# Patient Record
Sex: MALE | Race: WHITE | Employment: OTHER | URBAN - METROPOLITAN AREA
[De-identification: names, ages, dates, MRNs, and addresses within clinical notes are randomized per-mention and may not be internally consistent; named-entity substitution may affect disease eponyms.]

---

## 2017-09-08 ENCOUNTER — HOSPITAL ENCOUNTER (OUTPATIENT)
Dept: LAB | Age: 77
Discharge: HOME | End: 2017-09-08
Payer: MEDICARE

## 2017-09-08 DIAGNOSIS — R06.09 DOE (DYSPNEA ON EXERTION): ICD-10-CM

## 2017-09-08 DIAGNOSIS — I25.119 ATHEROSCLEROSIS OF NATIVE CORONARY ARTERY OF NATIVE HEART WITH ANGINA PECTORIS (HCC): ICD-10-CM

## 2017-09-08 PROBLEM — I50.32 DIASTOLIC CHF, CHRONIC (HCC): Status: ACTIVE | Noted: 2017-09-08

## 2017-09-08 LAB
ANION GAP SERPL CALC-SCNC: 9 MMOL/L
BASOPHILS # BLD: 0.1 K/UL (ref 0–0.2)
BASOPHILS NFR BLD: 1 % (ref 0–2)
BNP SERPL-MCNC: 16 PG/ML
BUN SERPL-MCNC: 58 MG/DL (ref 8–23)
CALCIUM SERPL-MCNC: 9 MG/DL (ref 8.3–10.4)
CHLORIDE SERPL-SCNC: 105 MMOL/L (ref 98–107)
CO2 SERPL-SCNC: 22 MMOL/L (ref 21–32)
CREAT SERPL-MCNC: 2.8 MG/DL (ref 0.8–1.5)
DIFFERENTIAL METHOD BLD: ABNORMAL
EOSINOPHIL # BLD: 0.4 K/UL (ref 0–0.8)
EOSINOPHIL NFR BLD: 5 % (ref 0.5–7.8)
ERYTHROCYTE [DISTWIDTH] IN BLOOD BY AUTOMATED COUNT: 13.4 % (ref 11.9–14.6)
GLUCOSE SERPL-MCNC: 152 MG/DL (ref 65–100)
HCT VFR BLD AUTO: 34.9 % (ref 41.1–50.3)
HGB BLD-MCNC: 11.7 G/DL (ref 13.6–17.2)
INR PPP: 1.1 (ref 0.9–1.2)
LYMPHOCYTES # BLD: 2.4 K/UL (ref 0.5–4.6)
LYMPHOCYTES NFR BLD: 29 % (ref 13–44)
MAGNESIUM SERPL-MCNC: 2.3 MG/DL (ref 1.8–2.4)
MCH RBC QN AUTO: 31.3 PG (ref 26.1–32.9)
MCHC RBC AUTO-ENTMCNC: 33.5 G/DL (ref 31.4–35)
MCV RBC AUTO: 93.3 FL (ref 79.6–97.8)
MONOCYTES # BLD: 0.6 K/UL (ref 0.1–1.3)
MONOCYTES NFR BLD: 7 % (ref 4–12)
NEUTS SEG # BLD: 4.8 K/UL (ref 1.7–8.2)
NEUTS SEG NFR BLD: 58 % (ref 43–78)
PLATELET # BLD AUTO: 245 K/UL (ref 150–450)
PMV BLD AUTO: 11.9 FL (ref 10.8–14.1)
POTASSIUM SERPL-SCNC: 4.3 MMOL/L (ref 3.5–5.1)
PROTHROMBIN TIME: 11.6 SEC (ref 9.6–12)
RBC # BLD AUTO: 3.74 M/UL (ref 4.23–5.67)
SODIUM SERPL-SCNC: 136 MMOL/L (ref 136–145)
WBC # BLD AUTO: 8.2 K/UL (ref 4.3–11.1)

## 2017-09-08 PROCEDURE — 85025 COMPLETE CBC W/AUTO DIFF WBC: CPT | Performed by: INTERNAL MEDICINE

## 2017-09-08 PROCEDURE — 83880 ASSAY OF NATRIURETIC PEPTIDE: CPT | Performed by: INTERNAL MEDICINE

## 2017-09-08 PROCEDURE — 83735 ASSAY OF MAGNESIUM: CPT | Performed by: INTERNAL MEDICINE

## 2017-09-08 PROCEDURE — 80048 BASIC METABOLIC PNL TOTAL CA: CPT | Performed by: INTERNAL MEDICINE

## 2017-09-08 PROCEDURE — 85610 PROTHROMBIN TIME: CPT | Performed by: INTERNAL MEDICINE

## 2017-09-08 PROCEDURE — 36415 COLL VENOUS BLD VENIPUNCTURE: CPT | Performed by: INTERNAL MEDICINE

## 2017-09-10 ENCOUNTER — HOSPITAL ENCOUNTER (OUTPATIENT)
Age: 77
Setting detail: OBSERVATION
Discharge: HOME | DRG: 303 | End: 2017-09-11
Attending: INTERNAL MEDICINE | Admitting: INTERNAL MEDICINE
Payer: MEDICARE

## 2017-09-10 PROBLEM — I20.0 UNSTABLE ANGINA (HCC): Status: ACTIVE | Noted: 2017-09-10

## 2017-09-10 LAB
ANION GAP SERPL CALC-SCNC: 11 MMOL/L (ref 7–16)
BUN SERPL-MCNC: 52 MG/DL (ref 8–23)
CALCIUM SERPL-MCNC: 8.9 MG/DL (ref 8.3–10.4)
CHLORIDE SERPL-SCNC: 106 MMOL/L (ref 98–107)
CO2 SERPL-SCNC: 22 MMOL/L (ref 21–32)
CREAT SERPL-MCNC: 2.62 MG/DL (ref 0.8–1.5)
ERYTHROCYTE [DISTWIDTH] IN BLOOD BY AUTOMATED COUNT: 14.1 % (ref 11.9–14.6)
GLUCOSE BLD STRIP.AUTO-MCNC: 149 MG/DL (ref 65–100)
GLUCOSE BLD STRIP.AUTO-MCNC: 151 MG/DL (ref 65–100)
GLUCOSE SERPL-MCNC: 145 MG/DL (ref 65–100)
HCT VFR BLD AUTO: 33.7 % (ref 41.1–50.3)
HGB BLD-MCNC: 11.7 G/DL (ref 13.6–17.2)
INR PPP: 1.1 (ref 0.9–1.2)
MAGNESIUM SERPL-MCNC: 2.1 MG/DL (ref 1.8–2.4)
MCH RBC QN AUTO: 31.4 PG (ref 26.1–32.9)
MCHC RBC AUTO-ENTMCNC: 34.7 G/DL (ref 31.4–35)
MCV RBC AUTO: 90.3 FL (ref 79.6–97.8)
PLATELET # BLD AUTO: 312 K/UL (ref 150–450)
PMV BLD AUTO: 11.1 FL (ref 10.8–14.1)
POTASSIUM SERPL-SCNC: 4.2 MMOL/L (ref 3.5–5.1)
PROTHROMBIN TIME: 11.8 SEC (ref 9.6–12)
RBC # BLD AUTO: 3.73 M/UL (ref 4.23–5.67)
SODIUM SERPL-SCNC: 139 MMOL/L (ref 136–145)
WBC # BLD AUTO: 8.9 K/UL (ref 4.3–11.1)

## 2017-09-10 PROCEDURE — 85027 COMPLETE CBC AUTOMATED: CPT | Performed by: INTERNAL MEDICINE

## 2017-09-10 PROCEDURE — 94760 N-INVAS EAR/PLS OXIMETRY 1: CPT

## 2017-09-10 PROCEDURE — 77030018846 HC SOL IRR STRL H20 ICUM -A

## 2017-09-10 PROCEDURE — 74011250637 HC RX REV CODE- 250/637: Performed by: NURSE PRACTITIONER

## 2017-09-10 PROCEDURE — 80048 BASIC METABOLIC PNL TOTAL CA: CPT | Performed by: INTERNAL MEDICINE

## 2017-09-10 PROCEDURE — 83735 ASSAY OF MAGNESIUM: CPT | Performed by: INTERNAL MEDICINE

## 2017-09-10 PROCEDURE — 74011250636 HC RX REV CODE- 250/636: Performed by: NURSE PRACTITIONER

## 2017-09-10 PROCEDURE — 65660000000 HC RM CCU STEPDOWN

## 2017-09-10 PROCEDURE — 74011636637 HC RX REV CODE- 636/637: Performed by: INTERNAL MEDICINE

## 2017-09-10 PROCEDURE — 65390000012 HC CONDITION CODE 44 OBSERVATION

## 2017-09-10 PROCEDURE — 74011636637 HC RX REV CODE- 636/637: Performed by: NURSE PRACTITIONER

## 2017-09-10 PROCEDURE — 85610 PROTHROMBIN TIME: CPT | Performed by: INTERNAL MEDICINE

## 2017-09-10 PROCEDURE — 82962 GLUCOSE BLOOD TEST: CPT

## 2017-09-10 RX ORDER — ISOSORBIDE MONONITRATE 60 MG/1
60 TABLET, EXTENDED RELEASE ORAL DAILY
Status: DISCONTINUED | OUTPATIENT
Start: 2017-09-11 | End: 2017-09-11 | Stop reason: HOSPADM

## 2017-09-10 RX ORDER — DEXTROSE 40 %
15 GEL (GRAM) ORAL AS NEEDED
Status: DISCONTINUED | OUTPATIENT
Start: 2017-09-10 | End: 2017-09-11 | Stop reason: HOSPADM

## 2017-09-10 RX ORDER — INSULIN LISPRO 100 [IU]/ML
14 INJECTION, SOLUTION INTRAVENOUS; SUBCUTANEOUS
Status: DISCONTINUED | OUTPATIENT
Start: 2017-09-10 | End: 2017-09-11 | Stop reason: HOSPADM

## 2017-09-10 RX ORDER — HYDRALAZINE HYDROCHLORIDE 50 MG/1
100 TABLET, FILM COATED ORAL 2 TIMES DAILY
Status: DISCONTINUED | OUTPATIENT
Start: 2017-09-10 | End: 2017-09-11 | Stop reason: HOSPADM

## 2017-09-10 RX ORDER — CLOPIDOGREL BISULFATE 75 MG/1
75 TABLET ORAL DAILY
Status: DISCONTINUED | OUTPATIENT
Start: 2017-09-11 | End: 2017-09-11 | Stop reason: HOSPADM

## 2017-09-10 RX ORDER — INSULIN LISPRO 100 [IU]/ML
INJECTION, SOLUTION INTRAVENOUS; SUBCUTANEOUS
Status: DISCONTINUED | OUTPATIENT
Start: 2017-09-10 | End: 2017-09-10

## 2017-09-10 RX ORDER — INSULIN LISPRO 100 [IU]/ML
INJECTION, SOLUTION INTRAVENOUS; SUBCUTANEOUS
Status: DISCONTINUED | OUTPATIENT
Start: 2017-09-10 | End: 2017-09-11 | Stop reason: HOSPADM

## 2017-09-10 RX ORDER — INSULIN GLARGINE 100 [IU]/ML
32 INJECTION, SOLUTION SUBCUTANEOUS 2 TIMES DAILY
Status: DISCONTINUED | OUTPATIENT
Start: 2017-09-10 | End: 2017-09-10

## 2017-09-10 RX ORDER — BUDESONIDE 0.5 MG/2ML
500 INHALANT ORAL
Status: DISCONTINUED | OUTPATIENT
Start: 2017-09-10 | End: 2017-09-11 | Stop reason: HOSPADM

## 2017-09-10 RX ORDER — SODIUM CHLORIDE 9 MG/ML
75 INJECTION, SOLUTION INTRAVENOUS CONTINUOUS
Status: DISCONTINUED | OUTPATIENT
Start: 2017-09-10 | End: 2017-09-11 | Stop reason: HOSPADM

## 2017-09-10 RX ORDER — SODIUM CHLORIDE 0.9 % (FLUSH) 0.9 %
5-10 SYRINGE (ML) INJECTION EVERY 8 HOURS
Status: DISCONTINUED | OUTPATIENT
Start: 2017-09-10 | End: 2017-09-11 | Stop reason: HOSPADM

## 2017-09-10 RX ORDER — DEXTROSE 50 % IN WATER (D50W) INTRAVENOUS SYRINGE
25-50 AS NEEDED
Status: DISCONTINUED | OUTPATIENT
Start: 2017-09-10 | End: 2017-09-11 | Stop reason: HOSPADM

## 2017-09-10 RX ORDER — ALLOPURINOL 300 MG/1
300 TABLET ORAL DAILY
Status: DISCONTINUED | OUTPATIENT
Start: 2017-09-11 | End: 2017-09-11 | Stop reason: HOSPADM

## 2017-09-10 RX ORDER — INSULIN GLARGINE 100 [IU]/ML
32 INJECTION, SOLUTION SUBCUTANEOUS 2 TIMES DAILY
Status: DISCONTINUED | OUTPATIENT
Start: 2017-09-11 | End: 2017-09-11 | Stop reason: HOSPADM

## 2017-09-10 RX ORDER — ALBUTEROL SULFATE 2.5 MG/.5ML
2.5 SOLUTION RESPIRATORY (INHALATION)
Status: DISCONTINUED | OUTPATIENT
Start: 2017-09-10 | End: 2017-09-11 | Stop reason: HOSPADM

## 2017-09-10 RX ORDER — INSULIN LISPRO 100 [IU]/ML
20 INJECTION, SOLUTION INTRAVENOUS; SUBCUTANEOUS
Status: DISCONTINUED | OUTPATIENT
Start: 2017-09-11 | End: 2017-09-11 | Stop reason: HOSPADM

## 2017-09-10 RX ORDER — SODIUM CHLORIDE 0.9 % (FLUSH) 0.9 %
5-10 SYRINGE (ML) INJECTION AS NEEDED
Status: DISCONTINUED | OUTPATIENT
Start: 2017-09-10 | End: 2017-09-11 | Stop reason: HOSPADM

## 2017-09-10 RX ORDER — INSULIN GLARGINE 100 [IU]/ML
16 INJECTION, SOLUTION SUBCUTANEOUS ONCE
Status: COMPLETED | OUTPATIENT
Start: 2017-09-10 | End: 2017-09-10

## 2017-09-10 RX ORDER — ALBUTEROL SULFATE 90 UG/1
1 AEROSOL, METERED RESPIRATORY (INHALATION)
Status: DISCONTINUED | OUTPATIENT
Start: 2017-09-10 | End: 2017-09-11 | Stop reason: HOSPADM

## 2017-09-10 RX ORDER — LEVOTHYROXINE SODIUM 50 UG/1
50 TABLET ORAL
Status: DISCONTINUED | OUTPATIENT
Start: 2017-09-11 | End: 2017-09-11 | Stop reason: HOSPADM

## 2017-09-10 RX ORDER — ASPIRIN 81 MG/1
81 TABLET ORAL DAILY
Status: DISCONTINUED | OUTPATIENT
Start: 2017-09-11 | End: 2017-09-11 | Stop reason: HOSPADM

## 2017-09-10 RX ADMIN — HYDRALAZINE HYDROCHLORIDE 100 MG: 50 TABLET, FILM COATED ORAL at 17:10

## 2017-09-10 RX ADMIN — INSULIN GLARGINE 16 UNITS: 100 INJECTION, SOLUTION SUBCUTANEOUS at 17:09

## 2017-09-10 RX ADMIN — INSULIN LISPRO 14 UNITS: 100 INJECTION, SOLUTION INTRAVENOUS; SUBCUTANEOUS at 17:09

## 2017-09-10 RX ADMIN — SODIUM CHLORIDE 75 ML/HR: 900 INJECTION, SOLUTION INTRAVENOUS at 15:44

## 2017-09-10 NOTE — PROGRESS NOTES
Patient received to room 310 as direct admit. Patient oriented to room, call light and plan of care. Admission assessment completed. Admission skin assessment completed with second RN and reveals the following: scattered scabs, otherwise skin intact.

## 2017-09-10 NOTE — IP AVS SNAPSHOT
[address] 
[phone] Patient: Gladsi Lomas MRN: [id number] [facility]:6/43[birth year]39 You are allergic to the following Allergen Reactions Amlodipine Shortness of Breath Iodine Shortness of Breath Metformin Shortness of Breath Ranolazine Unknown (comments) Lips and tongue numbness Spironolactone Unknown (comments) Other (comments) Potassium level went really high Increased potassium Beta Blocker (Beta-Blockers (Beta-Adrenergic Blocking Agts)) Other (comments) Mental fogginess Lortab (Hydrocodone-Acetaminophen) Unknown (comments) Pt states that if he takes this  The medication makes him feel like he is going crazy Shellfish Containing Products Shortness of Breath Swelling Recent Documentation Height Weight BMI Smoking Status 1.854 m 149.3 kg 43.43 kg/m2 Former Smoker Emergency Contacts Name Discharge Info Relation Home Work Mobile Aly Livingston  Spouse [3] [phone] About your hospitalization You were admitted on:  September 10, 2017 You last received care in the:  [facility] 3 TELEMETRY You were discharged on:  September 11, 2017 Unit phone number:  [phone] Why you were hospitalized Your primary diagnosis was:  Unstable Angina (Hcc) Your diagnoses also included:  Hypertension, Edema, Gallo (Obstructive Sleep Apnea), Ckd (Chronic Kidney Disease), Diabetes Mellitus Type 2 In Obese (Hcc), Spaulding (Dyspnea On Exertion), Diastolic Chf, Chronic (Hcc) Providers Seen During Your Hospitalizations Provider Role Specialty Primary office phone Reva Crocker DO Attending Provider Cardiology [phone] Your Primary Care Physician (PCP) Primary Care Physician Office Phone Office Fax Debbie Coelho[address] [phone] Follow-up Information Follow up With Details Comments Contact Info Mariela Jose DO In 4 weeks Sofia Fontenot and then follow up in the office with Dr. Freya Wood. Office will call Velvet  [address] [facility] [place] [facility] [address] 
[phone] Matt Plascencia MD In 1 week call in AM for follow up appointment in one week Jaspreet Wallis [address] [place] [id number] 
[phone] Your Appointments Thursday September 28, 2017 10:00 AM EDT Office Visit with Mariela Jose DO  
[facility] CARDIOLOGY [place] OFFICE ([address] [place] [address]) [address] [place] [address]  
[phone] Current Discharge Medication List  
  
START taking these medications Dose & Instructions Dispensing Information Comments Morning Noon Evening Bedtime  
 famotidine 40 mg tablet Commonly known as:  PEPCID Your last dose was: Your next dose is:    
   
   
 Dose:  40 mg Take 1 Tab by mouth two (2) times a day. Quantity:  60 Tab Refills:  3 CONTINUE these medications which have CHANGED Dose & Instructions Dispensing Information Comments Morning Noon Evening Bedtime  
 hydrALAZINE 100 mg tablet Commonly known as:  APRESOLINE What changed:  how much to take Your last dose was: Your next dose is:    
   
   
 Dose:  100 mg Take 1 Tab by mouth two (2) times a day. Quantity:  180 Tab Refills:  3 CONTINUE these medications which have NOT CHANGED Dose & Instructions Dispensing Information Comments Morning Noon Evening Bedtime  
 albuterol 90 mcg/actuation inhaler Commonly known as:  PROAIR HFA Your last dose was: Your next dose is:    
   
   
 2 puffs qid prn Quantity:  1 Inhaler Refills:  11  
     
   
   
   
  
 allopurinol 300 mg tablet Commonly known as:  Nakia Nip Your last dose was: Your next dose is: Dose:  300 mg Take 300 mg by mouth daily. Refills:  0  
     
   
   
   
  
 aspirin delayed-release 81 mg tablet Your last dose was: Your next dose is:    
   
   
 Dose:  81 mg Take 81 mg by mouth. Refills:  0 Cholecalciferol (Vitamin D3) 2,000 unit Cap capsule Commonly known as:  VITAMIN D3 Your last dose was: Your next dose is:    
   
   
 Dose:  1 Cap Take 1 Cap by mouth. Refills:  0  
     
   
   
   
  
 clopidogrel 75 mg Tab Commonly known as:  PLAVIX Your last dose was: Your next dose is:    
   
   
 Dose:  75 mg Take 1 Tab by mouth daily. Quantity:  90 Tab Refills:  3  
     
   
   
   
  
 cpap machine kit Your last dose was: Your next dose is:    
   
   
 8/12 cm qhs Refills:  0  
     
   
   
   
  
 fluticasone-vilanterol 100-25 mcg/dose inhaler Commonly known as:  BREO ELLIPTA Your last dose was: Your next dose is:    
   
   
 1 inhalation daily, rinse mouth after use Quantity:  1 Inhaler Refills:  11  
     
   
   
   
  
 glucose blood VI test strips strip Commonly known as:  ASCENSIA AUTODISC VI, ONE TOUCH ULTRA TEST VI Your last dose was: Your next dose is:    
   
   
 by Does Not Apply route. Refills:  0 HumaLOG 100 unit/mL injection Generic drug:  insulin lispro Your last dose was: Your next dose is:    
   
   
 Sliding scale Refills:  0 Insulin Needles (Disposable) 32 gauge x 5/32\" Ndle Your last dose was: Your next dose is:    
   
   
 by Does Not Apply route. Refills:  0  
     
   
   
   
  
 isosorbide mononitrate ER 60 mg CR tablet Commonly known as:  IMDUR Your last dose was: Your next dose is:    
   
   
 Dose:  60 mg Take 1 Tab by mouth every morning. Quantity:  90 Tab Refills:  3 Lancets Misc Your last dose was: Your next dose is:    
   
   
 by Does Not Apply route. Refills:  0  
     
   
   
   
  
 LANTUS 100 unit/mL injection Generic drug:  insulin glargine Your last dose was: Your next dose is:    
   
   
 Dose:  32 Units 32 Units by SubCUTAneous route two (2) times a day. Refills:  0  
     
   
   
   
  
 levothyroxine 50 mcg tablet Commonly known as:  SYNTHROID Your last dose was: Your next dose is: Take  by mouth Daily (before breakfast). Refills:  0  
     
   
   
   
  
 torsemide 10 mg tablet Commonly known as:  DEMADEX Your last dose was: Your next dose is:    
   
   
 Dose:  10 mg Take 10 mg by mouth two (2) times a day. Refills:  0  
     
   
   
   
  
 TRULICITY 1.5 HN/3.2 mL sub-q pen Generic drug:  dulaglutide Your last dose was: Your next dose is:    
   
   
  Refills:  0 STOP taking these medications   
 losartan-hydroCHLOROthiazide 100-25 mg per tablet Commonly known as:  HYZAAR Where to Get Your Medications These medications were sent to [address] [place] [address]  [address] [place] [address] Phone:  [phone]  
  famotidine 40 mg tablet Discharge Instructions Cardiac Catheterization/Angiography Discharge Instructions *Check the puncture site frequently for swelling or bleeding. If you see any bleeding, lie down and apply pressure over the area with a clean town or washcloth. Notify your doctor for any redness, swelling, drainage or oozing from the puncture site. Notify your doctor for any fever or chills. *If the leg or arm with the puncture becomes cold, numb or painful, call Dr Mohini Schaffer at  [phone]. *Activity should be limited for the next 48 hours.  Climb stairs as little as possible and avoid any stooping, bending or strenuous activity for 48 hours. No heavy lifting (anything over 10 pounds) for three days. DISCHARGE SUMMARY from Nurse The following personal items are in your possession at time of discharge: 
 
Dental Appliances: None Home Medications: Sent home Jewelry: None Clothing: At bedside Other Valuables: None PATIENT INSTRUCTIONS: 
 
After general anesthesia or intravenous sedation, for 24 hours or while taking prescription Narcotics: · Limit your activities · Do not drive and operate hazardous machinery · Do not make important personal or business decisions · Do  not drink alcoholic beverages · If you have not urinated within 8 hours after discharge, please contact your surgeon on call. Report the following to your surgeon: 
· Excessive pain, swelling, redness or odor of or around the surgical area · Temperature over 100.5 · Nausea and vomiting lasting longer than 4 hours or if unable to take medications · Any signs of decreased circulation or nerve impairment to extremity: change in color, persistent  numbness, tingling, coldness or increase pain · Any questions What to do at Home: *  Please give a list of your current medications to your Primary Care Provider. *  Please update this list whenever your medications are discontinued, doses are 
    changed, or new medications (including over-the-counter products) are added. *  Please carry medication information at all times in case of emergency situations. These are general instructions for a healthy lifestyle: No smoking/ No tobacco products/ Avoid exposure to second hand smoke Surgeon General's Warning:  Quitting smoking now greatly reduces serious risk to your health. Obesity, smoking, and sedentary lifestyle greatly increases your risk for illness A healthy diet, regular physical exercise & weight monitoring are important for maintaining a healthy lifestyle You may be retaining fluid if you have a history of heart failure or if you experience any of the following symptoms:  Weight gain of 3 pounds or more overnight or 5 pounds in a week, increased swelling in our hands or feet or shortness of breath while lying flat in bed. Please call your doctor as soon as you notice any of these symptoms; do not wait until your next office visit. Recognize signs and symptoms of STROKE: 
 
F-face looks uneven A-arms unable to move or move unevenly S-speech slurred or non-existent T-time-call 911 as soon as signs and symptoms begin-DO NOT go Back to bed or wait to see if you get better-TIME IS BRAIN. Warning Signs of HEART ATTACK Call 911 if you have these symptoms: 
? Chest discomfort. Most heart attacks involve discomfort in the center of the chest that lasts more than a few minutes, or that goes away and comes back. It can feel like uncomfortable pressure, squeezing, fullness, or pain. ? Discomfort in other areas of the upper body. Symptoms can include pain or discomfort in one or both arms, the back, neck, jaw, or stomach. ? Shortness of breath with or without chest discomfort. ? Other signs may include breaking out in a cold sweat, nausea, or lightheadedness. Don't wait more than five minutes to call [address]! Fast action can save your life. Calling 911 is almost always the fastest way to get lifesaving treatment. Emergency Medical Services staff can begin treatment when they arrive  up to an hour sooner than if someone gets to the hospital by car. The discharge information has been reviewed with the patient. The patient verbalized understanding. Discharge medications reviewed with the patient and appropriate educational materials and side effects teaching were provided. *Do not drive for 48 hours. *You may resume your usual diet.  Drink more fluids than usual. 
 
 *Have a responsible person drive you home and stay with you for at least 24 hours after your heart catheterization/angiography. *You may remove the bandage from your Right and Arm in 24 hours. You may shower in 24 hours. No tub baths, hot tubs or swimming for one week. Do not place any lotions, creams, powders, ointments over the puncture site for one week. You may place a clean band-aid over the puncture site each day for 5 days. Change this daily. Discharge Orders None ACO Transitions of Care Introducing [employer] [address] offers a voluntary care coordination program to provide high quality service and care to [facility] fee-for-service beneficiaries. John Monahan was designed to help you enhance your health and well-being through the following services: ? Transitions of Care  support for individuals who are transitioning from one care setting to another (example: Hospital to home). ? Chronic and Complex Care Coordination  support for individuals and caregivers of those with serious or chronic illnesses or with more than one chronic (ongoing) condition and those who take a number of different medications. If you meet specific medical criteria, a [address] [place] [address] Rd may call you directly to coordinate your care with your primary care physician and your other care providers. For questions about the [facility] programs, please, contact your physicians office. For general questions or additional information about Accountable Care Organizations: 
Please visit www.medicare.gov/acos. html or call 1-800-MEDICARE (4-135.109.5822) TTY users should call 3-601.129.1278. [employer] Announcement We are excited to announce that we are making your provider's discharge notes available to you in [employer]   You will see these notes when they are completed and signed by the physician that discharged you from your recent hospital stay. If you have any questions or concerns about any information you see in [employer], please call the Health Information Department where you were seen or reach out to your Primary Care Provider for more information about your plan of care. Introducing [facility]! Dear Margot Bolton: Thank you for requesting a [employer] account. Our records indicate that you already have an active [employer] account. You can access your account anytime at [email or web address] Did you know that you can access your hospital and ER discharge instructions at any time in [employer]? You can also review all of your test results from your hospital stay or ER visit. Additional Information If you have questions, please visit the Frequently Asked Questions section of the [employer] website at [email or web address] Remember, [employer] is NOT to be used for urgent needs. For medical emergencies, dial 911. Now available from your iPhone and Android! General Information Please provide this summary of care documentation to your next provider. Patient Signature:  ____________________________________________________________ Date:  ____________________________________________________________  
  
Daksha Pope Provider Signature:  ____________________________________________________________ Date:  ____________________________________________________________

## 2017-09-10 NOTE — IP AVS SNAPSHOT
303 [facility] 
 
 
 [address] [place] [address] 
[phone] Patient: Nicolette March MRN: [id number] XNP:[birth year] Current Discharge Medication List  
  
START taking these medications Dose & Instructions Dispensing Information Comments Morning Noon Evening Bedtime  
 famotidine 40 mg tablet Commonly known as:  PEPCID Your last dose was: Your next dose is:    
   
   
 Dose:  40 mg Take 1 Tab by mouth two (2) times a day. Quantity:  60 Tab Refills:  3 CONTINUE these medications which have CHANGED Dose & Instructions Dispensing Information Comments Morning Noon Evening Bedtime  
 hydrALAZINE 100 mg tablet Commonly known as:  APRESOLINE What changed:  how much to take Your last dose was: Your next dose is:    
   
   
 Dose:  100 mg Take 1 Tab by mouth two (2) times a day. Quantity:  180 Tab Refills:  3 CONTINUE these medications which have NOT CHANGED Dose & Instructions Dispensing Information Comments Morning Noon Evening Bedtime  
 albuterol 90 mcg/actuation inhaler Commonly known as:  PROAIR HFA Your last dose was: Your next dose is:    
   
   
 2 puffs qid prn Quantity:  1 Inhaler Refills:  11  
     
   
   
   
  
 allopurinol 300 mg tablet Commonly known as:  Darek Colin Your last dose was: Your next dose is:    
   
   
 Dose:  300 mg Take 300 mg by mouth daily. Refills:  0  
     
   
   
   
  
 aspirin delayed-release 81 mg tablet Your last dose was: Your next dose is:    
   
   
 Dose:  81 mg Take 81 mg by mouth. Refills:  0 Cholecalciferol (Vitamin D3) 2,000 unit Cap capsule Commonly known as:  VITAMIN D3 Your last dose was: Your next dose is:    
   
   
 Dose:  1 Cap Take 1 Cap by mouth. Refills:  0 clopidogrel 75 mg Tab Commonly known as:  PLAVIX Your last dose was: Your next dose is:    
   
   
 Dose:  75 mg Take 1 Tab by mouth daily. Quantity:  90 Tab Refills:  3  
     
   
   
   
  
 cpap machine kit Your last dose was: Your next dose is:    
   
   
 8/12 cm qhs Refills:  0  
     
   
   
   
  
 fluticasone-vilanterol 100-25 mcg/dose inhaler Commonly known as:  BREO ELLIPTA Your last dose was: Your next dose is:    
   
   
 1 inhalation daily, rinse mouth after use Quantity:  1 Inhaler Refills:  11  
     
   
   
   
  
 glucose blood VI test strips strip Commonly known as:  ASCENSIA AUTODISC VI, ONE TOUCH ULTRA TEST VI Your last dose was: Your next dose is:    
   
   
 by Does Not Apply route. Refills:  0 HumaLOG 100 unit/mL injection Generic drug:  insulin lispro Your last dose was: Your next dose is:    
   
   
 Sliding scale Refills:  0 Insulin Needles (Disposable) 32 gauge x 5/32\" Ndle Your last dose was: Your next dose is:    
   
   
 by Does Not Apply route. Refills:  0  
     
   
   
   
  
 isosorbide mononitrate ER 60 mg CR tablet Commonly known as:  IMDUR Your last dose was: Your next dose is:    
   
   
 Dose:  60 mg Take 1 Tab by mouth every morning. Quantity:  90 Tab Refills:  3 Lancets Misc Your last dose was: Your next dose is:    
   
   
 by Does Not Apply route. Refills:  0  
     
   
   
   
  
 LANTUS 100 unit/mL injection Generic drug:  insulin glargine Your last dose was: Your next dose is:    
   
   
 Dose:  32 Units 32 Units by SubCUTAneous route two (2) times a day. Refills:  0  
     
   
   
   
  
 levothyroxine 50 mcg tablet Commonly known as:  SYNTHROID Your last dose was: Your next dose is: Take  by mouth Daily (before breakfast). Refills:  0  
     
   
   
   
  
 torsemide 10 mg tablet Commonly known as:  DEMADEX Your last dose was: Your next dose is:    
   
   
 Dose:  10 mg Take 10 mg by mouth two (2) times a day. Refills:  0  
     
   
   
   
  
 TRULICITY 1.5 NZ/6.6 mL sub-q pen Generic drug:  dulaglutide Your last dose was: Your next dose is:    
   
   
  Refills:  0 STOP taking these medications   
 losartan-hydroCHLOROthiazide 100-25 mg per tablet Commonly known as:  HYZAAR Where to Get Your Medications These medications were sent to [address] [place] [address]  [address] [place], [place] [id number] Phone:  [phone]  
  famotidine 40 mg tablet

## 2017-09-10 NOTE — H&P
Please see office note from 9/8 --admitted for prehydration prior to decided upon [facility] and possible PCI for angina.

## 2017-09-11 VITALS
RESPIRATION RATE: 16 BRPM | BODY MASS INDEX: 41.75 KG/M2 | DIASTOLIC BLOOD PRESSURE: 87 MMHG | TEMPERATURE: 97.7 F | WEIGHT: 315 LBS | HEIGHT: 73 IN | OXYGEN SATURATION: 97 % | SYSTOLIC BLOOD PRESSURE: 145 MMHG | HEART RATE: 82 BPM

## 2017-09-11 LAB
ACT BLD: 439 SECS (ref 70–128)
ALBUMIN SERPL-MCNC: 3.2 G/DL (ref 3.2–4.6)
ALBUMIN/GLOB SERPL: 0.9 {RATIO} (ref 1.2–3.5)
ALP SERPL-CCNC: 55 U/L (ref 50–136)
ALT SERPL-CCNC: 26 U/L (ref 12–65)
ANION GAP SERPL CALC-SCNC: 10 MMOL/L (ref 7–16)
AST SERPL-CCNC: 20 U/L (ref 15–37)
BILIRUB SERPL-MCNC: 0.4 MG/DL (ref 0.2–1.1)
BNP SERPL-MCNC: 30 PG/ML
BUN SERPL-MCNC: 48 MG/DL (ref 8–23)
CALCIUM SERPL-MCNC: 8.7 MG/DL (ref 8.3–10.4)
CHLORIDE SERPL-SCNC: 106 MMOL/L (ref 98–107)
CHOLEST SERPL-MCNC: 154 MG/DL
CO2 SERPL-SCNC: 23 MMOL/L (ref 21–32)
CREAT SERPL-MCNC: 2.49 MG/DL (ref 0.8–1.5)
EST. AVERAGE GLUCOSE BLD GHB EST-MCNC: 197 MG/DL
GLOBULIN SER CALC-MCNC: 3.6 G/DL (ref 2.3–3.5)
GLUCOSE BLD STRIP.AUTO-MCNC: 197 MG/DL (ref 65–100)
GLUCOSE BLD STRIP.AUTO-MCNC: 231 MG/DL (ref 65–100)
GLUCOSE BLD STRIP.AUTO-MCNC: 234 MG/DL (ref 65–100)
GLUCOSE BLD STRIP.AUTO-MCNC: 241 MG/DL (ref 65–100)
GLUCOSE BLD STRIP.AUTO-MCNC: 291 MG/DL (ref 65–100)
GLUCOSE SERPL-MCNC: 182 MG/DL (ref 65–100)
HBA1C MFR BLD: 8.5 % (ref 4.8–6)
HDLC SERPL-MCNC: 36 MG/DL (ref 40–60)
HDLC SERPL: 4.3 {RATIO}
LDLC SERPL CALC-MCNC: 83.2 MG/DL
LIPID PROFILE,FLP: ABNORMAL
MAGNESIUM SERPL-MCNC: 2 MG/DL (ref 1.8–2.4)
POTASSIUM SERPL-SCNC: 4.3 MMOL/L (ref 3.5–5.1)
PROT SERPL-MCNC: 6.8 G/DL (ref 6.3–8.2)
SODIUM SERPL-SCNC: 139 MMOL/L (ref 136–145)
TRIGL SERPL-MCNC: 174 MG/DL (ref 35–150)
TSH SERPL DL<=0.005 MIU/L-ACNC: 2.01 UIU/ML (ref 0.36–3.74)
VLDLC SERPL CALC-MCNC: 34.8 MG/DL (ref 6–23)

## 2017-09-11 PROCEDURE — 80061 LIPID PANEL: CPT | Performed by: INTERNAL MEDICINE

## 2017-09-11 PROCEDURE — 74011250637 HC RX REV CODE- 250/637: Performed by: PHYSICIAN ASSISTANT

## 2017-09-11 PROCEDURE — 74011250636 HC RX REV CODE- 250/636: Performed by: INTERNAL MEDICINE

## 2017-09-11 PROCEDURE — 36415 COLL VENOUS BLD VENIPUNCTURE: CPT | Performed by: INTERNAL MEDICINE

## 2017-09-11 PROCEDURE — 74011250636 HC RX REV CODE- 250/636

## 2017-09-11 PROCEDURE — C1894 INTRO/SHEATH, NON-LASER: HCPCS

## 2017-09-11 PROCEDURE — 77030004559 HC CATH ANGI DX SUPT CARD -B

## 2017-09-11 PROCEDURE — 77030015766

## 2017-09-11 PROCEDURE — 74011636320 HC RX REV CODE- 636/320: Performed by: INTERNAL MEDICINE

## 2017-09-11 PROCEDURE — 83036 HEMOGLOBIN GLYCOSYLATED A1C: CPT | Performed by: INTERNAL MEDICINE

## 2017-09-11 PROCEDURE — 83880 ASSAY OF NATRIURETIC PEPTIDE: CPT | Performed by: INTERNAL MEDICINE

## 2017-09-11 PROCEDURE — 80048 BASIC METABOLIC PNL TOTAL CA: CPT | Performed by: INTERNAL MEDICINE

## 2017-09-11 PROCEDURE — 74011250637 HC RX REV CODE- 250/637: Performed by: NURSE PRACTITIONER

## 2017-09-11 PROCEDURE — 74011250637 HC RX REV CODE- 250/637: Performed by: INTERNAL MEDICINE

## 2017-09-11 PROCEDURE — 93571 IV DOP VEL&/PRESS C FLO 1ST: CPT

## 2017-09-11 PROCEDURE — 99218 HC RM OBSERVATION: CPT

## 2017-09-11 PROCEDURE — 74011000250 HC RX REV CODE- 250: Performed by: INTERNAL MEDICINE

## 2017-09-11 PROCEDURE — 74011000258 HC RX REV CODE- 258: Performed by: INTERNAL MEDICINE

## 2017-09-11 PROCEDURE — 80053 COMPREHEN METABOLIC PANEL: CPT | Performed by: INTERNAL MEDICINE

## 2017-09-11 PROCEDURE — C1769 GUIDE WIRE: HCPCS

## 2017-09-11 PROCEDURE — 77030004534 HC CATH ANGI DX INFN CARD -A

## 2017-09-11 PROCEDURE — 74011636637 HC RX REV CODE- 636/637: Performed by: INTERNAL MEDICINE

## 2017-09-11 PROCEDURE — 85347 COAGULATION TIME ACTIVATED: CPT

## 2017-09-11 PROCEDURE — C1887 CATHETER, GUIDING: HCPCS

## 2017-09-11 PROCEDURE — 77030012468 HC VLV BLEEDBK CNTRL ABBT -B

## 2017-09-11 PROCEDURE — 93458 L HRT ARTERY/VENTRICLE ANGIO: CPT

## 2017-09-11 PROCEDURE — 77030029997 HC DEV COM RDL R BND TELE -B

## 2017-09-11 PROCEDURE — 84443 ASSAY THYROID STIM HORMONE: CPT | Performed by: INTERNAL MEDICINE

## 2017-09-11 PROCEDURE — 82962 GLUCOSE BLOOD TEST: CPT

## 2017-09-11 PROCEDURE — 83735 ASSAY OF MAGNESIUM: CPT | Performed by: INTERNAL MEDICINE

## 2017-09-11 PROCEDURE — 74011636637 HC RX REV CODE- 636/637: Performed by: NURSE PRACTITIONER

## 2017-09-11 RX ORDER — DIPHENHYDRAMINE HCL 25 MG
25 CAPSULE ORAL EVERY 6 HOURS
Status: DISCONTINUED | OUTPATIENT
Start: 2017-09-11 | End: 2017-09-11 | Stop reason: HOSPADM

## 2017-09-11 RX ORDER — HYDROCORTISONE SODIUM SUCCINATE 100 MG/2ML
100 INJECTION, POWDER, FOR SOLUTION INTRAMUSCULAR; INTRAVENOUS ONCE
Status: COMPLETED | OUTPATIENT
Start: 2017-09-11 | End: 2017-09-11

## 2017-09-11 RX ORDER — SODIUM CHLORIDE 0.9 % (FLUSH) 0.9 %
5-10 SYRINGE (ML) INJECTION EVERY 8 HOURS
Status: DISCONTINUED | OUTPATIENT
Start: 2017-09-11 | End: 2017-09-11 | Stop reason: HOSPADM

## 2017-09-11 RX ORDER — HEPARIN SODIUM 200 [USP'U]/100ML
3 INJECTION, SOLUTION INTRAVENOUS CONTINUOUS
Status: DISCONTINUED | OUTPATIENT
Start: 2017-09-11 | End: 2017-09-11 | Stop reason: HOSPADM

## 2017-09-11 RX ORDER — FAMOTIDINE 10 MG/ML
20 INJECTION INTRAVENOUS AS NEEDED
Status: DISCONTINUED | OUTPATIENT
Start: 2017-09-11 | End: 2017-09-11 | Stop reason: HOSPADM

## 2017-09-11 RX ORDER — METOPROLOL TARTRATE 5 MG/5ML
5 INJECTION INTRAVENOUS ONCE
Status: COMPLETED | OUTPATIENT
Start: 2017-09-11 | End: 2017-09-11

## 2017-09-11 RX ORDER — FAMOTIDINE 40 MG/1
40 TABLET, FILM COATED ORAL 2 TIMES DAILY
Qty: 60 TAB | Refills: 3 | Status: SHIPPED | OUTPATIENT
Start: 2017-09-11 | End: 2017-09-28

## 2017-09-11 RX ORDER — PREDNISONE 20 MG/1
20 TABLET ORAL
Status: COMPLETED | OUTPATIENT
Start: 2017-09-11 | End: 2017-09-11

## 2017-09-11 RX ORDER — ADENOSINE 3 MG/ML
140 INJECTION, SOLUTION INTRAVENOUS
Status: DISCONTINUED | OUTPATIENT
Start: 2017-09-11 | End: 2017-09-11 | Stop reason: HOSPADM

## 2017-09-11 RX ORDER — SODIUM CHLORIDE 0.9 % (FLUSH) 0.9 %
5-10 SYRINGE (ML) INJECTION AS NEEDED
Status: DISCONTINUED | OUTPATIENT
Start: 2017-09-11 | End: 2017-09-11 | Stop reason: HOSPADM

## 2017-09-11 RX ORDER — DIPHENHYDRAMINE HYDROCHLORIDE 50 MG/ML
25 INJECTION, SOLUTION INTRAMUSCULAR; INTRAVENOUS ONCE
Status: COMPLETED | OUTPATIENT
Start: 2017-09-11 | End: 2017-09-11

## 2017-09-11 RX ORDER — SODIUM CHLORIDE 9 MG/ML
100 INJECTION, SOLUTION INTRAVENOUS CONTINUOUS
Status: DISCONTINUED | OUTPATIENT
Start: 2017-09-11 | End: 2017-09-11 | Stop reason: HOSPADM

## 2017-09-11 RX ORDER — LIDOCAINE HYDROCHLORIDE 20 MG/ML
1-20 INJECTION, SOLUTION INFILTRATION; PERINEURAL
Status: DISCONTINUED | OUTPATIENT
Start: 2017-09-11 | End: 2017-09-11 | Stop reason: HOSPADM

## 2017-09-11 RX ORDER — GUAIFENESIN 100 MG/5ML
243 LIQUID (ML) ORAL
Status: COMPLETED | OUTPATIENT
Start: 2017-09-11 | End: 2017-09-11

## 2017-09-11 RX ORDER — FAMOTIDINE 20 MG/1
40 TABLET, FILM COATED ORAL 2 TIMES DAILY
Status: DISCONTINUED | OUTPATIENT
Start: 2017-09-11 | End: 2017-09-11 | Stop reason: HOSPADM

## 2017-09-11 RX ADMIN — ADENOSINE 20.9 MG/MIN: 3 INJECTION, SOLUTION INTRAVENOUS at 14:05

## 2017-09-11 RX ADMIN — Medication 10 ML: at 16:00

## 2017-09-11 RX ADMIN — IOPAMIDOL 130 ML: 755 INJECTION, SOLUTION INTRAVENOUS at 14:08

## 2017-09-11 RX ADMIN — DIPHENHYDRAMINE HYDROCHLORIDE 25 MG: 25 CAPSULE ORAL at 09:27

## 2017-09-11 RX ADMIN — BIVALIRUDIN 1.75 MG/KG/HR: 250 INJECTION, POWDER, LYOPHILIZED, FOR SOLUTION INTRAVENOUS at 13:45

## 2017-09-11 RX ADMIN — CLOPIDOGREL BISULFATE 75 MG: 75 TABLET ORAL at 09:27

## 2017-09-11 RX ADMIN — METOPROLOL TARTRATE 5 MG: 5 INJECTION INTRAVENOUS at 13:32

## 2017-09-11 RX ADMIN — HYDROCORTISONE SODIUM SUCCINATE 100 MG: 100 INJECTION, POWDER, FOR SOLUTION INTRAMUSCULAR; INTRAVENOUS at 13:18

## 2017-09-11 RX ADMIN — INSULIN LISPRO 14 UNITS: 100 INJECTION, SOLUTION INTRAVENOUS; SUBCUTANEOUS at 16:01

## 2017-09-11 RX ADMIN — FAMOTIDINE 40 MG: 20 TABLET ORAL at 09:28

## 2017-09-11 RX ADMIN — LIDOCAINE HYDROCHLORIDE 60 MG: 20 INJECTION, SOLUTION INFILTRATION; PERINEURAL at 13:22

## 2017-09-11 RX ADMIN — HEPARIN SODIUM 2 ML: 10000 INJECTION, SOLUTION INTRAVENOUS; SUBCUTANEOUS at 13:26

## 2017-09-11 RX ADMIN — ASPIRIN 81 MG: 81 TABLET, COATED ORAL at 09:28

## 2017-09-11 RX ADMIN — DIPHENHYDRAMINE HYDROCHLORIDE 25 MG: 50 INJECTION, SOLUTION INTRAMUSCULAR; INTRAVENOUS at 13:18

## 2017-09-11 RX ADMIN — INSULIN GLARGINE 32 UNITS: 100 INJECTION, SOLUTION SUBCUTANEOUS at 15:59

## 2017-09-11 RX ADMIN — INSULIN LISPRO 4 UNITS: 100 INJECTION, SOLUTION INTRAVENOUS; SUBCUTANEOUS at 16:02

## 2017-09-11 RX ADMIN — FAMOTIDINE 20 MG: 10 INJECTION, SOLUTION INTRAVENOUS at 13:19

## 2017-09-11 RX ADMIN — PREDNISONE 20 MG: 20 TABLET ORAL at 09:27

## 2017-09-11 RX ADMIN — HYDRALAZINE HYDROCHLORIDE 100 MG: 50 TABLET, FILM COATED ORAL at 09:29

## 2017-09-11 RX ADMIN — ALLOPURINOL 300 MG: 300 TABLET ORAL at 09:27

## 2017-09-11 RX ADMIN — ASPIRIN 81 MG 243 MG: 81 TABLET ORAL at 10:38

## 2017-09-11 RX ADMIN — ISOSORBIDE MONONITRATE 60 MG: 60 TABLET, EXTENDED RELEASE ORAL at 09:30

## 2017-09-11 RX ADMIN — LEVOTHYROXINE SODIUM 50 MCG: 50 TABLET ORAL at 09:28

## 2017-09-11 RX ADMIN — NITROGLYCERIN 0.4 MG: 200 INJECTION, SOLUTION INTRAVENOUS at 14:09

## 2017-09-11 NOTE — DISCHARGE SUMMARY
[address] [place] [address] Cardiology Discharge Summary     Patient ID:  Alena Bolaños  [id number]  3 y.o.  1940    Admit date: 9/10/2017    Discharge date:  09/11/2017    Admitting Physician: Isadora Cline DO     Discharge Physician: Fabi Song NP/Dr. Ashley Landry    Admission Diagnoses: PRE CARDIAC CATH HYDRATION  Unstable angina ([facility] [address] [facility]  Unstable angina [facility])    Discharge Diagnoses:    Diagnosis    [facility]    Diastolic CHF, chronic ([facility]    Acquired hypothyroidism    Anemia in chronic kidney disease    Cough variant asthma    Coronary atherosclerosis of native coronary vessel    Dyspnea    CHRISTY (dyspnea on exertion)    Abnormal nuclear stress test    Elevated liver function tests    Hyponatremia    Anemia    Hypertension    Edema    CKD (chronic kidney disease)    PHILLIP (obstructive sleep apnea)    Diabetes mellitus type 2 in obese [facility])       Cardiology Procedures this admission:  Diagnostic left heart catheterization  Consults: [facility] Course: Patient was seen at the office of [address] [place] [address] Cardiology by Dr. Robe Tapia for complaints of shortness of breath and fatigue and was subsequently directly admitted for hydration over weekend given renal function with [facility] planned for following Monday. Patient underwent cardiac catheterization by Dr. Ashley Landry. Patient was found to have patent LAD stents and FFR of 0.93 of mid circumflex, medical management was continued. Patient tolerated the procedure well and was taken to the telemetry floor for recovery. The evening of discharge, patient was up feeling well without any complaints of chest pain or shortness of breath. Patient's right radial cath site was clean, dry and intact without hematoma or bruit. Patient's labs were WNL. Patient was seen and examined by Dr. Ashley Landry and determined stable and ready for discharge. Losartan/HCTZ stopped due to worsening renal function.   The patient will follow up with [address] [place] [address] Cardiology -- Dr. Mandy Melendez in 2 weeks with echo prior to follow up. DISPOSITION: The patient is being discharged home in stable condition on a low saturated fat, low cholesterol and low salt diet. The patient is instructed to advance activities as tolerated to the limit of fatigue or shortness of breath. The patient is instructed to avoid all heavy lifting, straining, stooping or squatting for 3-5 days. The patient is instructed to watch the cath site for bleeding/oozing; if seen, the patient is instructed to apply firm pressure with a clean cloth and call [facility] at [phone]. The patient is instructed to watch for signs of infection which include: increasing area of redness, fever/hot to touch or purulent drainage at the catheterization site. The patient is instructed not to soak in a bathtub for 7-10 days, but is cleared to shower. The patient is instructed to call the office or return to the ER for immediate evaluation for any shortness of breath or chest pain not relieved by NTG.         Discharge Exam:   Visit Vitals    /87    Pulse 82    Temp 97.7 °F (36.5 °C)    Resp 16    Ht 6' 1\" (1.854 m)    Wt 149.3 kg (329 lb 3.2 oz)    SpO2 97%    BMI 43.43 kg/m2       Recent Results (from the past 24 hour(s))   GLUCOSE, POC    Collection Time: 09/10/17  4:39 PM   Result Value Ref Range    Glucose (POC) 149 (H) 65 - 100 mg/dL   GLUCOSE, POC    Collection Time: 09/10/17 10:16 PM   Result Value Ref Range    Glucose (POC) 151 (H) 65 - 100 mg/dL   GLUCOSE, POC    Collection Time: 09/11/17  6:41 AM   Result Value Ref Range    Glucose (POC) 197 (H) 65 - 117 mg/dL   METABOLIC PANEL, COMPREHENSIVE    Collection Time: 09/11/17  6:50 AM   Result Value Ref Range    Sodium 139 136 - 145 mmol/L    Potassium 4.3 3.5 - 5.1 mmol/L    Chloride 106 98 - 107 mmol/L    CO2 23 21 - 32 mmol/L    Anion gap 10 7 - 16 mmol/L    Glucose 182 (H) 65 - 100 mg/dL    BUN 48 (H) 8 - 23 MG/DL    Creatinine 2.49 (H) 0.8 - 1.5 MG/DL    GFR est AA 32 (L) >60 ml/min/1.73m2    GFR est non-AA 27 (L) >60 ml/min/1.73m2    Calcium 8.7 8.3 - 10.4 MG/DL    Bilirubin, total 0.4 0.2 - 1.1 MG/DL    ALT (SGPT) 26 12 - 65 U/L    AST (SGOT) 20 15 - 37 U/L    Alk. phosphatase 55 50 - 136 U/L    Protein, total 6.8 6.3 - 8.2 g/dL    Albumin 3.2 3.2 - 4.6 g/dL    Globulin 3.6 (H) 2.3 - 3.5 g/dL    A-G Ratio 0.9 (L) 1.2 - 3.5     MAGNESIUM    Collection Time: 09/11/17  6:50 AM   Result Value Ref Range    Magnesium 2.0 1.8 - 2.4 mg/dL   BNP    Collection Time: 09/11/17  6:50 AM   Result Value Ref Range    BNP 30 pg/mL   LIPID PANEL    Collection Time: 09/11/17  6:50 AM   Result Value Ref Range    LIPID PROFILE          Cholesterol, total 154 <200 MG/DL    Triglyceride 174 (H) 35 - 150 MG/DL    HDL Cholesterol 36 (L) 40 - 60 MG/DL    LDL, calculated 83.2 <100 MG/DL    VLDL, calculated 34.8 (H) 6.0 - 23.0 MG/DL    CHOL/HDL Ratio 4.3     HEMOGLOBIN A1C WITH EAG    Collection Time: 09/11/17  6:50 AM   Result Value Ref Range    Hemoglobin A1c 8.5 (H) 4.8 - 6.0 %    Est. average glucose 197 mg/dL   TSH 3RD GENERATION    Collection Time: 09/11/17  6:50 AM   Result Value Ref Range    TSH 2.010 0.358 - 3.740 uIU/mL   GLUCOSE, POC    Collection Time: 09/11/17 11:16 AM   Result Value Ref Range    Glucose (POC) 231 (H) 65 - 100 mg/dL   POC ACTIVATED CLOTTING TIME    Collection Time: 09/11/17  1:48 PM   Result Value Ref Range    Activated Clotting Time (POC) 439 (H) 70 - 128 SECS   GLUCOSE, POC    Collection Time: 09/11/17  2:34 PM   Result Value Ref Range    Glucose (POC) 241 (H) 65 - 100 mg/dL   GLUCOSE, POC    Collection Time: 09/11/17  3:39 PM   Result Value Ref Range    Glucose (POC) 234 (H) 65 - 100 mg/dL         Patient Instructions:     Current Discharge Medication List      START taking these medications    Details   famotidine (PEPCID) 40 mg tablet Take 1 Tab by mouth two (2) times a day.   Qty: 60 Tab, Refills: 3         CONTINUE these medications which have NOT CHANGED    Details   torsemide (DEMADEX) 10 mg tablet Take 10 mg by mouth two (2) times a day. fluticasone-vilanterol (BREO ELLIPTA) 100-25 mcg/dose inhaler 1 inhalation daily, rinse mouth after use  Qty: 1 Inhaler, Refills: 11      levothyroxine (SYNTHROID) 50 mcg tablet Take  by mouth Daily (before breakfast). clopidogrel (PLAVIX) 75 mg tab Take 1 Tab by mouth daily. Qty: 90 Tab, Refills: 3      isosorbide mononitrate ER (IMDUR) 60 mg CR tablet Take 1 Tab by mouth every morning. Qty: 90 Tab, Refills: 3      TRULICITY 1.5 MH/4.3 mL sub-q pen       hydrALAZINE (APRESOLINE) 100 mg tablet Take 1 Tab by mouth two (2) times a day. Qty: 180 Tab, Refills: 3    Associated Diagnoses: Essential hypertension with goal blood pressure less than 140/90      aspirin delayed-release 81 mg tablet Take 81 mg by mouth. Cholecalciferol, Vitamin D3, (VITAMIN D3) 2,000 unit cap capsule Take 1 Cap by mouth. allopurinol (ZYLOPRIM) 300 mg tablet Take 300 mg by mouth daily. insulin lispro (HUMALOG) 100 unit/mL injection Sliding scale      insulin glargine (LANTUS) 100 unit/mL injection 32 Units by SubCUTAneous route two (2) times a day. albuterol (PROAIR HFA) 90 mcg/actuation inhaler 2 puffs qid prn  Qty: 1 Inhaler, Refills: 11      cpap machine kit 8/12 cm qhs      glucose blood VI test strips (ASCENSIA AUTODISC VI, ONE TOUCH ULTRA TEST VI) strip by Does Not Apply route. Insulin Needles, Disposable, 32 gauge x 5/32\" ndle by Does Not Apply route. Lancets misc by Does Not Apply route.          STOP taking these medications       losartan-hydrochlorothiazide (HYZAAR) 100-25 mg per tablet Comments:   Reason for Stopping:               Signed:  Pearl Keith NP  9/11/2017  4:20 PM

## 2017-09-11 NOTE — CONSULTS
HOSPITALIST H&P/CONSULT  NAME:  Shabnam Lopez   Age:  65 y.o.  :   1940   MRN:   [id number]  PCP: May Mena MD  Treatment Team: Attending Provider: Jeri Cabrera DO; Consulting Provider: Sarah aSndoval MD    Full Code     CC: Reason for consultation is: DM2    HPI:   Patient case was reviewed with the RN prior to seeing the patient. Patient is a 65 y.o. male who is in the hospital for a cardiac cath in the morning. We were consulted for DM mgmt. Pt has been diabetic for many years and has his insulin mgmt the way he likes it, including a sliding scale. He only took half his dose tonight since he will be NPO after midnight. He has no concerns. ROS:  All systems have been reviewed and are negative except as stated in HPI or elsewhere.       Past Medical History:   Diagnosis Date    Acquired hypothyroidism 2016    Anemia 2015    Anemia in chronic kidney disease 2016    CKD (chronic kidney disease) 10/11/2012    Coronary atherosclerosis of native coronary vessel 2/10/2016    Diabetes mellitus type 2 in obese ([facility] 10/11/2012    Dyspnea 2/10/2016    Edema 10/11/2012    Elevated liver function tests 2015    Hypertension 10/11/2012    Hyponatremia 2015    PHILLIP (obstructive sleep apnea) 10/11/2012    Seborrheic dermatitis 2013      Past Surgical History:   Procedure Laterality Date    HX HERNIA REPAIR        Social History   Substance Use Topics    Smoking status: Former Smoker     Packs/day: 0.50     Years: 2.00     Types: Cigarettes     Quit date: 1962    Smokeless tobacco: Never Used    Alcohol use No      Family History   Problem Relation Age of Onset    Hypertension Mother     Hypertension Father     Cancer Father      lung       FH Reviewed and non-contributory to admitting diagnosis    Allergies   Allergen Reactions    Amlodipine Shortness of Breath    Iodine Shortness of Breath    Metformin Shortness of Breath    Ranolazine Unknown (comments)     Lips and tongue numbness    Spironolactone Unknown (comments) and Other (comments)     Potassium level went really high  Increased potassium    Beta Blocker [Beta-Blockers (Beta-Adrenergic Blocking Agts)] Other (comments)     Mental fogginess    Lortab [Hydrocodone-Acetaminophen] Unknown (comments)     Pt states that if he takes this  The medication makes him feel like he is going crazy    Shellfish Containing Products Shortness of Breath and Swelling      Prior to Admission Medications   Prescriptions Last Dose Informant Patient Reported? Taking? Cholecalciferol, Vitamin D3, (VITAMIN D3) 2,000 unit cap capsule 9/10/2017 at Unknown time  Yes Yes   Sig: Take 1 Cap by mouth. Insulin Needles, Disposable, 32 gauge x \" ndle   Yes No   Sig: by Does Not Apply route. Lancets misc   Yes No   Sig: by Does Not Apply route. TRULICITY 1.5 PB/7.8 mL sub-q pen 9/10/2017 at Unknown time  Yes Yes   albuterol (PROAIR HFA) 90 mcg/actuation inhaler Unknown at Unknown time  No No   Si puffs qid prn   allopurinol (ZYLOPRIM) 300 mg tablet 9/10/2017 at Unknown time  Yes Yes   Sig: Take 300 mg by mouth daily. aspirin delayed-release 81 mg tablet 9/10/2017 at Unknown time  Yes Yes   Sig: Take 81 mg by mouth. clopidogrel (PLAVIX) 75 mg tab 9/10/2017 at Unknown time  No Yes   Sig: Take 1 Tab by mouth daily. cpap machine kit   Yes No   Si/12 cm qhs   fluticasone-vilanterol (BREO ELLIPTA) 100-25 mcg/dose inhaler 9/10/2017 at Unknown time  No Yes   Si inhalation daily, rinse mouth after use   glucose blood VI test strips (ASCENSIA AUTODISC VI, ONE TOUCH ULTRA TEST VI) strip   Yes No   Sig: by Does Not Apply route. hydrALAZINE (APRESOLINE) 100 mg tablet 9/10/2017 at Unknown time  No Yes   Sig: Take 1 Tab by mouth two (2) times a day. Patient taking differently: Take 50 mg by mouth two (2) times a day.    insulin glargine (LANTUS) 100 unit/mL injection 9/10/2017 at Unknown time Yes Yes   Si Units by SubCUTAneous route two (2) times a day. insulin lispro (HUMALOG) 100 unit/mL injection 9/10/2017 at Unknown time  Yes Yes   Sig: Sliding scale   isosorbide mononitrate ER (IMDUR) 60 mg CR tablet 9/10/2017 at Unknown time  No Yes   Sig: Take 1 Tab by mouth every morning. levothyroxine (SYNTHROID) 50 mcg tablet 9/10/2017 at Unknown time  Yes Yes   Sig: Take  by mouth Daily (before breakfast). losartan-hydrochlorothiazide (HYZAAR) 100-25 mg per tablet 9/10/2017 at Unknown time  No Yes   Sig: Take 1 Tab by mouth daily. torsemide (DEMADEX) 10 mg tablet 9/10/2017 at Unknown time  Yes Yes   Sig: Take 10 mg by mouth two (2) times a day. Facility-Administered Medications: None         Objective:     Visit Vitals    /64 (BP 1 Location: Right arm, BP Patient Position: At rest)    Pulse 70    Temp 97.2 °F (36.2 °C)    Resp 16    Ht 6' 1\" (1.854 m)    Wt 150.5 kg (331 lb 14.4 oz)    SpO2 94%    BMI 43.79 kg/m2      Temp (24hrs), Av °F (36.1 °C), Min:96.7 °F (35.9 °C), Max:97.2 °F (36.2 °C)    Oxygen Therapy  O2 Sat (%): 94 % (17 0052)  O2 Device: Room air (17 0405)   Body mass index is 43.79 kg/(m^2). Physical Exam:    General:    WD and WN , No apparent distress. Head:   Normocephalic, without obvious abnormality, atraumatic. Eyes:  PERRL; EOMI  ENT:  Hearing is normal.  Oropharynx is clear with tacky mucous membranes   Resp:    Clear to auscultation bilaterally. No Wheezing or Rhonchi. Resp are even and unlabored  Heart[de-identified]  Regular rate and rhythm,  no murmur, rub or gallop. No LE edema  Abdomen:   Soft, non-tender. Not distended. Bowel sounds normal.  hepato-splenomegaly cannot be assess due to obesity   Musc/SK: Muscle strength and tone normal and appropriate for age and condition. No cyanosis. No clubbing  Skin:     Texture, turgor normal. No significant rashes or lesions. Neurologic: CN II - XII are tested and intact;   Reflexes unobtainable  Psych: Alert and oriented x 4;  Judgement and insight are normal     Data Review:   Recent Results (from the past 24 hour(s))   METABOLIC PANEL, BASIC    Collection Time: 09/10/17  3:30 PM   Result Value Ref Range    Sodium 139 136 - 145 mmol/L    Potassium 4.2 3.5 - 5.1 mmol/L    Chloride 106 98 - 107 mmol/L    CO2 22 21 - 32 mmol/L    Anion gap 11 7 - 16 mmol/L    Glucose 145 (H) 65 - 100 mg/dL    BUN 52 (H) 8 - 23 MG/DL    Creatinine 2.62 (H) 0.8 - 1.5 MG/DL    GFR est AA 31 (L) >60 ml/min/1.73m2    GFR est non-AA 25 (L) >60 ml/min/1.73m2    Calcium 8.9 8.3 - 10.4 MG/DL   PROTHROMBIN TIME + INR    Collection Time: 09/10/17  3:30 PM   Result Value Ref Range    Prothrombin time 11.8 9.6 - 12.0 sec    INR 1.1 0.9 - 1.2     MAGNESIUM    Collection Time: 09/10/17  3:30 PM   Result Value Ref Range    Magnesium 2.1 1.8 - 2.4 mg/dL   CBC W/O DIFF    Collection Time: 09/10/17  3:30 PM   Result Value Ref Range    WBC 8.9 4.3 - 11.1 K/uL    RBC 3.73 (L) 4.23 - 5.67 M/uL    HGB 11.7 (L) 13.6 - 17.2 g/dL    HCT 33.7 (L) 41.1 - 50.3 %    MCV 90.3 79.6 - 97.8 FL    MCH 31.4 26.1 - 32.9 PG    MCHC 34.7 31.4 - 35.0 g/dL    RDW 14.1 11.9 - 14.6 %    PLATELET 969 398 - 081 K/uL    MPV 11.1 10.8 - 14.1 FL   GLUCOSE, POC    Collection Time: 09/10/17  4:39 PM   Result Value Ref Range    Glucose (POC) 149 (H) 65 - 100 mg/dL   GLUCOSE, POC    Collection Time: 09/10/17 10:16 PM   Result Value Ref Range    Glucose (POC) 151 (H) 65 - 100 mg/dL     CXR Results  (Last 48 hours)    None        CT Results  (Last 48 hours)    None          Assessment and Plan:      Active Hospital Problems    Diagnosis Date Noted    [email or web address] 88/06/9316    Diastolic CHF, chronic ([facility] 09/08/2017    CHRISTY (dyspnea on exertion) 12/22/2015    Hypertension 10/11/2012    Edema 10/11/2012     Edema on norvasc      PHILLIP (obstructive sleep apnea) 10/11/2012    CKD (chronic kidney disease) 10/11/2012    [facility] 10/11/2012         · PLAN   · Pt to continue home doses of insulin; will hold morning insulin until after cath when he can eat again  · Plans discussed with patient and/or caregiver; questions answered.     NO further need for hospitalist intervention; will sign off    Signed By: Eloy Coulter MD     September 11, 2017

## 2017-09-11 NOTE — PROGRESS NOTES
Care Management Interventions  PCP Verified by CM: Yes  Mode of Transport at Discharge:  (Patient and wife both drive)  Transition of Care Consult (CM Consult): Discharge Planning  Current Support Network: Lives with Spouse  Confirm Follow Up Transport: Family    Patient is 64 yo male admitted for hydration prior to cardiac catherization. Patient lives with wife and independent of ADL's prior to admission. Still drives and has CPAP at home. Discharge plan is home with spouse. Voices no needs at this time. Patient was changed from observation to inpatient. Wife request he takes own medication with change in status and I notified patient's nurse Nito Mccoy of this information and admission status change. Notified patient relations to request guest tray for wife. Code 40 letter given and content explained. Pt verbalized understanding of same. Copy given to pt. Copy placed in chart. Medicare Outpatient Observation Notice provided to the patient. Oral explanation was provided and all questions answered. Signed document placed in the medical record under media tab.  Copy to patient

## 2017-09-11 NOTE — DISCHARGE INSTRUCTIONS
Cardiac Catheterization/Angiography Discharge Instructions    *Check the puncture site frequently for swelling or bleeding. If you see any bleeding, lie down and apply pressure over the area with a clean town or washcloth. Notify your doctor for any redness, swelling, drainage or oozing from the puncture site. Notify your doctor for any fever or chills. *If the leg or arm with the puncture becomes cold, numb or painful, call Dr Rosanne Cui at  [phone]. *Activity should be limited for the next 48 hours. Climb stairs as little as possible and avoid any stooping, bending or strenuous activity for 48 hours. No heavy lifting (anything over 10 pounds) for three days. DISCHARGE SUMMARY from Nurse    The following personal items are in your possession at time of discharge:    Dental Appliances: None        Home Medications: Sent home  Jewelry: None  Clothing: At bedside  Other Valuables: None             PATIENT INSTRUCTIONS:    After general anesthesia or intravenous sedation, for 24 hours or while taking prescription Narcotics:  · Limit your activities  · Do not drive and operate hazardous machinery  · Do not make important personal or business decisions  · Do  not drink alcoholic beverages  · If you have not urinated within 8 hours after discharge, please contact your surgeon on call. Report the following to your surgeon:  · Excessive pain, swelling, redness or odor of or around the surgical area  · Temperature over 100.5  · Nausea and vomiting lasting longer than 4 hours or if unable to take medications  · Any signs of decreased circulation or nerve impairment to extremity: change in color, persistent  numbness, tingling, coldness or increase pain  · Any questions        What to do at Home:      *  Please give a list of your current medications to your Primary Care Provider.     *  Please update this list whenever your medications are discontinued, doses are      changed, or new medications (including over-the-counter products) are added. *  Please carry medication information at all times in case of emergency situations. These are general instructions for a healthy lifestyle:    No smoking/ No tobacco products/ Avoid exposure to second hand smoke    Surgeon General's Warning:  Quitting smoking now greatly reduces serious risk to your health. Obesity, smoking, and sedentary lifestyle greatly increases your risk for illness    A healthy diet, regular physical exercise & weight monitoring are important for maintaining a healthy lifestyle    You may be retaining fluid if you have a history of heart failure or if you experience any of the following symptoms:  Weight gain of 3 pounds or more overnight or 5 pounds in a week, increased swelling in our hands or feet or shortness of breath while lying flat in bed. Please call your doctor as soon as you notice any of these symptoms; do not wait until your next office visit. Recognize signs and symptoms of STROKE:    F-face looks uneven    A-arms unable to move or move unevenly    S-speech slurred or non-existent    T-time-call 911 as soon as signs and symptoms begin-DO NOT go       Back to bed or wait to see if you get better-TIME IS BRAIN. Warning Signs of HEART ATTACK     Call 911 if you have these symptoms:   Chest discomfort. Most heart attacks involve discomfort in the center of the chest that lasts more than a few minutes, or that goes away and comes back. It can feel like uncomfortable pressure, squeezing, fullness, or pain.  Discomfort in other areas of the upper body. Symptoms can include pain or discomfort in one or both arms, the back, neck, jaw, or stomach.  Shortness of breath with or without chest discomfort.  Other signs may include breaking out in a cold sweat, nausea, or lightheadedness. Don't wait more than five minutes to call 911 - MINUTES MATTER! Fast action can save your life.  Calling 911 is almost always the fastest way to get lifesaving treatment. Emergency Medical Services staff can begin treatment when they arrive -- up to an hour sooner than if someone gets to the hospital by car. The discharge information has been reviewed with the patient. The patient verbalized understanding. Discharge medications reviewed with the patient and appropriate educational materials and side effects teaching were provided. *Do not drive for 48 hours. *You may resume your usual diet. Drink more fluids than usual.    *Have a responsible person drive you home and stay with you for at least 24 hours after your heart catheterization/angiography. *You may remove the bandage from your Right and Arm in 24 hours. You may shower in 24 hours. No tub baths, hot tubs or swimming for one week. Do not place any lotions, creams, powders, ointments over the puncture site for one week. You may place a clean band-aid over the puncture site each day for 5 days. Change this daily.

## 2017-09-11 NOTE — PROCEDURES
Brief Cardiac Procedure Note    Patient: Jessica Key MRN: [id number]  SSN: [id number]    YOB: 1940  Age: 65 y.o. Sex: male      Date of Procedure: 9/11/2017     Pre-procedure Diagnosis: Shortness of Breath    Post-procedure Diagnosis: Coronary Artery Disease    Procedure: Left Heart Catheterization    Brief Description of Procedure: PRESSURE WIRE LCX    Performed By: Eyal Jeffries MD     Assistants: NONE    Anesthesia: Moderate Sedation    Estimated Blood Loss: Less than 10 mL      Specimens: None    Implants: None    Findings:   LV NORMAL  10mmAV GRADIENT AT MOST  LAD STENTS WIDELY PATENT  FOCAL [place] RING 50% MID LCX ---FFR 0.93  RCA MILD IRREG, PDA DIFFUSE MID 50% BUT 2mm VESSEL, TREAT MEDICALLY  RIGHT RADIAL    Complications: None    Recommendations: Continue medical therapy.     Signed By: Eyal Jeffries MD     September 11, 2017

## 2017-09-11 NOTE — PROGRESS NOTES
TRANSFER - IN REPORT:    Verbal report received from [place] on Nancy Leather  being received from Cath Lab for routine progression of care      Report consisted of patients Situation, Background, Assessment and   Recommendations(SBAR). Information from the following report(s) SBAR, Procedure Summary and MAR was reviewed with the receiving nurse. Opportunity for questions and clarification was provided. Assessment completed upon patients arrival to unit and care assumed. Detail Level: Detailed

## 2017-09-11 NOTE — PROGRESS NOTES
Report received from Ned Lam RN, care assumed. Pt resting in chair, family at side. No needs voiced at this time.

## 2017-09-11 NOTE — PROCEDURES
Galina Reddy 43       Name:  Bryce Fowler   MR#:  [id number]   :  1940   Account #:  [de-identified]   Date of Adm:  09/10/2017       DATE OF PROCEDURE: 2017     REFERRING: Idalia Riggs MD and Shantell Zamudio MD.    REASON FOR PROCEDURE: Recurrent exertional dyspnea in this   morbidly obese 65-year-old white male with a history of LAD   stents in the mid and distal vessel about 9-10 months ago by Dr. Elmore Simmonds. PROCEDURE PERFORMED: Left heart catheterization with coronary   angiography and left ventriculogram, pressure wire interrogation   to the mid left circumflex. TOTAL CONTRAST: 130 mL of Isovue. CONSCIOUS SEDATION: None, but the patient was monitored by Evette Urena RN without complication. He was pretreated for possible   IODINE ALLERGY with intravenous prednisone, Benadryl and Pepcid. PROCEDURE TECHNIQUE: After informed consent was obtained, the   patient was brought to the cath lab and prepped and draped in   the usual fashion. A 6-Malawian glide sheath was inserted in the   right radial artery using a micropuncture modified Seldinger   technique, and left heart catheterization performed using a 6-  Western Katharina JL4 catheter, 5-Malawian Tiger catheter and an angled   pigtail. Manual pressure will be applied to the patient's access   site in the recovery area. Pressure wire interrogation was   performed to the mid circumflex through a 6-Malawian XB 3.5   guiding catheter. PRESSURE RESULTS: Aorta 140/70. Left ventricle 140 over 10-14. LEFT VENTRICULOGRAM: There is normal left ventricular regional   wall motion with ejection fraction greater than 60%. There is no   mitral regurgitation and there is no aortic valve gradient on   catheter pullback of significance.  Left ventricular end-  diastolic pressure is high normal.    CORONARY ANATOMY: The left main has mild irregularities dividing   into an LAD and circumflex in the usual fashion. The LAD wraps around the apex of the left ventricle and has   stents that are present in the mid and distal vessel. Both   stents are widely patent with minimal irregularities. The   segment between the stents has mild irregular disease to 20% to   30%. The apical LAD has mild to moderate irregularity, also up   to about 40% to 50%, but this is a truly apical in a vessel but   is 2 mm in diameter or less and best left to medical therapy. There are a couple of small diagonal branches off the LAD and   one large diagonal branch with a high takeoff, all of which have   mild luminal irregularities. The circumflex is a moderate caliber, anatomically nondominant   system, which has a focal napkin ring 40% to 50% mid vessel   stenosis and mild disease elsewhere. The right coronary is a dominant vessel supplying a small   posterior descending branch and a moderate caliber   posterolateral branch. The right coronary proper has mild   disease up to 30% proximally, with extraluminal calcification. The remainder of the right coronary and the posterolateral   branch have mild irregularity. The posterior descending branch   has a diffuse irregular 40% to 50% proximal and mid vessel   stenosis, but the vessel is 2 mm in diameter or less and best   left to medical therapy given its small caliber. PRESSURE WIRE INTERROGATION: Given the patient's symptoms of   exertional dyspnea and moderate focal stenosis in a large   circumflex branch, pressure wire interrogation was performed. There was an appropriate hemodynamic and clinical response to   140 mcg/kg per minute of adenosine and the lowest FFR recorded   was 0.93, indicating hemodynamic insignificance. This will be   left to medical therapy. CONCLUSIONS:   1. Negative pressure wire interrogation of the mid circumflex. 2. Widely patent left anterior descending stents.    3. High normal left ventricular end-diastolic pressure with   normal ejection fraction. The patient is encouraged to begin an aggressive lifestyle   modification plan with diet, exercise and weight loss which will   almost certainly help with his exertional dyspnea in the future.         Darcy Giordano MD      ATS / Juanpablo Fung   D:  09/11/2017   14:22   T:  09/11/2017   14:54   Job #:  [id number]

## 2017-09-11 NOTE — PHYSICIAN ADVISORY
Letter of Determination: Outpatient Status Appropriate    This patient was originally hospitalized as Inpatient on 9/10/2017 for intravenous fluids prior to cardiac catheterization. This patient is appropriate for Outpatient Admission in accordance with CMS regulation Section 43 . 3. There does not appear to be sufficient medical necessity to warrant inpatient care. It is our recommendation that this patient's hospitalization status should be OUTPATIENT status.      The final decision regarding the patient's hospitalization status depends on the attending physician's judgement.       Modesto Roa MD, ELEAZAR,   Physician Guille Norwood.

## 2017-09-11 NOTE — CDMP QUERY
Please clarify if this patient is being treated/managed for:    MORBID OBESITY      BMI   43.43 HT 6'1\" #      =>Other Explanation of clinical findings  =>Unable to Determine (no explanation of clinical findings)        Please clarify and document your clinical opinion in the progress notes and discharge summary including the definitive and/or presumptive diagnosis, (suspected or probable), related to the above clinical findings. Please include clinical findings supporting your diagnosis.     Thank you,  Sravanthi Hylton RN  [phone]

## 2017-09-11 NOTE — PROGRESS NOTES
Discharge instructions reviewed with patient. Prescriptions given for famotidine and med info sheets provided for all new medications. Opportunity for questions provided. Patient voiced understanding of all discharge instructions.

## 2017-09-11 NOTE — PROGRESS NOTES
Report received from Merlyn Edwards Cath Lab RN. Procedural findings communicated. Intra procedural  medication administration reviewed. Progression of care discussed.      Patient received into CPRU [place] [facility] post sheath removal.     right Radial access site without bleeding or swelling     TR band dry and intact     Patient instructed to limit movement to right upper extremity    Routine post procedural vital signs and site assessment initiated

## 2017-09-11 NOTE — PROGRESS NOTES
TRANSFER - OUT REPORT:    Verbal report given to mercedes rn(name) on Kt Balderas  being transferred to cpru(unit) for routine progression of care       Report consisted of patients Situation, Background, Assessment and   Recommendations(SBAR). Information from the following report(s) SBAR was reviewed with the receiving nurse. Lines:   Peripheral IV 09/10/17 Left Antecubital (Active)   Site Assessment Clean, dry, & intact 9/11/2017 12:05 PM   Phlebitis Assessment 0 9/11/2017 12:05 PM   Infiltration Assessment 0 9/11/2017 12:05 PM   Dressing Status Clean, dry, & intact 9/11/2017 12:05 PM   Dressing Type Tape;Transparent 9/11/2017 12:05 PM   Hub Color/Line Status Capped;Flushed 9/11/2017 12:05 PM       Peripheral IV 09/11/17 Left Hand (Active)   Site Assessment Clean, dry, & intact 9/11/2017 12:05 PM   Phlebitis Assessment 0 9/11/2017 12:05 PM   Infiltration Assessment 0 9/11/2017 12:05 PM   Dressing Status Clean, dry, & intact 9/11/2017 12:05 PM   Dressing Type Tape;Transparent 9/11/2017 12:05 PM   Hub Color/Line Status Infusing;Patent 9/11/2017 12:05 PM        Opportunity for questions and clarification was provided.       Patient transported with:   Jonas Gleason  Negative pressure wire study  angiomax dc 1410  Right wrist r band 14ml  No bleeding or hematoma

## 2017-09-11 NOTE — PROGRESS NOTES
[facility] CARDIOLOGY PROGRESS NOTE    9/11/2017 8:07 AM    Admit Date: 9/10/2017    Admit Diagnosis: PRE CARDIAC CATH HYDRATION;Unstable angina (HCC)      Subjective:   Stable overnight without angina, CHF, or palpitations. Vitals stable and controlled. No other complaints overnight. Tolerating meds well. Objective:      Vitals:    09/10/17 2001 09/10/17 2217 09/11/17 0052 09/11/17 0449   BP:  145/59 136/64 169/70   Pulse:  63 70 69   Resp:  16 16 16   Temp:  97.1 °F (36.2 °C) 97.2 °F (36.2 °C) 97.3 °F (36.3 °C)   SpO2: 96% 97% 94% 97%   Weight:    149.3 kg (329 lb 3.2 oz)   Height:           Physical Exam:  Neck- supple, no JVD  CV- regular rate and rhythm no MRG  Lung- clear bilaterally, mildly decreased bibasilar but no crackles/wheezing  Abd- soft, nontender, nondistended  Ext- trace edema  Skin- warm and dry    Data Review:   Recent Labs      09/10/17   1530  09/08/17   1329   NA  139  136   K  4.2  4.3   MG  2.1  2.3   BUN  52*  58*   CREA  2.62*  2.80*   GLU  145*  152*   WBC  8.9  8.2   HGB  11.7*  11.7*   HCT  33.7*  34.9*   PLT  312  245   INR  1.1  1.1       Assessment and Plan:     Principal Problem:    Unstable angina (HCC) (9/10/2017)- anginal equivalent CHRISTY/SOB without harris CP, but similar to prior LAD PCI symptoms. Hydrating, check labs and plan [facility] later today if creatinine stable to improved    Active Problems:    Hypertension - stable, continue meds      Edema (10/11/2012)      Overview: Edema on norvasc      CKD (chronic kidney disease)- see above, hydrating      PHILLIP (obstructive sleep apnea)- CPAP as tolerated      Diabetes mellitus type 2 in obese (HCC) - stable, slide scale      CHRISTY (dyspnea on exertion) (60/62/6791)      Diastolic CHF, chronic ([address] (9/8/2017)- minimal edema today, continue meds    PRETREAT FOR CONTRAST ALLERGY WITH ORAL NOW, IV PRECATH THIS AFTERNOON      LUCA Mendez MD  [facility] Cardiology  Pager [phone]

## 2017-09-28 PROBLEM — I20.0 UNSTABLE ANGINA (HCC): Status: RESOLVED | Noted: 2017-09-10 | Resolved: 2017-09-28

## 2018-04-18 PROBLEM — E11.21 TYPE 2 DIABETES WITH NEPHROPATHY (HCC): Status: ACTIVE | Noted: 2018-04-18

## 2018-04-18 PROBLEM — E66.01 OBESITY, MORBID (HCC): Status: ACTIVE | Noted: 2018-04-18

## 2019-02-10 ENCOUNTER — ANESTHESIA EVENT (OUTPATIENT)
Dept: ENDOSCOPY | Age: 79
End: 2019-02-10
Payer: MEDICARE

## 2019-02-10 RX ORDER — SODIUM CHLORIDE 0.9 % (FLUSH) 0.9 %
5-40 SYRINGE (ML) INJECTION EVERY 8 HOURS
Status: CANCELLED | OUTPATIENT
Start: 2019-02-10

## 2019-02-10 RX ORDER — SODIUM CHLORIDE 0.9 % (FLUSH) 0.9 %
5-40 SYRINGE (ML) INJECTION AS NEEDED
Status: CANCELLED | OUTPATIENT
Start: 2019-02-10

## 2019-02-10 RX ORDER — SODIUM CHLORIDE, SODIUM LACTATE, POTASSIUM CHLORIDE, CALCIUM CHLORIDE 600; 310; 30; 20 MG/100ML; MG/100ML; MG/100ML; MG/100ML
100 INJECTION, SOLUTION INTRAVENOUS CONTINUOUS
Status: CANCELLED | OUTPATIENT
Start: 2019-02-10

## 2019-02-11 ENCOUNTER — HOSPITAL ENCOUNTER (OUTPATIENT)
Age: 79
Setting detail: OUTPATIENT SURGERY
Discharge: HOME | End: 2019-02-11
Attending: INTERNAL MEDICINE | Admitting: INTERNAL MEDICINE
Payer: MEDICARE

## 2019-02-11 ENCOUNTER — ANESTHESIA (OUTPATIENT)
Dept: ENDOSCOPY | Age: 79
End: 2019-02-11
Payer: MEDICARE

## 2019-02-11 VITALS
DIASTOLIC BLOOD PRESSURE: 88 MMHG | WEIGHT: 315 LBS | SYSTOLIC BLOOD PRESSURE: 160 MMHG | HEART RATE: 57 BPM | BODY MASS INDEX: 41.75 KG/M2 | OXYGEN SATURATION: 97 % | TEMPERATURE: 97.7 F | HEIGHT: 73 IN | RESPIRATION RATE: 16 BRPM

## 2019-02-11 LAB — GLUCOSE BLD STRIP.AUTO-MCNC: 126 MG/DL (ref 65–100)

## 2019-02-11 PROCEDURE — 77030009426 HC FCPS BIOP ENDOSC BSC -B: Performed by: INTERNAL MEDICINE

## 2019-02-11 PROCEDURE — 74011250636 HC RX REV CODE- 250/636

## 2019-02-11 PROCEDURE — 74011250636 HC RX REV CODE- 250/636: Performed by: ANESTHESIOLOGY

## 2019-02-11 PROCEDURE — 82962 GLUCOSE BLOOD TEST: CPT

## 2019-02-11 PROCEDURE — 88312 SPECIAL STAINS GROUP 1: CPT

## 2019-02-11 PROCEDURE — 76040000025: Performed by: INTERNAL MEDICINE

## 2019-02-11 PROCEDURE — 76060000031 HC ANESTHESIA FIRST 0.5 HR: Performed by: INTERNAL MEDICINE

## 2019-02-11 PROCEDURE — 88305 TISSUE EXAM BY PATHOLOGIST: CPT

## 2019-02-11 RX ORDER — LIDOCAINE HYDROCHLORIDE 20 MG/ML
INJECTION, SOLUTION EPIDURAL; INFILTRATION; INTRACAUDAL; PERINEURAL AS NEEDED
Status: DISCONTINUED | OUTPATIENT
Start: 2019-02-11 | End: 2019-02-11 | Stop reason: HOSPADM

## 2019-02-11 RX ORDER — PROPOFOL 10 MG/ML
INJECTION, EMULSION INTRAVENOUS
Status: DISCONTINUED | OUTPATIENT
Start: 2019-02-11 | End: 2019-02-11 | Stop reason: HOSPADM

## 2019-02-11 RX ORDER — PROPOFOL 10 MG/ML
INJECTION, EMULSION INTRAVENOUS AS NEEDED
Status: DISCONTINUED | OUTPATIENT
Start: 2019-02-11 | End: 2019-02-11 | Stop reason: HOSPADM

## 2019-02-11 RX ORDER — SODIUM CHLORIDE, SODIUM LACTATE, POTASSIUM CHLORIDE, CALCIUM CHLORIDE 600; 310; 30; 20 MG/100ML; MG/100ML; MG/100ML; MG/100ML
100 INJECTION, SOLUTION INTRAVENOUS CONTINUOUS
Status: DISCONTINUED | OUTPATIENT
Start: 2019-02-11 | End: 2019-02-11 | Stop reason: HOSPADM

## 2019-02-11 RX ADMIN — PROPOFOL 50 MG: 10 INJECTION, EMULSION INTRAVENOUS at 11:50

## 2019-02-11 RX ADMIN — LIDOCAINE HYDROCHLORIDE 60 MG: 20 INJECTION, SOLUTION EPIDURAL; INFILTRATION; INTRACAUDAL; PERINEURAL at 11:48

## 2019-02-11 RX ADMIN — SODIUM CHLORIDE, SODIUM LACTATE, POTASSIUM CHLORIDE, AND CALCIUM CHLORIDE: 600; 310; 30; 20 INJECTION, SOLUTION INTRAVENOUS at 11:44

## 2019-02-11 RX ADMIN — PROPOFOL 160 MCG/KG/MIN: 10 INJECTION, EMULSION INTRAVENOUS at 11:50

## 2019-02-11 RX ADMIN — PROPOFOL 20 MG: 10 INJECTION, EMULSION INTRAVENOUS at 11:52

## 2019-02-11 NOTE — ROUTINE PROCESS
Patient discharged. Passing flatus. Tolerating po fluids. Belongings returned. No acute distress noted. Escorted to car, with family by Jack Moreno RN.

## 2019-02-11 NOTE — ANESTHESIA POSTPROCEDURE EVALUATION
Procedure(s): ESOPHAGOGASTRODUODENOSCOPY (EGD)/ 43 ESOPHAGOGASTRODUODENAL (EGD) BIOPSY ENDOSCOPIC POLYPECTOMY. Anesthesia Post Evaluation Multimodal analgesia: multimodal analgesia used between 6 hours prior to anesthesia start to PACU discharge Patient location during evaluation: PACU Patient participation: complete - patient participated Level of consciousness: awake Pain management: adequate Airway patency: patent Anesthetic complications: no 
Cardiovascular status: acceptable and hemodynamically stable Respiratory status: acceptable Hydration status: acceptable Comments: Acceptable for discharge from PACU. Post anesthesia nausea and vomiting:  none Visit Vitals /66 Pulse 63 Temp 36.5 °C (97.7 °F) Resp 16 Ht 6' 1\" (1.854 m) Wt 149.7 kg (330 lb) SpO2 94% BMI 43.54 kg/m²

## 2019-02-11 NOTE — DISCHARGE INSTRUCTIONS
Gastrointestinal Esophagogastroduodenoscopy (EGD)/ Endoscopic Ultrasound(EUS)- Upper Exam Discharge Instructions    1. Call Dr. Micah Blanchard at [phone]  for any problems or questions. 2. Contact the doctor's office for follow up appointment as directed. 3. Medication may cause drowsiness for several hours, therefore, do not drive or operate machinery for remainder of the day. 4. No alcohol today. 5. Ordinarily, you may resume regular diet and activity after exam unless otherwise specified by your physician. 6. For mild soreness in your throat you may use Cepacol throat lozenges or warm  salt-water gargles as needed. Any additional instructions:   1. Avoid NSAIDs  2. Follow up with office for biopsy results  3. Increase Prilosec to 20 mg 2 x a day before breakfast and before evening meal every day  4. Take Gaviscon up to three times a day  5. Consider a GES or just a trial of Reglan  6.  Office visit follow up in one to two months    Instructions given to Jessica Key and other family members

## 2019-02-11 NOTE — PROCEDURES
ESOPHAGOGASTRODUODENOSCOPY    ESOPHAGOGASTRODUODENOSCOPY    DATE of PROCEDURE: 2/11/2019    PT NAME: Reva Darden     [id number]  PCP:  Aleks Killian MD  MEDICATION:   TIVA    INSTRUMENT: GIF  H190    SPECIAL PROCEDURE: None  BLOOD LOSS-  min. SPEC- 1) duodenal 2) pre pyloric antral polyp 3)  random gastric and EG junction    PROCEDURE:  Standard EGD      ASSESSMENT:  1. Small amount of retained bile, bile gastritis  2. Antral gastric polyp  3. Probable short segment Graves's without complication    PLAN:   1. Avoid NSAIDs  2. F/U Biopsies  3. Increase Prilosec to 20 mg 2 x a day before breakfast and before eving meal every day  4. Take Gaviscon up to three times a day  5. Consider a GES or just a trial of Reglan  6.  OV F/U one to two months    Brandon Ulloa MD

## 2019-02-11 NOTE — ANESTHESIA PREPROCEDURE EVALUATION
Anesthetic History No history of anesthetic complications Review of Systems / Medical History Patient summary reviewed and pertinent labs reviewed Pulmonary Sleep apnea: CPAP Asthma : well controlled Neuro/Psych Within defined limits Cardiovascular Hypertension: well controlled CAD, cardiac stents (BRANDY 2016 - Plavix heldx 7d, but remains on bASA) and hyperlipidemia Exercise tolerance: >4 METS 
  
GI/Hepatic/Renal 
  
GERD Renal disease: CRI Comments: Chronic nausea Endo/Other Diabetes: well controlled, type 2 Hypothyroidism Morbid obesity Other Findings Physical Exam 
 
Airway Mallampati: II 
TM Distance: > 6 cm Neck ROM: normal range of motion Mouth opening: Normal 
 
 Cardiovascular Rhythm: regular Rate: normal 
 
 
 
 Dental 
No notable dental hx Pulmonary Breath sounds clear to auscultation Abdominal 
 
 
 
 Other Findings Anesthetic Plan ASA: 3 Anesthesia type: total IV anesthesia Anesthetic plan and risks discussed with: Patient

## 2019-02-11 NOTE — H&P
Gastroenterology Outpatient History and Physical 
 
Patient: Lieutenant Alegre Physician: Ramses Cr MD 
 
Vital Signs: Blood pressure 177/65, pulse 60, temperature 97.8 °F (36.6 °C), resp. rate 16, height 6' 1\" (1.854 m), weight 149.7 kg (330 lb), SpO2 99 %. Allergies: Allergies Allergen Reactions  Amlodipine Shortness of Breath  Iodine Shortness of Breath  Metformin Shortness of Breath  Ranolazine Unknown (comments) Lips and tongue numbness  Shellfish Containing Products Anaphylaxis  Spironolactone Unknown (comments) and Other (comments) Potassium level went really high Increased potassium  Beta Blocker [Beta-Blockers (Beta-Adrenergic Blocking Agts)] Other (comments) Mental fogginess  Lortab [Hydrocodone-Acetaminophen] Unknown (comments) Pt states that if he takes this  The medication makes him feel like he is going crazy  Prilosec [Omeprazole] Other (comments) Caused drastic drop in blood sugar Chief Complaint: Nausea and emesis History of Present Illness: As Above Justification for Procedure: As Above History: 
Past Medical History:  
Diagnosis Date  Acquired hypothyroidism 4/8/2016  Anemia 1/21/2015  Anemia in chronic kidney disease 4/8/2016  Asthma  Chronic kidney disease   
 stage 4 kidney disease- Dr. Laurel Bailey  CKD (chronic kidney disease) 10/11/2012  Coronary atherosclerosis of native coronary vessel 2/10/2016  Diabetes mellitus type 2 in obese ([address] 10/11/2012  
 avg 150, symptoms of hypoglycemia 90  
 Diastolic heart failure ([address]  Dyspnea 2/10/2016  Dyspnea  Edema 10/11/2012  Elevated liver function tests 8/20/2015  Hypertension 10/11/2012  Hyponatremia 1/21/2015  Morbid obesity ([address] BMI 43.54  
 PHILLIP (obstructive sleep apnea) 10/11/2012  
 cpap  Seborrheic dermatitis 1/16/2013  Seborrheic dermatitis Past Surgical History:  
Procedure Laterality Date  HX CATARACT REMOVAL Bilateral 2017  HX HEART CATHETERIZATION  2016  
 3 stent 64 Shaniqua Restrepo Social History Socioeconomic History  Marital status:  Spouse name: Not on file  Number of children: Not on file  Years of education: Not on file  Highest education level: Not on file Tobacco Use  Smoking status: Former Smoker Packs/day: 0.50 Years: 2.00 Pack years: 1.00 Types: Cigarettes Last attempt to quit: 1962 Years since quittin.4  Smokeless tobacco: Never Used Substance and Sexual Activity  Alcohol use: No  
 Drug use: No  
  
Family History Problem Relation Age of Onset  Hypertension Mother  Hypertension Father  Cancer Father   
     lung  No Known Problems Sister  No Known Problems Brother  No Known Problems Sister Medications:  
Prior to Admission medications Medication Sig Start Date End Date Taking? Authorizing Provider  
carvedilol (COREG) 3.125 mg tablet Take 1 Tab by mouth two (2) times daily (with meals). 19  Yes Gt Powell DO  
losartan-hydroCHLOROthiazide (HYZAAR) 100-25 mg per tablet Take 1 Tab by mouth daily. 19  Yes Gt Powell DO  
isosorbide mononitrate ER (IMDUR) 60 mg CR tablet Take 1 Tab by mouth every morning. Patient taking differently: Take 60 mg by mouth nightly. 19  Yes Gt Powell DO  
clopidogrel (PLAVIX) 75 mg tab Take 1 Tab by mouth daily. 19  Yes Tyrese ROME,   
hydrALAZINE (APRESOLINE) 50 mg tablet Take 1 Tab by mouth two (2) times a day. 10/25/18  Yes Gt oPwell DO  
torsemide (DEMADEX) 10 mg tablet Take 1 Tab by mouth two (2) times a day.  10/25/18  Yes Gt Powell DO  
albuterol [facility] HFA) 90 mcg/actuation inhaler 2 puffs qid prn 18  Yes Viktoriya Cabrera, ESTEFANI  
fluticasone-vilanterol (BREO ELLIPTA) 100-25 mcg/dose inhaler 1 inhalation daily, rinse mouth after use 5/25/18  Yes Munir Cabrera, NP  
levothyroxine (SYNTHROID) 50 mcg tablet Take 88 mcg by mouth Daily (before breakfast). Yes Provider, Historical  
aspirin delayed-release 81 mg tablet Take 81 mg by mouth. Yes Provider, Historical  
Cholecalciferol, Vitamin D3, (VITAMIN D3) 2,000 unit cap capsule Take 1 Cap by mouth. Yes Provider, Historical  
allopurinol (ZYLOPRIM) 300 mg tablet Take 300 mg by mouth daily. Yes Provider, Historical  
insulin lispro (HUMALOG) 100 unit/mL injection Sliding scale 3/7/14  Yes Neita Galeazzi, MD  
insulin glargine (LANTUS) 100 unit/mL injection 46 Units by SubCUTAneous route two (2) times a day. Yes Provider, Historical  
TRULICITY 1.5 ZM/3.6 mL sub-q pen 1.5 mg by SubCUTAneous route every seven (7) days. 9/13/16   Provider, Historical  
 
 
Physical Exam:  
  
  
Heart: regular rate and rhythm, S1, S2 normal, no murmur, click, rub or gallop Lungs: chest clear, no wheezing, rales, normal symmetric air entry, Heart exam - S1, S2 normal, no murmur, no gallop, rate regular Abdominal: Bowel sounds are normal, liver is not enlarged, spleen is not enlarged Findings/Diagnosis: Nausea and emesis Signed: 
George Dickerson MD 2/11/2019

## 2019-05-24 ENCOUNTER — HOSPITAL ENCOUNTER (OUTPATIENT)
Dept: LAB | Age: 79
Discharge: HOME | End: 2019-05-24
Payer: MEDICARE

## 2019-05-24 DIAGNOSIS — I25.119 ATHEROSCLEROSIS OF NATIVE CORONARY ARTERY OF NATIVE HEART WITH ANGINA PECTORIS (HCC): ICD-10-CM

## 2019-05-24 LAB
ANION GAP SERPL CALC-SCNC: 9 MMOL/L (ref 7–16)
BASOPHILS # BLD: 0.1 K/UL (ref 0–0.2)
BASOPHILS NFR BLD: 1 % (ref 0–2)
BNP SERPL-MCNC: 13 PG/ML
BUN SERPL-MCNC: 67 MG/DL (ref 8–23)
CALCIUM SERPL-MCNC: 9.2 MG/DL (ref 8.3–10.4)
CHLORIDE SERPL-SCNC: 104 MMOL/L (ref 98–107)
CO2 SERPL-SCNC: 25 MMOL/L (ref 21–32)
CREAT SERPL-MCNC: 2.9 MG/DL (ref 0.8–1.5)
DIFFERENTIAL METHOD BLD: ABNORMAL
EOSINOPHIL # BLD: 0.4 K/UL (ref 0–0.8)
EOSINOPHIL NFR BLD: 5 % (ref 0.5–7.8)
ERYTHROCYTE [DISTWIDTH] IN BLOOD BY AUTOMATED COUNT: 14.1 % (ref 11.9–14.6)
GLUCOSE SERPL-MCNC: 185 MG/DL (ref 65–100)
HCT VFR BLD AUTO: 36.2 % (ref 41.1–50.3)
HGB BLD-MCNC: 12 G/DL (ref 13.6–17.2)
IMM GRANULOCYTES # BLD AUTO: 0 K/UL (ref 0–0.5)
IMM GRANULOCYTES NFR BLD AUTO: 0 % (ref 0–5)
INR PPP: 1.2
LYMPHOCYTES # BLD: 2.1 K/UL (ref 0.5–4.6)
LYMPHOCYTES NFR BLD: 26 % (ref 13–44)
MAGNESIUM SERPL-MCNC: 2.7 MG/DL (ref 1.8–2.4)
MCH RBC QN AUTO: 30.8 PG (ref 26.1–32.9)
MCHC RBC AUTO-ENTMCNC: 33.1 G/DL (ref 31.4–35)
MCV RBC AUTO: 93.1 FL (ref 79.6–97.8)
MONOCYTES # BLD: 0.7 K/UL (ref 0.1–1.3)
MONOCYTES NFR BLD: 9 % (ref 4–12)
NEUTS SEG # BLD: 4.7 K/UL (ref 1.7–8.2)
NEUTS SEG NFR BLD: 59 % (ref 43–78)
NRBC # BLD: 0 K/UL (ref 0–0.2)
PLATELET # BLD AUTO: 265 K/UL (ref 150–450)
PMV BLD AUTO: 10.6 FL (ref 9.4–12.3)
POTASSIUM SERPL-SCNC: 4.3 MMOL/L (ref 3.5–5.1)
PROTHROMBIN TIME: 14.7 SEC (ref 11.7–14.5)
RBC # BLD AUTO: 3.89 M/UL (ref 4.23–5.6)
SODIUM SERPL-SCNC: 138 MMOL/L (ref 136–145)
TSH SERPL DL<=0.005 MIU/L-ACNC: 0.88 UIU/ML (ref 0.36–3.74)
WBC # BLD AUTO: 8 K/UL (ref 4.3–11.1)

## 2019-05-24 PROCEDURE — 83735 ASSAY OF MAGNESIUM: CPT

## 2019-05-24 PROCEDURE — 80048 BASIC METABOLIC PNL TOTAL CA: CPT

## 2019-05-24 PROCEDURE — 36415 COLL VENOUS BLD VENIPUNCTURE: CPT

## 2019-05-24 PROCEDURE — 84443 ASSAY THYROID STIM HORMONE: CPT

## 2019-05-24 PROCEDURE — 85025 COMPLETE CBC W/AUTO DIFF WBC: CPT

## 2019-05-24 PROCEDURE — 83880 ASSAY OF NATRIURETIC PEPTIDE: CPT

## 2019-05-24 PROCEDURE — 85610 PROTHROMBIN TIME: CPT

## 2019-05-24 NOTE — PROGRESS NOTES
He is getting LHC on 5/29, please call him. Cr worsening, need to admit him the afternoon before to Observation tele floor bed for CKD and hydration for LHC. Needs overnight admission on the 28th for IV hydration before the LHC on Wed. Please arrange for early admission the day before and let patient know.  Thanks!!

## 2019-05-28 ENCOUNTER — HOSPITAL ENCOUNTER (OUTPATIENT)
Age: 79
LOS: 1 days | Discharge: HOME | End: 2019-05-30
Attending: INTERNAL MEDICINE | Admitting: INTERNAL MEDICINE
Payer: MEDICARE

## 2019-05-28 PROBLEM — R07.9 CHEST PAIN: Status: ACTIVE | Noted: 2019-05-28

## 2019-05-28 LAB
ATRIAL RATE: 85 BPM
CALCULATED P AXIS, ECG09: 69 DEGREES
CALCULATED R AXIS, ECG10: -12 DEGREES
CALCULATED T AXIS, ECG11: 54 DEGREES
DIAGNOSIS, 93000: NORMAL
GLUCOSE BLD STRIP.AUTO-MCNC: 202 MG/DL (ref 65–100)
GLUCOSE BLD STRIP.AUTO-MCNC: 251 MG/DL (ref 65–100)
GLUCOSE BLD STRIP.AUTO-MCNC: 401 MG/DL (ref 65–100)
P-R INTERVAL, ECG05: 256 MS
Q-T INTERVAL, ECG07: 388 MS
QRS DURATION, ECG06: 102 MS
QTC CALCULATION (BEZET), ECG08: 461 MS
VENTRICULAR RATE, ECG03: 85 BPM

## 2019-05-28 PROCEDURE — 74011000250 HC RX REV CODE- 250: Performed by: INTERNAL MEDICINE

## 2019-05-28 PROCEDURE — 74011250636 HC RX REV CODE- 250/636: Performed by: NURSE PRACTITIONER

## 2019-05-28 PROCEDURE — 74011636637 HC RX REV CODE- 636/637: Performed by: INTERNAL MEDICINE

## 2019-05-28 PROCEDURE — 94760 N-INVAS EAR/PLS OXIMETRY 1: CPT

## 2019-05-28 PROCEDURE — 82962 GLUCOSE BLOOD TEST: CPT

## 2019-05-28 PROCEDURE — 77030020263 HC SOL INJ SOD CL0.9% LFCR 1000ML

## 2019-05-28 PROCEDURE — 93005 ELECTROCARDIOGRAM TRACING: CPT | Performed by: NURSE PRACTITIONER

## 2019-05-28 RX ORDER — ALBUTEROL SULFATE 0.83 MG/ML
2.5 SOLUTION RESPIRATORY (INHALATION)
Status: DISCONTINUED | OUTPATIENT
Start: 2019-05-28 | End: 2019-05-30 | Stop reason: HOSPADM

## 2019-05-28 RX ORDER — SODIUM CHLORIDE 0.9 % (FLUSH) 0.9 %
5-40 SYRINGE (ML) INJECTION AS NEEDED
Status: DISCONTINUED | OUTPATIENT
Start: 2019-05-28 | End: 2019-05-30 | Stop reason: HOSPADM

## 2019-05-28 RX ORDER — INSULIN LISPRO 100 [IU]/ML
INJECTION, SOLUTION INTRAVENOUS; SUBCUTANEOUS
Status: DISCONTINUED | OUTPATIENT
Start: 2019-05-28 | End: 2019-05-28

## 2019-05-28 RX ORDER — ASPIRIN 81 MG/1
81 TABLET ORAL DAILY
Status: DISCONTINUED | OUTPATIENT
Start: 2019-05-29 | End: 2019-05-30 | Stop reason: HOSPADM

## 2019-05-28 RX ORDER — INSULIN GLARGINE 100 [IU]/ML
46 INJECTION, SOLUTION SUBCUTANEOUS 2 TIMES DAILY
Status: DISCONTINUED | OUTPATIENT
Start: 2019-05-28 | End: 2019-05-30 | Stop reason: HOSPADM

## 2019-05-28 RX ORDER — ALLOPURINOL 300 MG/1
300 TABLET ORAL DAILY
Status: DISCONTINUED | OUTPATIENT
Start: 2019-05-29 | End: 2019-05-30 | Stop reason: HOSPADM

## 2019-05-28 RX ORDER — PREDNISONE 10 MG/1
10 TABLET ORAL 2 TIMES DAILY
Status: DISCONTINUED | OUTPATIENT
Start: 2019-05-28 | End: 2019-05-29

## 2019-05-28 RX ORDER — NITROGLYCERIN 0.4 MG/1
0.4 TABLET SUBLINGUAL
Status: DISCONTINUED | OUTPATIENT
Start: 2019-05-28 | End: 2019-05-30 | Stop reason: HOSPADM

## 2019-05-28 RX ORDER — SODIUM CHLORIDE 0.9 % (FLUSH) 0.9 %
5-40 SYRINGE (ML) INJECTION EVERY 8 HOURS
Status: DISCONTINUED | OUTPATIENT
Start: 2019-05-28 | End: 2019-05-30 | Stop reason: HOSPADM

## 2019-05-28 RX ORDER — MORPHINE SULFATE 2 MG/ML
2 INJECTION, SOLUTION INTRAMUSCULAR; INTRAVENOUS
Status: DISCONTINUED | OUTPATIENT
Start: 2019-05-28 | End: 2019-05-30 | Stop reason: HOSPADM

## 2019-05-28 RX ORDER — CARVEDILOL 3.12 MG/1
3.12 TABLET ORAL 2 TIMES DAILY WITH MEALS
Status: DISCONTINUED | OUTPATIENT
Start: 2019-05-28 | End: 2019-05-30 | Stop reason: HOSPADM

## 2019-05-28 RX ORDER — DIPHENHYDRAMINE HCL 25 MG
25 CAPSULE ORAL 2 TIMES DAILY
Status: DISCONTINUED | OUTPATIENT
Start: 2019-05-28 | End: 2019-05-30 | Stop reason: HOSPADM

## 2019-05-28 RX ORDER — BUDESONIDE 0.5 MG/2ML
500 INHALANT ORAL
Status: DISCONTINUED | OUTPATIENT
Start: 2019-05-28 | End: 2019-05-30 | Stop reason: HOSPADM

## 2019-05-28 RX ORDER — ISOSORBIDE MONONITRATE 60 MG/1
60 TABLET, EXTENDED RELEASE ORAL DAILY
Status: DISCONTINUED | OUTPATIENT
Start: 2019-05-29 | End: 2019-05-30 | Stop reason: HOSPADM

## 2019-05-28 RX ORDER — LEVOTHYROXINE SODIUM 88 UG/1
88 TABLET ORAL
Status: DISCONTINUED | OUTPATIENT
Start: 2019-05-29 | End: 2019-05-30 | Stop reason: HOSPADM

## 2019-05-28 RX ORDER — INSULIN LISPRO 100 [IU]/ML
INJECTION, SOLUTION INTRAVENOUS; SUBCUTANEOUS
Status: DISCONTINUED | OUTPATIENT
Start: 2019-05-28 | End: 2019-05-30 | Stop reason: HOSPADM

## 2019-05-28 RX ORDER — INSULIN LISPRO 100 [IU]/ML
20 INJECTION, SOLUTION INTRAVENOUS; SUBCUTANEOUS
Status: DISCONTINUED | OUTPATIENT
Start: 2019-05-28 | End: 2019-05-30 | Stop reason: HOSPADM

## 2019-05-28 RX ORDER — INSULIN LISPRO 100 [IU]/ML
28 INJECTION, SOLUTION INTRAVENOUS; SUBCUTANEOUS
Status: DISCONTINUED | OUTPATIENT
Start: 2019-05-28 | End: 2019-05-30 | Stop reason: HOSPADM

## 2019-05-28 RX ORDER — HYDRALAZINE HYDROCHLORIDE 50 MG/1
50 TABLET, FILM COATED ORAL 2 TIMES DAILY
Status: DISCONTINUED | OUTPATIENT
Start: 2019-05-28 | End: 2019-05-30 | Stop reason: HOSPADM

## 2019-05-28 RX ORDER — INSULIN LISPRO 100 [IU]/ML
INJECTION, SOLUTION INTRAVENOUS; SUBCUTANEOUS
COMMUNITY
End: 2021-07-01 | Stop reason: ALTCHOICE

## 2019-05-28 RX ORDER — INSULIN LISPRO 100 [IU]/ML
20 INJECTION, SOLUTION INTRAVENOUS; SUBCUTANEOUS
Status: DISCONTINUED | OUTPATIENT
Start: 2019-05-29 | End: 2019-05-30 | Stop reason: HOSPADM

## 2019-05-28 RX ORDER — ONDANSETRON 4 MG/1
4 TABLET, ORALLY DISINTEGRATING ORAL
Status: DISCONTINUED | OUTPATIENT
Start: 2019-05-28 | End: 2019-05-30 | Stop reason: HOSPADM

## 2019-05-28 RX ORDER — CLOPIDOGREL BISULFATE 75 MG/1
75 TABLET ORAL DAILY
Status: DISCONTINUED | OUTPATIENT
Start: 2019-05-29 | End: 2019-05-30 | Stop reason: HOSPADM

## 2019-05-28 RX ORDER — LOSARTAN POTASSIUM AND HYDROCHLOROTHIAZIDE 25; 100 MG/1; MG/1
1 TABLET ORAL DAILY
Status: DISCONTINUED | OUTPATIENT
Start: 2019-05-29 | End: 2019-05-30 | Stop reason: HOSPADM

## 2019-05-28 RX ORDER — SODIUM CHLORIDE 9 MG/ML
150 INJECTION, SOLUTION INTRAVENOUS CONTINUOUS
Status: DISCONTINUED | OUTPATIENT
Start: 2019-05-28 | End: 2019-05-29

## 2019-05-28 RX ADMIN — Medication 5 ML: at 22:13

## 2019-05-28 RX ADMIN — INSULIN LISPRO 28 UNITS: 100 INJECTION, SOLUTION INTRAVENOUS; SUBCUTANEOUS at 13:12

## 2019-05-28 RX ADMIN — Medication 10 ML: at 14:04

## 2019-05-28 RX ADMIN — INSULIN LISPRO 6 UNITS: 100 INJECTION, SOLUTION INTRAVENOUS; SUBCUTANEOUS at 22:09

## 2019-05-28 RX ADMIN — INSULIN LISPRO 15 UNITS: 100 INJECTION, SOLUTION INTRAVENOUS; SUBCUTANEOUS at 13:15

## 2019-05-28 RX ADMIN — SODIUM CHLORIDE 75 ML/HR: 900 INJECTION, SOLUTION INTRAVENOUS at 20:28

## 2019-05-28 NOTE — PROGRESS NOTES
Verbal bedside report received from [facility], [address] [facility]. Assumed care of patient.

## 2019-05-28 NOTE — PROGRESS NOTES
Initial visit to assess pt's spiritual needs. Feeling today?  good    Receiving good care?  yes    Needs from Spiritual Care:  Nothing at this time    Ministry of presence & prayer to demonstrate caring & concern, convey emotional & spiritual support.     Chaplain Norman Real, MDiv,[facility],PhD

## 2019-05-28 NOTE — PROGRESS NOTES
05/28/19 1230   Dual Skin Pressure Injury Assessment   Dual Skin Pressure Injury Assessment WDL   Second Care Provider (Based on [address] [place] [address]) Paty Higginbotham RN   Skin Integumentary   Skin Integumentary (WDL) WDL    Pressure  Injury Documentation No Pressure Injury Noted-Pressure Ulcer Prevention Initiated   Skin Color Appropriate for ethnicity   Skin Condition/Temp Warm;Dry   Skin Integrity Intact   Turgor Non-tenting   Hair Growth Present   Varicosities Absent     Dual RN skin assessment completed. Patient's skin noted to be warm, dry and intact. Patient with intact skin over all pressure points. No noted skin breakdown or skin issues noted.

## 2019-05-28 NOTE — PROGRESS NOTES
Pt refused tx, stating he will take his [place] that he brought from home. Home CPAP at bedside, pt declined help with setup/water.

## 2019-05-28 NOTE — PROGRESS NOTES
The patient is being admitted from home for overnight hydration due to CKD. Please see H and P from 5/24/2019 office visit for Plan.

## 2019-05-28 NOTE — PROGRESS NOTES
Patient noted to be outpatient, so discussed with patient his option to take home medications and educated patient on the protocol for taking home medications at the hospital. Patient stated he did not want to do it that way and brought out his personal medicine box with assorted pills in it stating he wanted to take \"his\" pills. Patient then took 4 unmarked pills from his bottle against RN's recommendation and stated the pills he took was losartan, imdur, hydralazine and coreg. Patient also proceeded to draw up 29 units humalog from his personal bag at this time and 46 units of lantus. RN discussed in length the policy and protocols for taking home medications after patient self administered all aforementioned medications. Patient finally agreeable to taking home medications per hospital policy and stated he would not take unmarked pills from his home supply and only take medications labeled in bags and verified by pharmacy. Patient also questioned why provider had ordered benadryl as a part of his medications during this admission. RN educated that benadryl is given alongside a sterioid when patient has allergies to contrast dye and shellfish as a preventative combination prior to heart caths. Clarified these orders with Kristy aBrrett NP. Per Risa Day, these orders are correct and to document if patient refuses to take this medication. Medications sent to pharmacy for verification. Patient would not give RN his unlabeled bottle of pills to lock in silver box, but stated he would not take anymore medications from that bottle.

## 2019-05-28 NOTE — PROGRESS NOTES
Patient arrived to unit as direct admit per Dr. Jeremie Us. Patient stated he had not eaten lunch yet, but is diabetic. Checked patient's blood sugars and noted to be 401. Spoke with Serjio Barragan NP. Orders received to order sliding scaled humalog and the prandial insulin dose that patient takes at home and give as ordered per sliding scale and prandial. Provider to see patient soon.

## 2019-05-28 NOTE — PROGRESS NOTES
Verbal bedside report given to [facility] carlos RODRÍGUEZ RN. Patient's situation, background, assessment and recommendations provided. Opportunity for questions provided. Oncoming RN assumed care of patient.

## 2019-05-28 NOTE — PROGRESS NOTES
Care Management Interventions  PCP Verified by CM: Yes  Palliative Care Criteria Met (RRAT>21 & CHF Dx)?: No(RRAT 36 Dx Chest pain)  Transition of Care Consult (CM Consult): Discharge Planning  Discharge Durable Medical Equipment: No(CPAP)  Physical Therapy Consult: No  Occupational Therapy Consult: No  Speech Therapy Consult: No  Current Support Network: Lives with Spouse  Confirm Follow Up Transport: Self  Plan discussed with Pt/Family/Caregiver: Yes  Freedom of Choice Offered: Yes  Discharge Location  Discharge Placement: Home  Met with patient alert and oriented x 3, independent of ADL's and lives with his wife of 61 years. DME includes CPAP and he is able to drive. He has Medicare and AARP and is able to obtain medications at [facility] in [facility] [phone]. Current d/c plan is home with wife. He is admitted observation for IVF prior to cardiac cath.

## 2019-05-29 LAB
ANION GAP SERPL CALC-SCNC: 11 MMOL/L (ref 7–16)
BUN SERPL-MCNC: 63 MG/DL (ref 8–23)
CALCIUM SERPL-MCNC: 9 MG/DL (ref 8.3–10.4)
CHLORIDE SERPL-SCNC: 104 MMOL/L (ref 98–107)
CO2 SERPL-SCNC: 21 MMOL/L (ref 21–32)
CREAT SERPL-MCNC: 2.73 MG/DL (ref 0.8–1.5)
GLUCOSE BLD STRIP.AUTO-MCNC: 172 MG/DL (ref 65–100)
GLUCOSE BLD STRIP.AUTO-MCNC: 176 MG/DL (ref 65–100)
GLUCOSE BLD STRIP.AUTO-MCNC: 184 MG/DL (ref 65–100)
GLUCOSE BLD STRIP.AUTO-MCNC: 215 MG/DL (ref 65–100)
GLUCOSE BLD STRIP.AUTO-MCNC: 216 MG/DL (ref 65–100)
GLUCOSE SERPL-MCNC: 199 MG/DL (ref 65–100)
POTASSIUM SERPL-SCNC: 4.8 MMOL/L (ref 3.5–5.1)
SODIUM SERPL-SCNC: 136 MMOL/L (ref 136–145)

## 2019-05-29 PROCEDURE — 74011250636 HC RX REV CODE- 250/636

## 2019-05-29 PROCEDURE — 82962 GLUCOSE BLOOD TEST: CPT

## 2019-05-29 PROCEDURE — 74011250636 HC RX REV CODE- 250/636: Performed by: INTERNAL MEDICINE

## 2019-05-29 PROCEDURE — 74011636320 HC RX REV CODE- 636/320: Performed by: INTERNAL MEDICINE

## 2019-05-29 PROCEDURE — C1769 GUIDE WIRE: HCPCS

## 2019-05-29 PROCEDURE — 77030020263 HC SOL INJ SOD CL0.9% LFCR 1000ML

## 2019-05-29 PROCEDURE — 80048 BASIC METABOLIC PNL TOTAL CA: CPT

## 2019-05-29 PROCEDURE — C1894 INTRO/SHEATH, NON-LASER: HCPCS

## 2019-05-29 PROCEDURE — 74011250637 HC RX REV CODE- 250/637: Performed by: NURSE PRACTITIONER

## 2019-05-29 PROCEDURE — 93458 L HRT ARTERY/VENTRICLE ANGIO: CPT

## 2019-05-29 PROCEDURE — 77030015766

## 2019-05-29 PROCEDURE — 77030029997 HC DEV COM RDL R BND TELE -B

## 2019-05-29 PROCEDURE — 74011250636 HC RX REV CODE- 250/636: Performed by: NURSE PRACTITIONER

## 2019-05-29 PROCEDURE — 74011000250 HC RX REV CODE- 250: Performed by: INTERNAL MEDICINE

## 2019-05-29 PROCEDURE — 77030004534 HC CATH ANGI DX INFN CARD -A

## 2019-05-29 RX ORDER — HYDROCORTISONE SODIUM SUCCINATE 100 MG/2ML
100 INJECTION, POWDER, FOR SOLUTION INTRAMUSCULAR; INTRAVENOUS ONCE
Status: COMPLETED | OUTPATIENT
Start: 2019-05-29 | End: 2019-05-29

## 2019-05-29 RX ORDER — LIDOCAINE HYDROCHLORIDE 10 MG/ML
3-10 INJECTION INFILTRATION; PERINEURAL
Status: DISCONTINUED | OUTPATIENT
Start: 2019-05-29 | End: 2019-05-29

## 2019-05-29 RX ORDER — MIDAZOLAM HYDROCHLORIDE 1 MG/ML
.5-2 INJECTION, SOLUTION INTRAMUSCULAR; INTRAVENOUS
Status: DISCONTINUED | OUTPATIENT
Start: 2019-05-29 | End: 2019-05-29

## 2019-05-29 RX ORDER — FAMOTIDINE 10 MG/ML
20 INJECTION INTRAVENOUS ONCE
Status: COMPLETED | OUTPATIENT
Start: 2019-05-29 | End: 2019-05-29

## 2019-05-29 RX ORDER — HEPARIN SODIUM 200 [USP'U]/100ML
2 INJECTION, SOLUTION INTRAVENOUS CONTINUOUS
Status: DISCONTINUED | OUTPATIENT
Start: 2019-05-29 | End: 2019-05-29

## 2019-05-29 RX ORDER — SODIUM CHLORIDE 9 MG/ML
75 INJECTION, SOLUTION INTRAVENOUS CONTINUOUS
Status: DISCONTINUED | OUTPATIENT
Start: 2019-05-29 | End: 2019-05-30 | Stop reason: HOSPADM

## 2019-05-29 RX ADMIN — LOSARTAN POTASSIUM AND HYDROCHLOROTHIAZIDE 1 TABLET: 25; 100 TABLET ORAL at 16:51

## 2019-05-29 RX ADMIN — SODIUM CHLORIDE 150 ML/HR: 900 INJECTION, SOLUTION INTRAVENOUS at 09:00

## 2019-05-29 RX ADMIN — SODIUM CHLORIDE 75 ML/HR: 900 INJECTION, SOLUTION INTRAVENOUS at 21:36

## 2019-05-29 RX ADMIN — INSULIN LISPRO 3 UNITS: 100 INJECTION, SOLUTION INTRAVENOUS; SUBCUTANEOUS at 21:40

## 2019-05-29 RX ADMIN — INSULIN LISPRO 3 UNITS: 100 INJECTION, SOLUTION INTRAVENOUS; SUBCUTANEOUS at 13:01

## 2019-05-29 RX ADMIN — FAMOTIDINE 20 MG: 10 INJECTION, SOLUTION INTRAVENOUS at 10:55

## 2019-05-29 RX ADMIN — CARVEDILOL 3.12 MG: 3.12 TABLET ORAL at 08:13

## 2019-05-29 RX ADMIN — HYDROCORTISONE SODIUM SUCCINATE 100 MG: 100 INJECTION, POWDER, FOR SOLUTION INTRAMUSCULAR; INTRAVENOUS at 10:55

## 2019-05-29 RX ADMIN — HYDRALAZINE HYDROCHLORIDE 50 MG: 50 TABLET, FILM COATED ORAL at 08:17

## 2019-05-29 RX ADMIN — INSULIN LISPRO 20 UNITS: 100 INJECTION, SOLUTION INTRAVENOUS; SUBCUTANEOUS at 16:45

## 2019-05-29 RX ADMIN — LEVOTHYROXINE SODIUM 88 MCG: 88 TABLET ORAL at 08:14

## 2019-05-29 RX ADMIN — HEPARIN SODIUM 2 UNITS/HR: 200 INJECTION, SOLUTION INTRAVENOUS at 11:38

## 2019-05-29 RX ADMIN — CLOPIDOGREL BISULFATE 75 MG: 75 TABLET ORAL at 08:14

## 2019-05-29 RX ADMIN — INSULIN GLARGINE 23 UNITS: 100 INJECTION, SOLUTION SUBCUTANEOUS at 08:57

## 2019-05-29 RX ADMIN — HYDRALAZINE HYDROCHLORIDE 50 MG: 50 TABLET, FILM COATED ORAL at 16:49

## 2019-05-29 RX ADMIN — INSULIN GLARGINE 46 UNITS: 100 INJECTION, SOLUTION SUBCUTANEOUS at 16:44

## 2019-05-29 RX ADMIN — INSULIN LISPRO 28 UNITS: 100 INJECTION, SOLUTION INTRAVENOUS; SUBCUTANEOUS at 13:01

## 2019-05-29 RX ADMIN — CARVEDILOL 3.12 MG: 3.12 TABLET ORAL at 16:51

## 2019-05-29 RX ADMIN — ALLOPURINOL 150 MG: 300 TABLET ORAL at 08:12

## 2019-05-29 RX ADMIN — HEPARIN SODIUM 2 ML: 10000 INJECTION, SOLUTION INTRAVENOUS; SUBCUTANEOUS at 12:07

## 2019-05-29 RX ADMIN — LIDOCAINE HYDROCHLORIDE 3 ML: 10 INJECTION, SOLUTION INFILTRATION; PERINEURAL at 12:05

## 2019-05-29 RX ADMIN — Medication 5 ML: at 05:14

## 2019-05-29 RX ADMIN — INSULIN LISPRO 6 UNITS: 100 INJECTION, SOLUTION INTRAVENOUS; SUBCUTANEOUS at 16:45

## 2019-05-29 RX ADMIN — DIPHENHYDRAMINE HYDROCHLORIDE 25 MG: 25 CAPSULE ORAL at 08:53

## 2019-05-29 RX ADMIN — ASPIRIN 81 MG: 81 TABLET ORAL at 08:11

## 2019-05-29 RX ADMIN — IOPAMIDOL 110 ML: 755 INJECTION, SOLUTION INTRAVENOUS at 12:24

## 2019-05-29 RX ADMIN — ISOSORBIDE MONONITRATE 60 MG: 60 TABLET, EXTENDED RELEASE ORAL at 16:52

## 2019-05-29 RX ADMIN — Medication 5 ML: at 13:04

## 2019-05-29 NOTE — PROGRESS NOTES
Pt asked to take half of AM insulin lantus 46 unit prior to procedure today. Per Dr. Josh Lomax, it is okay for pt to take half AM insulin lantus. Hold AM insulin humalog SS and prandial per Dr. Josh Lomax.

## 2019-05-29 NOTE — PROGRESS NOTES
Verbal bedside report received from [facility], [address] [place] [address]. Assumed care of patient.

## 2019-05-29 NOTE — PROGRESS NOTES
Problem: Falls - Risk of  Goal: *Absence of Falls  Description  Document Indira Dhaliwal Fall Risk and appropriate interventions in the flowsheet.   Outcome: Progressing Towards Goal     Problem: Cath Lab Procedures: Pre-Procedure  Goal: Off Pathway (Use only if patient is Off Pathway)  Outcome: Progressing Towards Goal  Goal: Activity/Safety  Outcome: Progressing Towards Goal  Goal: Consults, if ordered  Outcome: Progressing Towards Goal  Goal: Diagnostic Test/Procedures  Outcome: Progressing Towards Goal  Goal: Nutrition/Diet  Outcome: Progressing Towards Goal  Goal: Discharge Planning  Outcome: Progressing Towards Goal  Goal: Medications  Outcome: Progressing Towards Goal  Goal: Respiratory  Outcome: Progressing Towards Goal  Goal: Treatments/Interventions/Procedures  Outcome: Progressing Towards Goal  Goal: Psychosocial  Outcome: Progressing Towards Goal  Goal: *Verbalize description of procedure  Outcome: Progressing Towards Goal  Goal: *Consent signed  Outcome: Progressing Towards Goal

## 2019-05-29 NOTE — PROGRESS NOTES
TRANSFER - OUT REPORT:    [facility] Dr Cindy Badillo  RRA  Diagnostic no interventions  TR band 12 ml  NO bleeding/hematoma  Pt is a/o no complaints    Verbal report given to Pauly(name) on Trinidad Valenzuela  being transferred to [facility](unit) for routine progression of care       Report consisted of patients Situation, Background, Assessment and   Recommendations(SBAR). Information from the following report(s) SBAR and Procedure Summary was reviewed with the receiving nurse. Lines:   Peripheral IV 05/28/19 Left;Posterior Forearm (Active)   Site Assessment Clean, dry, & intact 5/29/2019  8:00 AM   Phlebitis Assessment 0 5/29/2019  8:00 AM   Infiltration Assessment 0 5/29/2019  8:00 AM   Dressing Status Clean, dry, & intact 5/29/2019  8:00 AM   Dressing Type Transparent 5/29/2019  8:00 AM   Hub Color/Line Status Patent 5/29/2019  8:00 AM   Alcohol Cap Used No 5/29/2019  4:30 AM       Peripheral IV 05/29/19 Anterior;Right Wrist (Active)   Site Assessment Clean, dry, & intact 5/29/2019  8:00 AM   Phlebitis Assessment 0 5/29/2019  8:00 AM   Infiltration Assessment 0 5/29/2019  8:00 AM   Dressing Status Clean, dry, & intact 5/29/2019  8:00 AM   Dressing Type Transparent 5/29/2019  8:00 AM   Hub Color/Line Status Patent 5/29/2019  8:00 AM   Alcohol Cap Used No 5/29/2019  4:30 AM        Opportunity for questions and clarification was provided.

## 2019-05-29 NOTE — PROGRESS NOTES
pato  5/29/2019 6:18 AM    Admit Date: 5/28/2019    Admit Diagnosis: Chest pain [R07.9]      Subjective:    Patient is a [de-identified] y/o male sitting in recliner. Patient was direct admit for hydration pre heart cath. Patient has many questions regarding medications. He reports an iodine/shellfish allergy. Being premedicated with benadryl and prednisone.      Objective:      Visit Vitals  /80 (BP 1 Location: Right arm)   Pulse 72   Temp 97.7 °F (36.5 °C)   Resp 19   Ht 6' 1\" (1.854 m)   Wt 325 lb 4.8 oz (147.6 kg)   SpO2 97%   BMI 42.92 kg/m²       ROS:  General ROS: negative for - chills  Hematological and Lymphatic ROS: negative for - blood clots or jaundice  Respiratory ROS: no cough, shortness of breath, or wheezing  Cardiovascular ROS: no chest pain or dyspnea on exertion  Gastrointestinal ROS: no abdominal pain, change in bowel habits, or black or bloody stools  Neurological ROS: no TIA or stroke symptoms    Physical Exam:    Physical Examination: General appearance - alert, well appearing, and in no distress  Mental status - alert, oriented to person, place, and time  Eyes - pupils equal and reactive, extraocular eye movements intact  Neck/lymph - supple, no significant adenopathy  Chest/CV - clear to auscultation, no wheezes, rales or rhonchi, symmetric air entry  Heart - normal rate, regular rhythm, normal S1, S2, no murmurs, rubs, clicks or gallops  Abdomen/GI - soft, nontender, nondistended, no masses or organomegaly  Musculoskeletal - no joint tenderness, deformity or swelling  Extremities - intact peripheral pulses, slight pedal edema bilaterally  Skin - normal coloration and turgor, no rashes, no suspicious skin lesions noted    Current Facility-Administered Medications   Medication Dose Route Frequency    insulin lispro (HUMALOG) injection 20 Units (Patient Supplied)  20 Units SubCUTAneous ACB    insulin lispro (HUMALOG) injection 28 Units (Patient Supplied)  28 Units SubCUTAneous ACL    insulin lispro (HUMALOG) injection 20 Units (Patient Supplied)  20 Units SubCUTAneous ACD    insulin lispro (HUMALOG) injection (Patient Supplied)   SubCUTAneous AC&HS    carvedilol (COREG) tablet 3.125 mg (Patient Supplied)  3.125 mg Oral BID WITH MEALS    ondansetron (ZOFRAN ODT) tablet 4 mg  4 mg Oral Q8H PRN    [START ON 6/2/2019] dulaglutide (TRULICITY) 1.5 NA/4.0 mL sub-q pen 1.5 mg (Patient Supplied)  1.5 mg SubCUTAneous every Sunday    albuterol (PROVENTIL VENTOLIN) nebulizer solution 2.5 mg  2.5 mg Nebulization Q4H PRN    hydrALAZINE (APRESOLINE) tablet 50 mg (Patient Supplied)  50 mg Oral BID    predniSONE (DELTASONE) tablet 10 mg  10 mg Oral BID    allopurinol (ZYLOPRIM) tablet 300 mg (Patient Supplied)  300 mg Oral DAILY    insulin glargine (LANTUS) injection 46 Units (Patient Supplied)  46 Units SubCUTAneous BID    aspirin delayed-release tablet 81 mg (Patient Supplied)  81 mg Oral DAILY    clopidogrel (PLAVIX) tablet 75 mg (Patient Supplied)  75 mg Oral DAILY    levothyroxine (SYNTHROID) tablet 88 mcg (Patient Supplied)  88 mcg Oral ACB    isosorbide mononitrate ER (IMDUR) tablet 60 mg (Patient Supplied)  60 mg Oral DAILY    0.9% sodium chloride infusion  75 mL/hr IntraVENous CONTINUOUS    sodium chloride (NS) flush 5-40 mL  5-40 mL IntraVENous Q8H    sodium chloride (NS) flush 5-40 mL  5-40 mL IntraVENous PRN    nitroglycerin (NITROSTAT) tablet 0.4 mg  0.4 mg SubLINGual Q5MIN PRN    morphine injection 2 mg  2 mg IntraVENous Q4H PRN    diphenhydrAMINE (BENADRYL) capsule 25 mg  25 mg Oral BID    budesonide (PULMICORT) 500 mcg/2 ml nebulizer suspension  500 mcg Nebulization BID RT    And    albuterol (PROVENTIL VENTOLIN) nebulizer solution 2.5 mg  2.5 mg Nebulization Q6HWA RT    losartan-hydroCHLOROthiazide (HYZAAR) 100-25 mg per tablet 1 Tab (Patient Supplied)  1 Tab Oral DAILY       Data Review:   @LABRCNT(Na,K,BUN,CREA,WBC,HGB,HCT,PLT,INR,TRP,TCHOL*,Triglyceride*,LDL*,LDLCPOC HDL*,HDL])@    TELEMETRY: SR    Assessment/Plan:     Active Problems:    Chest pain (5/28/2019)    Plan for LHC and possible PCI. Increased hydration rate. Premedicate for iodine allergy. Patient has CKD III and a 25-50% chance of acquiring contrast induced nephropathy and 10% chance of requiring dialysis.      Lakisha Fletcher RN

## 2019-05-29 NOTE — PROGRESS NOTES
TRANSFER - IN REPORT:    Verbal report received from Chuck Briones RN on Estefania Rubalcava being received from Cath lab for routine post - op      Report consisted of patients Situation, Background, Assessment and Recommendations(SBAR). Information from the following report(s) SBAR and MAR was reviewed with the receiving nurse. Opportunity for questions and clarification was provided. Patient to be transferred to room 309.

## 2019-05-29 NOTE — H&P
Miguelina Davies DO   Physician   Cardiology   Progress Notes      Signed   Encounter Date:  5/24/2019               Expand All Collapse All            []Hide copied text    []Roxanne for details           [address], [address]  PHONE: [phone]                                                            Stephanie Nowak   1940        SUBJECTIVE:   Stephanie Nowak is a [de-identified] y.o. male seen for a follow up visit regarding the following:           Chief Complaint   Patient presents with    Chest Pain       for the past few weeks worsening     Shortness of Breath       extertional          HPI:    Here for CAD (LAD PCI 12/15, does have small vessel dz, LHC 9/11/17 with patent LAD stents, no PCI), PHILLIP on CPAP. He has CKD Stage III - IV.    9/2017:  LHC showed mild to mod CAD. patent LAD stents. Echo 9/2017: EF normal.      He is struggling with worsening angina, more CHRISTY. Poor exercise tolerance. Feeling tired, no energy. Getting worse. More chest pressure as well. NO LE edema. No edema now. This has been stable.       Still on DAPT. Doing well on DAPT treatment as reviewed today, no bleeding issues or excessive bruising noted. Blood sugars have been better. He has lost 10 pds with better diet now.      Seeing Dr. Alexis Travis as well. On meds for asthma. Compliant with CPAP.     Stopped statin, does not trust them.         Edema on norvasc  High K on aldactone  Intolerant to ranexa           Past Medical History, Past Surgical History, Family history, Social History, and Medications were all reviewed with the patient today and updated as necessary.             Outpatient Medications Marked as Taking for the 5/24/19 encounter (Office Visit) with Miguelina Davies DO   Medication Sig Dispense Refill    levothyroxine (SYNTHROID) 88 mcg tablet Take 88 mcg by mouth.        predniSONE (DELTASONE) 20 mg tablet Take 20 mg by mouth two (2) times a day.  6 Tab 0    cpap machine kit by Does Not Apply route. 8 cm        fluticasone furoate-vilanterol (BREO ELLIPTA) 100-25 mcg/dose inhaler 1 inhalation daily, rinse mouth after use 3 Inhaler 3    ondansetron hcl (ZOFRAN) 4 mg tablet Take 4 mg by mouth every eight (8) hours as needed for Nausea.        albuterol (PROAIR HFA) 90 mcg/actuation inhaler Take 2 Puffs by inhalation every four (4) hours as needed for Wheezing. 1 Inhaler 11    carvedilol (COREG) 3.125 mg tablet Take 1 Tab by mouth two (2) times daily (with meals). 180 Tab 3    losartan-hydroCHLOROthiazide (HYZAAR) 100-25 mg per tablet Take 1 Tab by mouth daily. 90 Tab 3    isosorbide mononitrate ER (IMDUR) 60 mg CR tablet Take 1 Tab by mouth every morning. (Patient taking differently: Take 60 mg by mouth nightly.) 90 Tab 3    clopidogrel (PLAVIX) 75 mg tab Take 1 Tab by mouth daily. 90 Tab 3    hydrALAZINE (APRESOLINE) 50 mg tablet Take 1 Tab by mouth two (2) times a day. 180 Tab 3    torsemide (DEMADEX) 10 mg tablet Take 1 Tab by mouth two (2) times a day.  196 Tab 3    TRULICITY 1.5 CH/2.0 mL sub-q pen 1.5 mg by SubCUTAneous route every seven (7) days.        aspirin delayed-release 81 mg tablet Take 81 mg by mouth.        Cholecalciferol, Vitamin D3, (VITAMIN D3) 2,000 unit cap capsule Take 1 Cap by mouth.        allopurinol (ZYLOPRIM) 300 mg tablet Take 300 mg by mouth daily.        insulin lispro (HUMALOG) 100 unit/mL injection Sliding scale        insulin glargine (LANTUS) 100 unit/mL injection 46 Units by SubCUTAneous route two (2) times a day.                Allergies   Allergen Reactions    Amlodipine Shortness of Breath    Iodine Shortness of Breath    Metformin Shortness of Breath    Ranolazine Unknown (comments)       Lips and tongue numbness    Shellfish Containing Products Anaphylaxis    Spironolactone Unknown (comments) and Other (comments)       Potassium level went really high  Increased potassium    Beta Blocker [Beta-Blockers (Beta-Adrenergic Blocking Agts)] Other (comments)       Mental fogginess    Lortab [Hydrocodone-Acetaminophen] Unknown (comments)       Pt states that if he takes this  The medication makes him feel like he is going crazy    Prilosec [Omeprazole] Other (comments)       Caused drastic drop in blood sugar           Patient Active Problem List     Diagnosis    Obesity, morbid ([address]    Type 2 diabetes with nephropathy ([address]    Diastolic CHF, chronic ([address]    Acquired hypothyroidism    Anemia in chronic kidney disease    Cough variant asthma    Coronary atherosclerosis of native coronary vessel       12/15: LAD PCI, small vessel dz       Dyspnea    CHRISTY (dyspnea on exertion)    Abnormal nuclear stress test    Elevated liver function tests    Hyponatremia    Anemia    PPD positive    Seborrheic dermatitis    Hypertension    Edema       Edema on norvasc       CKD (chronic kidney disease)    PHILLIP (obstructive sleep apnea)    Diabetes mellitus type 2 in obese [facility])            Past Surgical History:   Procedure Laterality Date    HX CATARACT REMOVAL Bilateral 2017    HX HEART CATHETERIZATION   2016     3 stent    HX HERNIA REPAIR               Family History   Problem Relation Age of Onset    Hypertension Mother      Hypertension Father      Cancer Father           lung    No Known Problems Sister      No Known Problems Brother      No Known Problems Sister        Social History            Tobacco Use    Smoking status: Former Smoker       Packs/day: 0.50       Years: 2.00       Pack years: 1.00       Types: Cigarettes       Last attempt to quit: 1962       Years since quittin.4    Smokeless tobacco: Never Used   Substance Use Topics    Alcohol use: No            ROS:  Constitutional:   Negative for fevers and unexplained weight loss. Eyes:   Negative for visual disturbance. ENT:   Negative for significant hearing loss and tinnitus. Respiratory:   Negative for hemoptysis.   Cardiovascular: Negative except as noted in HPI. Gastrointestinal:   Negative for melena and abdominal pain. Genitourinary:   Negative for hematuria, renal stones. Integumentary:   Negative for rash or non-healing wounds  Hematologic/Lymphatic:   Negative for excessive bleeding hx or clotting disorder. Musculoskeletal:  Negative for active, unexplained/severe joint pain. Neurological:   Negative for stroke. Behavioral/Psych:   Negative for suicidal ideations. Endocrine:   Negative for uncontrolled diabetic symptoms including polyuria, polydipsia and poor wound healing.         PHYSICAL EXAM:        Visit Vitals       /64   Pulse 65   Ht 6' 1\" (1.854 m)   Wt 325 lb (147.4 kg) Comment: with shoes   BMI 42.88 kg/m²      General/Constitutional:   Alert and oriented x 3, no acute distress  HEENT:   normocephalic, atraumatic, moist mucous membranes  Neck:   No JVD or carotid bruits bilaterally  Cardiovascular:   regular rate and rhythm, no murmur/rub/gallop appreciated  Pulmonary:   clear to auscultation bilaterally, no respiratory distress  Abdomen:   soft, non-tender, non-distended  Ext:   Mild non-pitting LE edema bilaterally  Skin:  warm and dry, no obvious rashes seen  Neuro:   no obvious sensory or motor deficits  Psychiatric:   normal mood and affect     EKG Today: sinus rhythm, normal intervals and non-specific ST/T wave changes.           Medical problems, medical history and test results were reviewed with the patient today.           Recent Results   No results found for this or any previous visit (from the past 168 hour(s)).            Lab Results   Component Value Date/Time     Cholesterol, total 154 09/11/2017 06:50 AM     HDL Cholesterol 36 (L) 09/11/2017 06:50 AM     LDL, calculated 83.2 09/11/2017 06:50 AM     VLDL, calculated 34.8 (H) 09/11/2017 06:50 AM     Triglyceride 174 (H) 09/11/2017 06:50 AM     CHOL/HDL Ratio 4.3 09/11/2017 06:50 AM            ASSESSMENT:     Diagnoses and all orders for this visit:     1. Irregular heart beats  -     AMB POC EKG ROUTINE W/ 12 LEADS, INTER & REP     2. Atherosclerosis of native coronary artery of native heart with angina pectoris ([address]  -     CBC WITH AUTOMATED DIFF; Future  -     METABOLIC PANEL, BASIC; Future  -     PROTHROMBIN TIME + INR; Future  -     BNP; Future  -     MAGNESIUM; Future  -     TSH 3RD GENERATION; Future  -     LEFT HEART CATH; Future     3. Diastolic CHF, chronic (HCC)     4. Generalized edema     5. Essential hypertension     6. Stage 4 chronic kidney disease (HCC)     Other orders  -     predniSONE (DELTASONE) 20 mg tablet; Take 20 mg by mouth two (2) times a day.              IMPRESSION:      1. CAD:  Angina worsening. Lost 10 pds, diet better now. No edema, no obvious HF. He has known CKD.       Needs LHC given worsening angina, risk of ARF reviewed.      May need overnight admission for CKD to monitor Cr after the LHC. He agrees to proceed with risk of ARF known.      Will pre-treat for contrast allergy as well with prednisone and in the lab before per protocol.     Plan for Left Heart Catheterization and possible Percutaneous Coronary Intervention (PCI) at [facility] due to worsening angina within the last 2 mos as described in HPI. Discussed risk of cardiac catheterization and potential PCI with the patient in detail. These risks include, but are not limited to, bleeding, stroke, heart attack, cardiac arrhythmias, allergic reactions, atheroemboli, acute kidney injury and cardiac arrest/death. Local complications at the site of catheter insertion were also reviewed and discussed. The patient voiced complete understanding about these risks. The patient agrees to proceed with the aforementioned associated risks.     Check labs before.       2. HTN:  Follow, remain on meds.      3. CHRISTY:  Likely multi-factorial from weight, DHF, asthma, PHILLIP, worsening CKD and physical deconditioning. Needs weight loss. Remain on CPAP. Follow after the [facility]. Check BNP as well. But, he has lost weight, eating better now.      4. HPL:  Stopped statin on his own, does not trust them.         5. CRI: stage IV, seeing renal.     As above, IV hydration before the [facility], may need overnight admission.      6. cDHF:  Follow BP, has been well controlled. Remain on current diuretics, seeing renal.  Follow Cr and K. BID diuretic working well, extra one as needed.         The patient has been instructed to call with any angina or equivalent as reviewed today. All questions were answered with the patient voicing complete understanding.       Patient has been instructed and agrees to call our office with any issues or other concerns related to their cardiac condition(s) and/or complaint(s).       Follow-up and Dispositions  ·   Return for Return after test(s).              Cirilo Rai DO  5/24/2019         Pt set up for procedure. Risks benefits and alternatives discussed. Pt agrees to proceed. Risks of bleeding infection emergent surgical procedure loss of life or limb renal failure and other known risks discussed.  Pt agrees to proceed and agrees to sign consent form.                           Electronically signed by Jet House DO at 05/24/19 2191   Note Details     Author Jet House DO File Time 05/24/19 1148   Author Type Physician Status Signed   Last  Jet House DO Specialty Cardiology       Office Visit on 5/24/2019            Detailed Report           Note shared with patient

## 2019-05-29 NOTE — PROGRESS NOTES
Arrived in patient's room to administer home medications. Patient had an unmarked bottle out and stated he already had his pills pull out and ready to take- showed RN several assorted pills in bottle. Re-educated patient on hospital home medication policy and that I would need to scan his home medications that were verified by pharmacy last night. Patient agreed to take home medications per protocol and allow RN to scan pharmacy verified home medications. Noted that pharmacy had not verified patient's home prednisone bottle. Took bottle to pharmacy and pharmacist Harleen Merida unable to verify patient's prednisone because the computer program did not have an option for 10mg prednisone label. Patient refusing to take hospital supplied prednisone because he does not want to take anything his insurance won't cover while under outpatient status. Instructed by Harleen Merida, Pharmacist to change the order to a non-formulary, so pharmacy could make a label allowing patient to take a half tablet of his 20mg prednisone tablets equalling the 10mg ordered dose by MD.  All home medications given by RN, then placed in silver lock box in room. Patient refused to let RN lock up his unmarked bottle of assorted pills and refused to send it home. Patient also informed RN that he expected a lunch tray to be in his room waiting for his after his procedure today because he does not like the turkey sandwich trays kept on the floor. Told patient I would speak to dietary and try to arrange this if possible, but there was no guarantee he would be back on the unit for lunch depending on time of procedure. Patient making passive, rude comments about nurses and the nursing field at this time. Charge RN's Jose Dumont and Jorje Solorzano informed of situation. Patient to be prepped for heart cath today by tech, other wise, ready for heart cath.

## 2019-05-29 NOTE — PROGRESS NOTES
Brought pt's home meds into the room. Pt refused to have them locked in silver box in the room, insisting the meds to be placed in lower drawer of bedside table.

## 2019-05-29 NOTE — PROGRESS NOTES
Cardiac Catheterization Procedure Note    Patient ID:     Name: Stephanie Nowak   Medical Record Number: [id number]   YOB: 1940    Date of Procedure: 5/29/2019     Pre-procedure Diagnosis:  Atypical Angina    Post-procedure Diagnosis: Coronary Artery Disease    Reason for Procedure: Suspected CAD    Blood loss less than 5 ml    Sedation. Pt received 0 mg versed and 0 mcg fentanyl for monitored conscious sedation from 1200to 1230. Nurse margo    Specimen: None    No complications    No assistants    Time out, Mallampati, and ASA performed    Procedure:  After informed consent, patient was prepped and draped in the usual sterile fashion. radial approach was used. 120cc Visipaque contrast were utilized for the entire procedure. no closure device used        FINDINGS    Left Ventricle: 60  LVEDP: 24    Left Main:ok    Left Anterior descending coronary artery: [id number]% disease       Left Circumflex coronary artery: diffuse mild disease        Right coronary artery: diffuse mod disease            Graft anatomy: na    Intervention if done: na    Conclusions: moderate disease    Recommentations: med therapy.  weight loss    No complications      Signed By: Obinna Fowler MD

## 2019-05-29 NOTE — PROGRESS NOTES
Bedside and Verbal shift change report given to self (oncoming nurse) by Delores Gallego RN (offgoing nurse). Report included the following information SBAR, Kardex, MAR and Recent Results.

## 2019-05-29 NOTE — PROGRESS NOTES
Verbal bedside report given to [facility], oncoming RN. Patient's situation, background, assessment and recommendations provided. Opportunity for questions provided. Oncoming RN assumed care of patient.

## 2019-05-29 NOTE — PROGRESS NOTES
Verbal bedside report given to Ann Martins, oncoming RN. Patient's situation, background, assessment and recommendations provided. Opportunity for questions provided. Oncoming RN assumed care of patient. R. radial site visualized.

## 2019-05-30 VITALS
HEIGHT: 73 IN | OXYGEN SATURATION: 97 % | HEART RATE: 54 BPM | RESPIRATION RATE: 18 BRPM | BODY MASS INDEX: 41.75 KG/M2 | WEIGHT: 315 LBS | DIASTOLIC BLOOD PRESSURE: 81 MMHG | TEMPERATURE: 98.1 F | SYSTOLIC BLOOD PRESSURE: 155 MMHG

## 2019-05-30 LAB
ANION GAP SERPL CALC-SCNC: 11 MMOL/L (ref 7–16)
BUN SERPL-MCNC: 65 MG/DL (ref 8–23)
CALCIUM SERPL-MCNC: 9.1 MG/DL (ref 8.3–10.4)
CHLORIDE SERPL-SCNC: 106 MMOL/L (ref 98–107)
CO2 SERPL-SCNC: 21 MMOL/L (ref 21–32)
CREAT SERPL-MCNC: 2.63 MG/DL (ref 0.8–1.5)
GLUCOSE BLD STRIP.AUTO-MCNC: 155 MG/DL (ref 65–100)
GLUCOSE SERPL-MCNC: 142 MG/DL (ref 65–100)
POTASSIUM SERPL-SCNC: 4.6 MMOL/L (ref 3.5–5.1)
SODIUM SERPL-SCNC: 138 MMOL/L (ref 136–145)

## 2019-05-30 PROCEDURE — 80048 BASIC METABOLIC PNL TOTAL CA: CPT

## 2019-05-30 PROCEDURE — 36415 COLL VENOUS BLD VENIPUNCTURE: CPT

## 2019-05-30 PROCEDURE — 82962 GLUCOSE BLOOD TEST: CPT

## 2019-05-30 RX ADMIN — ALLOPURINOL 300 MG: 300 TABLET ORAL at 07:43

## 2019-05-30 RX ADMIN — HYDRALAZINE HYDROCHLORIDE 50 MG: 50 TABLET, FILM COATED ORAL at 07:47

## 2019-05-30 RX ADMIN — ASPIRIN 81 MG: 81 TABLET ORAL at 07:44

## 2019-05-30 RX ADMIN — CARVEDILOL 3.12 MG: 3.12 TABLET ORAL at 07:45

## 2019-05-30 RX ADMIN — Medication 10 ML: at 05:02

## 2019-05-30 RX ADMIN — INSULIN LISPRO 20 UNITS: 100 INJECTION, SOLUTION INTRAVENOUS; SUBCUTANEOUS at 07:38

## 2019-05-30 RX ADMIN — INSULIN LISPRO 3 UNITS: 100 INJECTION, SOLUTION INTRAVENOUS; SUBCUTANEOUS at 07:37

## 2019-05-30 RX ADMIN — CLOPIDOGREL BISULFATE 75 MG: 75 TABLET ORAL at 07:46

## 2019-05-30 RX ADMIN — LEVOTHYROXINE SODIUM 88 MCG: 88 TABLET ORAL at 07:30

## 2019-05-30 RX ADMIN — INSULIN GLARGINE 40 UNITS: 100 INJECTION, SOLUTION SUBCUTANEOUS at 07:40

## 2019-05-30 NOTE — DISCHARGE INSTRUCTIONS
Patient Education        Left Heart Catheterization: About This Test  What is it? Cardiac catheterization is a test to check the left side of your heart. Your doctor might look at the shape of your heart, the motion of your heart, or the blood pressure inside the chambers. Why is this test done? This test gives information about how your heart is working. It can:  · Check blood flow and blood pressure in the chambers of the heart. · Check the pumping action of the heart. · Find out if a heart defect is present and how severe it is. · Find out how well the heart valves work. What happens during the test?  · You will get medicine to help you relax. · A thin tube called a catheter is put into a blood vessel in the groin or the arm. The doctor moves the catheter through the blood vessel into your heart. · You will get a shot to numb the skin where the catheter goes in. You may feel pressure when the doctor moves the catheter through your blood vessel into your heart. · Dye may be injected into your heart. Your doctor can watch on special monitors as the dye moves in your heart. The dye helps your doctor see blood flow in your heart. · You may feel hot or flushed for several seconds when the dye is put in.  · If a heart defect is found, cardiac catheterization sometimes is used to correct it during the test.  How long does it take? · The test will take about 30 minutes. If a problem is found and the doctor treats it, it can take a few hours longer. What happens after the test?  · You will stay in a room for at least a few hours to make sure the catheter site starts to heal. You may have a bandage or a compression device on your groin or arm to prevent bleeding. · If the catheter was placed in your groin, you may lie in bed for a few hours. If the catheter was put in your arm, you will need to keep your arm still for at least 1 hour. · You may or may not need to stay in the hospital overnight.  You will get more instructions for what to do at home. · Drink plenty of fluids for several hours after the test.  Follow-up care is a key part of your treatment and safety. Be sure to make and go to all appointments, and call your doctor if you are having problems. It's also a good idea to know your test results and keep a list of the medicines you take. Where can you learn more? Go to http://curtis-kaitlynn.info/. Enter W306 in the search box to learn more about \"Left Heart Catheterization: About This Test.\"  Current as of: July 22, 2018  Content Version: 11.9  © 0048-2285 [employer]. Care instructions adapted under license by [employer] (which disclaims liability or warranty for this information). If you have questions about a medical condition or this instruction, always ask your healthcare professional. [address] any warranty or liability for your use of this information. Cardiac Catheterization/Angiography Discharge Instructions    *Check the puncture site frequently for swelling or bleeding. If you see any bleeding, lie down and apply pressure over the area with a clean town or washcloth. Notify your doctor for any redness, swelling, drainage or oozing from the puncture site. Notify your doctor for any fever or chills. *If the leg or arm with the puncture becomes cold, numb or painful, call Dr Jeanne Lundberg at  [phone]    *Activity should be limited for the next 48 hours. Climb stairs as little as possible and avoid any stooping, bending or strenuous activity for 48 hours. No heavy lifting (anything over 10 pounds) for three days. *Do not drive for 48 hours. *You may resume your usual diet. Drink more fluids than usual.    *Have a responsible person drive you home and stay with you for at least 24 hours after your heart catheterization/angiography. *You may remove the bandage from your Right and Arm in 24 hours.  You may shower in 24 hours. No tub baths, hot tubs or swimming for one week. Do not place any lotions, creams, powders, ointments over the puncture site for one week. You may place a clean band-aid over the puncture site each day for 5 days. Change this daily.

## 2019-05-30 NOTE — PROGRESS NOTES
Bedside and Verbal shift change report given to Robert Ramachandran RN (oncoming nurse) by self Fantasma Damon nurse). Report included the following information SBAR, Kardex, MAR and Recent Results.

## 2019-05-30 NOTE — DISCHARGE SUMMARY
[facility] Discharge Summary     Patient ID:  Ricardo Garcia  [id number]  61 y.o.  1940    Admit date: 5/28/2019    Discharge date:  5/29/2019    Admitting Physician: Santiago Powell DO     Discharge Physician: Dr. Twyla Sotomayor    Admission Diagnoses: Chest pain [R07.9]    Discharge Diagnoses:    Diagnosis    Chest pain    Obesity, morbid ([address]    Type 2 diabetes with nephropathy ([address]    Diastolic CHF, chronic ([address]    Coronary atherosclerosis of native coronary vessel    CKD (chronic kidney disease)       Cardiology Procedures this admission:  Left heart catheterization with PCI  Consults: None    Hospital Course: Patient was seen at the office of [facility] by Dr. Javad Rogers for complaints of chest pain and was subsequently scheduled for an AM Admission LHC at [facility] on 5/28/19 for overnight hydration. Patient underwent cardiac catheterization by Dr. Karen Carney. Patient was found to have stable moderate CAD that will continue to be treated medically. Patient tolerated the procedure well and was taken to the telemetry floor for recovery. The morning of discharge, patient was up feeling well without any complaints of chest pain or shortness of breath. Patient's right radial cath site was clean, dry and intact without hematoma or bruit. Patient's labs were WNL. Patient was seen and examined by Dr. Twyla Sotomayor and determined stable and ready for discharge. Patient was instructed on the importance of medication compliance including taking aspirin and Plavix everyday without missing a dose. For maximized medical therapy for CAD, patient will continue BB, ARB, and Imdur/hydralazine. No statin therapy due to intolerance to all statins in the past.  The patient will follow up with [facility] -- Dr. Javad Rogers in 2-4 weeks. Patient has been referred to cardiac rehab. Patient will have BMP prior to follow up.         DISPOSITION: The patient is being discharged home in stable condition on a low saturated fat, low cholesterol and low salt diet. The patient is instructed to advance activities as tolerated to the limit of fatigue or shortness of breath. The patient is instructed to avoid all heavy lifting, straining, stooping or squatting for 3-5 days. The patient is instructed to watch the cath site for bleeding/oozing; if seen, the patient is instructed to apply firm pressure with a clean cloth and call [address] Cardiology at [phone]. The patient is instructed to watch for signs of infection which include: increasing area of redness, fever/hot to touch or purulent drainage at the catheterization site. The patient is instructed not to soak in a bathtub for 7-10 days, but is cleared to shower. The patient is instructed to call the office or return to the ER for immediate evaluation for any shortness of breath or chest pain not relieved by NTG. Discharge Exam: Patient has been seen by Dr. Latosha Hector: see his progress note for exam details.     Visit Vitals  /71   Pulse 64   Temp 97.6 °F (36.4 °C)   Resp 18   Ht 6' 1\" (1.854 m)   Wt 147.6 kg (325 lb 4.8 oz)   SpO2 97%   BMI 42.92 kg/m²       Recent Results (from the past 24 hour(s))   METABOLIC PANEL, BASIC    Collection Time: 05/29/19  4:36 AM   Result Value Ref Range    Sodium 136 136 - 145 mmol/L    Potassium 4.8 3.5 - 5.1 mmol/L    Chloride 104 98 - 107 mmol/L    CO2 21 21 - 32 mmol/L    Anion gap 11 7 - 16 mmol/L    Glucose 199 (H) 65 - 100 mg/dL    BUN 63 (H) 8 - 23 MG/DL    Creatinine 2.73 (H) 0.8 - 1.5 MG/DL    GFR est AA 29 (L) >60 ml/min/1.73m2    GFR est non-AA 24 (L) >60 ml/min/1.73m2    Calcium 9.0 8.3 - 10.4 MG/DL   GLUCOSE, POC    Collection Time: 05/29/19  6:19 AM   Result Value Ref Range    Glucose (POC) 215 (H) 65 - 100 mg/dL   GLUCOSE, POC    Collection Time: 05/29/19 10:47 AM   Result Value Ref Range    Glucose (POC) 172 (H) 65 - 100 mg/dL   GLUCOSE, POC    Collection Time: 05/29/19 12:53 PM   Result Value Ref Range    Glucose (POC) 184 (H) 65 - 100 mg/dL   GLUCOSE, POC    Collection Time: 19  3:47 PM   Result Value Ref Range    Glucose (POC) 216 (H) 65 - 100 mg/dL   GLUCOSE, POC    Collection Time: 19  9:36 PM   Result Value Ref Range    Glucose (POC) 176 (H) 65 - 100 mg/dL         Patient Instructions:     Current Discharge Medication List      CONTINUE these medications which have NOT CHANGED    Details   !! insulin lispro (HUMALOG U-100 INSULIN) 100 unit/mL injection by SubCUTAneous route Before breakfast, lunch, and dinner. Patient takes pre-meal humalo units with breakfast plus sliding scale, 28 units plus sliding scale and 20 units plus sliding scale      levothyroxine (SYNTHROID) 88 mcg tablet Take 88 mcg by mouth. cpap machine kit by Does Not Apply route. 8 cm      carvedilol (COREG) 3.125 mg tablet Take 1 Tab by mouth two (2) times daily (with meals). Qty: 180 Tab, Refills: 3      losartan-hydroCHLOROthiazide (HYZAAR) 100-25 mg per tablet Take 1 Tab by mouth daily. Qty: 90 Tab, Refills: 3      isosorbide mononitrate ER (IMDUR) 60 mg CR tablet Take 1 Tab by mouth every morning. Qty: 90 Tab, Refills: 3      clopidogrel (PLAVIX) 75 mg tab Take 1 Tab by mouth daily. Qty: 90 Tab, Refills: 3      torsemide (DEMADEX) 10 mg tablet Take 1 Tab by mouth two (2) times a day. Qty: 180 Tab, Refills: 3      aspirin delayed-release 81 mg tablet Take 81 mg by mouth. Cholecalciferol, Vitamin D3, (VITAMIN D3) 2,000 unit cap capsule Take 1 Cap by mouth. allopurinol (ZYLOPRIM) 300 mg tablet Take 300 mg by mouth daily. predniSONE (DELTASONE) 20 mg tablet Take 20 mg by mouth two (2) times a day. Qty: 6 Tab, Refills: 0      fluticasone furoate-vilanterol (BREO ELLIPTA) 100-25 mcg/dose inhaler 1 inhalation daily, rinse mouth after use  Qty: 3 Inhaler, Refills: 3      ondansetron hcl (ZOFRAN) 4 mg tablet Take 4 mg by mouth every eight (8) hours as needed for Nausea.       albuterol (PROAIR HFA) 90 mcg/actuation inhaler Take 2 Puffs by inhalation every four (4) hours as needed for Wheezing. Qty: 1 Inhaler, Refills: 11      hydrALAZINE (APRESOLINE) 50 mg tablet Take 1 Tab by mouth two (2) times a day. Qty: 180 Tab, Refills: 3      TRULICITY 1.5 BH/7.4 mL sub-q pen 1.5 mg by SubCUTAneous route every seven (7) days. !! insulin lispro (HUMALOG) 100 unit/mL injection Sliding scale      insulin glargine (LANTUS) 100 unit/mL injection 46 Units by SubCUTAneous route two (2) times a day. !! - Potential duplicate medications found. Please discuss with provider.

## 2019-05-30 NOTE — PROCEDURES
[facility]  CARDIAC CATH    Name:  Kathleen Schlatter  MR#:  [id number]  :  1940  ACCOUNT #:  [de-identified]  DATE OF SERVICE:  2019      PROCEDURES PERFORMED:  Left heart cath, selective coronary angiography, and left ventriculogram.    PREOPERATIVE DIAGNOSES:  Atypical angina. POSTOPERATIVE DIAGNOSES:  Stable coronary artery disease. SURGEON:  Severo Rama, MD    ASSISTANT:  None. ESTIMATED BLOOD LOSS:  Less than 5 mL. SPECIMENS REMOVED:  None. COMPLICATIONS:  None. IMPLANTS:  None. ANESTHESIA:  Sedation, none. INDICATION:  Atypical angina. TOTAL CONTRAST:  120 mL. Radial access was used with a Martha left 4, Martha right 4, and pigtail. FINDINGS:  As follows; left ventriculogram done in [place] projection shows an EF of 60% with a dilated ventricle. LVEDP is measured at 24. Left main arises normally, bifurcates into LAD and circumflex. Left main has moderate calcification but no stenosis. LAD courses the apex, has previously been stented. The LAD gives off a large first diagonal.  The LAD throughout its midportion has diffuse moderate 40-60% plaquing but no high-grade obstruction. The diagonal has diffuse moderate 30-40% plaquing with no high-grade obstruction. Circumflex artery in the AV groove supplies two OMs and a PDA. The circumflex has diffuse moderate 30-40% disease throughout the OMs have diffuse moderate 30-40% disease. Right coronary artery arises in a normal fashion, courses in the AV groove, supplies the posterior descending and posterolateral.  The right system has diffuse moderate nonobstructive disease. CONCLUSION:  Diffuse moderate nonobstructive coronary artery disease. The patient is best served with lifestyle modification, medical therapy and weight loss.       Ryanne Gomez MD      [id number]  D:  2019 12:49  T:  2019 12:58  JOB #:  [id number]

## 2019-05-30 NOTE — PROGRESS NOTES
Care Management Interventions  PCP Verified by CM: Yes  Palliative Care Criteria Met (RRAT>21 & CHF Dx)?: No(RRAT 36 Dx Chest pain)  Mode of Transport at Discharge: Other (see comment)(wife)  Transition of Care Consult (CM Consult): Discharge Planning  Discharge Durable Medical Equipment: No(CPAP)  Physical Therapy Consult: No  Occupational Therapy Consult: No  Speech Therapy Consult: No  Current Support Network: Lives with Spouse  Confirm Follow Up Transport: Self  Plan discussed with Pt/Family/Caregiver: Yes  Freedom of Choice Offered: Yes  Discharge Location  Discharge Placement: Home  Patient ready for d/c home today. He voices no concerns or needs for d/c. Patient to d/c home with wife.

## 2021-07-02 ENCOUNTER — HOSPITAL ENCOUNTER (OUTPATIENT)
Dept: LAB | Age: 81
Discharge: HOME | End: 2021-07-02
Payer: MEDICARE

## 2021-07-02 DIAGNOSIS — I50.32 DIASTOLIC CHF, CHRONIC (HCC): ICD-10-CM

## 2021-07-02 LAB
ALBUMIN SERPL-MCNC: 3.3 G/DL (ref 3.2–4.6)
ALBUMIN/GLOB SERPL: 1 {RATIO} (ref 1.2–3.5)
ALP SERPL-CCNC: 75 U/L (ref 50–136)
ALT SERPL-CCNC: 29 U/L (ref 12–65)
ANION GAP SERPL CALC-SCNC: 6 MMOL/L (ref 7–16)
AST SERPL-CCNC: 29 U/L (ref 15–37)
BILIRUB SERPL-MCNC: 0.4 MG/DL (ref 0.2–1.1)
BNP SERPL-MCNC: 272 PG/ML
BUN SERPL-MCNC: 62 MG/DL (ref 8–23)
CALCIUM SERPL-MCNC: 9.2 MG/DL (ref 8.3–10.4)
CHLORIDE SERPL-SCNC: 108 MMOL/L (ref 98–107)
CO2 SERPL-SCNC: 25 MMOL/L (ref 21–32)
CREAT SERPL-MCNC: 3.02 MG/DL (ref 0.8–1.5)
ERYTHROCYTE [DISTWIDTH] IN BLOOD BY AUTOMATED COUNT: 13.6 % (ref 11.9–14.6)
EST. AVERAGE GLUCOSE BLD GHB EST-MCNC: 163 MG/DL
GLOBULIN SER CALC-MCNC: 3.4 G/DL (ref 2.3–3.5)
GLUCOSE SERPL-MCNC: 242 MG/DL (ref 65–100)
HBA1C MFR BLD: 7.3 % (ref 4.2–6.3)
HCT VFR BLD AUTO: 32.1 % (ref 41.1–50.3)
HGB BLD-MCNC: 10.5 G/DL (ref 13.6–17.2)
MAGNESIUM SERPL-MCNC: 2.6 MG/DL (ref 1.8–2.4)
MCH RBC QN AUTO: 30.9 PG (ref 26.1–32.9)
MCHC RBC AUTO-ENTMCNC: 32.7 G/DL (ref 31.4–35)
MCV RBC AUTO: 94.4 FL (ref 79.6–97.8)
NRBC # BLD: 0 K/UL (ref 0–0.2)
PLATELET # BLD AUTO: 229 K/UL (ref 150–450)
PMV BLD AUTO: 10.7 FL (ref 9.4–12.3)
POTASSIUM SERPL-SCNC: 4.3 MMOL/L (ref 3.5–5.1)
PROT SERPL-MCNC: 6.7 G/DL (ref 6.3–8.2)
RBC # BLD AUTO: 3.4 M/UL (ref 4.23–5.6)
SODIUM SERPL-SCNC: 139 MMOL/L (ref 138–145)
TSH SERPL DL<=0.005 MIU/L-ACNC: 0.92 UIU/ML (ref 0.36–3.74)
WBC # BLD AUTO: 5.9 K/UL (ref 4.3–11.1)

## 2021-07-02 PROCEDURE — 80053 COMPREHEN METABOLIC PANEL: CPT

## 2021-07-02 PROCEDURE — 36415 COLL VENOUS BLD VENIPUNCTURE: CPT

## 2021-07-02 PROCEDURE — 83036 HEMOGLOBIN GLYCOSYLATED A1C: CPT

## 2021-07-02 PROCEDURE — 85027 COMPLETE CBC AUTOMATED: CPT

## 2021-07-02 PROCEDURE — 83880 ASSAY OF NATRIURETIC PEPTIDE: CPT

## 2021-07-02 PROCEDURE — 84443 ASSAY THYROID STIM HORMONE: CPT

## 2021-07-02 PROCEDURE — 83735 ASSAY OF MAGNESIUM: CPT

## 2021-07-02 NOTE — PROGRESS NOTES
Please call him, labs are good. Cr up some, but good. Maybe take off HCTZ a few days,  only take 4-5 days per week. HbA1c 7.3, see PCP when he can about this. Call for issues, let's plan on BMP in 3 mos, see me.    Thanks

## 2021-07-07 NOTE — PROGRESS NOTES
Spoke with pt and relayed lab results per Dr Thuy Mcdonnell. Also went over HCTZ. Verbal understanding was given.

## 2022-03-19 PROBLEM — I50.32 DIASTOLIC CHF, CHRONIC (HCC): Status: ACTIVE | Noted: 2017-09-08

## 2022-03-19 PROBLEM — E11.21 TYPE 2 DIABETES WITH NEPHROPATHY (HCC): Status: ACTIVE | Noted: 2018-04-18

## 2022-03-19 PROBLEM — R07.9 CHEST PAIN: Status: ACTIVE | Noted: 2019-05-28

## 2022-03-19 PROBLEM — E66.01 OBESITY, MORBID (HCC): Status: ACTIVE | Noted: 2018-04-18

## 2022-05-31 ENCOUNTER — TELEPHONE (OUTPATIENT)
Dept: PULMONOLOGY | Age: 82
End: 2022-05-31

## 2022-05-31 DIAGNOSIS — R06.02 SOB (SHORTNESS OF BREATH): Primary | ICD-10-CM

## 2022-05-31 NOTE — TELEPHONE ENCOUNTER
Patient has appointment on Friday 6/3.   Having pain in right lung when coughing and asking if he can get chest xray before his appointment on 6/3

## 2022-06-01 ENCOUNTER — HOSPITAL ENCOUNTER (OUTPATIENT)
Dept: GENERAL RADIOLOGY | Age: 82
Discharge: HOME | End: 2022-06-04
Payer: MEDICARE

## 2022-06-01 ENCOUNTER — TELEPHONE (OUTPATIENT)
Dept: PULMONOLOGY | Age: 82
End: 2022-06-01

## 2022-06-01 DIAGNOSIS — R06.02 SOB (SHORTNESS OF BREATH): ICD-10-CM

## 2022-06-01 PROCEDURE — 71046 X-RAY EXAM CHEST 2 VIEWS: CPT

## 2022-06-01 NOTE — TELEPHONE ENCOUNTER
Last seen: 12/3/21  Hx: asthma, JORJE w/ CPAP, SEARS, DM2    Contacted patient in regard to reported pain w/ cough; started a couple of weeks ago, described as a tenderness only on the R side when coughing; also having some sob; wants to rule out any other potential issues; no fevers; not worse w/ exertion only w/ cough or sneezing; pain is in the front but more to the side than the middle; doesn't feel like normal asthma exacerbation & is requesting cxr order to ensure no further interventions needed. Order entered and patient will have done prior to appt 6/3.

## 2022-06-01 NOTE — TELEPHONE ENCOUNTER
Patient wants an cxr before his appt on Friday he has been hurting in his right lung please have him one ordered

## 2022-06-02 NOTE — PROGRESS NOTES
He is an 79-year-old male seen today for follow-up of cough variant asthma, sleep apnea, obesity, exertional dyspnea, JORJE.  At prior visit, family member had mentioned family history of cirrhosis and wondered if he needed to have alpha-1 level.  This was obtained and was normal.      DIAGNOSTICS:       CPFt's 3/2016 - Spirometry is consistent with mild obstructive defect with decreased forced vital capacity, lung volume and atelectasis is consistent with mild gas trapping. The diffusion capacity is normal. Overall this is consistent with GOLD stage II COPD. CXR 3/2016unremarkable. Echocardiogram 12/2015 EF 6570%. Diastolic dysfunction. Myocardial stress test - CONCLUSION:   1. Stress EKG: Normal.  2. SPECT Perfusion Imaging: Fixed defect likely related to diaphragmatic attenuation - probably normal.  3. LV Systolic Function is  normal.   ECHO - 9/22/2017EF 70-75%, hyperdynamic, mild LVH.  Diastolic function indeterminant.  RVSP estimated at 22 mmHg. CXR 10/2016no acute findings. Ambulatory oximetry 10/5/2016 - baseline saturation 97%, dropped to 95% with ambulation. Baseline heart rate 82, increased to 124 with ambulation. Elevated d-dimer 10/2016, subsequent negative ventilation/perfusion study. Spirometry 6/20/2017moderate combined obstructive and restrictive defect, interval improvement since prior study. Spirometry 5/25/2018 - patient declined, (back pain).   Spirometry 3/29/2019moderate restrictive defect, interval decline in FVC, no significant change in FEV1.  Previous complete pulmonary function test have demonstrated obstructive defect with air trapping. Echo 8/3/2020EF 49-68%, + diastolic dysfunction, mild aortic valve sclerosis without significant stenosis, minimal aortic regurgitation. Mild MR. Trace TR.  RVSP could not be estimated due to no tricuspid regurgitation.   Spirometry 12/3/2020mild restrictive defect, slight improvement in FVC and FEV1 compared to last study, significant improvement compared to baseline study.   Normal alpha-1 level 6/2021.

## 2022-06-03 ENCOUNTER — OFFICE VISIT (OUTPATIENT)
Dept: PULMONOLOGY | Age: 82
End: 2022-06-03
Payer: MEDICARE

## 2022-06-03 VITALS
WEIGHT: 315 LBS | DIASTOLIC BLOOD PRESSURE: 74 MMHG | SYSTOLIC BLOOD PRESSURE: 130 MMHG | HEART RATE: 75 BPM | BODY MASS INDEX: 41.75 KG/M2 | HEIGHT: 73 IN | OXYGEN SATURATION: 97 % | TEMPERATURE: 97.2 F

## 2022-06-03 DIAGNOSIS — J45.991 COUGH VARIANT ASTHMA: Primary | ICD-10-CM

## 2022-06-03 DIAGNOSIS — I50.32 DIASTOLIC CHF, CHRONIC (HCC): ICD-10-CM

## 2022-06-03 DIAGNOSIS — E66.01 OBESITY, MORBID (HCC): ICD-10-CM

## 2022-06-03 DIAGNOSIS — R06.09 DYSPNEA ON EXERTION: ICD-10-CM

## 2022-06-03 DIAGNOSIS — G47.33 OSA (OBSTRUCTIVE SLEEP APNEA): ICD-10-CM

## 2022-06-03 DIAGNOSIS — J98.4 RESTRICTIVE LUNG DISEASE: ICD-10-CM

## 2022-06-03 PROCEDURE — G8427 DOCREV CUR MEDS BY ELIG CLIN: HCPCS | Performed by: NURSE PRACTITIONER

## 2022-06-03 PROCEDURE — 1123F ACP DISCUSS/DSCN MKR DOCD: CPT | Performed by: NURSE PRACTITIONER

## 2022-06-03 PROCEDURE — 99214 OFFICE O/P EST MOD 30 MIN: CPT | Performed by: NURSE PRACTITIONER

## 2022-06-03 PROCEDURE — 1036F TOBACCO NON-USER: CPT | Performed by: NURSE PRACTITIONER

## 2022-06-03 PROCEDURE — G8417 CALC BMI ABV UP PARAM F/U: HCPCS | Performed by: NURSE PRACTITIONER

## 2022-06-03 RX ORDER — FLUTICASONE FUROATE, UMECLIDINIUM BROMIDE AND VILANTEROL TRIFENATATE 100; 62.5; 25 UG/1; UG/1; UG/1
POWDER RESPIRATORY (INHALATION)
Qty: 1 EACH | Refills: 11 | Status: ON HOLD | OUTPATIENT
Start: 2022-06-03 | End: 2022-08-02

## 2022-06-03 ASSESSMENT — ENCOUNTER SYMPTOMS
SHORTNESS OF BREATH: 1
ABDOMINAL PAIN: 0
DIARRHEA: 0
NAUSEA: 0
CHEST TIGHTNESS: 0
WHEEZING: 0
COUGH: 0
CONSTIPATION: 0
VOMITING: 0

## 2022-06-03 NOTE — PROGRESS NOTES
[address], [address] [facility]  [phone]    Patient Name:  Arlene Parrish    YOB: 1940    Office Visit 6/3/2022      CHIEF COMPLAINT:      Chief Complaint   Patient presents with    Asthma    Follow-up     HISTORY OF PRESENT ILLNESS:     He is a very pleasant 28-year-old male seen today for follow-up of cough variant asthma, sleep apnea, obesity, exertional dyspnea, JORJE.  At prior visit, family member had mentioned family history of cirrhosis and wondered if he needed to have alpha-1 level.  This was obtained and was normal.    Today, he reports that he has had interval increase in exertional dyspnea over the past several weeks. He states that [employer] not seem to get around in his lungs as deep as before. \"  States that he is \"scared to do anything\" due to this shortness of breath and therefore has become very inactive. He does note some improvement in dyspnea later in the day. He has some tenderness in the right rib area with coughing and sneezing and therefore requested to have chest x-ray prior to his appointment which demonstrates clear lung fields and no acute cardiopulmonary abnormality. He is using albuterol inhaler twice daily and notes good response. Has gained 24 pounds over the past 8 months. Has chronic lower extremity edema, diuretics prescribed by nephrology. Has 10 mg furosemide advised that he can take up to 4 tablets daily if needed for worsening swelling. Also, his wife indicates that edema is some better when he is compliant using compression stockings.      DIAGNOSTICS:       CPFt's 3/2016 - Spirometry is consistent with mild obstructive defect with decreased forced vital capacity, lung volume and atelectasis is consistent with mild gas trapping. The diffusion capacity is normal. Overall this is consistent with GOLD stage II COPD. CXR 3/2016-unremarkable. Echocardiogram 12/2015- EF 65-70%.  Diastolic dysfunction. Myocardial stress test - CONCLUSION:   1. Stress EKG: Normal.  2. SPECT Perfusion Imaging: Fixed defect likely related to diaphragmatic attenuation - probably normal.  3. LV Systolic Function is  normal.   ECHO - 9/22/2017-EF 70-75%, hyperdynamic, mild LVH.  Diastolic function indeterminant.  RVSP estimated at 22 mmHg. CXR 10/2016-no acute findings. Ambulatory oximetry 10/5/2016 - baseline saturation 97%, dropped to 95% with ambulation. Baseline heart rate 82, increased to 124 with ambulation. Elevated d-dimer 10/2016, subsequent negative ventilation/perfusion study. Spirometry 6/20/2017-moderate combined obstructive and restrictive defect, interval improvement since prior study. Spirometry 5/25/2018 - patient declined, (back pain).   Spirometry 3/29/2019-moderate restrictive defect, interval decline in FVC, no significant change in FEV1.  Previous complete pulmonary function test have demonstrated obstructive defect with air trapping. Echo 8/3/2020-EF 11-68%, + diastolic dysfunction, mild aortic valve sclerosis without significant stenosis, minimal aortic regurgitation.  Mild MR.  Trace TR.  RVSP could not be estimated due to no tricuspid regurgitation. Spirometry 12/3/2020-mild restrictive defect, slight improvement in FVC and FEV1 compared to last study, significant improvement compared to baseline study.   Normal alpha-1 level 6/2021. CXR 6/1/2022- lung parenchyma is clear, no acute cardiopulmonary abnormality. Ambulatory oximetry 6/3/2022-baseline saturation 97% room air at rest, 97% room air with ambulation. Baseline heart rate 58, maximum 73 with ambulation.     Past Medical History:   Diagnosis Date    Acquired hypothyroidism 4/8/2016    Anemia 1/21/2015    Anemia in chronic kidney disease 4/8/2016    Asthma     Chronic kidney disease     stage 4 kidney disease- Dr. Rodo Coates CKD (chronic kidney disease) 10/11/2012    Coronary atherosclerosis of native coronary vessel 2/10/2016  Diabetes mellitus type 2 in obese ([address] 10/11/2012    avg 150, symptoms of hypoglycemia 90    Diastolic heart failure ([address]     Dyspnea     Dyspnea 2/10/2016    Edema 10/11/2012    Elevated liver function tests 2015    Hypertension 10/11/2012    Hyponatremia 2015    Morbid obesity (HCC)     BMI 43.54    JORJE (obstructive sleep apnea) 10/11/2012    cpap    Seborrheic dermatitis 2013    Seborrheic dermatitis        Patient Active Problem List   Diagnosis    Edema    CKD (chronic kidney disease)    JORJE (obstructive sleep apnea)    Dyspnea    Cough variant asthma    PPD positive    Hyponatremia    Chest pain    Anemia in chronic kidney disease    Obesity, morbid ([address]    SEARS (dyspnea on exertion)    Abnormal nuclear stress test    Type 2 diabetes with nephropathy (HCC)    Hypertension    Seborrheic dermatitis    Elevated liver function tests    Anemia    Diastolic CHF, chronic ([address]    Diabetes mellitus type 2 in obese ([address]    Coronary atherosclerosis of native coronary vessel    Acquired hypothyroidism         Past Surgical History:   Procedure Laterality Date    CARDIAC CATHETERIZATION      3 stent    CATARACT REMOVAL Bilateral 2017    HERNIA REPAIR           Social History     Socioeconomic History    Marital status:      Spouse name: None    Number of children: None    Years of education: None    Highest education level: None   Occupational History    None   Tobacco Use    Smoking status: Former Smoker     Packs/day: 0.50     Years: 2.00     Pack years: 1.00     Quit date: 1962     Years since quittin.4    Smokeless tobacco: Never Used   Substance and Sexual Activity    Alcohol use: No    Drug use: No    Sexual activity: None   Other Topics Concern    None   Social History Narrative    None     Social Determinants of Health     Financial Resource Strain:     Difficulty of Paying Living Expenses: Not on file   Food Insecurity:  Worried About Running Out of Food in the Last Year: Not on file    John of Food in the Last Year: Not on file   Transportation Needs:     Lack of Transportation (Medical): Not on file    Lack of Transportation (Non-Medical):  Not on file   Physical Activity:     Days of Exercise per Week: Not on file    Minutes of Exercise per Session: Not on file   Stress:     Feeling of Stress : Not on file   Social Connections:     Frequency of Communication with Friends and Family: Not on file    Frequency of Social Gatherings with Friends and Family: Not on file    Attends Ca[employer] Services: Not on file    Active Member of [address] [place] [address] [employer] or Organizations: Not on file    Attends Club or Organization Meetings: Not on file    Marital Status: Not on file   Intimate Partner Violence:     Fear of Current or Ex-Partner: Not on file    Emotionally Abused: Not on file    Physically Abused: Not on file    Sexually Abused: Not on file   Housing Stability:     Unable to Pay for Housing in the Last Year: Not on file    Number of [place] in the Last Year: Not on file    Unstable Housing in the Last Year: Not on file       Family History   Problem Relation Age of Onset    Cancer Father         lung    No Known Problems Sister     No Known Problems Brother     Hypertension Father     No Known Problems Sister     Hypertension Mother        Allergies   Allergen Reactions    Amlodipine Shortness Of Breath    Iodine Shortness Of Breath    Metformin Shortness Of Breath    Ranolazine Other (See Comments)     Lips and tongue numbness    Spironolactone Other (See Comments)     Potassium level went really high  Increased potassium    Hydrocodone-Acetaminophen Other (See Comments)     Pt states that if he takes this  The medication makes him feel like he is going crazy    Omeprazole Other (See Comments)     Caused drastic drop in blood sugar       Current Outpatient Medications   Medication Sig    albuterol sulfate  (90 Base) MCG/ACT inhaler 2 puffs qid prn    allopurinol (ZYLOPRIM) 300 MG tablet Take 150 mg by mouth daily    aspirin 81 MG EC tablet Take 81 mg by mouth    calcitRIOL (ROCALTROL) 0.25 MCG capsule Take 0.25 mcg by mouth daily    cloNIDine (CATAPRES) 0.1 MG tablet Take 0.1 mg by mouth 2 times daily    Dulaglutide (TRULICITY) 1.5 ZG/4.2TI SOPN Inject 1.5 mg into the skin every 7 days    fluticasone-vilanterol (BREO ELLIPTA) 100-25 MCG/INH AEPB inhaler 1 inhalation daily, rinse mouth after use    hydrALAZINE (APRESOLINE) 100 MG tablet Take 100 mg by mouth 2 times daily    hydrALAZINE (APRESOLINE) 50 MG tablet Take 50 mg by mouth 4 times daily    hydroCHLOROthiazide (HYDRODIURIL) 25 MG tablet Take 25 mg by mouth daily    insulin glargine (LANTUS) 100 UNIT/ML injection vial Inject 60 Units into the skin 2 times daily    levothyroxine (SYNTHROID) 88 MCG tablet Take 88 mcg by mouth    losartan-hydroCHLOROthiazide (HYZAAR) 100-25 MG per tablet Take 1 tablet by mouth daily    nebivolol (BYSTOLIC) 10 MG tablet Take 10 mg by mouth daily    sodium bicarbonate 650 MG tablet Take 650 mg by mouth 2 times daily    torsemide (DEMADEX) 10 MG tablet Take 10 mg by mouth 3 times daily    losartan (COZAAR) 100 MG tablet Take 100 mg by mouth daily (Patient not taking: Reported on 6/3/2022)     No current facility-administered medications for this visit. REVIEW OF SYSTEMS:    Review of Systems   Constitutional: Positive for fatigue and unexpected weight change. Negative for chills and fever. Has gained 24 lbs since 10/21. Respiratory: Positive for shortness of breath. Negative for cough, chest tightness and wheezing. Cardiovascular: Positive for leg swelling. Negative for chest pain and palpitations. Gastrointestinal: Negative for abdominal pain, constipation, diarrhea, nausea and vomiting. Neurological: Negative for dizziness, tremors, seizures, weakness and headaches.             PHYSICAL EXAM:    Vitals:    06/03/22 1005   BP: 130/74   Pulse: 75   Temp: 97.2 °F (36.2 °C)   TempSrc: Skin   SpO2: 97%   Weight: (!) 348 lb (157.9 kg)   Height: 6' 1\" (1.854 m)        Body mass index is 45.91 kg/m². GENERAL APPEARANCE:  The patient is morbidly obese and in no respiratory distress. HEENT:  PERRL. Conjunctivae unremarkable. Posterior oropharynx- no thrush, oral mucosa is moist and pink. NECK/LYMPHATIC:   Symmetrical with no elevation of jugular venous pulsation. Trachea midline. No thyroid enlargement. No cervical adenopathy. LUNGS:   Normal respiratory effort with symmetrical lung expansion. Breath sounds-diminished at lung bases, no wheezing or rhonchi. Very faint crackles at lung bases. HEART:   There is a normal rate and regular rhythm. No murmur, rub, or gallop. There is 2+ edema in the lower extremities. NEURO:  The patient is alert and oriented to person, place, and time. Memory appears intact and mood is normal.  No gross sensorimotor deficits are present. DIAGNOSTIC TESTS: Studies were personally reviewed by me and discussed with the patient. CXR:    XR CHEST (2 VW) 06/01/2022      Findings: 2 views of the chest submitted demonstrate the cardiac silhouette and  mediastinum to be unremarkable. There is no pleural effusion or pneumothorax. The lung parenchyma is clear. Degenerative spondylosis is noted in the thoracic  spine. Impression  No acute cardiopulmonary abnormality. Ambulatory oximetry 6/3/2022-baseline saturation 97% room air at rest, 97% room air with ambulation. Baseline heart rate 58, maximum 73 with ambulation. ASSESSMENT:   (Medical Decision Making)                                                                                                                                          Encounter Diagnoses   Name Primary?     Cough variant asthma Yes    Comment: moderate obstruction    Dyspnea on exertion     JORJE (obstructive sleep apnea)     Obesity, morbid (HCC)     Diastolic CHF, chronic (HCC)     Restrictive lung disease      Patient with increased exertional dyspnea, see HPI for detailed discussion. I reviewed with him that dyspnea is multifactorial in his case related to above conditions. I have encouraged him to increase activity as tolerated, and that while dyspnea is an uncomfortable sensation for him, it is not harmful. We also discussed increasing use of albuterol inhaler to 2 puffs 4 times daily if needed, since he notices good response to twice daily. He is compliant with maintenance inhaler. BMI is 46, 24 pound weight gain over the past 8 months, substantial weight loss is warranted. Discussed dietary modification. Diastolic CHF appears to be fairly well compensated at this time, he has significant chronic lower extremity edema. This improves with use of compression stockings. He is compliant with diuretics which are managed by nephrology. PLAN:     Discussed nature of his shortness of breath which is multifactorial    Hold breo    Begin trelegy 100, (sample and Rx provided) 1 inhalation every morning, rinse mouth after use. Prescription for trelegy is provided - if he perceives significant response to it. If so, and not covered by insurance, advised to contact us with alternate preferred Rx as recommended by insurance. If he does not perceive significant response to Trelegy, advised to resume Breo 1 inhalation every morning. Albuterol 2 puffs 4 times daily as needed for shortness of breath or wheezing. Recommendations for fluid intake and diuretic dose as per nephrology and cardiology. I have encouraged him to increase activity as tolerated. We also discussed importance of weight loss. Continue CPAP as prescribed, follow up in the sleep center. Advised to use CPAP during the day if he naps, may benefit from short, structured nap with CPAP on daily basis.     Follow-up and Dispositions    · Return for MD or Dat, 40  min appt, chronic obstructive asthma, DD, JORJE, PFT's. Orders Placed This Encounter    fluticasone-umeclidin-vilant ([employer]) 100-62.5-25 MCG/INH AEPB     Si inhalation every morning, rinse mouth after use     Dispense:  1 each     Refill:  11           CLINT CARCAMO, APRN - CNP    Total  time spent with patient - 54 min. Over 50% of today's office visit was spent in face to face time reviewing test results, prognosis, importance of compliance, education about disease process, benefits of medications, instructions for management of acute symptoms, and follow up plans. Collaborating MD: Dr. Chris Buitrago    Electronically signed. Dictated using voice recognition software.   Proof read but unrecognized errors may exist.

## 2022-06-03 NOTE — PATIENT INSTRUCTIONS
Discussed nature of his shortness of breath which is multifactorial    Hold breo    Begin trelegy 100, (sample and Rx provided) 1 inhalation every morning, rinse mouth after use. Prescription for trelegy is provided if he perceives significant response to it. If so, and not covered by insurance, advised to contact us with alternate preferred Rx as recommended by insurance. If he does not perceive significant response to Trelegy, advised to resume Breo 1 inhalation every morning. Albuterol 2 puffs 4 times daily as needed for shortness of breath or wheezing. Recommendations for fluid intake and diuretic dose as per nephrology and cardiology. I have encouraged him to increase activity as tolerated. We also discussed importance of weight loss. Continue CPAP as prescribed, follow up in the sleep center. Advised to use CPAP during the day if he naps, may benefit from short, structured nap with CPAP on daily basis. [address] [place] [address] for Beginners:     Most people are sick of trying new diets for one reason - they do not work! What makes the [address] [place] [address] different? What is the [address] [place] [address]? [address] [place] [address] teaches a way of life where you rely on the right carbohydrates and fats. This new way of eating allows you to live contently without eating the bad carbohydrates and fats. In contrast, when a person eats bad carbohydrates and fats they feel hungrier, causing them to eat more, which causes weight gain. In exchange for eating right, you become healthier and can enjoy an 8 to 13 pound weight loss in just two weeks! The Diet was created by Dr. Kaitlynn Gilliland, a highly respected cardiologist, to work with your body safely and effectively. This diet works in phases, the first two for a specific timeframe and the third phase for life.  With this new approach, you can stop counting calories, stop weighing food portions, and stop feeling as though you are deprived from eating good-tasting and satisfying food! Actually, you will be eating three, normal-size meals but wait, that not all! You will also get two snacks each day and with meal plans that are designed to be flexible, you can enjoy a variety, based on what sounds good to you on any particular day. Best of all, you will see amazing results in a short amount of time. Your hips, thighs, and stomach will be thinner, the number on the scales will go down, and all those overwhelming food cravings will be gone! Just imagine losing weight while still enjoying many of your favorite foods. With the diet, you can dine on mouth-watering foods like Chicken en Papillote, Shrimp Figueroa, and even Chocolate Sponge Cake and still lose the weight! [facility] Diet Phase     There are basically three phases in [address] [place] [address]. You eat normal portion sizes In Phase 1, but all carbohydrate are restricted. This is the strictest phase in the diet and will last for two weeks. It emphasizes lean meats, such as chicken, turkey, fish, and shellfish. Low-Glycemic-index vegetables are allowed as well as low-fat cheese, nuts, eggs. Dieters should expect to lose somewhere between 8 to 12 pounds. In Phase 2, some of the banned food are slowly introduced while weight loss continue to around 1-2 pounds per week. You should remain on it until you lost your desired amount of weight. Phase 3 is for maintenance and should be followed for life. Is all about maintaining your desired weight with a healthy balanced diet. Should your weight begin to climb, simply return to Phase 1. To learn what the phases consist of, how [address] works, and what you can eat, keep reading! Caution:    As with any new diet, if you have any illness, are pregnant, or have questions or concerns, consult your physician prior to starting.  Specific to the diet, if you have kidney problems, or have diabetes that might have impaired your kidney function, talk to your physician before starting this diet. How does the [address] [place] [address] Work? As mentioned, the [address] [place] [address] is unique, successful, easy, and works in a three-phase process. Unlike many other so-called diets, with the [address] [place] [address], simply substitutes your bad carbohydrates and fats for good ones. After trying this, you will be amazed by how well and quickly it works. [facility] Diet Phase 1 lasts for two weeks. During this first phase, you will eat normal meals of chicken, beef, turkey, fish, and shellfish, lots of vegetables, eggs, cheese, nuts, and garden salads using 100% olive oil for your salad dressing. Each day for 14 days, you will eat three, well-balanced meals. While eating until your hunger is satisfied may go against most diets, with the [address] [place] [address] Avenue, it is part of the plan. Trying to lose weight and become healthy by depriving the body of food makes no sense. In addition to the three meals each day, you will also eat a snack between breakfast and lunch, and then again between lunch and dinner. Even if you do not feel like eating these snacks, for the [address] [place] [address] to work, you need to, and after dinner, you will even have dessert. Additionally, during this phase, you can drink all the coffee and tea you want and be sure to drink lots of water. You may be thinking that this is a lot of food - it is! With most diets, you deprive your body, eating only small portions of foods that are unappealing. The change you will make during this phase is that you will cut out all bread, rice, potatoes, pasta, baked goods, fruit, candy, cake, cookies, ice cream, or sugar. Keep in mind that these eliminated foods will be added back into your diet, starting in Phase 2. In addition to taking these foods out of your diet temporarily, you will also need to avoid beer, or any kind of alcohol. Once you start Phase 2, reasonable amounts of wine can be added back in.     Instead of feeling overwhelmed about the foods that will be taken out of your diet during the first two weeks, stop and think about this for a minute. To achieve a life of health and lose unwanted weight, two weeks is a small investment to make. After all, you are worth it! The first two or three days will be somewhat challenging, but breaking any bad habit starts out a little bumpy. Once you pass this small mili, the rest of the time will go by quicker than you think. When you see the results that these changes bring, you will be glad you did not give up! [address] [facility] Diet Phase 2 is different from the first in that it will last as long as it takes you to lose your desired weight. This phase will last different lengths of time depending on each individual person, how well they follow the diet, and how their body reacts. Do not forget that by the time you start this phase, you will already be 8 to 13 pounds lighter! Other changes you will notice when starting Phase 2 is that your outlook on eating will be changed. During the past two weeks, the way in which your body was reacting to foods making you overweight was corrected. Those old, nagging cravings have been squelched and the bad eating habits of the past are gone. The secret is not that you are eating less food, but eating fewer foods that start cravings and store excessive fat. Once the first phase ends, the weight will continue to come off by staying on the plan. Some of the indulgences you took out of your diet during Phase 1 will be added back in, but less often. When you want a piece of chocolate cake, you can have one, just not every night. If hot garlic bread sounds good, you can have this too, but not as often and with less enthusiasm. As you go through phase 2, your weight loss will not be as dramatic, but it will be consistent. On average, you can expect to lose from one to two pounds.  While these numbers are lower than what you achieved in the first phase, losing slowly is healthier and you will keep the weight off. [address] [facility] Diet Phase 3 begins when you have hit your target weight. To help you maintain your new weight, you will be able to enjoy options that are more liberal. Once you get to this phase, you will stay there for the rest of your life. After completing Phase 1 and 2, this phase will feel like normal eating to you. Now you are eating differently - for life! If your weight starts creeping back up, modify the foods you are eating and the amounts. Along with losing extra weight and changing the way your body responds to food, your blood chemistry is also changing, improving your cardiovascular system. With heart disease being a leading cause of death where the only symptom of heart attack is often death, improving your cardiovascular system will add quality years to your life. Although you may have started the [address] as a way to lose weight, when finished, you will see that you achieved so much more! [address]    During Phase 1, the foods eaten will be in controlled portions to include chicken, beef, turkey, fish, and shellfish. These type of food can also be eaten along with vegetables, eggs, cheese, nuts, and salads, but all will be in controlled portions too. Refer to the following list to see the type of food that are acceptable in phase 1, 2, and 3. Remember that bread, rice, potatoes, pasta, baked goods, fruit, candy, cake, cookies, ice cream, or sugar will be added back slowly into your diet in Phase 2.     Foods Allowed in Phase 1    BEEF Lean cuts, such as:  Eye of Round  Ground beef:  Extra Lean (96/4)  Lean (92/8)  Sirloin (90/10)  Tenderloin  Top Loin  Top Round  LAMB (Remove all visible fat)    Center Cut  Chop  Loin  PORK    Boiled ham  [place] nava  Loin  Tenderloin       POULTRY (SKINLESS)    [place] hen  [place]  [place] ([facility]) nava (2 slices per day)  [place] ([facility]) and chicken breast  SEAFOOD    All types of fish and shellfish  TOFU    Use soft, low-fat or lite varieties  VEAL    Chop  Cutlet, leg  Top round  EGGS    The use of whole eggs is not limited unless otherwise directed by your doctor. Use egg whites and egg substitute if desired.   LUNCHMEAT    Fat-free or low-fat only  MEAT SUBSTITUTES (SOY BASED)    Wang - Limit to 2 slices per day  Burger - < 3 gms fat per 2-3 oz portion  Chicken Patties & Nuggets - < 3 gms fat per 2-3 oz portion  Hot Dogs - < 3 gms fat per 2-3 oz portion  Natural Peanut Butter - 2 Tbsp (may use as protein choice or limited nut choice)  Sausage Oma - Limit 1 sarahi per day  Seiten  Soy Crumbles  Soy Nuts - 1/4 cup for a protein snack is suggested serving  [employer]  DAIRY    Low-fat (1 percent) or fat-free milk or soy milk  Plain or sugar-free low-fat or fat-free yogurt  Fat-free half & half  CHEESE (FAT-FREE OR LOW-FAT)  American  Cheddar  Cottage cheese, 1-2% or fat-free  Cream cheese substitute, dairy-free  Feta  Mozzarella  Parmesan  Provolone  Ricotta  String      NUTS (Limit to one serving per day as specified)    Almonds - 15 (Dry roasted recommended)  [place] Nuts - 4  Cashews - 15 (Dry roasted recommended)  Pecans - 15 (Dry roasted recommended)  Macadamia - 8 (Dry roasted recommended)  Peanut Butter - 1 tsp  Peanut Butter, Natural = 2 TBS  Peanuts, 20 small (May use dry roasted or boiled)  Pine Nuts ([place]) - 1 ounce  Pistachios - 30 (Dry roasted recommended)  Walnuts - 15 (Dry roasted recommended)  In place of nuts, may use: Flax Seed - 3 TBS    VEGETABLE CHOICES (includes legumes) (May use fresh, frozen or canned without added sugar)  Artichokes  Asparagus  Beans, Green  Beans, [place]  Beans, Wax  Beans or Legumes:  Black Beans  Butter Beans  Chickpeas or Garbanzo  [place]ais Peas  Soy Beans  Split Peas  Broccoli  Bok [place]  Cabbage  Cauliflower  Celery  [place] Greens  Cucumbers  Eggplant  Lettuce (All varieties)  Juice (Limit to 6 ounces per day)  Tomato  V-8  Mushrooms  Mustard Greens  Okra  Onion - Limit to 1/2 per day  Peppers (All varieties)  Cmclure Blood or those sweetened with [employer] (All varieties)  Rhubarb  Sauerkraut  Snow peas  Spinach  Sprouts, Alfalfa  Squash, Spaghetti  Squash, Summer  Yellow  Zucchini  Tomato - Limit to 1 whole or 10 cherry per serving  FAT CHOICES (with some suggested serving sizes) The following monounsaturated oils are recommended to be consumed daily:    Olive Oil  Canola Oil  Other Oil Choices that may be chosen (Polyunsaturated or a blend of Monounsaturated):    Corn  Enova  Grape seed  Safflower  Soybean  OTHER FAT CHOICES:    Avocado - 1/3 whole = 1 TBS oil  Guacamole - ½ cup = 1 TBS oil  Margarine - Chose those that do not contain Trans Fatty Acids such as Fleishmann's Premium Olive Oil or Smart Balance  Mayonnaise - Regular or Low Fat  Olives (Green or Ripe) 15 = 1/2 TBS  Salad Dressing - Use those < 3 gms sugar per serving  TOPPINGS & SAUCES use sparingly (check labels for added sugar)    Hot Sauce  Salsa - Limit to 2 TBS during phase 1  Soy Sauce - 1/2 TBS  Steak Sauce - 1/2 TBS  Worcestershire Sauce - 1 TBS  Whipped Topping (Light) - 2 TBS  SPICES AND SEASONINGS    All spices that contain no added sugar  Broth  Extracts (almond, vanilla, or others)  Horseradish sauce  I Can't Believe It's Not Butter! Spray  Lemon Juice  Lime Juice Pepper (black, cayenne, red, white)  SWEET TREATS (Limit to 75 calories per day)    Candies, hard, sugar-free  Chocolate powder, no-added-sugar  Cocoa powder, baking type  Fudgsicles, sugar-free  Gelatin, sugar-free  Gum, sugar-free  Popsicles, sugar-free  Sugar substitute Some Sugar Free  Products may be made with sugar alcohols (isomalt, lactitol, mannitol, sorbitol or xylitol) and are permitted on the SBD.  They may have associated side effects of GI distress (abdominal pain, diarrhea & gas) if consumed in excessive amounts. SUGAR SUBSTITUTES    Acesulfame K  Fructose (needs to be counted as Sweet Treats, Caloric Limit)  Nutrasweet (Equal)  Saccharin (Sweet & Low)  Sucralose (Splenda)  Stevia (Not approved by FDA)     Foods NOT Allowed and to be Avoided in Phase 1    VEGETABLES  Beets  Carrots  Corn  Potatoes, white  Potatoes, sweet  Yams  BEEF  Brisket  Liver  Other fatty cuts  Rib steaks  POULTRY    Chicken, wings and legs  Duck  [employer] products, processed  PORK    Honey-baked ham  VEAL    Breast  FRUIT   Avoid ALL fruits and fruit juices in Phase 1, including:    Apples  Apricots  Berries  Cantaloupe  Grapefruit  Peaches  Pears  DAIRY  1/2 cup of plain fat-free yogurt (once per day max.)  Fat Free 1/2 & 1/2, Nonfat milk, 1% milk,  Soy milk allowed with coffee. Otherwise avoid all other dairy products (unless listed under protein choices or sweet treats). Limit to < 2 TBS per cup of coffee. Otherwise, avoid all milk products in Phase 1, including:   Yogurt, cup-style and frozen  Ice cream  Milk, low-fat, fat-free, whole  Milk, soy  STARCHES AND CARBS    Avoid ALL starchy food in Phase 1, including:    Bread, all types  Cereal  Croutons, all types  Matzo  Oatmeal  Rice, all types  Pasta, all types  Pastry and baked goods, all types  CHEESE    Brie  [place]  Non-reduced fat  MISCELLANEOUS    Alcohol of any kind, including beer and wine  No regular ketchup or cocktail sauce  No pork rinds - too high in saturated fat  No jerky - too high in sugar content  Limit Caffeine-Containing Beverages to 1-2 servings per day    Foods Allowed in Phase 2 (Foods you can reintroduce to your diet)    FRUITS  Apples   Apricots-dried fresh   Blueberries   Cantaloupe   Cherries   Grapefruit   Grapes   Kiwi   Mangoes   Oranges   Peaches   Pears   Plums   Strawberries   DAIRY     Milk-light soy, fat-free or 1%   Yogurt-light, fruit-flavored, plain, low-fat or fat-free        STARCHES (use sparingly)   Bagels, small, whole grain Bread-multigrain, oat and bran, rye, whole wheat   Cereal-Fiber One, Davy's Extra-Fiber All Bran, oatmeal (not instant), other high-fiber, Uncle Fernie   Muffins, bran-sugar-free (no raisins)   Pasta, whole wheat   Peas, green   Ruth-stone-ground, whole wheat   Popcorn   Potato, small, sweet   Rice-brown, wild   VEGETABLES AND LEGUMES    Barley   Beans, wilkes   Black-eyed peas  MISCELLANEOUS    Chocolate (sparingly)-bittersweet, semisweet   Pudding, fat-free/sugar-free   Wine, red     Foods NOT Allowed and to be Avoided in Phase 2    STARCHES AND BREADS   Bagel, refined wheat   Bread-refined wheat white   Cookies   Cornflakes   Matzo   Pasta, white flour   Potatoes-baked, white instant   Rice cakes   Rice, white   Rolls, dinner   VEGETABLES     Beets   Carrots   Corn   Potatoes   FRUIT   Bananas   Canned fruit, juice packed   Fruit juice   Pineapple   Raisins   Watermelon    MISCELLANEOUS     Honey   Ice cream   Jam

## 2022-08-01 ENCOUNTER — TELEPHONE (OUTPATIENT)
Dept: CARDIOLOGY CLINIC | Age: 82
End: 2022-08-01

## 2022-08-01 DIAGNOSIS — I10 HYPERTENSION: ICD-10-CM

## 2022-08-01 DIAGNOSIS — I50.32 DIASTOLIC CHF, CHRONIC (HCC): Primary | ICD-10-CM

## 2022-08-01 DIAGNOSIS — R06.00 DYSPNEA: ICD-10-CM

## 2022-08-01 NOTE — TELEPHONE ENCOUNTER
Patient called and stated he has been on nebivolol. Patient states he is having shortness of breath just when he gets up and walks. Patient is also having trouble with feet sweelling and fluid. Paitent has gained a few pounds and has some wheezing. Patient was just wondering what to do with the prescription. Please call and advise.

## 2022-08-01 NOTE — TELEPHONE ENCOUNTER
Pt.calls reporting since he has been on Bystolic his bigeminy is better but he is retaining fluid,extremely SOB on exertion,walking to bathroom feels like he has run a 100 yard dash,lungs were good at Pulmonary. He is wheezing and has gained 10-15 pounds in the past few weeks. He increased his Torsemide to 4 tabs a day=40mg  once daily and it is not pulling off fluid. He is on Losartan HCTZ 100/25mg qday. BP is averaging 140-150/70's HR in 60'S. He does not want to go to the ER. Please advise. [facility]

## 2022-08-02 ENCOUNTER — HOSPITAL ENCOUNTER (INPATIENT)
Age: 82
LOS: 6 days | Discharge: HOME | DRG: 291 | End: 2022-08-08
Attending: INTERNAL MEDICINE | Admitting: INTERNAL MEDICINE
Payer: MEDICARE

## 2022-08-02 ENCOUNTER — OFFICE VISIT (OUTPATIENT)
Dept: CARDIOLOGY CLINIC | Age: 82
End: 2022-08-02
Payer: MEDICARE

## 2022-08-02 ENCOUNTER — APPOINTMENT (OUTPATIENT)
Dept: GENERAL RADIOLOGY | Age: 82
DRG: 291 | End: 2022-08-02
Attending: INTERNAL MEDICINE
Payer: MEDICARE

## 2022-08-02 VITALS
HEART RATE: 78 BPM | SYSTOLIC BLOOD PRESSURE: 154 MMHG | HEIGHT: 73 IN | DIASTOLIC BLOOD PRESSURE: 78 MMHG | BODY MASS INDEX: 41.75 KG/M2 | OXYGEN SATURATION: 95 % | WEIGHT: 315 LBS

## 2022-08-02 DIAGNOSIS — N18.4 STAGE 4 CHRONIC KIDNEY DISEASE (HCC): ICD-10-CM

## 2022-08-02 DIAGNOSIS — I50.33 ACUTE ON CHRONIC HEART FAILURE WITH PRESERVED EJECTION FRACTION (HCC): Primary | ICD-10-CM

## 2022-08-02 DIAGNOSIS — I10 PRIMARY HYPERTENSION: ICD-10-CM

## 2022-08-02 DIAGNOSIS — R06.09 DYSPNEA ON EXERTION: ICD-10-CM

## 2022-08-02 DIAGNOSIS — I50.32 DIASTOLIC CHF, CHRONIC (HCC): ICD-10-CM

## 2022-08-02 DIAGNOSIS — R06.00 DYSPNEA: ICD-10-CM

## 2022-08-02 DIAGNOSIS — I10 HYPERTENSION: ICD-10-CM

## 2022-08-02 DIAGNOSIS — I50.32 DIASTOLIC CHF, CHRONIC (HCC): Primary | ICD-10-CM

## 2022-08-02 PROBLEM — E87.70 VOLUME OVERLOAD: Status: ACTIVE | Noted: 2022-08-02

## 2022-08-02 LAB
ALBUMIN SERPL-MCNC: 3 G/DL (ref 3.2–4.6)
ALBUMIN/GLOB SERPL: 0.9 {RATIO} (ref 1.2–3.5)
ALP SERPL-CCNC: 65 U/L (ref 50–136)
ALT SERPL-CCNC: 25 U/L (ref 12–65)
ANION GAP SERPL CALC-SCNC: 7 MMOL/L (ref 7–16)
AST SERPL-CCNC: 20 U/L (ref 15–37)
BILIRUB SERPL-MCNC: 0.4 MG/DL (ref 0.2–1.1)
BUN SERPL-MCNC: 72 MG/DL (ref 8–23)
CALCIUM SERPL-MCNC: 8.9 MG/DL (ref 8.3–10.4)
CHLORIDE SERPL-SCNC: 113 MMOL/L (ref 98–107)
CO2 SERPL-SCNC: 21 MMOL/L (ref 21–32)
CREAT SERPL-MCNC: 3.4 MG/DL (ref 0.8–1.5)
EKG ATRIAL RATE: 75 BPM
EKG DIAGNOSIS: NORMAL
EKG P AXIS: 66 DEGREES
EKG P-R INTERVAL: 260 MS
EKG Q-T INTERVAL: 430 MS
EKG QRS DURATION: 108 MS
EKG QTC CALCULATION (BAZETT): 480 MS
EKG R AXIS: -2 DEGREES
EKG T AXIS: 103 DEGREES
EKG VENTRICULAR RATE: 75 BPM
ERYTHROCYTE [DISTWIDTH] IN BLOOD BY AUTOMATED COUNT: 14.6 % (ref 11.9–14.6)
GLOBULIN SER CALC-MCNC: 3.3 G/DL (ref 2.3–3.5)
GLUCOSE BLD STRIP.AUTO-MCNC: 143 MG/DL (ref 65–100)
GLUCOSE SERPL-MCNC: 146 MG/DL (ref 65–100)
HCT VFR BLD AUTO: 30.2 % (ref 41.1–50.3)
HGB BLD-MCNC: 9.4 G/DL (ref 13.6–17.2)
MCH RBC QN AUTO: 30.5 PG (ref 26.1–32.9)
MCHC RBC AUTO-ENTMCNC: 31.1 G/DL (ref 31.4–35)
MCV RBC AUTO: 98.1 FL (ref 79.6–97.8)
NRBC # BLD: 0 K/UL (ref 0–0.2)
NT PRO BNP: 9416 PG/ML
PLATELET # BLD AUTO: 236 K/UL (ref 150–450)
PMV BLD AUTO: 11.5 FL (ref 9.4–12.3)
POTASSIUM SERPL-SCNC: 4.9 MMOL/L (ref 3.5–5.1)
PROT SERPL-MCNC: 6.3 G/DL (ref 6.3–8.2)
RBC # BLD AUTO: 3.08 M/UL (ref 4.23–5.6)
SERVICE CMNT-IMP: ABNORMAL
SODIUM SERPL-SCNC: 141 MMOL/L (ref 136–145)
WBC # BLD AUTO: 6 K/UL (ref 4.3–11.1)

## 2022-08-02 PROCEDURE — 2580000003 HC RX 258: Performed by: NURSE PRACTITIONER

## 2022-08-02 PROCEDURE — 1100000003 HC PRIVATE W/ TELEMETRY

## 2022-08-02 PROCEDURE — 6370000000 HC RX 637 (ALT 250 FOR IP): Performed by: NURSE PRACTITIONER

## 2022-08-02 PROCEDURE — G8417 CALC BMI ABV UP PARAM F/U: HCPCS | Performed by: INTERNAL MEDICINE

## 2022-08-02 PROCEDURE — 6370000000 HC RX 637 (ALT 250 FOR IP): Performed by: INTERNAL MEDICINE

## 2022-08-02 PROCEDURE — 71045 X-RAY EXAM CHEST 1 VIEW: CPT

## 2022-08-02 PROCEDURE — G8427 DOCREV CUR MEDS BY ELIG CLIN: HCPCS | Performed by: INTERNAL MEDICINE

## 2022-08-02 PROCEDURE — 1123F ACP DISCUSS/DSCN MKR DOCD: CPT | Performed by: INTERNAL MEDICINE

## 2022-08-02 PROCEDURE — 1036F TOBACCO NON-USER: CPT | Performed by: INTERNAL MEDICINE

## 2022-08-02 PROCEDURE — 99215 OFFICE O/P EST HI 40 MIN: CPT | Performed by: INTERNAL MEDICINE

## 2022-08-02 PROCEDURE — 82962 GLUCOSE BLOOD TEST: CPT

## 2022-08-02 PROCEDURE — 6360000002 HC RX W HCPCS: Performed by: NURSE PRACTITIONER

## 2022-08-02 PROCEDURE — 93005 ELECTROCARDIOGRAM TRACING: CPT | Performed by: NURSE PRACTITIONER

## 2022-08-02 RX ORDER — ONDANSETRON 4 MG/1
4 TABLET, ORALLY DISINTEGRATING ORAL EVERY 8 HOURS PRN
Status: DISCONTINUED | OUTPATIENT
Start: 2022-08-02 | End: 2022-08-08 | Stop reason: HOSPADM

## 2022-08-02 RX ORDER — FLUTICASONE FUROATE AND VILANTEROL 100; 25 UG/1; UG/1
POWDER RESPIRATORY (INHALATION) DAILY
COMMUNITY

## 2022-08-02 RX ORDER — CLONIDINE HYDROCHLORIDE 0.2 MG/1
0.1 TABLET ORAL 2 TIMES DAILY PRN
Status: DISCONTINUED | OUTPATIENT
Start: 2022-08-02 | End: 2022-08-08 | Stop reason: HOSPADM

## 2022-08-02 RX ORDER — METOPROLOL SUCCINATE 100 MG/1
100 TABLET, EXTENDED RELEASE ORAL DAILY
Status: DISCONTINUED | OUTPATIENT
Start: 2022-08-03 | End: 2022-08-08 | Stop reason: HOSPADM

## 2022-08-02 RX ORDER — ASPIRIN 81 MG/1
81 TABLET ORAL DAILY
Status: DISCONTINUED | OUTPATIENT
Start: 2022-08-02 | End: 2022-08-02

## 2022-08-02 RX ORDER — ALLOPURINOL 300 MG/1
150 TABLET ORAL
Status: DISCONTINUED | OUTPATIENT
Start: 2022-08-03 | End: 2022-08-02

## 2022-08-02 RX ORDER — SODIUM BICARBONATE 650 MG/1
650 TABLET ORAL 2 TIMES DAILY
Status: DISCONTINUED | OUTPATIENT
Start: 2022-08-02 | End: 2022-08-07

## 2022-08-02 RX ORDER — ACETAMINOPHEN 325 MG/1
650 TABLET ORAL EVERY 6 HOURS PRN
Status: DISCONTINUED | OUTPATIENT
Start: 2022-08-02 | End: 2022-08-08 | Stop reason: HOSPADM

## 2022-08-02 RX ORDER — SODIUM CHLORIDE 0.9 % (FLUSH) 0.9 %
5-40 SYRINGE (ML) INJECTION PRN
Status: DISCONTINUED | OUTPATIENT
Start: 2022-08-02 | End: 2022-08-08 | Stop reason: HOSPADM

## 2022-08-02 RX ORDER — SODIUM CHLORIDE 9 MG/ML
INJECTION, SOLUTION INTRAVENOUS PRN
Status: DISCONTINUED | OUTPATIENT
Start: 2022-08-02 | End: 2022-08-08 | Stop reason: HOSPADM

## 2022-08-02 RX ORDER — LEVOTHYROXINE SODIUM 88 UG/1
88 TABLET ORAL DAILY
Status: DISCONTINUED | OUTPATIENT
Start: 2022-08-03 | End: 2022-08-08 | Stop reason: HOSPADM

## 2022-08-02 RX ORDER — ACETAMINOPHEN 650 MG/1
650 SUPPOSITORY RECTAL EVERY 6 HOURS PRN
Status: DISCONTINUED | OUTPATIENT
Start: 2022-08-02 | End: 2022-08-08 | Stop reason: HOSPADM

## 2022-08-02 RX ORDER — ONDANSETRON 2 MG/ML
4 INJECTION INTRAMUSCULAR; INTRAVENOUS EVERY 6 HOURS PRN
Status: DISCONTINUED | OUTPATIENT
Start: 2022-08-02 | End: 2022-08-08 | Stop reason: HOSPADM

## 2022-08-02 RX ORDER — HYDRALAZINE HYDROCHLORIDE 50 MG/1
100 TABLET, FILM COATED ORAL EVERY 12 HOURS SCHEDULED
Status: DISCONTINUED | OUTPATIENT
Start: 2022-08-02 | End: 2022-08-08 | Stop reason: HOSPADM

## 2022-08-02 RX ORDER — ALLOPURINOL 300 MG/1
150 TABLET ORAL
Status: DISCONTINUED | OUTPATIENT
Start: 2022-08-02 | End: 2022-08-08 | Stop reason: HOSPADM

## 2022-08-02 RX ORDER — ASPIRIN 81 MG/1
81 TABLET ORAL
Status: DISCONTINUED | OUTPATIENT
Start: 2022-08-03 | End: 2022-08-02

## 2022-08-02 RX ORDER — FLUTICASONE FUROATE AND VILANTEROL 100; 25 UG/1; UG/1
1 POWDER RESPIRATORY (INHALATION) DAILY
Status: DISCONTINUED | OUTPATIENT
Start: 2022-08-03 | End: 2022-08-08 | Stop reason: HOSPADM

## 2022-08-02 RX ORDER — POLYETHYLENE GLYCOL 3350 17 G/17G
17 POWDER, FOR SOLUTION ORAL DAILY PRN
Status: DISCONTINUED | OUTPATIENT
Start: 2022-08-02 | End: 2022-08-08 | Stop reason: HOSPADM

## 2022-08-02 RX ORDER — HYDRALAZINE HYDROCHLORIDE 50 MG/1
50 TABLET, FILM COATED ORAL 4 TIMES DAILY
Status: DISCONTINUED | OUTPATIENT
Start: 2022-08-02 | End: 2022-08-02

## 2022-08-02 RX ORDER — ALLOPURINOL 300 MG/1
150 TABLET ORAL DAILY
Status: DISCONTINUED | OUTPATIENT
Start: 2022-08-02 | End: 2022-08-02

## 2022-08-02 RX ORDER — ALBUTEROL SULFATE 90 UG/1
2 AEROSOL, METERED RESPIRATORY (INHALATION) EVERY 6 HOURS PRN
Status: DISCONTINUED | OUTPATIENT
Start: 2022-08-02 | End: 2022-08-08 | Stop reason: HOSPADM

## 2022-08-02 RX ORDER — SODIUM CHLORIDE 0.9 % (FLUSH) 0.9 %
5-40 SYRINGE (ML) INJECTION EVERY 12 HOURS SCHEDULED
Status: DISCONTINUED | OUTPATIENT
Start: 2022-08-02 | End: 2022-08-08 | Stop reason: HOSPADM

## 2022-08-02 RX ORDER — NEBIVOLOL 10 MG/1
10 TABLET ORAL DAILY
Status: DISCONTINUED | OUTPATIENT
Start: 2022-08-02 | End: 2022-08-02

## 2022-08-02 RX ORDER — INSULIN GLARGINE 100 [IU]/ML
50 INJECTION, SOLUTION SUBCUTANEOUS 2 TIMES DAILY
Status: DISCONTINUED | OUTPATIENT
Start: 2022-08-02 | End: 2022-08-04

## 2022-08-02 RX ORDER — INSULIN GLARGINE 100 [IU]/ML
60 INJECTION, SOLUTION SUBCUTANEOUS 2 TIMES DAILY
Status: DISCONTINUED | OUTPATIENT
Start: 2022-08-02 | End: 2022-08-02

## 2022-08-02 RX ORDER — FLUTICASONE FUROATE AND VILANTEROL 100; 25 UG/1; UG/1
1 POWDER RESPIRATORY (INHALATION) DAILY
Status: ON HOLD | COMMUNITY
End: 2022-08-15 | Stop reason: CLARIF

## 2022-08-02 RX ORDER — CLONIDINE HYDROCHLORIDE 0.2 MG/1
0.1 TABLET ORAL 2 TIMES DAILY
Status: DISCONTINUED | OUTPATIENT
Start: 2022-08-02 | End: 2022-08-02

## 2022-08-02 RX ORDER — ASPIRIN 81 MG/1
81 TABLET ORAL
Status: DISCONTINUED | OUTPATIENT
Start: 2022-08-02 | End: 2022-08-08 | Stop reason: HOSPADM

## 2022-08-02 RX ADMIN — ALLOPURINOL 150 MG: 300 TABLET ORAL at 18:29

## 2022-08-02 RX ADMIN — SODIUM BICARBONATE 650 MG TABLET 650 MG: at 21:11

## 2022-08-02 RX ADMIN — INSULIN GLARGINE 50 UNITS: 100 INJECTION, SOLUTION SUBCUTANEOUS at 21:12

## 2022-08-02 RX ADMIN — ASPIRIN 81 MG: 81 TABLET ORAL at 18:28

## 2022-08-02 RX ADMIN — FUROSEMIDE 10 MG/HR: 10 INJECTION, SOLUTION INTRAMUSCULAR; INTRAVENOUS at 18:25

## 2022-08-02 RX ADMIN — HYDRALAZINE HYDROCHLORIDE 100 MG: 50 TABLET, FILM COATED ORAL at 18:35

## 2022-08-02 RX ADMIN — SODIUM CHLORIDE, PRESERVATIVE FREE 10 ML: 5 INJECTION INTRAVENOUS at 21:12

## 2022-08-02 NOTE — TELEPHONE ENCOUNTER
I called and informed pt. of MD response. I made appt. this afternoon w//LEIA pt.did not want to drive to [place]. I sent lab orders to [facility].   He is to have labs this am  Orders Placed This Encounter   Procedures    CBC     Standing Status:   Future     Standing Expiration Date:   8/2/2023    Comprehensive Metabolic Panel     Standing Status:   Future     Standing Expiration Date:   8/2/2023    proBNP, N-TERMINAL     Standing Status:   Future     Standing Expiration Date:   8/2/2023

## 2022-08-02 NOTE — PROGRESS NOTES
[address] [place], [place]  2 [facility], [address]  PHONE: [phone]    SUBJECTIVE: /HPI  Tracey Cooper (1940) is a 79 y.o. male seen for a follow up visit regarding the following:   Specialty Problems          Cardiology Problems    Hypertension        Coronary atherosclerosis of native coronary vessel        Diastolic CHF, chronic (HCC)        Chest pain         Patient presents today with symptoms of shortness of breath dyspnea on exertion PND orthopnea. He does carry the diagnosis of chronic diastolic heart failureHe has an echocardiogram from April of this year that shows normal ejection fraction 60 to 20% with diastolic dysfunction. He also has moderate aortic stenosis in April mean gradient was 23 mmHg peak gradient 38 mmHg. He has labs ordered today of a CBC proBNP and a comprehensive metabolic panel but these are either not resulted or have not yet been drawn  Lab work done in July on the 12th shows glucose of 190 creatinine of 3.32 and an EGFR of less than 18. Hemoglobin hematocrit were 11 and 33 respectively this was again in April. April 7 patient weighed 329 pounds August seconds patient is now 350 pounds    Patient has had several weeks of progressive worsening shortness of breath he endorses that if he just sits and settles down he is okay but with even minimal exertion he gets significantly short of breath  Labs drawn this AM not yet resulted. Past Medical History, Past Surgical History, Family history, Social History, and Medications were all reviewed with the patient today and updated as necessary.     Allergies   Allergen Reactions    Amlodipine Shortness Of Breath    Iodine Shortness Of Breath    Metformin Shortness Of Breath    Ranolazine Other (See Comments)     Lips and tongue numbness    Spironolactone Other (See Comments)     Potassium level went really high  Increased potassium    Hydrocodone-Acetaminophen Other (See Comments)     Pt states that if he takes this  The medication makes him feel like he is going crazy    Omeprazole Other (See Comments)     Caused drastic drop in blood sugar     Past Medical History:   Diagnosis Date    Acquired hypothyroidism 2016    Anemia 2015    Anemia in chronic kidney disease 2016    Asthma     Chronic kidney disease     stage 4 kidney disease- Dr. Roderick Winter    CKD (chronic kidney disease) 10/11/2012    Coronary atherosclerosis of native coronary vessel 2/10/2016    Diabetes mellitus type 2 in obese ([address] 10/11/2012    avg 150, symptoms of hypoglycemia 90    Diastolic heart failure ([address]     Dyspnea     Dyspnea 2/10/2016    Edema 10/11/2012    Elevated liver function tests 2015    Hypertension 10/11/2012    Hyponatremia 2015    Morbid obesity ([facility]     BMI 43.54    OJRJE (obstructive sleep apnea) 10/11/2012    cpap    Seborrheic dermatitis 2013    Seborrheic dermatitis      Past Surgical History:   Procedure Laterality Date    CARDIAC CATHETERIZATION      3 stent    CATARACT REMOVAL Bilateral 2017    HERNIA REPAIR       Family History   Problem Relation Age of Onset    Cancer Father         lung    No Known Problems Sister     No Known Problems Brother     Hypertension Father     No Known Problems Sister     Hypertension Mother       Social History     Tobacco Use    Smoking status: Former     Packs/day: 0.50     Years: 2.00     Pack years: 1.00     Types: Cigarettes     Quit date: 1962     Years since quittin.6    Smokeless tobacco: Never   Substance Use Topics    Alcohol use: No       Current Outpatient Medications:     fluticasone-umeclidin-vilant (TRELEGY ELLIPTA) 100-62.5-25 MCG/INH AEPB, 1 inhalation every morning, rinse mouth after use, Disp: 1 each, Rfl: 11    albuterol sulfate  (90 Base) MCG/ACT inhaler, 2 puffs qid prn, Disp: , Rfl:     allopurinol (ZYLOPRIM) 300 MG tablet, Take 150 mg by mouth daily, Disp: , Rfl:     aspirin 81 MG EC tablet, Take 81 mg by mouth, Disp: , Rfl:     calcitRIOL (ROCALTROL) 0.25 MCG capsule, Take 0.25 mcg by mouth daily, Disp: , Rfl:     cloNIDine (CATAPRES) 0.1 MG tablet, Take 0.1 mg by mouth 2 times daily, Disp: , Rfl:     Dulaglutide (TRULICITY) 1.5 MC/0.7PF SOPN, Inject 1.5 mg into the skin every 7 days, Disp: , Rfl:     fluticasone-vilanterol (BREO ELLIPTA) 100-25 MCG/INH AEPB inhaler, 1 inhalation daily, rinse mouth after use, Disp: , Rfl:     hydrALAZINE (APRESOLINE) 100 MG tablet, Take 100 mg by mouth 2 times daily, Disp: , Rfl:     hydrALAZINE (APRESOLINE) 50 MG tablet, Take 50 mg by mouth 4 times daily, Disp: , Rfl:     insulin glargine (LANTUS) 100 UNIT/ML injection vial, Inject 60 Units into the skin 2 times daily, Disp: , Rfl:     levothyroxine (SYNTHROID) 88 MCG tablet, Take 88 mcg by mouth, Disp: , Rfl:     losartan-hydroCHLOROthiazide (HYZAAR) 100-25 MG per tablet, Take 1 tablet by mouth daily, Disp: , Rfl:     nebivolol (BYSTOLIC) 10 MG tablet, Take 10 mg by mouth daily, Disp: , Rfl:     sodium bicarbonate 650 MG tablet, Take 650 mg by mouth 2 times daily, Disp: , Rfl:     torsemide (DEMADEX) 10 MG tablet, Take 10 mg by mouth 3 times daily, Disp: , Rfl:     ROS:  Review of Systems - General ROS: negative for - chills, fatigue or fever  Hematological and Lymphatic ROS: negative for - bleeding problems, bruising or jaundice  Respiratory ROS: positive for - cough, orthopnea, and shortness of breath  Cardiovascular ROS: positive for - dyspnea on exertion, edema, irregular heartbeat, paroxysmal nocturnal dyspnea, and shortness of breath  Gastrointestinal ROS: no abdominal pain, change in bowel habits, or black or bloody stools  Neurological ROS: no TIA or stroke symptoms  All other systems negative.     Wt Readings from Last 3 Encounters:   08/02/22 (!) 350 lb (158.8 kg)   06/03/22 (!) 348 lb (157.9 kg)   04/29/22 (!) 338 lb (153.3 kg)     Temp Readings from Last 3 Encounters:   06/03/22 97.2 °F (36.2 °C) (Skin)     BP Readings from Last 3 Encounters:   06/03/22 130/74   04/29/22 128/74   04/08/22 (!) 142/70     Pulse Readings from Last 3 Encounters:   06/03/22 75   04/29/22 66   04/07/22 83       PHYSICAL EXAM:  Ht 6' 1\" (1.854 m)   Wt (!) 350 lb (158.8 kg)   BMI 46.18 kg/m²   Physical Examination: General appearance - alert, well appearing, and in no distress  Mental status - alert, oriented to person, place, and time  Eyes - pupils equal and reactive, extraocular eye movements intact  Neck/lymph - supple, no significant adenopathy  Chest/lungs - decreased air entry noted in bases  Heart/CV - normal rate and regular rhythm, systolic murmur 3/6 at 2nd left intercostal space  Abdomen/GI - soft, nontender, nondistended, no masses or organomegaly  Musculoskeletal - no joint tenderness, deformity or swelling  Extremities - pedal edema 3 +  Skin - normal coloration and turgor, no rashes, no suspicious skin lesions noted    EKG: normal sinus rhythm, nonspecific ST and T waves changes. Medications reviewed and questions answered    No results found for this or any previous visit (from the past 672 hour(s)). No results found for: CHOL, CHOLPOCT, CHOLX, CHLST, CHOLV, HDL, HDLPOC, HDLC, LDL, LDLC, VLDLC, VLDL, TGLX, TRIGL    ASSESSMENT and PLAN  No follow-up provider specified. is for   Specialty Problems          Cardiology Problems    Hypertension        Coronary atherosclerosis of native coronary vessel        Diastolic CHF, chronic (HCC)        Chest pain            PLAN:  Problem List Items Addressed This Visit          Circulatory    Hypertension    Diastolic CHF, chronic (HCC) - Primary       Other    Dyspnea      Patient presents with significant volume overload significant edema crackles in the base of his lungs significant shortness of breath and dyspnea on exertion with orthopnea and PND. He has had escalating diuretic treatment that has yielded no improvement. He is up 21 pounds in weight from April till now.   His kidney function shows an EGFR of 18 which is severely reduced.   I believe with all of these factors in patient therapy with a Lasix or Bumex drip is warranted also consultation with nephrology to assess whether temporary ultrafiltration or dialysis might be needed    Continue meds as below    Current Outpatient Medications:     fluticasone-umeclidin-vilant (TRELEGY ELLIPTA) 100-62.5-25 MCG/INH AEPB, 1 inhalation every morning, rinse mouth after use, Disp: 1 each, Rfl: 11    albuterol sulfate  (90 Base) MCG/ACT inhaler, 2 puffs qid prn, Disp: , Rfl:     allopurinol (ZYLOPRIM) 300 MG tablet, Take 150 mg by mouth daily, Disp: , Rfl:     aspirin 81 MG EC tablet, Take 81 mg by mouth, Disp: , Rfl:     calcitRIOL (ROCALTROL) 0.25 MCG capsule, Take 0.25 mcg by mouth daily, Disp: , Rfl:     cloNIDine (CATAPRES) 0.1 MG tablet, Take 0.1 mg by mouth 2 times daily, Disp: , Rfl:     Dulaglutide (TRULICITY) 1.5 DF/1.6HC SOPN, Inject 1.5 mg into the skin every 7 days, Disp: , Rfl:     fluticasone-vilanterol (BREO ELLIPTA) 100-25 MCG/INH AEPB inhaler, 1 inhalation daily, rinse mouth after use, Disp: , Rfl:     hydrALAZINE (APRESOLINE) 100 MG tablet, Take 100 mg by mouth 2 times daily, Disp: , Rfl:     hydrALAZINE (APRESOLINE) 50 MG tablet, Take 50 mg by mouth 4 times daily, Disp: , Rfl:     insulin glargine (LANTUS) 100 UNIT/ML injection vial, Inject 60 Units into the skin 2 times daily, Disp: , Rfl:     levothyroxine (SYNTHROID) 88 MCG tablet, Take 88 mcg by mouth, Disp: , Rfl:     losartan-hydroCHLOROthiazide (HYZAAR) 100-25 MG per tablet, Take 1 tablet by mouth daily, Disp: , Rfl:     nebivolol (BYSTOLIC) 10 MG tablet, Take 10 mg by mouth daily, Disp: , Rfl:     sodium bicarbonate 650 MG tablet, Take 650 mg by mouth 2 times daily, Disp: , Rfl:     torsemide (DEMADEX) 10 MG tablet, Take 10 mg by mouth 3 times daily, Disp: , Rfl:     Roula Santos MD  8/2/2022  2:25 PM    Pt is instructed to follow all appropriate cardiac risk factor recommendations and to be compliant with meds and testing. Instructed to follow up appropriately and seek urgent medical care if acute or unstable issues arise.  Results of some tests may be viewed thru [address] but this does not substitute for follow up with MD. If follow up is not scheduled pt is instructed to call for follow up

## 2022-08-02 NOTE — PROGRESS NOTES
Patient arrive via wheelchair, direct admit from cardiology office. Patient accompanied by daughter. Patient placed on tele monitor, IV access obtained, oriented to room, bed low and locked. Home medications verified. Skin assessed, heels free of breakdown, small scattered scars.

## 2022-08-03 PROBLEM — I50.33 ACUTE ON CHRONIC HEART FAILURE WITH PRESERVED EJECTION FRACTION (HCC): Status: ACTIVE | Noted: 2022-08-03

## 2022-08-03 LAB
25(OH)D3 SERPL-MCNC: 19.8 NG/ML (ref 30–100)
ALBUMIN SERPL-MCNC: 2.9 G/DL (ref 3.2–4.6)
ALBUMIN/GLOB SERPL: 0.9 {RATIO} (ref 1.2–3.5)
ALP SERPL-CCNC: 63 U/L (ref 50–136)
ALT SERPL-CCNC: 23 U/L (ref 12–65)
ANION GAP SERPL CALC-SCNC: 6 MMOL/L (ref 7–16)
AST SERPL-CCNC: 17 U/L (ref 15–37)
BILIRUB SERPL-MCNC: 0.4 MG/DL (ref 0.2–1.1)
BUN SERPL-MCNC: 72 MG/DL (ref 8–23)
CALCIUM SERPL-MCNC: 8.9 MG/DL (ref 8.3–10.4)
CHLORIDE SERPL-SCNC: 111 MMOL/L (ref 98–107)
CO2 SERPL-SCNC: 23 MMOL/L (ref 21–32)
CREAT SERPL-MCNC: 3.6 MG/DL (ref 0.8–1.5)
ERYTHROCYTE [DISTWIDTH] IN BLOOD BY AUTOMATED COUNT: 14.6 % (ref 11.9–14.6)
FERRITIN SERPL-MCNC: 29 NG/ML (ref 8–388)
GLOBULIN SER CALC-MCNC: 3.3 G/DL (ref 2.3–3.5)
GLUCOSE BLD STRIP.AUTO-MCNC: 107 MG/DL (ref 65–100)
GLUCOSE BLD STRIP.AUTO-MCNC: 107 MG/DL (ref 65–100)
GLUCOSE BLD STRIP.AUTO-MCNC: 132 MG/DL (ref 65–100)
GLUCOSE BLD STRIP.AUTO-MCNC: 160 MG/DL (ref 65–100)
GLUCOSE BLD STRIP.AUTO-MCNC: 196 MG/DL (ref 65–100)
GLUCOSE BLD STRIP.AUTO-MCNC: 65 MG/DL (ref 65–100)
GLUCOSE BLD STRIP.AUTO-MCNC: 90 MG/DL (ref 65–100)
GLUCOSE BLD STRIP.AUTO-MCNC: 92 MG/DL (ref 65–100)
GLUCOSE SERPL-MCNC: 104 MG/DL (ref 65–100)
HCT VFR BLD AUTO: 27.2 % (ref 41.1–50.3)
HGB BLD-MCNC: 8.7 G/DL (ref 13.6–17.2)
IRON SATN MFR SERPL: 17 %
IRON SERPL-MCNC: 50 UG/DL (ref 35–150)
IRON SERPL-MCNC: 50 UG/DL (ref 35–150)
MCH RBC QN AUTO: 30.9 PG (ref 26.1–32.9)
MCHC RBC AUTO-ENTMCNC: 32 G/DL (ref 31.4–35)
MCV RBC AUTO: 96.5 FL (ref 79.6–97.8)
NRBC # BLD: 0 K/UL (ref 0–0.2)
NT PRO BNP: ABNORMAL PG/ML
PLATELET # BLD AUTO: 210 K/UL (ref 150–450)
PMV BLD AUTO: 10.7 FL (ref 9.4–12.3)
POTASSIUM SERPL-SCNC: 4.5 MMOL/L (ref 3.5–5.1)
PROT SERPL-MCNC: 6.2 G/DL (ref 6.3–8.2)
RBC # BLD AUTO: 2.82 M/UL (ref 4.23–5.6)
SERVICE CMNT-IMP: ABNORMAL
SERVICE CMNT-IMP: NORMAL
SODIUM SERPL-SCNC: 140 MMOL/L (ref 138–145)
T4 SERPL-MCNC: 7.4 UG/DL (ref 4.8–13.9)
TIBC SERPL-MCNC: 298 UG/DL (ref 250–450)
TSH, 3RD GENERATION: 1.51 UIU/ML (ref 0.36–3.74)
WBC # BLD AUTO: 5.6 K/UL (ref 4.3–11.1)

## 2022-08-03 PROCEDURE — 1100000003 HC PRIVATE W/ TELEMETRY

## 2022-08-03 PROCEDURE — 80053 COMPREHEN METABOLIC PANEL: CPT

## 2022-08-03 PROCEDURE — 99223 1ST HOSP IP/OBS HIGH 75: CPT | Performed by: INTERNAL MEDICINE

## 2022-08-03 PROCEDURE — 6370000000 HC RX 637 (ALT 250 FOR IP): Performed by: NURSE PRACTITIONER

## 2022-08-03 PROCEDURE — 82728 ASSAY OF FERRITIN: CPT

## 2022-08-03 PROCEDURE — 2580000003 HC RX 258: Performed by: INTERNAL MEDICINE

## 2022-08-03 PROCEDURE — 82306 VITAMIN D 25 HYDROXY: CPT

## 2022-08-03 PROCEDURE — 85027 COMPLETE CBC AUTOMATED: CPT

## 2022-08-03 PROCEDURE — 36415 COLL VENOUS BLD VENIPUNCTURE: CPT

## 2022-08-03 PROCEDURE — 2580000003 HC RX 258: Performed by: NURSE PRACTITIONER

## 2022-08-03 PROCEDURE — 6360000002 HC RX W HCPCS: Performed by: INTERNAL MEDICINE

## 2022-08-03 PROCEDURE — 83880 ASSAY OF NATRIURETIC PEPTIDE: CPT

## 2022-08-03 PROCEDURE — 6360000002 HC RX W HCPCS: Performed by: NURSE PRACTITIONER

## 2022-08-03 PROCEDURE — 84443 ASSAY THYROID STIM HORMONE: CPT

## 2022-08-03 PROCEDURE — 84436 ASSAY OF TOTAL THYROXINE: CPT

## 2022-08-03 PROCEDURE — 83540 ASSAY OF IRON: CPT

## 2022-08-03 PROCEDURE — 6370000000 HC RX 637 (ALT 250 FOR IP): Performed by: INTERNAL MEDICINE

## 2022-08-03 PROCEDURE — 82962 GLUCOSE BLOOD TEST: CPT

## 2022-08-03 RX ORDER — DEXTROSE MONOHYDRATE 100 MG/ML
INJECTION, SOLUTION INTRAVENOUS CONTINUOUS PRN
Status: DISCONTINUED | OUTPATIENT
Start: 2022-08-03 | End: 2022-08-08 | Stop reason: HOSPADM

## 2022-08-03 RX ADMIN — SODIUM CHLORIDE 250 MG: 9 INJECTION, SOLUTION INTRAVENOUS at 10:01

## 2022-08-03 RX ADMIN — FUROSEMIDE 10 MG/HR: 10 INJECTION, SOLUTION INTRAMUSCULAR; INTRAVENOUS at 03:46

## 2022-08-03 RX ADMIN — ALLOPURINOL 150 MG: 300 TABLET ORAL at 16:33

## 2022-08-03 RX ADMIN — SODIUM BICARBONATE 650 MG TABLET 650 MG: at 09:49

## 2022-08-03 RX ADMIN — ASPIRIN 81 MG: 81 TABLET ORAL at 16:33

## 2022-08-03 RX ADMIN — SODIUM CHLORIDE, PRESERVATIVE FREE 10 ML: 5 INJECTION INTRAVENOUS at 20:30

## 2022-08-03 RX ADMIN — SODIUM BICARBONATE 650 MG TABLET 650 MG: at 20:29

## 2022-08-03 RX ADMIN — INSULIN GLARGINE 50 UNITS: 100 INJECTION, SOLUTION SUBCUTANEOUS at 22:01

## 2022-08-03 RX ADMIN — HYDRALAZINE HYDROCHLORIDE 100 MG: 50 TABLET, FILM COATED ORAL at 20:29

## 2022-08-03 RX ADMIN — FUROSEMIDE 20 MG/HR: 10 INJECTION, SOLUTION INTRAMUSCULAR; INTRAVENOUS at 09:53

## 2022-08-03 RX ADMIN — FUROSEMIDE 20 MG/HR: 10 INJECTION, SOLUTION INTRAMUSCULAR; INTRAVENOUS at 15:14

## 2022-08-03 RX ADMIN — LEVOTHYROXINE SODIUM 88 MCG: 0.09 TABLET ORAL at 06:27

## 2022-08-03 RX ADMIN — HYDRALAZINE HYDROCHLORIDE 100 MG: 50 TABLET, FILM COATED ORAL at 09:45

## 2022-08-03 RX ADMIN — FUROSEMIDE 20 MG/HR: 10 INJECTION, SOLUTION INTRAMUSCULAR; INTRAVENOUS at 20:28

## 2022-08-03 RX ADMIN — SODIUM CHLORIDE, PRESERVATIVE FREE 10 ML: 5 INJECTION INTRAVENOUS at 09:50

## 2022-08-03 RX ADMIN — INSULIN GLARGINE 50 UNITS: 100 INJECTION, SOLUTION SUBCUTANEOUS at 09:51

## 2022-08-03 RX ADMIN — METOPROLOL SUCCINATE 100 MG: 100 TABLET, EXTENDED RELEASE ORAL at 09:42

## 2022-08-03 NOTE — H&P
Office Visit    8/2/2022  [facility] [[email or web address]] CARDIOLOGY  Bean Bullock MD    Cardiology Diastolic CHF, chronic [facility]) +2 more    Dx Congestive Heart Failure ; Referred by Bean Bullock MD    Reason for Visit         Progress Notes  Bean Bullock MD (Physician)   Cardiology  Expand All Collapse All  [address] [place], [place]  2 [address] [facility], [facility] [address] [place]  [facility], [address] [place] [address]  PHONE: [phone]     SUBJECTIVE: /HPI  Kyung Noriega (1940) is a 79 y.o. male seen for a follow up visit regarding the following:  Specialty Problems                  Cardiology Problems     Hypertension           Coronary atherosclerosis of native coronary vessel           Diastolic CHF, chronic (HCC)           Chest pain            Patient presents today with symptoms of shortness of breath dyspnea on exertion PND orthopnea. He does carry the diagnosis of chronic diastolic heart failureHe has an echocardiogram from April of this year that shows normal ejection fraction 60 to 85% with diastolic dysfunction. He also has moderate aortic stenosis in April mean gradient was 23 mmHg peak gradient 38 mmHg. He has labs ordered today of a CBC proBNP and a comprehensive metabolic panel but these are either not resulted or have not yet been drawn  Lab work done in July on the 12th shows glucose of 190 creatinine of 3.32 and an EGFR of less than 18. Hemoglobin hematocrit were 11 and 33 respectively this was again in April. April 7 patient weighed 329 pounds August seconds patient is now 350 pounds     Patient has had several weeks of progressive worsening shortness of breath he endorses that if he just sits and settles down he is okay but with even minimal exertion he gets significantly short of breath  Labs drawn this AM not yet resulted. Past Medical History, Past Surgical History, Family history, Social History, and Medications were all reviewed with the patient today and updated as necessary.            Allergies   Allergen Reactions    Amlodipine Shortness Of Breath    Iodine Shortness Of Breath    Metformin Shortness Of Breath    Ranolazine Other (See Comments)       Lips and tongue numbness    Spironolactone Other (See Comments)       Potassium level went really high  Increased potassium    Hydrocodone-Acetaminophen Other (See Comments)       Pt states that if he takes this  The medication makes him feel like he is going crazy    Omeprazole Other (See Comments)       Caused drastic drop in blood sugar      Past Medical History        Past Medical History:   Diagnosis Date    Acquired hypothyroidism 2016    Anemia 2015    Anemia in chronic kidney disease 2016    Asthma      Chronic kidney disease       stage 4 kidney disease- Dr. Curtis Kim    CKD (chronic kidney disease) 10/11/2012    Coronary atherosclerosis of native coronary vessel 2/10/2016    Diabetes mellitus type 2 in obese ([address] 10/11/2012     avg 150, symptoms of hypoglycemia 90    Diastolic heart failure ([address]      Dyspnea      Dyspnea 2/10/2016    Edema 10/11/2012    Elevated liver function tests 2015    Hypertension 10/11/2012    Hyponatremia 2015    Morbid obesity ([address]       BMI 43.54    JORJE (obstructive sleep apnea) 10/11/2012     cpap    Seborrheic dermatitis 2013    Seborrheic dermatitis           Past Surgical History         Past Surgical History:   Procedure Laterality Date    CARDIAC CATHETERIZATION   2016     3 stent    CATARACT REMOVAL Bilateral 2017    HERNIA REPAIR            Family History         Family History   Problem Relation Age of Onset    Cancer Father           lung    No Known Problems Sister      No Known Problems Brother      Hypertension Father      No Known Problems Sister      Hypertension Mother           Social History            Tobacco Use    Smoking status: Former       Packs/day: 0.50       Years: 2.00       Pack years: 1.00       Types: Cigarettes       Quit date: 1962       Years since quittin.6 Smokeless tobacco: Never   Substance Use Topics    Alcohol use: No        Current Medication      Current Outpatient Medications:    fluticasone-umeclidin-vilant (TRELEGY ELLIPTA) 100-62.5-25 MCG/INH AEPB, 1 inhalation every morning, rinse mouth after use, Disp: 1 each, Rfl: 11    albuterol sulfate  (90 Base) MCG/ACT inhaler, 2 puffs qid prn, Disp: , Rfl:    allopurinol (ZYLOPRIM) 300 MG tablet, Take 150 mg by mouth daily, Disp: , Rfl:    aspirin 81 MG EC tablet, Take 81 mg by mouth, Disp: , Rfl:    calcitRIOL (ROCALTROL) 0.25 MCG capsule, Take 0.25 mcg by mouth daily, Disp: , Rfl:    cloNIDine (CATAPRES) 0.1 MG tablet, Take 0.1 mg by mouth 2 times daily, Disp: , Rfl:    Dulaglutide (TRULICITY) 1.6 YO/8.4NK SOPN, Inject 1.5 mg into the skin every 7 days, Disp: , Rfl:    fluticasone-vilanterol (BREO ELLIPTA) 100-25 MCG/INH AEPB inhaler, 1 inhalation daily, rinse mouth after use, Disp: , Rfl:    hydrALAZINE (APRESOLINE) 100 MG tablet, Take 100 mg by mouth 2 times daily, Disp: , Rfl:    hydrALAZINE (APRESOLINE) 50 MG tablet, Take 50 mg by mouth 4 times daily, Disp: , Rfl:    insulin glargine (LANTUS) 100 UNIT/ML injection vial, Inject 60 Units into the skin 2 times daily, Disp: , Rfl:    levothyroxine (SYNTHROID) 88 MCG tablet, Take 88 mcg by mouth, Disp: , Rfl:    losartan-hydroCHLOROthiazide (HYZAAR) 100-25 MG per tablet, Take 1 tablet by mouth daily, Disp: , Rfl:    nebivolol (BYSTOLIC) 10 MG tablet, Take 10 mg by mouth daily, Disp: , Rfl:    sodium bicarbonate 650 MG tablet, Take 650 mg by mouth 2 times daily, Disp: , Rfl:    torsemide (DEMADEX) 10 MG tablet, Take 10 mg by mouth 3 times daily, Disp: , Rfl:        ROS:  Review of Systems - General ROS: negative for - chills, fatigue or fever  Hematological and Lymphatic ROS: negative for - bleeding problems, bruising or jaundice  Respiratory ROS: positive for - cough, orthopnea, and shortness of breath  Cardiovascular ROS: positive for - dyspnea on exertion, edema, irregular heartbeat, paroxysmal nocturnal dyspnea, and shortness of breath  Gastrointestinal ROS: no abdominal pain, change in bowel habits, or black or bloody stools  Neurological ROS: no TIA or stroke symptoms  All other systems negative. Wt Readings from Last 3 Encounters:   08/02/22 (!) 350 lb (158.8 kg)   06/03/22 (!) 348 lb (157.9 kg)   04/29/22 (!) 338 lb (153.3 kg)          Temp Readings from Last 3 Encounters:   06/03/22 97.2 °F (36.2 °C) (Skin)          BP Readings from Last 3 Encounters:   06/03/22 130/74   04/29/22 128/74   04/08/22 (!) 142/70          Pulse Readings from Last 3 Encounters:   06/03/22 75   04/29/22 66   04/07/22 83         PHYSICAL EXAM:  Ht 6' 1\" (1.854 m)   Wt (!) 350 lb (158.8 kg)   BMI 46.18 kg/m²   Physical Examination: General appearance - alert, well appearing, and in no distress  Mental status - alert, oriented to person, place, and time  Eyes - pupils equal and reactive, extraocular eye movements intact  Neck/lymph - supple, no significant adenopathy  Chest/lungs - decreased air entry noted in bases  Heart/CV - normal rate and regular rhythm, systolic murmur 3/6 at 2nd left intercostal space  Abdomen/GI - soft, nontender, nondistended, no masses or organomegaly  Musculoskeletal - no joint tenderness, deformity or swelling  Extremities - pedal edema 3 +  Skin - normal coloration and turgor, no rashes, no suspicious skin lesions noted     EKG: normal sinus rhythm, nonspecific ST and T waves changes. Medications reviewed and questions answered     Recent Results   No results found for this or any previous visit (from the past 672 hour(s)). No results found for: CHOL, CHOLPOCT, CHOLX, CHLST, CHOLV, HDL, HDLPOC, HDLC, LDL, LDLC, VLDLC, VLDL, TGLX, TRIGL     ASSESSMENT and PLAN  No follow-up provider specified.  is for  Specialty Problems                  Cardiology Problems     Hypertension           Coronary atherosclerosis of native coronary vessel           Diastolic CHF, chronic (HCC)           Chest pain               PLAN:  Problem List Items Addressed This Visit                  Circulatory     Hypertension     Diastolic CHF, chronic (HCC) - Primary          Other     Dyspnea      Patient presents with significant volume overload significant edema crackles in the base of his lungs significant shortness of breath and dyspnea on exertion with orthopnea and PND. He has had escalating diuretic treatment that has yielded no improvement. He is up 21 pounds in weight from April till now. His kidney function shows an EGFR of 18 which is severely reduced.   I believe with all of these factors in patient therapy with a Lasix or Bumex drip is warranted also consultation with nephrology to assess whether temporary ultrafiltration or dialysis might be needed     Continue meds as below    Current Medication      Current Outpatient Medications:    fluticasone-umeclidin-vilant (TRELEGY ELLIPTA) 100-62.5-25 MCG/INH AEPB, 1 inhalation every morning, rinse mouth after use, Disp: 1 each, Rfl: 11    albuterol sulfate  (90 Base) MCG/ACT inhaler, 2 puffs qid prn, Disp: , Rfl:    allopurinol (ZYLOPRIM) 300 MG tablet, Take 150 mg by mouth daily, Disp: , Rfl:    aspirin 81 MG EC tablet, Take 81 mg by mouth, Disp: , Rfl:    calcitRIOL (ROCALTROL) 0.25 MCG capsule, Take 0.25 mcg by mouth daily, Disp: , Rfl:    cloNIDine (CATAPRES) 0.1 MG tablet, Take 0.1 mg by mouth 2 times daily, Disp: , Rfl:    Dulaglutide (TRULICITY) 1.5 KX/6.3SM SOPN, Inject 1.5 mg into the skin every 7 days, Disp: , Rfl:    fluticasone-vilanterol (BREO ELLIPTA) 100-25 MCG/INH AEPB inhaler, 1 inhalation daily, rinse mouth after use, Disp: , Rfl:    hydrALAZINE (APRESOLINE) 100 MG tablet, Take 100 mg by mouth 2 times daily, Disp: , Rfl:    hydrALAZINE (APRESOLINE) 50 MG tablet, Take 50 mg by mouth 4 times daily, Disp: , Rfl:    insulin glargine (LANTUS) 100 UNIT/ML injection vial, Inject 60 Units into the skin 2 times daily, Disp: , Rfl:    levothyroxine (SYNTHROID) 88 MCG tablet, Take 88 mcg by mouth, Disp: , Rfl:    losartan-hydroCHLOROthiazide (HYZAAR) 100-25 MG per tablet, Take 1 tablet by mouth daily, Disp: , Rfl:    nebivolol (BYSTOLIC) 10 MG tablet, Take 10 mg by mouth daily, Disp: , Rfl:    sodium bicarbonate 650 MG tablet, Take 650 mg by mouth 2 times daily, Disp: , Rfl:    torsemide (DEMADEX) 10 MG tablet, Take 10 mg by mouth 3 times daily, Disp: , Rfl:        Dudley Boyer MD  8/2/2022  2:25 PM     Pt is instructed to follow all appropriate cardiac risk factor recommendations and to be compliant with meds and testing. Instructed to follow up appropriately and seek urgent medical care if acute or unstable issues arise. Results of some tests may be viewed thru [address] but this does not substitute for follow up with MD. If follow up is not scheduled pt is instructed to call for follow up                     Instructions    AVS (Automatic SnapShot taken 8/2/2022)        Additional Documentation    Vitals:  /78 Important   Pulse 78  Ht 6' 1\" (1.854 m)  Wt 350 lb (158.8 kg) Important   SpO2 95%  BMI 46.18 kg/m²  BSA 2.86 m²  Pain Sc   0 - No pain    More Vitals   Flowsheets:  Calorie Assessment,  Vitals Reassessment     Encounter Info:  Billing Info,  Allergies,  Detailed Report,  History,  Medications,  Questionnaires                   Chart Review Routing History    No routing history on file.     Encounter Status    Signed by Sheron Herman MD on 8/2/22 at 14:40    BestPractice Advisories    Click to view BestPractice Advisory history       Encounter Messages    No messages in this encounter         Orders Placed    None        Outpatient Medications at End of Encounter as of 8/2/2022    fluticasone-umeclidin-vilant (TRELEGY ELLIPTA) 100-62.5-25 MCG/INH AEPB (Taking) 1 inhalation every morning, rinse mouth after use   albuterol sulfate  (90 Base) MCG/ACT inhaler (Taking) 2 puffs qid prn   allopurinol (ZYLOPRIM) 300 MG tablet (Taking) Take 150 mg by mouth daily   aspirin 81 MG EC tablet (Taking) Take 81 mg by mouth   calcitRIOL (ROCALTROL) 0.25 MCG capsule (Taking) Take 0.25 mcg by mouth daily   cloNIDine (CATAPRES) 0.1 MG tablet (Taking) Take 0.1 mg by mouth 2 times daily as needed   Dulaglutide (TRULICITY) 1.5 IP/9.5CJ SOPN (Taking) Inject 1.5 mg into the skin every 7 days On sundays   hydrALAZINE (APRESOLINE) 50 MG tablet (Taking) Take 100 mg by mouth in the morning and 100 mg before bedtime.    insulin glargine (LANTUS) 100 UNIT/ML injection vial (Taking) Inject 50 Units into the skin 2 times daily   levothyroxine (SYNTHROID) 88 MCG tablet (Taking) Take 88 mcg by mouth   losartan-hydroCHLOROthiazide (HYZAAR) 100-25 MG per tablet (Taking) Take 1 tablet by mouth daily   nebivolol (BYSTOLIC) 10 MG tablet (Taking) Take 10 mg by mouth daily   sodium bicarbonate 650 MG tablet (Taking) Take 650 mg by mouth 2 times daily   torsemide (DEMADEX) 10 MG tablet (Taking) Take 10 mg by mouth 3 times daily   fluticasone-vilanterol (BREO ELLIPTA) 100-25 MCG/INH AEPB inhaler (Taking) 1 inhalation daily, rinse mouth after use   hydrALAZINE (APRESOLINE) 100 MG tablet (Taking) Take 100 mg by mouth 2 times daily       Medication Changes      None     Medication List         Visit Diagnoses      Diastolic CHF, chronic (HCC)    Primary hypertension    Dyspnea on exertion     Problem List

## 2022-08-03 NOTE — PROGRESS NOTES
drip with plan to switch to PO once volume status has improved. I suspect it will take days of Lasix drip to achieve appropriate level of volume reduction due to his significant profound edema level. There is very little room for management of HFpEF medications due to his stage IV kidney disease. Patient will need to have a blunt discussion concerning his prognosis with end-stage renal disease and his need for aggressive diuresis to manage fluid status due to significant HFpEF    There is likely a strong component of underlying obstructive sleep apnea playing a role in this as well    Diastolic dysfunction   Echo completed on 04/08/22  showed EF of 02-39% with diastolic dysfunction present.      CKD  Nephrology has been consulted, see current care plan     Karla Mahan

## 2022-08-03 NOTE — PROGRESS NOTES
Subjective:   Daily Progress Note: 8/3/2022 9:48 AM    F/U CKD 4, anasarca    Feels Better  Comfortable  No CP No SOB  No Abd pain  MS -      Current Facility-Administered Medications   Medication Dose Route Frequency    glucose chewable tablet 16 g  4 tablet Oral PRN    dextrose bolus 10% 125 mL  125 mL IntraVENous PRN    Or    dextrose bolus 10% 250 mL  250 mL IntraVENous PRN    glucagon (rDNA) injection 1 mg  1 mg SubCUTAneous PRN    dextrose 10 % infusion   IntraVENous Continuous PRN    ferric gluconate (FERRLECIT) 250 mg in sodium chloride 0.9 % 250 mL IVPB  250 mg IntraVENous Daily    albuterol sulfate HFA (PROVENTIL;VENTOLIN;PROAIR) 108 (90 Base) MCG/ACT inhaler 2 puff  2 puff Inhalation Q6H PRN    levothyroxine (SYNTHROID) tablet 88 mcg  88 mcg Oral Daily    sodium bicarbonate tablet 650 mg  650 mg Oral BID    sodium chloride flush 0.9 % injection 5-40 mL  5-40 mL IntraVENous 2 times per day    sodium chloride flush 0.9 % injection 5-40 mL  5-40 mL IntraVENous PRN    0.9 % sodium chloride infusion   IntraVENous PRN    ondansetron (ZOFRAN-ODT) disintegrating tablet 4 mg  4 mg Oral Q8H PRN    Or    ondansetron (ZOFRAN) injection 4 mg  4 mg IntraVENous Q6H PRN    acetaminophen (TYLENOL) tablet 650 mg  650 mg Oral Q6H PRN    Or    acetaminophen (TYLENOL) suppository 650 mg  650 mg Rectal Q6H PRN    polyethylene glycol (GLYCOLAX) packet 17 g  17 g Oral Daily PRN    metoprolol succinate (TOPROL XL) extended release tablet 100 mg  100 mg Oral Daily    furosemide (LASIX) 100 mg in sodium chloride 0.9 % 100 mL infusion (mini-bag)  20 mg/hr IntraVENous Continuous    cloNIDine (CATAPRES) tablet 0.1 mg  0.1 mg Oral BID PRN    hydrALAZINE (APRESOLINE) tablet 100 mg  100 mg Oral 2 times per day    insulin glargine (LANTUS) injection vial 50 Units  50 Units SubCUTAneous BID    allopurinol (ZYLOPRIM) tablet 150 mg  150 mg Oral Dinner    aspirin EC tablet 81 mg  81 mg Oral Dinner    fluticasone-vilanterol (BREO ELLIPTA) 100-25 MCG/INH inhaler - pt supplied (Patient Supplied)  1 puff Inhalation Daily         Objective:     BP (!) 150/67   Pulse 72   Temp 97.3 °F (36.3 °C) (Temporal)   Resp 18   Wt (!) 354 lb 4.5 oz (160.7 kg)   SpO2 98%   BMI 46.74 kg/m²         Temp (24hrs), Av.9 °F (36.6 °C), Min:97.3 °F (36.3 °C), Max:99 °F (37.2 °C)      No intake/output data recorded.  1901 -  0700  In: -   Out: 2975 [Urine:2975]      BP (!) 150/67   Pulse 72   Temp 97.3 °F (36.3 °C) (Temporal)   Resp 18   Wt (!) 354 lb 4.5 oz (160.7 kg)   SpO2 98%   BMI 46.74 kg/m²   Head: Normocephalic, without obvious abnormality  Neck: no JVD  Lungs: clear to auscultation bilaterally  Heart: regular rate and rhythm  Abdomen: soft, non-tender.   Extremities: 3+ edema          Data Review    Recent Results (from the past 48 hour(s))   proBNP, N-TERMINAL    Collection Time: 22 10:50 AM   Result Value Ref Range    NT Pro-BNP 9,416 (H) <450 PG/ML   CBC    Collection Time: 22 10:50 AM   Result Value Ref Range    WBC 6.0 4.3 - 11.1 K/uL    RBC 3.08 (L) 4.23 - 5.6 M/uL    Hemoglobin 9.4 (L) 13.6 - 17.2 g/dL    Hematocrit 30.2 (L) 41.1 - 50.3 %    MCV 98.1 (H) 79.6 - 97.8 FL    MCH 30.5 26.1 - 32.9 PG    MCHC 31.1 (L) 31.4 - 35.0 g/dL    RDW 14.6 11.9 - 14.6 %    Platelets 306 975 - 946 K/uL    MPV 11.5 9.4 - 12.3 FL    nRBC 0.00 0.0 - 0.2 K/uL   Comprehensive Metabolic Panel    Collection Time: 22 10:50 AM   Result Value Ref Range    Sodium 141 136 - 145 mmol/L    Potassium 4.9 3.5 - 5.1 mmol/L    Chloride 113 (H) 98 - 107 mmol/L    CO2 21 21 - 32 mmol/L    Anion Gap 7 7 - 16 mmol/L    Glucose 146 (H) 65 - 100 mg/dL    BUN 72 (H) 8 - 23 MG/DL    Creatinine 3.40 (H) 0.8 - 1.5 MG/DL    GFR African American 22 (L) >60 ml/min/1.73m2    GFR Non- 18 (L) >60 ml/min/1.73m2    Calcium 8.9 8.3 - 10.4 MG/DL    Total Bilirubin 0.4 0.2 - 1.1 MG/DL    ALT 25 12 - 65 U/L    AST 20 15 - 37 U/L    Alk Phosphatase 65 50 - 136 U/L    Total Protein 6.3 6.3 - 8.2 g/dL    Albumin 3.0 (L) 3.2 - 4.6 g/dL    Globulin 3.3 2.3 - 3.5 g/dL    Albumin/Globulin Ratio 0.9 (L) 1.2 - 3.5     EKG 12 lead    Collection Time: 08/02/22  5:00 PM   Result Value Ref Range    Ventricular Rate 75 BPM    Atrial Rate 75 BPM    P-R Interval 260 ms    QRS Duration 108 ms    Q-T Interval 430 ms    QTc Calculation (Bazett) 480 ms    P Axis 66 degrees    R Axis -2 degrees    T Axis 103 degrees    Diagnosis       !!! Poor data quality, interpretation may be adversely affected   POCT Glucose    Collection Time: 08/02/22  9:09 PM   Result Value Ref Range    POC Glucose 143 (H) 65 - 100 mg/dL    Performed by: Dilia    POCT Glucose    Collection Time: 08/03/22 12:00 AM   Result Value Ref Range    POC Glucose 107 (H) 65 - 100 mg/dL    Performed by: [place] Million    POCT Glucose    Collection Time: 08/03/22  2:42 AM   Result Value Ref Range    POC Glucose 65 65 - 100 mg/dL    Performed by: Huan    POCT Glucose    Collection Time: 08/03/22  3:13 AM   Result Value Ref Range    POC Glucose 107 (H) 65 - 100 mg/dL    Performed by: [place] Million    POCT Glucose    Collection Time: 08/03/22  4:17 AM   Result Value Ref Range    POC Glucose 132 (H) 65 - 100 mg/dL    Performed by: [place] Million    T4    Collection Time: 08/03/22  6:19 AM   Result Value Ref Range    T4, Total 7.4 4.8 - 13.9 ug/dL   Vitamin D 25 Hydroxy    Collection Time: 08/03/22  6:19 AM   Result Value Ref Range    Vit D, 25-Hydroxy 19.8 (L) 30.0 - 100.0 ng/mL   Iron    Collection Time: 08/03/22  6:19 AM   Result Value Ref Range    Iron 50 35 - 150 ug/dL   proBNP, N-TERMINAL    Collection Time: 08/03/22  6:19 AM   Result Value Ref Range    NT Pro-BNP 11,667 (H) <450 PG/ML   TSH    Collection Time: 08/03/22  6:19 AM   Result Value Ref Range    TSH, 3RD GENERATION 1.510 0.358 - 3.740 uIU/mL   CBC    Collection Time: 08/03/22  6:20 AM   Result Value Ref Range    WBC 5.6 4.3 - 11.1 K/uL    RBC 2.82 (L) 4.23 - 5.6 M/uL    Hemoglobin 8.7 (L) 13.6 - 17.2 g/dL    Hematocrit 27.2 (L) 41.1 - 50.3 %    MCV 96.5 79.6 - 97.8 FL    MCH 30.9 26.1 - 32.9 PG    MCHC 32.0 31.4 - 35.0 g/dL    RDW 14.6 11.9 - 14.6 %    Platelets 183 948 - 766 K/uL    MPV 10.7 9.4 - 12.3 FL    nRBC 0.00 0.0 - 0.2 K/uL   Comprehensive Metabolic Panel w/ Reflex to MG    Collection Time: 08/03/22  6:20 AM   Result Value Ref Range    Sodium 140 138 - 145 mmol/L    Potassium 4.5 3.5 - 5.1 mmol/L    Chloride 111 (H) 98 - 107 mmol/L    CO2 23 21 - 32 mmol/L    Anion Gap 6 (L) 7 - 16 mmol/L    Glucose 104 (H) 65 - 100 mg/dL    BUN 72 (H) 8 - 23 MG/DL    Creatinine 3.60 (H) 0.8 - 1.5 MG/DL    GFR African American 21 (L) >60 ml/min/1.73m2    GFR Non- 17 (L) >60 ml/min/1.73m2    Calcium 8.9 8.3 - 10.4 MG/DL    Total Bilirubin 0.4 0.2 - 1.1 MG/DL    ALT 23 12 - 65 U/L    AST 17 15 - 37 U/L    Alk Phosphatase 63 50 - 136 U/L    Total Protein 6.2 (L) 6.3 - 8.2 g/dL    Albumin 2.9 (L) 3.2 - 4.6 g/dL    Globulin 3.3 2.3 - 3.5 g/dL    Albumin/Globulin Ratio 0.9 (L) 1.2 - 3.5     Ferritin    Collection Time: 08/03/22  6:20 AM   Result Value Ref Range    Ferritin 29 8 - 388 NG/ML   Transferrin Saturation    Collection Time: 08/03/22  6:20 AM   Result Value Ref Range    Iron 50 35 - 150 ug/dL    TIBC 298 250 - 450 ug/dL    TRANSFERRIN SATURATION 17 (L) >20 %   POCT Glucose    Collection Time: 08/03/22  8:03 AM   Result Value Ref Range    POC Glucose 90 65 - 100 mg/dL    Performed by: Valentin        Assessment     Patient Active Problem List    Diagnosis Date Noted    Acute on chronic heart failure with preserved ejection fraction (HCC) 08/03/2022    Volume overload 08/02/2022    Restrictive lung disease 06/03/2022    Chest pain 05/28/2019    Obesity, morbid ([facility] 04/18/2018    Type 2 diabetes with nephropathy ([facility] 23/67/0449    Diastolic CHF, chronic ([facility]) 09/08/2017    Anemia in chronic kidney disease 04/08/2016    Acquired hypothyroidism 04/08/2016    Cough variant asthma 03/11/2016    Dyspnea 02/10/2016    Coronary atherosclerosis of native coronary vessel 02/10/2016    SEARS (dyspnea on exertion) 12/22/2015    Abnormal nuclear stress test 12/22/2015    Elevated liver function tests 08/20/2015    Hyponatremia 01/21/2015    Anemia 01/21/2015    PPD positive 10/28/2014    Seborrheic dermatitis 01/16/2013    Edema 10/11/2012    CKD (chronic kidney disease) 10/11/2012    JORJE (obstructive sleep apnea) 10/11/2012    Hypertension 10/11/2012    Diabetes mellitus type 2 in obese ([facility] [address] 10/11/2012           Problems Addressed by Nephrology         CKD IV  - at baseline renal function. Stable with diuresis     Volume overload / Anasarca - Continue Lasix gtt. Excellent response so far.       Anemia of CKD IV - give IV iron

## 2022-08-03 NOTE — CONSULTS
[facility] CONSULT NOTE    Admission Date:  8/2/2022    Admission Diagnosis:  Volume overload [E87.70]    Reason for consult: CKD, Volume overload    Subjective:   History of Present Illness:  Minh Rivero is a 79 y.o. male with a PMH including CKD, HTN, diastolic dysfunction who presented to Cardiology clinic with complaints of worsening SOB and weight gain over the past few months. Patient has CKD IV and follows with Dr. Ever King. Patient states that he has been compliant with his medications, including diuretic. He was on Torsemide 10mg and then increased to 40mg recently to see if his swelling and SOB would improve, but he had no improvement in symptoms. He complaints of SOB with minimal exertion. No SOB with rest. He is currently on room air. No chest pain at this time.      Past Medical History:   Diagnosis Date    Acquired hypothyroidism 4/8/2016    Anemia 1/21/2015    Anemia in chronic kidney disease 4/8/2016    Asthma     Chronic kidney disease     stage 4 kidney disease- Dr. Elizabeth Robles    CKD (chronic kidney disease) 10/11/2012    Coronary atherosclerosis of native coronary vessel 2/10/2016    Diabetes mellitus type 2 in obese ([address] 10/11/2012    avg 150, symptoms of hypoglycemia 90    Diastolic heart failure ([address]     Dyspnea     Dyspnea 2/10/2016    Edema 10/11/2012    Elevated liver function tests 8/20/2015    Hypertension 10/11/2012    Hyponatremia 1/21/2015    Morbid obesity ([address]     BMI 43.54    JORJE (obstructive sleep apnea) 10/11/2012    cpap    Seborrheic dermatitis 1/16/2013    Seborrheic dermatitis       Past Surgical History:   Procedure Laterality Date    CARDIAC CATHETERIZATION  2016    3 stent    CATARACT REMOVAL Bilateral 2017    HERNIA REPAIR  1957      Current Facility-Administered Medications   Medication Dose Route Frequency    albuterol sulfate HFA (PROVENTIL;VENTOLIN;PROAIR) 108 (90 Base) MCG/ACT inhaler 2 puff  2 puff Inhalation Q6H PRN    [START ON 8/3/2022] levothyroxine (SYNTHROID) tablet 88 mcg  88 mcg Oral Daily    sodium bicarbonate tablet 650 mg  650 mg Oral BID    sodium chloride flush 0.9 % injection 5-40 mL  5-40 mL IntraVENous 2 times per day    sodium chloride flush 0.9 % injection 5-40 mL  5-40 mL IntraVENous PRN    0.9 % sodium chloride infusion   IntraVENous PRN    ondansetron (ZOFRAN-ODT) disintegrating tablet 4 mg  4 mg Oral Q8H PRN    Or    ondansetron (ZOFRAN) injection 4 mg  4 mg IntraVENous Q6H PRN    acetaminophen (TYLENOL) tablet 650 mg  650 mg Oral Q6H PRN    Or    acetaminophen (TYLENOL) suppository 650 mg  650 mg Rectal Q6H PRN    polyethylene glycol (GLYCOLAX) packet 17 g  17 g Oral Daily PRN    [START ON 8/3/2022] metoprolol succinate (TOPROL XL) extended release tablet 100 mg  100 mg Oral Daily    furosemide (LASIX) 100 mg in sodium chloride 0.9 % 100 mL infusion (mini-bag)  10 mg/hr IntraVENous Continuous    cloNIDine (CATAPRES) tablet 0.1 mg  0.1 mg Oral BID PRN    hydrALAZINE (APRESOLINE) tablet 100 mg  100 mg Oral 2 times per day    insulin glargine (LANTUS) injection vial 50 Units  50 Units SubCUTAneous BID    allopurinol (ZYLOPRIM) tablet 150 mg  150 mg Oral Dinner    aspirin EC tablet 81 mg  81 mg Oral Dinner    [START ON 8/3/2022] fluticasone-vilanterol (BREO ELLIPTA) 100-25 MCG/INH inhaler - pt supplied (Patient Supplied)  1 puff Inhalation Daily     Allergies   Allergen Reactions    Amlodipine Shortness Of Breath    Iodine Shortness Of Breath    Metformin Shortness Of Breath    Ranolazine Other (See Comments)     Lips and tongue numbness    Spironolactone Other (See Comments)     Potassium level went really high  Increased potassium    Hydrocodone-Acetaminophen Other (See Comments)     Pt states that if he takes this  The medication makes him feel like he is going crazy    Omeprazole Other (See Comments)     Caused drastic drop in blood sugar      Social History     Tobacco Use    Smoking status: Former     Packs/day: 0.50     Years: 2.00 Pack years: 1.00     Types: Cigarettes     Quit date: 1962     Years since quittin.6    Smokeless tobacco: Never   Substance Use Topics    Alcohol use: No      Family History   Problem Relation Age of Onset    Cancer Father         lung    No Known Problems Sister     No Known Problems Brother     Hypertension Father     No Known Problems Sister     Hypertension Mother         Review of Systems  Pertinent positives and negatives as per HPI, remainder of systems (at least 10 systems were reviewed) are negative    Objective:   Vitals:    22 1628   BP: (!) 165/77   Pulse: 75   Resp: 16   Temp: 97.6 °F (36.4 °C)   TempSrc: Oral   SpO2: 98%   Weight: (!) 348 lb 9.6 oz (158.1 kg)       Intake/Output Summary (Last 24 hours) at 2022  Last data filed at 2022  Gross per 24 hour   Intake --   Output 900 ml   Net -900 ml       Physical Exam  GEN :in no distress, resting comfortably  HEENT: anicteric sclerae, NC/AT  CV - regular rate, no audible murmur  Lung - equal chest rise, no audible wheezing  Abd - soft, nontender  Ext - no cyanosis, + anasarca  Neurologic - alert, following commands  Skin - no rashes, no cyanosis  Psychiatric: Normal mood and affect. Cooperative. Data Review:   Recent Labs     22  1050   WBC 6.0   HGB 9.4*   HCT 30.2*        Recent Labs     22  1050      K 4.9   *   CO2 21   BUN 72*   CREATININE 3.40*   GLUCOSE 146*   CALCIUM 8.9     No results for input(s): PH, PCO2, PO2, PCO2 in the last 72 hours. Assessment and Plan:  CKD IV  - at baseline renal function. Follows with The Dr. Mikayla Sin and was seen about 2 weeks ago. Monitor strict I&Os. Daily BMP, Mag, Phos    Volume overload / Anasarca - agree with lasix gtt. No indication for dialysis currently. - low salt diet, fluid restriction.  Strict I&Os    Anemia of CKD IV - check iron studies    Adin Carnes MD  [facility]

## 2022-08-03 NOTE — PLAN OF CARE
Problem: Discharge Planning  Goal: Discharge to home or other facility with appropriate resources  8/3/2022 1033 by Ashlee Barnes RN  Outcome: Progressing  8/3/2022 1032 by Ashlee Barnes RN  Outcome: Progressing     Problem: Skin/Tissue Integrity  Goal: Absence of new skin breakdown  Description: 1. Monitor for areas of redness and/or skin breakdown  2. Assess vascular access sites hourly  3. Every 4-6 hours minimum:  Change oxygen saturation probe site  4. Every 4-6 hours:  If on nasal continuous positive airway pressure, respiratory therapy assess nares and determine need for appliance change or resting period.   8/3/2022 1033 by Ashlee Barnes RN  Outcome: Progressing  8/3/2022 1032 by Ashlee Barnes RN  Outcome: Progressing     Problem: Safety - Adult  Goal: Free from fall injury  8/3/2022 1033 by Ashlee Barnes RN  Outcome: Progressing  8/3/2022 1032 by Ashlee Barnes RN  Outcome: Progressing  Flowsheets (Taken 8/3/2022 6894)  Free From Fall Injury:   Instruct family/caregiver on patient safety   Based on caregiver fall risk screen, instruct family/caregiver to ask for assistance with transferring infant if caregiver noted to have fall risk factors     Problem: Chronic Conditions and Co-morbidities  Goal: Patient's chronic conditions and co-morbidity symptoms are monitored and maintained or improved  Outcome: Progressing

## 2022-08-04 ENCOUNTER — APPOINTMENT (OUTPATIENT)
Dept: NON INVASIVE DIAGNOSTICS | Age: 82
DRG: 291 | End: 2022-08-04
Attending: INTERNAL MEDICINE
Payer: MEDICARE

## 2022-08-04 LAB
ALBUMIN SERPL-MCNC: 2.8 G/DL (ref 3.2–4.6)
ALBUMIN/GLOB SERPL: 0.9 {RATIO} (ref 1.2–3.5)
ALP SERPL-CCNC: 60 U/L (ref 50–136)
ALT SERPL-CCNC: 20 U/L (ref 12–65)
ANION GAP SERPL CALC-SCNC: 9 MMOL/L (ref 7–16)
AST SERPL-CCNC: 18 U/L (ref 15–37)
BILIRUB SERPL-MCNC: 0.3 MG/DL (ref 0.2–1.1)
BUN SERPL-MCNC: 84 MG/DL (ref 8–23)
CALCIUM SERPL-MCNC: 8.9 MG/DL (ref 8.3–10.4)
CHLORIDE SERPL-SCNC: 108 MMOL/L (ref 98–107)
CO2 SERPL-SCNC: 25 MMOL/L (ref 21–32)
CREAT SERPL-MCNC: 3.8 MG/DL (ref 0.8–1.5)
ECHO AO ASC DIAM: 3.1 CM
ECHO AO ASCENDING AORTA INDEX: 1.15 CM/M2
ECHO AO ROOT DIAM: 3.2 CM
ECHO AO ROOT INDEX: 1.19 CM/M2
ECHO AV AREA PEAK VELOCITY: 1.3 CM2
ECHO AV AREA VTI: 1.2 CM2
ECHO AV AREA/BSA PEAK VELOCITY: 0.5 CM2/M2
ECHO AV AREA/BSA VTI: 0.4 CM2/M2
ECHO AV MEAN GRADIENT: 26 MMHG
ECHO AV MEAN VELOCITY: 2.4 M/S
ECHO AV PEAK GRADIENT: 43 MMHG
ECHO AV PEAK VELOCITY: 3.3 M/S
ECHO AV VELOCITY RATIO: 0.36
ECHO AV VTI: 90.4 CM
ECHO BSA: 2.86 M2
ECHO EST RA PRESSURE: 3 MMHG
ECHO IVC PROX: 1.9 CM
ECHO LA AREA 2C: 21 CM2
ECHO LA AREA 4C: 27.2 CM2
ECHO LA DIAMETER INDEX: 2.04 CM/M2
ECHO LA DIAMETER: 5.5 CM
ECHO LA MAJOR AXIS: 6.8 CM
ECHO LA MINOR AXIS: 5.5 CM
ECHO LA TO AORTIC ROOT RATIO: 1.72
ECHO LA VOL BP: 85 ML (ref 18–58)
ECHO LA VOL/BSA BIPLANE: 32 ML/M2 (ref 16–34)
ECHO LV E' LATERAL VELOCITY: 8 CM/S
ECHO LV E' SEPTAL VELOCITY: 6 CM/S
ECHO LV EDV A2C: 206 ML
ECHO LV EDV A4C: 232 ML
ECHO LV EDV INDEX A4C: 86 ML/M2
ECHO LV EDV NDEX A2C: 77 ML/M2
ECHO LV EJECTION FRACTION A2C: 64 %
ECHO LV EJECTION FRACTION A4C: 61 %
ECHO LV EJECTION FRACTION BIPLANE: 62 % (ref 55–100)
ECHO LV ESV A2C: 74 ML
ECHO LV ESV A4C: 91 ML
ECHO LV ESV INDEX A2C: 28 ML/M2
ECHO LV ESV INDEX A4C: 34 ML/M2
ECHO LV FRACTIONAL SHORTENING: 33 % (ref 28–44)
ECHO LV INTERNAL DIMENSION DIASTOLE INDEX: 2.01 CM/M2
ECHO LV INTERNAL DIMENSION DIASTOLIC: 5.4 CM (ref 4.2–5.9)
ECHO LV INTERNAL DIMENSION SYSTOLIC INDEX: 1.34 CM/M2
ECHO LV INTERNAL DIMENSION SYSTOLIC: 3.6 CM
ECHO LV IVSD: 1.2 CM (ref 0.6–1)
ECHO LV MASS 2D: 264.4 G (ref 88–224)
ECHO LV MASS INDEX 2D: 98.3 G/M2 (ref 49–115)
ECHO LV POSTERIOR WALL DIASTOLIC: 1.2 CM (ref 0.6–1)
ECHO LV RELATIVE WALL THICKNESS RATIO: 0.44
ECHO LVOT AREA: 3.5 CM2
ECHO LVOT AV VTI INDEX: 0.35
ECHO LVOT DIAM: 2.1 CM
ECHO LVOT MEAN GRADIENT: 4 MMHG
ECHO LVOT PEAK GRADIENT: 6 MMHG
ECHO LVOT PEAK VELOCITY: 1.2 M/S
ECHO LVOT STROKE VOLUME INDEX: 41.1 ML/M2
ECHO LVOT SV: 110.4 ML
ECHO LVOT VTI: 31.9 CM
ECHO MV A VELOCITY: 1.05 M/S
ECHO MV E DECELERATION TIME (DT): 248 MS
ECHO MV E VELOCITY: 1.03 M/S
ECHO MV E/A RATIO: 0.98
ECHO MV E/E' LATERAL: 12.88
ECHO MV E/E' RATIO (AVERAGED): 15.02
ECHO MV E/E' SEPTAL: 17.17
ECHO PV ACCELERATION TIME (AT): 103 MS
ECHO PV MAX VELOCITY: 1.1 M/S
ECHO PV PEAK GRADIENT: 5 MMHG
ECHO RIGHT VENTRICULAR SYSTOLIC PRESSURE (RVSP): 51 MMHG
ECHO RV BASAL DIMENSION: 4.4 CM
ECHO RV INTERNAL DIMENSION: 3.6 CM
ECHO RV TAPSE: 2.1 CM (ref 1.7–?)
ECHO TV REGURGITANT MAX VELOCITY: 3.45 M/S
ECHO TV REGURGITANT PEAK GRADIENT: 48 MMHG
ERYTHROCYTE [DISTWIDTH] IN BLOOD BY AUTOMATED COUNT: 14.7 % (ref 11.9–14.6)
GLOBULIN SER CALC-MCNC: 3 G/DL (ref 2.3–3.5)
GLUCOSE BLD STRIP.AUTO-MCNC: 106 MG/DL (ref 65–100)
GLUCOSE BLD STRIP.AUTO-MCNC: 112 MG/DL (ref 65–100)
GLUCOSE BLD STRIP.AUTO-MCNC: 116 MG/DL (ref 65–100)
GLUCOSE BLD STRIP.AUTO-MCNC: 129 MG/DL (ref 65–100)
GLUCOSE BLD STRIP.AUTO-MCNC: 226 MG/DL (ref 65–100)
GLUCOSE BLD STRIP.AUTO-MCNC: 62 MG/DL (ref 65–100)
GLUCOSE BLD STRIP.AUTO-MCNC: 87 MG/DL (ref 65–100)
GLUCOSE SERPL-MCNC: 108 MG/DL (ref 65–100)
HCT VFR BLD AUTO: 27.2 % (ref 41.1–50.3)
HGB BLD-MCNC: 8.6 G/DL (ref 13.6–17.2)
MAGNESIUM SERPL-MCNC: 2.6 MG/DL (ref 1.8–2.4)
MCH RBC QN AUTO: 30.5 PG (ref 26.1–32.9)
MCHC RBC AUTO-ENTMCNC: 31.6 G/DL (ref 31.4–35)
MCV RBC AUTO: 96.5 FL (ref 79.6–97.8)
NRBC # BLD: 0 K/UL (ref 0–0.2)
PLATELET # BLD AUTO: 203 K/UL (ref 150–450)
PMV BLD AUTO: 11 FL (ref 9.4–12.3)
POTASSIUM SERPL-SCNC: 4.2 MMOL/L (ref 3.5–5.1)
PROT SERPL-MCNC: 5.8 G/DL (ref 6.3–8.2)
RBC # BLD AUTO: 2.82 M/UL (ref 4.23–5.6)
SERVICE CMNT-IMP: ABNORMAL
SERVICE CMNT-IMP: NORMAL
SODIUM SERPL-SCNC: 142 MMOL/L (ref 136–145)
WBC # BLD AUTO: 4.8 K/UL (ref 4.3–11.1)

## 2022-08-04 PROCEDURE — 99232 SBSQ HOSP IP/OBS MODERATE 35: CPT | Performed by: INTERNAL MEDICINE

## 2022-08-04 PROCEDURE — 93306 TTE W/DOPPLER COMPLETE: CPT | Performed by: INTERNAL MEDICINE

## 2022-08-04 PROCEDURE — C8929 TTE W OR WO FOL WCON,DOPPLER: HCPCS

## 2022-08-04 PROCEDURE — 6370000000 HC RX 637 (ALT 250 FOR IP): Performed by: NURSE PRACTITIONER

## 2022-08-04 PROCEDURE — 2580000003 HC RX 258: Performed by: INTERNAL MEDICINE

## 2022-08-04 PROCEDURE — 6370000000 HC RX 637 (ALT 250 FOR IP): Performed by: INTERNAL MEDICINE

## 2022-08-04 PROCEDURE — 6360000002 HC RX W HCPCS: Performed by: INTERNAL MEDICINE

## 2022-08-04 PROCEDURE — 2580000003 HC RX 258: Performed by: NURSE PRACTITIONER

## 2022-08-04 PROCEDURE — 80053 COMPREHEN METABOLIC PANEL: CPT

## 2022-08-04 PROCEDURE — 83735 ASSAY OF MAGNESIUM: CPT

## 2022-08-04 PROCEDURE — 6360000004 HC RX CONTRAST MEDICATION: Performed by: INTERNAL MEDICINE

## 2022-08-04 PROCEDURE — 85027 COMPLETE CBC AUTOMATED: CPT

## 2022-08-04 PROCEDURE — 1100000003 HC PRIVATE W/ TELEMETRY

## 2022-08-04 PROCEDURE — 36415 COLL VENOUS BLD VENIPUNCTURE: CPT

## 2022-08-04 PROCEDURE — 82962 GLUCOSE BLOOD TEST: CPT

## 2022-08-04 RX ORDER — INSULIN GLARGINE 100 [IU]/ML
45 INJECTION, SOLUTION SUBCUTANEOUS NIGHTLY
Status: DISCONTINUED | OUTPATIENT
Start: 2022-08-04 | End: 2022-08-05

## 2022-08-04 RX ORDER — INSULIN GLARGINE 100 [IU]/ML
50 INJECTION, SOLUTION SUBCUTANEOUS EVERY MORNING
Status: DISCONTINUED | OUTPATIENT
Start: 2022-08-04 | End: 2022-08-05

## 2022-08-04 RX ADMIN — FUROSEMIDE 20 MG/HR: 10 INJECTION, SOLUTION INTRAMUSCULAR; INTRAVENOUS at 14:19

## 2022-08-04 RX ADMIN — SODIUM BICARBONATE 650 MG TABLET 650 MG: at 20:37

## 2022-08-04 RX ADMIN — INSULIN GLARGINE 45 UNITS: 100 INJECTION, SOLUTION SUBCUTANEOUS at 21:30

## 2022-08-04 RX ADMIN — FUROSEMIDE 20 MG/HR: 10 INJECTION, SOLUTION INTRAMUSCULAR; INTRAVENOUS at 03:20

## 2022-08-04 RX ADMIN — ASPIRIN 81 MG: 81 TABLET ORAL at 17:22

## 2022-08-04 RX ADMIN — METOPROLOL SUCCINATE 100 MG: 100 TABLET, EXTENDED RELEASE ORAL at 09:13

## 2022-08-04 RX ADMIN — ALLOPURINOL 150 MG: 300 TABLET ORAL at 17:22

## 2022-08-04 RX ADMIN — SODIUM CHLORIDE, PRESERVATIVE FREE 10 ML: 5 INJECTION INTRAVENOUS at 20:37

## 2022-08-04 RX ADMIN — PERFLUTREN 0.3 ML: 6.52 INJECTION, SUSPENSION INTRAVENOUS at 14:22

## 2022-08-04 RX ADMIN — SODIUM BICARBONATE 650 MG TABLET 650 MG: at 09:12

## 2022-08-04 RX ADMIN — SODIUM CHLORIDE 250 MG: 9 INJECTION, SOLUTION INTRAVENOUS at 09:18

## 2022-08-04 RX ADMIN — FUROSEMIDE 20 MG/HR: 10 INJECTION, SOLUTION INTRAMUSCULAR; INTRAVENOUS at 20:42

## 2022-08-04 RX ADMIN — HYDRALAZINE HYDROCHLORIDE 100 MG: 50 TABLET, FILM COATED ORAL at 20:37

## 2022-08-04 RX ADMIN — LEVOTHYROXINE SODIUM 88 MCG: 0.09 TABLET ORAL at 07:07

## 2022-08-04 RX ADMIN — INSULIN GLARGINE 50 UNITS: 100 INJECTION, SOLUTION SUBCUTANEOUS at 09:06

## 2022-08-04 RX ADMIN — SODIUM CHLORIDE, PRESERVATIVE FREE 10 ML: 5 INJECTION INTRAVENOUS at 09:18

## 2022-08-04 RX ADMIN — HYDRALAZINE HYDROCHLORIDE 100 MG: 50 TABLET, FILM COATED ORAL at 09:17

## 2022-08-04 RX ADMIN — FUROSEMIDE 20 MG/HR: 10 INJECTION, SOLUTION INTRAMUSCULAR; INTRAVENOUS at 09:11

## 2022-08-04 NOTE — PROGRESS NOTES
[facility] CARDIOLOGY PROGRESS NOTE           8/4/2022 8:11 AM    Admit Date: 8/2/2022      Subjective:   Patient is morbidly obese male with likely mixed picture diastolic heart failure, JORJE, obesity hypoventilation syndrome, right-sided heart failure primarily with anasarca. He is responding well to IV Lasix drip. ROS:  Cardiovascular:  As noted above    Objective:      Vitals:    08/03/22 2029 08/03/22 2043 08/04/22 0048 08/04/22 0500   BP: 127/72 127/72 (!) 129/57 (!) 153/63   Pulse:  55 66 61   Resp:  18 18 18   Temp:  97.5 °F (36.4 °C) 98.2 °F (36.8 °C) 98.5 °F (36.9 °C)   TempSrc:  Oral Oral Oral   SpO2:  95% 95% 96%   Weight:    (!) 336 lb 14.4 oz (152.8 kg)       Physical Exam:  General-No Acute Distress  Neck- supple, no JVD  CV- regular rate and rhythm no MRG  Lung- clear bilaterally  Abd- soft, nontender, nondistended  Ext-3+ edema  Skin- warm and dry    Data Review:   Recent Labs     08/03/22  0620 08/04/22  0400    142   K 4.5 4.2   BUN 72* 84*   WBC 5.6 4.8   HGB 8.7* 8.6*   HCT 27.2* 27.2*    203       Assessment/Plan:     Principal Problem:    Volume overload  Plan: Patient with total body volume overload likely a mixed picture of diastolic heart failure and underlying right ventricular heart failure from untreated obstructive sleep apnea hypoventilation syndrome and severe chronic morbid obesity. Continue IV Lasix drip. Patient continues to have severe bilateral lower extremity edema most of it dependent in his feet. He has not been compliant with keeping feet elevated. He is educated today again about elevating feet to help improve severe lower extremity edema. Recheck echocardiogram to assess RV function and RVSP. Most recent echocardiogram did not address either 1 of these adequately. Active Problems:    Acute on chronic heart failure with preserved ejection fraction ([address]  Plan: See above.         Ansley Manrique MD  8/4/2022 8:11 AM

## 2022-08-04 NOTE — NURSE NAVIGATOR
CHF teaching started post introduction to pt/family; aware of diagnosis. Planner/scale @ BS and will follow. Smoking/ ETOH/Illicit drug use cessation and maintain a healthy weight covered. Pt/family aware that I can not prescribe nor adjust  medications: 15mins  Palliative Care score:  Refused ACP on admission  Start 2L/D Fluid restriction/ cardiac diet  CHF teaching continues to pt/family. Emphasis on taking prescription meds as ordered, to keep F/U appts and to call MD STAT if any of the following occur:  any short of breath. If you can not lay flat without developing short of breath or rapid breathing at night; or if it wakes you up. Develop a cough or wheezing.     Pt/family verbalizes understanding, will follow to reinforce teaching skills: 20 mins      DM teaching held unitl AM discussion  No outside food

## 2022-08-04 NOTE — CARE COORDINATION
Patient admitted with volume overload. Patient on room air, up in the chair. CM completed assessment and found no discharge needs. CM will continue to follow throughout hospitalization should any new discharge needs be identified.

## 2022-08-04 NOTE — PROGRESS NOTES
2345 - Pt refuses hourly blood pressures. Pt was educated on the importance of monitoring his blood pressure while on a lasix gtt. Pt will allow for q4 BP checks. 0200 - pt requested to have sugar checked due to a feeling that it is low. BS was 87.

## 2022-08-04 NOTE — PROGRESS NOTES
Subjective:   Daily Progress Note: 8/4/2022 10:37 AM    F/U CKD 4, anasarca    Feels Better  Comfortable  No CP No SOB  No Abd pain  MS -      Current Facility-Administered Medications   Medication Dose Route Frequency    insulin glargine (LANTUS) injection vial 50 Units  50 Units SubCUTAneous QAM    insulin glargine (LANTUS) injection vial 45 Units  45 Units SubCUTAneous Nightly    glucose chewable tablet 16 g  4 tablet Oral PRN    dextrose bolus 10% 125 mL  125 mL IntraVENous PRN    Or    dextrose bolus 10% 250 mL  250 mL IntraVENous PRN    glucagon (rDNA) injection 1 mg  1 mg SubCUTAneous PRN    dextrose 10 % infusion   IntraVENous Continuous PRN    ferric gluconate (FERRLECIT) 250 mg in sodium chloride 0.9 % 250 mL IVPB  250 mg IntraVENous Daily    albuterol sulfate HFA (PROVENTIL;VENTOLIN;PROAIR) 108 (90 Base) MCG/ACT inhaler 2 puff  2 puff Inhalation Q6H PRN    levothyroxine (SYNTHROID) tablet 88 mcg  88 mcg Oral Daily    sodium bicarbonate tablet 650 mg  650 mg Oral BID    sodium chloride flush 0.9 % injection 5-40 mL  5-40 mL IntraVENous 2 times per day    sodium chloride flush 0.9 % injection 5-40 mL  5-40 mL IntraVENous PRN    0.9 % sodium chloride infusion   IntraVENous PRN    ondansetron (ZOFRAN-ODT) disintegrating tablet 4 mg  4 mg Oral Q8H PRN    Or    ondansetron (ZOFRAN) injection 4 mg  4 mg IntraVENous Q6H PRN    acetaminophen (TYLENOL) tablet 650 mg  650 mg Oral Q6H PRN    Or    acetaminophen (TYLENOL) suppository 650 mg  650 mg Rectal Q6H PRN    polyethylene glycol (GLYCOLAX) packet 17 g  17 g Oral Daily PRN    metoprolol succinate (TOPROL XL) extended release tablet 100 mg  100 mg Oral Daily    furosemide (LASIX) 100 mg in sodium chloride 0.9 % 100 mL infusion (mini-bag)  20 mg/hr IntraVENous Continuous    cloNIDine (CATAPRES) tablet 0.1 mg  0.1 mg Oral BID PRN    hydrALAZINE (APRESOLINE) tablet 100 mg  100 mg Oral 2 times per day    allopurinol (ZYLOPRIM) tablet 150 mg  150 mg Oral Dinner aspirin EC tablet 81 mg  81 mg Oral Dinner    fluticasone-vilanterol (BREO ELLIPTA) 100-25 MCG/INH inhaler - pt supplied (Patient Supplied)  1 puff Inhalation Daily         Objective:     /63   Pulse 60   Temp 98 °F (36.7 °C) (Oral)   Resp 16   Wt (!) 336 lb 14.4 oz (152.8 kg)   SpO2 95%   BMI 44.45 kg/m²         Temp (24hrs), Av °F (36.7 °C), Min:97.2 °F (36.2 °C), Max:98.5 °F (36.9 °C)      No intake/output data recorded.  1901 -  0700  In: 840 [P.O.:840]  Out: 9275 [Urine:9275]      /63   Pulse 60   Temp 98 °F (36.7 °C) (Oral)   Resp 16   Wt (!) 336 lb 14.4 oz (152.8 kg)   SpO2 95%   BMI 44.45 kg/m²   Head: Normocephalic, without obvious abnormality  Neck: no JVD  Lungs: clear to auscultation bilaterally  Heart: regular rate and rhythm  Abdomen: soft, non-tender.   Extremities: 3+ edema          Data Review    Recent Results (from the past 48 hour(s))   proBNP, N-TERMINAL    Collection Time: 22 10:50 AM   Result Value Ref Range    NT Pro-BNP 9,416 (H) <450 PG/ML   CBC    Collection Time: 22 10:50 AM   Result Value Ref Range    WBC 6.0 4.3 - 11.1 K/uL    RBC 3.08 (L) 4.23 - 5.6 M/uL    Hemoglobin 9.4 (L) 13.6 - 17.2 g/dL    Hematocrit 30.2 (L) 41.1 - 50.3 %    MCV 98.1 (H) 79.6 - 97.8 FL    MCH 30.5 26.1 - 32.9 PG    MCHC 31.1 (L) 31.4 - 35.0 g/dL    RDW 14.6 11.9 - 14.6 %    Platelets 448 435 - 358 K/uL    MPV 11.5 9.4 - 12.3 FL    nRBC 0.00 0.0 - 0.2 K/uL   Comprehensive Metabolic Panel    Collection Time: 22 10:50 AM   Result Value Ref Range    Sodium 141 136 - 145 mmol/L    Potassium 4.9 3.5 - 5.1 mmol/L    Chloride 113 (H) 98 - 107 mmol/L    CO2 21 21 - 32 mmol/L    Anion Gap 7 7 - 16 mmol/L    Glucose 146 (H) 65 - 100 mg/dL    BUN 72 (H) 8 - 23 MG/DL    Creatinine 3.40 (H) 0.8 - 1.5 MG/DL    GFR African American 22 (L) >60 ml/min/1.73m2    GFR Non- 18 (L) >60 ml/min/1.73m2    Calcium 8.9 8.3 - 10.4 MG/DL    Total Bilirubin 0.4 0.2 - 1.1 08/03/22  6:20 AM   Result Value Ref Range    WBC 5.6 4.3 - 11.1 K/uL    RBC 2.82 (L) 4.23 - 5.6 M/uL    Hemoglobin 8.7 (L) 13.6 - 17.2 g/dL    Hematocrit 27.2 (L) 41.1 - 50.3 %    MCV 96.5 79.6 - 97.8 FL    MCH 30.9 26.1 - 32.9 PG    MCHC 32.0 31.4 - 35.0 g/dL    RDW 14.6 11.9 - 14.6 %    Platelets 960 068 - 758 K/uL    MPV 10.7 9.4 - 12.3 FL    nRBC 0.00 0.0 - 0.2 K/uL   Comprehensive Metabolic Panel w/ Reflex to MG    Collection Time: 08/03/22  6:20 AM   Result Value Ref Range    Sodium 140 138 - 145 mmol/L    Potassium 4.5 3.5 - 5.1 mmol/L    Chloride 111 (H) 98 - 107 mmol/L    CO2 23 21 - 32 mmol/L    Anion Gap 6 (L) 7 - 16 mmol/L    Glucose 104 (H) 65 - 100 mg/dL    BUN 72 (H) 8 - 23 MG/DL    Creatinine 3.60 (H) 0.8 - 1.5 MG/DL    GFR African American 21 (L) >60 ml/min/1.73m2    GFR Non- 17 (L) >60 ml/min/1.73m2    Calcium 8.9 8.3 - 10.4 MG/DL    Total Bilirubin 0.4 0.2 - 1.1 MG/DL    ALT 23 12 - 65 U/L    AST 17 15 - 37 U/L    Alk Phosphatase 63 50 - 136 U/L    Total Protein 6.2 (L) 6.3 - 8.2 g/dL    Albumin 2.9 (L) 3.2 - 4.6 g/dL    Globulin 3.3 2.3 - 3.5 g/dL    Albumin/Globulin Ratio 0.9 (L) 1.2 - 3.5     Ferritin    Collection Time: 08/03/22  6:20 AM   Result Value Ref Range    Ferritin 29 8 - 388 NG/ML   Transferrin Saturation    Collection Time: 08/03/22  6:20 AM   Result Value Ref Range    Iron 50 35 - 150 ug/dL    TIBC 298 250 - 450 ug/dL    TRANSFERRIN SATURATION 17 (L) >20 %   POCT Glucose    Collection Time: 08/03/22  8:03 AM   Result Value Ref Range    POC Glucose 90 65 - 100 mg/dL    Performed by: Valentin    POCT Glucose    Collection Time: 08/03/22 11:17 AM   Result Value Ref Range    POC Glucose 196 (H) 65 - 100 mg/dL    Performed by: [place] Deep    POCT Glucose    Collection Time: 08/03/22  4:03 PM   Result Value Ref Range    POC Glucose 92 65 - 100 mg/dL    Performed by: [employer] Deep    POCT Glucose    Collection Time: 08/03/22  8:47 PM   Result Value Ref Range POC Glucose 160 (H) 65 - 100 mg/dL    Performed by: Rod (Mitchell)    POCT Glucose    Collection Time: 08/04/22  2:19 AM   Result Value Ref Range    POC Glucose 87 65 - 100 mg/dL    Performed by: Aysha    CBC    Collection Time: 08/04/22  4:00 AM   Result Value Ref Range    WBC 4.8 4.3 - 11.1 K/uL    RBC 2.82 (L) 4.23 - 5.6 M/uL    Hemoglobin 8.6 (L) 13.6 - 17.2 g/dL    Hematocrit 27.2 (L) 41.1 - 50.3 %    MCV 96.5 79.6 - 97.8 FL    MCH 30.5 26.1 - 32.9 PG    MCHC 31.6 31.4 - 35.0 g/dL    RDW 14.7 (H) 11.9 - 14.6 %    Platelets 268 994 - 437 K/uL    MPV 11.0 9.4 - 12.3 FL    nRBC 0.00 0.0 - 0.2 K/uL   Comprehensive Metabolic Panel w/ Reflex to MG    Collection Time: 08/04/22  4:00 AM   Result Value Ref Range    Sodium 142 136 - 145 mmol/L    Potassium 4.2 3.5 - 5.1 mmol/L    Chloride 108 (H) 98 - 107 mmol/L    CO2 25 21 - 32 mmol/L    Anion Gap 9 7 - 16 mmol/L    Glucose 108 (H) 65 - 100 mg/dL    BUN 84 (H) 8 - 23 MG/DL    Creatinine 3.80 (H) 0.8 - 1.5 MG/DL    GFR African American 20 (L) >60 ml/min/1.73m2    GFR Non- 16 (L) >60 ml/min/1.73m2    Calcium 8.9 8.3 - 10.4 MG/DL    Total Bilirubin 0.3 0.2 - 1.1 MG/DL    ALT 20 12 - 65 U/L    AST 18 15 - 37 U/L    Alk Phosphatase 60 50 - 136 U/L    Total Protein 5.8 (L) 6.3 - 8.2 g/dL    Albumin 2.8 (L) 3.2 - 4.6 g/dL    Globulin 3.0 2.3 - 3.5 g/dL    Albumin/Globulin Ratio 0.9 (L) 1.2 - 3.5     POCT Glucose    Collection Time: 08/04/22  5:43 AM   Result Value Ref Range    POC Glucose 62 (L) 65 - 100 mg/dL    Performed by: Jaswinder Berkowitz    POCT Glucose    Collection Time: 08/04/22  6:25 AM   Result Value Ref Range    POC Glucose 129 (H) 65 - 100 mg/dL    Performed by: Aysha    POCT Glucose    Collection Time: 08/04/22  7:13 AM   Result Value Ref Range    POC Glucose 112 (H) 65 - 100 mg/dL    Performed by: Yobani        Assessment     Patient Active Problem List    Diagnosis Date Noted    Acute on chronic heart failure with preserved ejection fraction ([facility] 08/03/2022    Volume overload 08/02/2022    Restrictive lung disease 06/03/2022    Chest pain 05/28/2019    Obesity, morbid ([facility] 04/18/2018    Type 2 diabetes with nephropathy ([facility] 68/44/7244    Diastolic CHF, chronic ([facility] 09/08/2017    Anemia in chronic kidney disease 04/08/2016    Acquired hypothyroidism 04/08/2016    Cough variant asthma 03/11/2016    Dyspnea 02/10/2016    Coronary atherosclerosis of native coronary vessel 02/10/2016    SEARS (dyspnea on exertion) 12/22/2015    Abnormal nuclear stress test 12/22/2015    Elevated liver function tests 08/20/2015    Hyponatremia 01/21/2015    Anemia 01/21/2015    PPD positive 10/28/2014    Seborrheic dermatitis 01/16/2013    Edema 10/11/2012    CKD (chronic kidney disease) 10/11/2012    JORJE (obstructive sleep apnea) 10/11/2012    Hypertension 10/11/2012    Diabetes mellitus type 2 in obese ([facility] 10/11/2012           Problems Addressed by Nephrology         CKD IV  - at baseline renal function. Stable with diuresis - Creatinine 3.8>3.6      Volume overload / Anasarca - Continue Lasix gtt. Excellent response so far.       Anemia of CKD IV - on IV iron

## 2022-08-04 NOTE — PROGRESS NOTES
Patient having cramps in feet, legs, and hands. Lorenzo Abbott NP notified, orders received for stat MAG.

## 2022-08-05 LAB
ALBUMIN SERPL-MCNC: 3.1 G/DL (ref 3.2–4.6)
ALBUMIN/GLOB SERPL: 0.9 {RATIO} (ref 1.2–3.5)
ALP SERPL-CCNC: 70 U/L (ref 50–136)
ALT SERPL-CCNC: 22 U/L (ref 12–65)
ANION GAP SERPL CALC-SCNC: 8 MMOL/L (ref 7–16)
AST SERPL-CCNC: 21 U/L (ref 15–37)
BILIRUB SERPL-MCNC: 0.3 MG/DL (ref 0.2–1.1)
BUN SERPL-MCNC: 89 MG/DL (ref 8–23)
CALCIUM SERPL-MCNC: 9.1 MG/DL (ref 8.3–10.4)
CHLORIDE SERPL-SCNC: 105 MMOL/L (ref 98–107)
CO2 SERPL-SCNC: 26 MMOL/L (ref 21–32)
CREAT SERPL-MCNC: 4.2 MG/DL (ref 0.8–1.5)
ERYTHROCYTE [DISTWIDTH] IN BLOOD BY AUTOMATED COUNT: 14.5 % (ref 11.9–14.6)
GLOBULIN SER CALC-MCNC: 3.6 G/DL (ref 2.3–3.5)
GLUCOSE BLD STRIP.AUTO-MCNC: 117 MG/DL (ref 65–100)
GLUCOSE BLD STRIP.AUTO-MCNC: 118 MG/DL (ref 65–100)
GLUCOSE BLD STRIP.AUTO-MCNC: 120 MG/DL (ref 65–100)
GLUCOSE BLD STRIP.AUTO-MCNC: 135 MG/DL (ref 65–100)
GLUCOSE BLD STRIP.AUTO-MCNC: 220 MG/DL (ref 65–100)
GLUCOSE BLD STRIP.AUTO-MCNC: 55 MG/DL (ref 65–100)
GLUCOSE SERPL-MCNC: 117 MG/DL (ref 65–100)
HCT VFR BLD AUTO: 31.1 % (ref 41.1–50.3)
HGB BLD-MCNC: 9.8 G/DL (ref 13.6–17.2)
MCH RBC QN AUTO: 30.6 PG (ref 26.1–32.9)
MCHC RBC AUTO-ENTMCNC: 31.5 G/DL (ref 31.4–35)
MCV RBC AUTO: 97.2 FL (ref 79.6–97.8)
NRBC # BLD: 0 K/UL (ref 0–0.2)
PLATELET # BLD AUTO: 226 K/UL (ref 150–450)
PMV BLD AUTO: 10.9 FL (ref 9.4–12.3)
POTASSIUM SERPL-SCNC: 4.2 MMOL/L (ref 3.5–5.1)
PROT SERPL-MCNC: 6.7 G/DL (ref 6.3–8.2)
RBC # BLD AUTO: 3.2 M/UL (ref 4.23–5.6)
SERVICE CMNT-IMP: ABNORMAL
SODIUM SERPL-SCNC: 139 MMOL/L (ref 136–145)
WBC # BLD AUTO: 5.4 K/UL (ref 4.3–11.1)

## 2022-08-05 PROCEDURE — 82962 GLUCOSE BLOOD TEST: CPT

## 2022-08-05 PROCEDURE — 2580000003 HC RX 258: Performed by: NURSE PRACTITIONER

## 2022-08-05 PROCEDURE — 2580000003 HC RX 258: Performed by: INTERNAL MEDICINE

## 2022-08-05 PROCEDURE — 80053 COMPREHEN METABOLIC PANEL: CPT

## 2022-08-05 PROCEDURE — 1100000003 HC PRIVATE W/ TELEMETRY

## 2022-08-05 PROCEDURE — 36415 COLL VENOUS BLD VENIPUNCTURE: CPT

## 2022-08-05 PROCEDURE — 6370000000 HC RX 637 (ALT 250 FOR IP): Performed by: NURSE PRACTITIONER

## 2022-08-05 PROCEDURE — 85027 COMPLETE CBC AUTOMATED: CPT

## 2022-08-05 PROCEDURE — 6370000000 HC RX 637 (ALT 250 FOR IP): Performed by: INTERNAL MEDICINE

## 2022-08-05 PROCEDURE — 6360000002 HC RX W HCPCS: Performed by: INTERNAL MEDICINE

## 2022-08-05 PROCEDURE — 99232 SBSQ HOSP IP/OBS MODERATE 35: CPT | Performed by: INTERNAL MEDICINE

## 2022-08-05 RX ORDER — INSULIN LISPRO 100 [IU]/ML
0-4 INJECTION, SOLUTION INTRAVENOUS; SUBCUTANEOUS
Status: DISCONTINUED | OUTPATIENT
Start: 2022-08-05 | End: 2022-08-08 | Stop reason: HOSPADM

## 2022-08-05 RX ORDER — INSULIN GLARGINE 100 [IU]/ML
25 INJECTION, SOLUTION SUBCUTANEOUS 2 TIMES DAILY
Status: DISCONTINUED | OUTPATIENT
Start: 2022-08-05 | End: 2022-08-06

## 2022-08-05 RX ORDER — INSULIN LISPRO 100 [IU]/ML
0-4 INJECTION, SOLUTION INTRAVENOUS; SUBCUTANEOUS NIGHTLY
Status: DISCONTINUED | OUTPATIENT
Start: 2022-08-05 | End: 2022-08-06

## 2022-08-05 RX ADMIN — LEVOTHYROXINE SODIUM 88 MCG: 0.09 TABLET ORAL at 06:18

## 2022-08-05 RX ADMIN — INSULIN GLARGINE 25 UNITS: 100 INJECTION, SOLUTION SUBCUTANEOUS at 09:55

## 2022-08-05 RX ADMIN — HYDRALAZINE HYDROCHLORIDE 100 MG: 50 TABLET, FILM COATED ORAL at 09:54

## 2022-08-05 RX ADMIN — HYDRALAZINE HYDROCHLORIDE 100 MG: 50 TABLET, FILM COATED ORAL at 20:59

## 2022-08-05 RX ADMIN — SODIUM CHLORIDE, PRESERVATIVE FREE 10 ML: 5 INJECTION INTRAVENOUS at 20:58

## 2022-08-05 RX ADMIN — METOPROLOL SUCCINATE 100 MG: 100 TABLET, EXTENDED RELEASE ORAL at 09:55

## 2022-08-05 RX ADMIN — SODIUM BICARBONATE 650 MG TABLET 650 MG: at 09:55

## 2022-08-05 RX ADMIN — INSULIN GLARGINE 25 UNITS: 100 INJECTION, SOLUTION SUBCUTANEOUS at 20:56

## 2022-08-05 RX ADMIN — ALLOPURINOL 150 MG: 300 TABLET ORAL at 17:44

## 2022-08-05 RX ADMIN — Medication 16 G: at 04:56

## 2022-08-05 RX ADMIN — FUROSEMIDE 20 MG/HR: 10 INJECTION, SOLUTION INTRAMUSCULAR; INTRAVENOUS at 23:52

## 2022-08-05 RX ADMIN — SODIUM CHLORIDE, PRESERVATIVE FREE 10 ML: 5 INJECTION INTRAVENOUS at 09:57

## 2022-08-05 RX ADMIN — SODIUM BICARBONATE 650 MG TABLET 650 MG: at 21:04

## 2022-08-05 RX ADMIN — FUROSEMIDE 20 MG/HR: 10 INJECTION, SOLUTION INTRAMUSCULAR; INTRAVENOUS at 07:30

## 2022-08-05 RX ADMIN — ASPIRIN 81 MG: 81 TABLET ORAL at 17:44

## 2022-08-05 RX ADMIN — SODIUM CHLORIDE 250 MG: 9 INJECTION, SOLUTION INTRAVENOUS at 10:19

## 2022-08-05 NOTE — PROGRESS NOTES
Physical Therapy Note:    Attempted to see patient this AM for physical therapy evaluation session. Patient reporting he had no needs at this time and was observed ambulating in young ad payam. Will discharge from PT caseload at this time. Thank you,    Shazia Castillo.  Taryn Billy     Rehab Caseload Tracker

## 2022-08-05 NOTE — DIABETES MGMT
Patient admitted with volume overload. Admit blood glucose 143. HgbA1C 7.3 (). Blood glucose ranged  yesterday with patient receiving Lantus 95 units. Blood glucose this morning was 55. Creatinine 4.20. GFR 14. Reviewed patient current regimen: Lantus 50 units daily and Lantus 45 units HS. Patient would likely benefit from a decrease in basal insulin to reduce the risk of morning hypoglycemia. Provider updated via [employer] regarding recommendation and glycemic control.

## 2022-08-05 NOTE — PROGRESS NOTES
Result Value Ref Range    Sodium 142 136 - 145 mmol/L    Potassium 4.2 3.5 - 5.1 mmol/L    Chloride 108 (H) 98 - 107 mmol/L    CO2 25 21 - 32 mmol/L    Anion Gap 9 7 - 16 mmol/L    Glucose 108 (H) 65 - 100 mg/dL    BUN 84 (H) 8 - 23 MG/DL    Creatinine 3.80 (H) 0.8 - 1.5 MG/DL    GFR African American 20 (L) >60 ml/min/1.73m2    GFR Non- 16 (L) >60 ml/min/1.73m2    Calcium 8.9 8.3 - 10.4 MG/DL    Total Bilirubin 0.3 0.2 - 1.1 MG/DL    ALT 20 12 - 65 U/L    AST 18 15 - 37 U/L    Alk Phosphatase 60 50 - 136 U/L    Total Protein 5.8 (L) 6.3 - 8.2 g/dL    Albumin 2.8 (L) 3.2 - 4.6 g/dL    Globulin 3.0 2.3 - 3.5 g/dL    Albumin/Globulin Ratio 0.9 (L) 1.2 - 3.5     POCT Glucose    Collection Time: 08/04/22  5:43 AM   Result Value Ref Range    POC Glucose 62 (L) 65 - 100 mg/dL    Performed by: Delmar Ugalde    POCT Glucose    Collection Time: 08/04/22  6:25 AM   Result Value Ref Range    POC Glucose 129 (H) 65 - 100 mg/dL    Performed by: Aysha    POCT Glucose    Collection Time: 08/04/22  7:13 AM   Result Value Ref Range    POC Glucose 112 (H) 65 - 100 mg/dL    Performed by: Yobani    POCT Glucose    Collection Time: 08/04/22 10:50 AM   Result Value Ref Range    POC Glucose 116 (H) 65 - 100 mg/dL    Performed by: Delisa    Transthoracic echocardiogram (TTE) complete with contrast, bubble, strain, and 3D PRN    Collection Time: 08/04/22  2:29 PM   Result Value Ref Range    LV EDV A2C 206 mL    LV EDV A4C 232 mL    LV ESV A2C 74 mL    LV ESV A4C 91 mL    IVSd 1.2 (A) 0.6 - 1.0 cm    LVIDd 5.4 4.2 - 5.9 cm    LVIDs 3.6 cm    LVOT Diameter 2.1 cm    LVOT Mean Gradient 4 mmHg    LVOT VTI 31.9 cm    LVOT Peak Velocity 1.2 m/s    LVOT Peak Gradient 6 mmHg    LVPWd 1.2 (A) 0.6 - 1.0 cm    LV E' Lateral Velocity 8 cm/s    LV E' Septal Velocity 6 cm/s    LV Ejection Fraction A2C 64 %    LV Ejection Fraction A4C 61 %    EF BP 62 55 - 100 %    LVOT Area 3.5 cm2    LVOT .4 ml LA Minor Axis 5.5 cm    LA Major Axis 6.8 cm    LA Area 2C 21.0 cm2    LA Area 4C 27.2 cm2    LA Volume BP 85 (A) 18 - 58 mL    LA Diameter 5.5 cm    AV Mean Velocity 2.4 m/s    AV Mean Gradient 26 mmHg    AV VTI 90.4 cm    AV Peak Velocity 3.3 m/s    AV Peak Gradient 43 mmHg    AV Area by VTI 1.2 cm2    AV Area by Peak Velocity 1.3 cm2    Aortic Root 3.2 cm    Ascending Aorta 3.1 cm    IVC Proxmal 1.9 cm    MV E Wave Deceleration Time 248.0 ms    MV A Velocity 1.05 m/s    MV E Velocity 1.03 m/s    PV .0 ms    PV Max Velocity 1.1 m/s    PV Peak Gradient 5 mmHg    RVIDd 3.6 cm    RV Basal Dimension 4.4 cm    TAPSE 2.1 1.7 cm    TR Max Velocity 3.45 m/s    TR Peak Gradient 48 mmHg    Body Surface Area 2.86 m2    Fractional Shortening 2D 33 28 - 44 %    LV ESV Index A4C 34 mL/m2    LV EDV Index A4C 86 mL/m2    LV ESV Index A2C 28 mL/m2    LV EDV Index A2C 77 mL/m2    LVIDd Index 2.01 cm/m2    LVIDs Index 1.34 cm/m2    LV RWT Ratio 0.44     LV Mass 2D 264.4 (A) 88 - 224 g    LV Mass 2D Index 98.3 49 - 115 g/m2    MV E/A 0.98     E/E' Ratio (Averaged) 15.02     E/E' Lateral 12.88     E/E' Septal 17.17     LA Volume Index BP 32 16 - 34 ml/m2    LVOT Stroke Volume Index 41.1 mL/m2    LA Size Index 2.04 cm/m2    LA/AO Root Ratio 1.72     Ao Root Index 1.19 cm/m2    Ascending Aorta Index 1.15 cm/m2    AV Velocity Ratio 0.36     LVOT:AV VTI Index 0.35     BRIDGETT/BSA VTI 0.4 cm2/m2    BRIDGETT/BSA Peak Velocity 0.5 cm2/m2    Est. RA Pressure 3 mmHg    RVSP 51 mmHg   POCT Glucose    Collection Time: 08/04/22  4:17 PM   Result Value Ref Range    POC Glucose 106 (H) 65 - 100 mg/dL    Performed by: Delisa    Magnesium    Collection Time: 08/04/22  6:31 PM   Result Value Ref Range    Magnesium 2.6 (H) 1.8 - 2.4 mg/dL   POCT Glucose    Collection Time: 08/04/22  9:00 PM   Result Value Ref Range    POC Glucose 226 (H) 65 - 100 mg/dL    Performed by: Mariya)[facility]    POCT Glucose    Collection Time: 08/05/22  4:48 AM Result Value Ref Range    POC Glucose 55 (L) 65 - 100 mg/dL    Performed by: GabbyMichelle    POCT Glucose    Collection Time: 08/05/22  5:17 AM   Result Value Ref Range    POC Glucose 118 (H) 65 - 100 mg/dL    Performed by: GabbyMichelle    CBC    Collection Time: 08/05/22  5:49 AM   Result Value Ref Range    WBC 5.4 4.3 - 11.1 K/uL    RBC 3.20 (L) 4.23 - 5.6 M/uL    Hemoglobin 9.8 (L) 13.6 - 17.2 g/dL    Hematocrit 31.1 (L) 41.1 - 50.3 %    MCV 97.2 79.6 - 97.8 FL    MCH 30.6 26.1 - 32.9 PG    MCHC 31.5 31.4 - 35.0 g/dL    RDW 14.5 11.9 - 14.6 %    Platelets 153 914 - 024 K/uL    MPV 10.9 9.4 - 12.3 FL    nRBC 0.00 0.0 - 0.2 K/uL   Comprehensive Metabolic Panel w/ Reflex to MG    Collection Time: 08/05/22  5:49 AM   Result Value Ref Range    Sodium 139 136 - 145 mmol/L    Potassium 4.2 3.5 - 5.1 mmol/L    Chloride 105 98 - 107 mmol/L    CO2 26 21 - 32 mmol/L    Anion Gap 8 7 - 16 mmol/L    Glucose 117 (H) 65 - 100 mg/dL    BUN 89 (H) 8 - 23 MG/DL    Creatinine 4.20 (H) 0.8 - 1.5 MG/DL    GFR African American 18 (L) >60 ml/min/1.73m2    GFR Non- 14 (L) >60 ml/min/1.73m2    Calcium 9.1 8.3 - 10.4 MG/DL    Total Bilirubin 0.3 0.2 - 1.1 MG/DL    ALT 22 12 - 65 U/L    AST 21 15 - 37 U/L    Alk Phosphatase 70 50 - 136 U/L    Total Protein 6.7 6.3 - 8.2 g/dL    Albumin 3.1 (L) 3.2 - 4.6 g/dL    Globulin 3.6 (H) 2.3 - 3.5 g/dL    Albumin/Globulin Ratio 0.9 (L) 1.2 - 3.5     POCT Glucose    Collection Time: 08/05/22  8:27 AM   Result Value Ref Range    POC Glucose 135 (H) 65 - 100 mg/dL    Performed by: Cesar Matthew    POCT Glucose    Collection Time: 08/05/22 11:39 AM   Result Value Ref Range    POC Glucose 120 (H) 65 - 100 mg/dL    Performed by: Cesar Matthew        Assessment     Patient Active Problem List    Diagnosis Date Noted    Acute on chronic heart failure with preserved ejection fraction ([facility] 08/03/2022    Volume overload 08/02/2022    Restrictive lung disease 06/03/2022    Chest pain 05/28/2019    Obesity, morbid ([facility] 04/18/2018    Type 2 diabetes with nephropathy ([facility] 83/83/9879    Diastolic CHF, chronic ([facility] 09/08/2017    Anemia in chronic kidney disease 04/08/2016    Acquired hypothyroidism 04/08/2016    Cough variant asthma 03/11/2016    Dyspnea 02/10/2016    Coronary atherosclerosis of native coronary vessel 02/10/2016    SEARS (dyspnea on exertion) 12/22/2015    Abnormal nuclear stress test 12/22/2015    Elevated liver function tests 08/20/2015    Hyponatremia 01/21/2015    Anemia 01/21/2015    PPD positive 10/28/2014    Seborrheic dermatitis 01/16/2013    Edema 10/11/2012    CKD (chronic kidney disease) 10/11/2012    JORJE (obstructive sleep apnea) 10/11/2012    Hypertension 10/11/2012    Diabetes mellitus type 2 in obese ([facility] 10/11/2012           Problems Addressed by Nephrology         CKD IV  - Creatinine 3.6>>4.2. Some change with diuresis. Volume overload / Anasarca - Continue Lasix gtt. Excellent response so far. Some decline in renal function and complicated by cramping. May take a hiatus soon or change to bolus lasix. Continue gtt for now.      Anemia of CKD IV - on IV iron

## 2022-08-05 NOTE — CARE COORDINATION
LOS 3 D  CM following continued hospitalization for volume overload. Patient up in chair. Room air. Lasix drip. CM discussed [facility] POA with patient informing him without HC POA the Juan C Davies would be his spouse. Patient states he has a Living Will but would like to draft a [facility] POA while in house. He states he would like his daughter, Harish Sanchez ([phone]) to be his primary decision maker. CM contacted the  service and requested consultation.

## 2022-08-05 NOTE — PROGRESS NOTES
am  8/5/2022 7:54 AM    Admit Date: 8/2/2022    Admit Diagnosis: Volume overload [E87.70]      Subjective:    Patient is an 80 yo male with likely mixed picture of diastolic heart failure responding well to IV lasix drip. This morning patient is sitting comfortably in chair. Reports cramping in his hands, legs, and feet. Also states he is becoming symptomatic due to hypoglycemia that is occurring at about 5am every morning. Denies any CP, SOB at rest, palpitations or syncope. Objective:    BP (!) 143/67   Pulse 58   Temp 97.6 °F (36.4 °C) (Axillary)   Resp 18   Wt (!) 331 lb 14.4 oz (150.5 kg)   SpO2 99%   BMI 43.79 kg/m²     ROS:  General ROS: negative for - chills  Hematological and Lymphatic ROS: negative for - blood clots or jaundice  Respiratory ROS: no cough, shortness of breath, or wheezing  Cardiovascular ROS: no chest pain positive for dyspnea on exertion  Gastrointestinal ROS: no abdominal pain, change in bowel habits, or black or bloody stools  Neurological ROS: no TIA or stroke symptoms    Physical Exam:      Physical Examination: General appearance - Appearance: alert, well appearing, and in no distress.    Neck/lymph - supple, no significant adenopathy  Chest/CV - clear to auscultation, no wheezes, rales or rhonchi, symmetric air entry  Heart - normal rate, regular rhythm, normal S1, S2, no murmurs, rubs, clicks or gallops  Abdomen/GI - soft, nontender, nondistended, no masses or organomegaly   Musculoskeletal - no joint tenderness, deformity or swelling  Extremities - pedal edema 3 +  Skin - normal coloration and turgor, no rashes, no suspicious skin lesions noted    Current Facility-Administered Medications   Medication Dose Route Frequency    insulin glargine (LANTUS) injection vial 50 Units  50 Units SubCUTAneous QAM    insulin glargine (LANTUS) injection vial 45 Units  45 Units SubCUTAneous Nightly    glucose chewable tablet 16 g  4 tablet Oral PRN    dextrose bolus 10% 125 mL  125 mL IntraVENous PRN    Or    dextrose bolus 10% 250 mL  250 mL IntraVENous PRN    glucagon (rDNA) injection 1 mg  1 mg SubCUTAneous PRN    dextrose 10 % infusion   IntraVENous Continuous PRN    ferric gluconate (FERRLECIT) 250 mg in sodium chloride 0.9 % 250 mL IVPB  250 mg IntraVENous Daily    albuterol sulfate HFA (PROVENTIL;VENTOLIN;PROAIR) 108 (90 Base) MCG/ACT inhaler 2 puff  2 puff Inhalation Q6H PRN    levothyroxine (SYNTHROID) tablet 88 mcg  88 mcg Oral Daily    sodium bicarbonate tablet 650 mg  650 mg Oral BID    sodium chloride flush 0.9 % injection 5-40 mL  5-40 mL IntraVENous 2 times per day    sodium chloride flush 0.9 % injection 5-40 mL  5-40 mL IntraVENous PRN    0.9 % sodium chloride infusion   IntraVENous PRN    ondansetron (ZOFRAN-ODT) disintegrating tablet 4 mg  4 mg Oral Q8H PRN    Or    ondansetron (ZOFRAN) injection 4 mg  4 mg IntraVENous Q6H PRN    acetaminophen (TYLENOL) tablet 650 mg  650 mg Oral Q6H PRN    Or    acetaminophen (TYLENOL) suppository 650 mg  650 mg Rectal Q6H PRN    polyethylene glycol (GLYCOLAX) packet 17 g  17 g Oral Daily PRN    metoprolol succinate (TOPROL XL) extended release tablet 100 mg  100 mg Oral Daily    furosemide (LASIX) 100 mg in sodium chloride 0.9 % 100 mL infusion (mini-bag)  20 mg/hr IntraVENous Continuous    cloNIDine (CATAPRES) tablet 0.1 mg  0.1 mg Oral BID PRN    hydrALAZINE (APRESOLINE) tablet 100 mg  100 mg Oral 2 times per day    allopurinol (ZYLOPRIM) tablet 150 mg  150 mg Oral Dinner    aspirin EC tablet 81 mg  81 mg Oral Dinner    fluticasone-vilanterol (BREO ELLIPTA) 100-25 MCG/INH inhaler - pt supplied (Patient Supplied)  1 puff Inhalation Daily       Data Review: data included in this note has been independently reviewed by the author     TELEMETRY: NSR    Assessment/Plan:     Patient Active Problem List   Diagnosis    Edema    CKD (chronic kidney disease)    JORJE (obstructive sleep apnea)    Dyspnea    Cough variant asthma    PPD positive Hyponatremia    Chest pain    Anemia in chronic kidney disease    Obesity, morbid (HCC)    SEARS (dyspnea on exertion)    Abnormal nuclear stress test    Type 2 diabetes with nephropathy (HCC)    Hypertension    Seborrheic dermatitis    Elevated liver function tests    Anemia    Diastolic CHF, chronic (HCC)    Diabetes mellitus type 2 in obese (HCC)    Coronary atherosclerosis of native coronary vessel    Acquired hypothyroidism    Restrictive lung disease    Volume overload    Acute on chronic heart failure with preserved ejection fraction ([facility] [address]     PLAN  Continue with IV lasix drip. Continue with daily CMP collection to closely monitor electrolytes. Diuresis may be contributing to the cramping. Encouraged patient to keep a snack or juice by his bed to sip on in the early mornings to combat the low blood sugars.      Madina Turpin MD

## 2022-08-05 NOTE — ACP (ADVANCE CARE PLANNING)
Advance Care Planning     General Advance Care Planning (ACP) Conversation    Date of Conversation: 8/2/2022  Conducted with: Patient with Decision Making Capacity    Healthcare Decision Maker:    Primary Decision Maker: Sangeetha Logan - [phone]  Click here to complete Healthcare Decision Makers including selection of the Healthcare Decision Maker Relationship (ie \"Primary\"). Today we domented primary decision maker. Patient to draft a HCPOA with  today. Content/Action Overview:  NO ACP documents on file. Consult to pastoral care made.   Reviewed DNR/DNI and patient elects Full Code (Attempt Resuscitation)        Length of Voluntary ACP Conversation in minutes:  <16 minutes (Non-Billable)    Loc Pelaez RN

## 2022-08-05 NOTE — DIABETES MGMT
Patient admitted 8/2/22 with volume overload. History of DM, HTN, diastolic CHF, and CKD. Blood glucose on admission 146. HbA1c 7.3%. Pt with episode of hypoglycemia 55 at 0448. FBS:135. Patient states that prior to admission medications include Lantus 50 units bid and Trulicity weekly (takes on Sundays). Pt states that he does not want to talk about diabetes and politely declines educational booklets, but states that he would like more snack options available. Discussed with nurse practitioner and primary nurse. Educated patient regarding current basal/bolus regimen of Lantus 25 units bid, and Humalog correctional scale coverage 4x/day, ac and hs including type of insulin, timing with meals, onset, duration of effect, and peak of insulin dose. Instructed patient to always seek guidance from their primary care provider about adjusting their insulin doses and not to adjust them on their own as this could negatively impact their glycemic control or result in hypoglycemia. Patient verbalizes understanding. Patient states that he has a working blood glucose meter and monitors his blood glucose 3x/day. Pt states that his blood glucose is usually in the 70-80 range fasting and low 200's in the evening. Reviewed ADA target goals for blood glucose and HbA1c. Discussed signs, symptoms and treatments for hypoglycemia and to notify primary care nurse of any s/s. Pt states that he is symptomatic when hypoglycemic. Stressed the importance of follow up care for diabetes management with PCP. Pt states that he has all necessary diabetic supplies and has no further questions regarding diabetes management.

## 2022-08-05 NOTE — PROGRESS NOTES
Spiritual Care Visit, initial visit. Advance Care Planning     Visited patient at bedside.  came to room to assist patient with a health care power of . Unable to do so because patient was sleeping. Visit by Akosua Mattehw, Staff .  KEATON.Andrzej., Aparna.BELEM., B.A.

## 2022-08-05 NOTE — PROGRESS NOTES
Spiritual Care Visit, initial visit. Advance Care Planning     Visited with patient at bedside. Received request for Advance Care Consult. Carried document for patient to review. He asked that document be left for his review;  to return after lunch. Visited with patient at bedside. Visit by Griffin Galloway, Staff .  M.Andrzej., Aparna.B., B.A.

## 2022-08-06 LAB
GLUCOSE BLD STRIP.AUTO-MCNC: 135 MG/DL (ref 65–100)
GLUCOSE BLD STRIP.AUTO-MCNC: 168 MG/DL (ref 65–100)
GLUCOSE BLD STRIP.AUTO-MCNC: 189 MG/DL (ref 65–100)
GLUCOSE BLD STRIP.AUTO-MCNC: 195 MG/DL (ref 65–100)
GLUCOSE BLD STRIP.AUTO-MCNC: 219 MG/DL (ref 65–100)
GLUCOSE BLD STRIP.AUTO-MCNC: 69 MG/DL (ref 65–100)
SERVICE CMNT-IMP: ABNORMAL
SERVICE CMNT-IMP: NORMAL

## 2022-08-06 PROCEDURE — 6370000000 HC RX 637 (ALT 250 FOR IP): Performed by: NURSE PRACTITIONER

## 2022-08-06 PROCEDURE — 6360000002 HC RX W HCPCS: Performed by: INTERNAL MEDICINE

## 2022-08-06 PROCEDURE — 2580000003 HC RX 258: Performed by: INTERNAL MEDICINE

## 2022-08-06 PROCEDURE — 82962 GLUCOSE BLOOD TEST: CPT

## 2022-08-06 PROCEDURE — 6370000000 HC RX 637 (ALT 250 FOR IP): Performed by: INTERNAL MEDICINE

## 2022-08-06 PROCEDURE — 2580000003 HC RX 258: Performed by: NURSE PRACTITIONER

## 2022-08-06 PROCEDURE — 1100000003 HC PRIVATE W/ TELEMETRY

## 2022-08-06 PROCEDURE — 99232 SBSQ HOSP IP/OBS MODERATE 35: CPT | Performed by: INTERNAL MEDICINE

## 2022-08-06 RX ORDER — INSULIN GLARGINE 100 [IU]/ML
12 INJECTION, SOLUTION SUBCUTANEOUS NIGHTLY
Status: DISCONTINUED | OUTPATIENT
Start: 2022-08-06 | End: 2022-08-08

## 2022-08-06 RX ORDER — FUROSEMIDE 10 MG/ML
120 INJECTION INTRAMUSCULAR; INTRAVENOUS 2 TIMES DAILY
Status: DISCONTINUED | OUTPATIENT
Start: 2022-08-06 | End: 2022-08-07

## 2022-08-06 RX ORDER — INSULIN LISPRO 100 [IU]/ML
1 INJECTION, SOLUTION INTRAVENOUS; SUBCUTANEOUS NIGHTLY
Status: DISCONTINUED | OUTPATIENT
Start: 2022-08-06 | End: 2022-08-08 | Stop reason: HOSPADM

## 2022-08-06 RX ORDER — INSULIN GLARGINE 100 [IU]/ML
12 INJECTION, SOLUTION SUBCUTANEOUS 2 TIMES DAILY
Status: DISCONTINUED | OUTPATIENT
Start: 2022-08-07 | End: 2022-08-06

## 2022-08-06 RX ORDER — INSULIN GLARGINE 100 [IU]/ML
25 INJECTION, SOLUTION SUBCUTANEOUS DAILY
Status: DISCONTINUED | OUTPATIENT
Start: 2022-08-07 | End: 2022-08-08

## 2022-08-06 RX ADMIN — FUROSEMIDE 120 MG: 10 INJECTION, SOLUTION INTRAMUSCULAR; INTRAVENOUS at 17:54

## 2022-08-06 RX ADMIN — INSULIN GLARGINE 25 UNITS: 100 INJECTION, SOLUTION SUBCUTANEOUS at 09:27

## 2022-08-06 RX ADMIN — METOPROLOL SUCCINATE 100 MG: 100 TABLET, EXTENDED RELEASE ORAL at 09:25

## 2022-08-06 RX ADMIN — SODIUM CHLORIDE, PRESERVATIVE FREE 10 ML: 5 INJECTION INTRAVENOUS at 09:24

## 2022-08-06 RX ADMIN — INSULIN GLARGINE 12 UNITS: 100 INJECTION, SOLUTION SUBCUTANEOUS at 21:54

## 2022-08-06 RX ADMIN — SODIUM CHLORIDE, PRESERVATIVE FREE 10 ML: 5 INJECTION INTRAVENOUS at 21:56

## 2022-08-06 RX ADMIN — HYDRALAZINE HYDROCHLORIDE 100 MG: 50 TABLET, FILM COATED ORAL at 09:25

## 2022-08-06 RX ADMIN — LEVOTHYROXINE SODIUM 88 MCG: 0.09 TABLET ORAL at 05:34

## 2022-08-06 RX ADMIN — ALLOPURINOL 150 MG: 300 TABLET ORAL at 17:52

## 2022-08-06 RX ADMIN — SODIUM BICARBONATE 650 MG TABLET 650 MG: at 09:25

## 2022-08-06 RX ADMIN — HYDRALAZINE HYDROCHLORIDE 100 MG: 50 TABLET, FILM COATED ORAL at 21:55

## 2022-08-06 RX ADMIN — SODIUM BICARBONATE 650 MG TABLET 650 MG: at 21:55

## 2022-08-06 RX ADMIN — SODIUM CHLORIDE 250 MG: 9 INJECTION, SOLUTION INTRAVENOUS at 09:14

## 2022-08-06 RX ADMIN — FUROSEMIDE 20 MG/HR: 10 INJECTION, SOLUTION INTRAMUSCULAR; INTRAVENOUS at 05:34

## 2022-08-06 RX ADMIN — ASPIRIN 81 MG: 81 TABLET ORAL at 17:52

## 2022-08-06 ASSESSMENT — PAIN SCALES - GENERAL
PAINLEVEL_OUTOF10: 0
PAINLEVEL_OUTOF10: 0

## 2022-08-06 NOTE — PROGRESS NOTES
Bedside and Verbal shift change report to be given to Eber Sears RN (oncoming nurse) by self (offgoing nurse). Report included the following information Nurse Handoff Report, Intake/Output, MAR, and Recent Results.

## 2022-08-06 NOTE — PROGRESS NOTES
Bedside and Verbal shift change report received from [facility]. Report included the following information Nurse Handoff Report, Intake/Output, MAR, and Recent Results.

## 2022-08-06 NOTE — PROGRESS NOTES
Subjective:   Daily Progress Note: 8/6/2022 10:24 AM    F/U CKD 4, anasarca    Feels Better  Comfortable, some cramping  No CP No SOB  No Abd pain  MS -      Current Facility-Administered Medications   Medication Dose Route Frequency    insulin lispro (HUMALOG) injection vial 1 Units  1 Units SubCUTAneous Nightly    insulin glargine (LANTUS) injection vial 25 Units  25 Units SubCUTAneous BID    insulin lispro (HUMALOG) injection vial 0-4 Units  0-4 Units SubCUTAneous TID WC    glucose chewable tablet 16 g  4 tablet Oral PRN    dextrose bolus 10% 125 mL  125 mL IntraVENous PRN    Or    dextrose bolus 10% 250 mL  250 mL IntraVENous PRN    glucagon (rDNA) injection 1 mg  1 mg SubCUTAneous PRN    dextrose 10 % infusion   IntraVENous Continuous PRN    ferric gluconate (FERRLECIT) 250 mg in sodium chloride 0.9 % 250 mL IVPB  250 mg IntraVENous Daily    albuterol sulfate HFA (PROVENTIL;VENTOLIN;PROAIR) 108 (90 Base) MCG/ACT inhaler 2 puff  2 puff Inhalation Q6H PRN    levothyroxine (SYNTHROID) tablet 88 mcg  88 mcg Oral Daily    sodium bicarbonate tablet 650 mg  650 mg Oral BID    sodium chloride flush 0.9 % injection 5-40 mL  5-40 mL IntraVENous 2 times per day    sodium chloride flush 0.9 % injection 5-40 mL  5-40 mL IntraVENous PRN    0.9 % sodium chloride infusion   IntraVENous PRN    ondansetron (ZOFRAN-ODT) disintegrating tablet 4 mg  4 mg Oral Q8H PRN    Or    ondansetron (ZOFRAN) injection 4 mg  4 mg IntraVENous Q6H PRN    acetaminophen (TYLENOL) tablet 650 mg  650 mg Oral Q6H PRN    Or    acetaminophen (TYLENOL) suppository 650 mg  650 mg Rectal Q6H PRN    polyethylene glycol (GLYCOLAX) packet 17 g  17 g Oral Daily PRN    metoprolol succinate (TOPROL XL) extended release tablet 100 mg  100 mg Oral Daily    furosemide (LASIX) 100 mg in sodium chloride 0.9 % 100 mL infusion (mini-bag)  20 mg/hr IntraVENous Continuous    cloNIDine (CATAPRES) tablet 0.1 mg  0.1 mg Oral BID PRN    hydrALAZINE (APRESOLINE) tablet 100 mg 100 mg Oral 2 times per day    allopurinol (ZYLOPRIM) tablet 150 mg  150 mg Oral Dinner    aspirin EC tablet 81 mg  81 mg Oral Dinner    fluticasone-vilanterol (BREO ELLIPTA) 100-25 MCG/INH inhaler - pt supplied (Patient Supplied)  1 puff Inhalation Daily         Objective:     /62   Pulse 60   Temp 98.1 °F (36.7 °C) (Oral)   Resp 17   Wt (!) 329 lb 9.6 oz (149.5 kg)   SpO2 97%   BMI 43.49 kg/m²         Temp (24hrs), Av °F (36.7 °C), Min:97.7 °F (36.5 °C), Max:98.3 °F (36.8 °C)      No intake/output data recorded.  1901 -  0700  In: 600 [P.O.:600]  Out: 5600 [Urine:5600]      /62   Pulse 60   Temp 98.1 °F (36.7 °C) (Oral)   Resp 17   Wt (!) 329 lb 9.6 oz (149.5 kg)   SpO2 97%   BMI 43.49 kg/m²   Head: Normocephalic, without obvious abnormality  Neck: no JVD  Lungs: clear to auscultation bilaterally  Heart: regular rate and rhythm  Abdomen: soft, non-tender.   Extremities: 3+ edema          Data Review    Recent Results (from the past 48 hour(s))   POCT Glucose    Collection Time: 22 10:50 AM   Result Value Ref Range    POC Glucose 116 (H) 65 - 100 mg/dL    Performed by: Delisa    Transthoracic echocardiogram (TTE) complete with contrast, bubble, strain, and 3D PRN    Collection Time: 22  2:29 PM   Result Value Ref Range    LV EDV A2C 206 mL    LV EDV A4C 232 mL    LV ESV A2C 74 mL    LV ESV A4C 91 mL    IVSd 1.2 (A) 0.6 - 1.0 cm    LVIDd 5.4 4.2 - 5.9 cm    LVIDs 3.6 cm    LVOT Diameter 2.1 cm    LVOT Mean Gradient 4 mmHg    LVOT VTI 31.9 cm    LVOT Peak Velocity 1.2 m/s    LVOT Peak Gradient 6 mmHg    LVPWd 1.2 (A) 0.6 - 1.0 cm    LV E' Lateral Velocity 8 cm/s    LV E' Septal Velocity 6 cm/s    LV Ejection Fraction A2C 64 %    LV Ejection Fraction A4C 61 %    EF BP 62 55 - 100 %    LVOT Area 3.5 cm2    LVOT .4 ml    LA Minor Axis 5.5 cm    LA Major Axis 6.8 cm    LA Area 2C 21.0 cm2    LA Area 4C 27.2 cm2    LA Volume BP 85 (A) 18 - 58 mL    LA Diameter 5.5 cm    AV Mean Velocity 2.4 m/s    AV Mean Gradient 26 mmHg    AV VTI 90.4 cm    AV Peak Velocity 3.3 m/s    AV Peak Gradient 43 mmHg    AV Area by VTI 1.2 cm2    AV Area by Peak Velocity 1.3 cm2    Aortic Root 3.2 cm    Ascending Aorta 3.1 cm    IVC Proxmal 1.9 cm    MV E Wave Deceleration Time 248.0 ms    MV A Velocity 1.05 m/s    MV E Velocity 1.03 m/s    PV .0 ms    PV Max Velocity 1.1 m/s    PV Peak Gradient 5 mmHg    RVIDd 3.6 cm    RV Basal Dimension 4.4 cm    TAPSE 2.1 1.7 cm    TR Max Velocity 3.45 m/s    TR Peak Gradient 48 mmHg    Body Surface Area 2.86 m2    Fractional Shortening 2D 33 28 - 44 %    LV ESV Index A4C 34 mL/m2    LV EDV Index A4C 86 mL/m2    LV ESV Index A2C 28 mL/m2    LV EDV Index A2C 77 mL/m2    LVIDd Index 2.01 cm/m2    LVIDs Index 1.34 cm/m2    LV RWT Ratio 0.44     LV Mass 2D 264.4 (A) 88 - 224 g    LV Mass 2D Index 98.3 49 - 115 g/m2    MV E/A 0.98     E/E' Ratio (Averaged) 15.02     E/E' Lateral 12.88     E/E' Septal 17.17     LA Volume Index BP 32 16 - 34 ml/m2    LVOT Stroke Volume Index 41.1 mL/m2    LA Size Index 2.04 cm/m2    LA/AO Root Ratio 1.72     Ao Root Index 1.19 cm/m2    Ascending Aorta Index 1.15 cm/m2    AV Velocity Ratio 0.36     LVOT:AV VTI Index 0.35     BRIDGETT/BSA VTI 0.4 cm2/m2    BRIDGETT/BSA Peak Velocity 0.5 cm2/m2    Est. RA Pressure 3 mmHg    RVSP 51 mmHg   POCT Glucose    Collection Time: 08/04/22  4:17 PM   Result Value Ref Range    POC Glucose 106 (H) 65 - 100 mg/dL    Performed by: Delisa    Magnesium    Collection Time: 08/04/22  6:31 PM   Result Value Ref Range    Magnesium 2.6 (H) 1.8 - 2.4 mg/dL   POCT Glucose    Collection Time: 08/04/22  9:00 PM   Result Value Ref Range    POC Glucose 226 (H) 65 - 100 mg/dL    Performed by: Rod (Mitchell)    POCT Glucose    Collection Time: 08/05/22  4:48 AM   Result Value Ref Range    POC Glucose 55 (L) 65 - 100 mg/dL    Performed by: Aysha    POCT Glucose    Collection Value Ref Range    POC Glucose 69 65 - 100 mg/dL    Performed by: Shad    POCT Glucose    Collection Time: 08/06/22  4:48 AM   Result Value Ref Range    POC Glucose 135 (H) 65 - 100 mg/dL    Performed by: Shad    POCT Glucose    Collection Time: 08/06/22  8:04 AM   Result Value Ref Range    POC Glucose 168 (H) 65 - 100 mg/dL    Performed by: iTana Ye        Assessment     Patient Active Problem List    Diagnosis Date Noted    Acute on chronic heart failure with preserved ejection fraction ([address] 08/03/2022    Volume overload 08/02/2022    Restrictive lung disease 06/03/2022    Chest pain 05/28/2019    Obesity, morbid ([facility] [address] 04/18/2018    Type 2 diabetes with nephropathy ([facility] [address] 28/18/4712    Diastolic CHF, chronic (HCC) 09/08/2017    Anemia in chronic kidney disease 04/08/2016    Acquired hypothyroidism 04/08/2016    Cough variant asthma 03/11/2016    Dyspnea 02/10/2016    Coronary atherosclerosis of native coronary vessel 02/10/2016    SEARS (dyspnea on exertion) 12/22/2015    Abnormal nuclear stress test 12/22/2015    Elevated liver function tests 08/20/2015    Hyponatremia 01/21/2015    Anemia 01/21/2015    PPD positive 10/28/2014    Seborrheic dermatitis 01/16/2013    Edema 10/11/2012    CKD (chronic kidney disease) 10/11/2012    JORJE (obstructive sleep apnea) 10/11/2012    Hypertension 10/11/2012    Diabetes mellitus type 2 in obese ([facility] [address] 10/11/2012           Problems Addressed by Nephrology         CKD IV  - Creatinine 3.6>>4.2. Some change with diuresis. No lab today - ordered for tomorrow. Volume overload / Anasarca -With continued cramping would switch him over to bolus Lasix and gauge response.       Anemia of CKD IV - on IV iron

## 2022-08-06 NOTE — PROGRESS NOTES
am  8/6/2022 7:03 AM    Admit Date: 8/2/2022    Admit Diagnosis: Volume overload [E87.70]      Subjective:    Patient : Pt is a 79 yr old male who has come in with symptoms of HFpEF. He has significant fluid overload/anasarca and has been treated with diuretic therapy. Has bouts of hypoglycemia. Objective:    /63   Pulse 65   Temp 98.3 °F (36.8 °C)   Resp 16   Wt (!) 329 lb 9.6 oz (149.5 kg)   SpO2 97%   BMI 43.49 kg/m²     ROS:  General ROS: negative for - chills  Hematological and Lymphatic ROS: negative for - blood clots or jaundice  Respiratory ROS: no cough, shortness of breath, or wheezing  Cardiovascular ROS: no chest pain or dyspnea on exertion  Gastrointestinal ROS: no abdominal pain, change in bowel habits, or black or bloody stools  Neurological ROS: no TIA or stroke symptoms    Physical Exam:      Physical Examination: General appearance - Appearance: alert, well appearing, and in no distress.    Neck/lymph - supple, no significant adenopathy  Chest/CV - clear to auscultation, no wheezes, rales or rhonchi, symmetric air entry  Heart - normal rate, regular rhythm, normal S1, S2, no murmurs, rubs, clicks or gallops  Abdomen/GI - soft, nontender, nondistended, no masses or organomegaly   Musculoskeletal - no joint tenderness, deformity or swelling  Extremities - pedal edema 2 +  Skin - normal coloration and turgor, no rashes, no suspicious skin lesions noted    Current Facility-Administered Medications   Medication Dose Route Frequency    insulin glargine (LANTUS) injection vial 25 Units  25 Units SubCUTAneous BID    insulin lispro (HUMALOG) injection vial 0-4 Units  0-4 Units SubCUTAneous TID WC    insulin lispro (HUMALOG) injection vial 0-4 Units  0-4 Units SubCUTAneous Nightly    glucose chewable tablet 16 g  4 tablet Oral PRN    dextrose bolus 10% 125 mL  125 mL IntraVENous PRN    Or    dextrose bolus 10% 250 mL  250 mL IntraVENous PRN    glucagon (rDNA) injection 1 mg  1 mg SubCUTAneous PRN    dextrose 10 % infusion   IntraVENous Continuous PRN    ferric gluconate (FERRLECIT) 250 mg in sodium chloride 0.9 % 250 mL IVPB  250 mg IntraVENous Daily    albuterol sulfate HFA (PROVENTIL;VENTOLIN;PROAIR) 108 (90 Base) MCG/ACT inhaler 2 puff  2 puff Inhalation Q6H PRN    levothyroxine (SYNTHROID) tablet 88 mcg  88 mcg Oral Daily    sodium bicarbonate tablet 650 mg  650 mg Oral BID    sodium chloride flush 0.9 % injection 5-40 mL  5-40 mL IntraVENous 2 times per day    sodium chloride flush 0.9 % injection 5-40 mL  5-40 mL IntraVENous PRN    0.9 % sodium chloride infusion   IntraVENous PRN    ondansetron (ZOFRAN-ODT) disintegrating tablet 4 mg  4 mg Oral Q8H PRN    Or    ondansetron (ZOFRAN) injection 4 mg  4 mg IntraVENous Q6H PRN    acetaminophen (TYLENOL) tablet 650 mg  650 mg Oral Q6H PRN    Or    acetaminophen (TYLENOL) suppository 650 mg  650 mg Rectal Q6H PRN    polyethylene glycol (GLYCOLAX) packet 17 g  17 g Oral Daily PRN    metoprolol succinate (TOPROL XL) extended release tablet 100 mg  100 mg Oral Daily    furosemide (LASIX) 100 mg in sodium chloride 0.9 % 100 mL infusion (mini-bag)  20 mg/hr IntraVENous Continuous    cloNIDine (CATAPRES) tablet 0.1 mg  0.1 mg Oral BID PRN    hydrALAZINE (APRESOLINE) tablet 100 mg  100 mg Oral 2 times per day    allopurinol (ZYLOPRIM) tablet 150 mg  150 mg Oral Dinner    aspirin EC tablet 81 mg  81 mg Oral Dinner    fluticasone-vilanterol (BREO ELLIPTA) 100-25 MCG/INH inhaler - pt supplied (Patient Supplied)  1 puff Inhalation Daily       Data Review: data included in this note has been independently reviewed by the author     TELEMETRY: NSR    Assessment/Plan:     Patient Active Problem List   Diagnosis    Edema    CKD (chronic kidney disease)    JORJE (obstructive sleep apnea)    Dyspnea    Cough variant asthma    PPD positive    Hyponatremia    Chest pain    Anemia in chronic kidney disease    Obesity, morbid (HCC)    SEARS (dyspnea on exertion)    Abnormal nuclear stress test    Type 2 diabetes with nephropathy (HCC)    Hypertension    Seborrheic dermatitis    Elevated liver function tests    Anemia    Diastolic CHF, chronic (HCC)    Diabetes mellitus type 2 in obese [facility])    Coronary atherosclerosis of native coronary vessel    Acquired hypothyroidism    Restrictive lung disease    Volume overload    Acute on chronic heart failure with preserved ejection fraction (HCC)     PLAN  Continue lasix therapy to ensure maximal diuresis of excess fluid. Ensure patient is fully diuresed despite any slight increase in creatinine levels. Cut evening insulin to 12.5    Patient's EGFR is 18 which is really low enough that starting an SGLT2 inhibitor or MRA therapy is not viable at this time. With his HFpEF and anasarca from multifactorial issues including his chronic kidney disease diuretics and vasodilators may be our only option at this point.   He continues to do well with his diuresis and ideally would be diuresed until he is at a dry weight before he is changed over to p.o. meds but I suspect that he will be recurrent presentations with significant if not massive lower extremity edema        Moriah Hylton

## 2022-08-06 NOTE — PROGRESS NOTES
Advance Care Planning   Advance Care Planning Note  Ambulatory Spiritual Care Services    Date:  8/2/2022    Received request from patient. Consultation conversation participants:   Patient who understands ACP conversation     Goals of ACP Conversation:  Discuss advance care planning documents    Health Care Decision Makers:      Primary Decision Maker: Jayme Cranker - Child - [phone]   Summary:  Completed New Documents    Advance Care Planning Documents (Patient Wishes)  Currently on file:   Healthcare Power of /Advance Directive Appointment of Health Care Agent    Assessment:       Interventions:  Assisted in the completion of documents according to patient's wishes at this time    Care Preferences Communicated:   No    Outcomes:  New advance directive completed. Patient / Healthcare Decision Maker Instructions:  Return a copy of Advance Directive Form(s) to medical office.     Electronically signed by Chaplain Barry on 8/6/2022 at 9:27 AM.

## 2022-08-07 LAB
ANION GAP SERPL CALC-SCNC: 9 MMOL/L (ref 7–16)
BUN SERPL-MCNC: 107 MG/DL (ref 8–23)
CALCIUM SERPL-MCNC: 9.2 MG/DL (ref 8.3–10.4)
CHLORIDE SERPL-SCNC: 102 MMOL/L (ref 98–107)
CO2 SERPL-SCNC: 27 MMOL/L (ref 21–32)
CREAT SERPL-MCNC: 4.77 MG/DL (ref 0.8–1.5)
GLUCOSE BLD STRIP.AUTO-MCNC: 123 MG/DL (ref 65–100)
GLUCOSE BLD STRIP.AUTO-MCNC: 129 MG/DL (ref 65–100)
GLUCOSE BLD STRIP.AUTO-MCNC: 171 MG/DL (ref 65–100)
GLUCOSE BLD STRIP.AUTO-MCNC: 185 MG/DL (ref 65–100)
GLUCOSE BLD STRIP.AUTO-MCNC: 190 MG/DL (ref 65–100)
GLUCOSE BLD STRIP.AUTO-MCNC: 87 MG/DL (ref 65–100)
GLUCOSE SERPL-MCNC: 112 MG/DL (ref 65–100)
POTASSIUM SERPL-SCNC: 3.9 MMOL/L (ref 3.5–5.1)
SERVICE CMNT-IMP: ABNORMAL
SERVICE CMNT-IMP: NORMAL
SODIUM SERPL-SCNC: 138 MMOL/L (ref 138–145)

## 2022-08-07 PROCEDURE — 82962 GLUCOSE BLOOD TEST: CPT

## 2022-08-07 PROCEDURE — 6370000000 HC RX 637 (ALT 250 FOR IP): Performed by: NURSE PRACTITIONER

## 2022-08-07 PROCEDURE — 36415 COLL VENOUS BLD VENIPUNCTURE: CPT

## 2022-08-07 PROCEDURE — 1100000003 HC PRIVATE W/ TELEMETRY

## 2022-08-07 PROCEDURE — 2580000003 HC RX 258: Performed by: NURSE PRACTITIONER

## 2022-08-07 PROCEDURE — 6370000000 HC RX 637 (ALT 250 FOR IP): Performed by: INTERNAL MEDICINE

## 2022-08-07 PROCEDURE — 99232 SBSQ HOSP IP/OBS MODERATE 35: CPT | Performed by: INTERNAL MEDICINE

## 2022-08-07 PROCEDURE — 80048 BASIC METABOLIC PNL TOTAL CA: CPT

## 2022-08-07 RX ORDER — DIAPER,BRIEF,INFANT-TODD,DISP
EACH MISCELLANEOUS 2 TIMES DAILY PRN
Status: DISCONTINUED | OUTPATIENT
Start: 2022-08-07 | End: 2022-08-08 | Stop reason: HOSPADM

## 2022-08-07 RX ADMIN — INSULIN GLARGINE 12 UNITS: 100 INJECTION, SOLUTION SUBCUTANEOUS at 22:00

## 2022-08-07 RX ADMIN — INSULIN GLARGINE 25 UNITS: 100 INJECTION, SOLUTION SUBCUTANEOUS at 10:14

## 2022-08-07 RX ADMIN — HYDRALAZINE HYDROCHLORIDE 100 MG: 50 TABLET, FILM COATED ORAL at 22:07

## 2022-08-07 RX ADMIN — SODIUM CHLORIDE, PRESERVATIVE FREE 10 ML: 5 INJECTION INTRAVENOUS at 10:12

## 2022-08-07 RX ADMIN — SODIUM BICARBONATE 650 MG TABLET 650 MG: at 10:10

## 2022-08-07 RX ADMIN — LEVOTHYROXINE SODIUM 88 MCG: 0.09 TABLET ORAL at 06:47

## 2022-08-07 RX ADMIN — HYDRALAZINE HYDROCHLORIDE 100 MG: 50 TABLET, FILM COATED ORAL at 10:10

## 2022-08-07 RX ADMIN — SODIUM CHLORIDE, PRESERVATIVE FREE 10 ML: 5 INJECTION INTRAVENOUS at 22:07

## 2022-08-07 RX ADMIN — ALLOPURINOL 150 MG: 300 TABLET ORAL at 17:24

## 2022-08-07 RX ADMIN — ASPIRIN 81 MG: 81 TABLET ORAL at 17:24

## 2022-08-07 RX ADMIN — METOPROLOL SUCCINATE 100 MG: 100 TABLET, EXTENDED RELEASE ORAL at 10:10

## 2022-08-07 ASSESSMENT — PAIN SCALES - GENERAL
PAINLEVEL_OUTOF10: 0

## 2022-08-07 NOTE — PROGRESS NOTES
Subjective:   Daily Progress Note: 8/7/2022 10:23 AM    F/U CKD 4, anasarca    Feels Better  Comfortable, some cramping  No CP No SOB  No Abd pain  MS -      Current Facility-Administered Medications   Medication Dose Route Frequency    insulin lispro (HUMALOG) injection vial 1 Units  1 Units SubCUTAneous Nightly    insulin glargine (LANTUS) injection vial 12 Units  12 Units SubCUTAneous Nightly    insulin glargine (LANTUS) injection vial 25 Units  25 Units SubCUTAneous Daily    insulin lispro (HUMALOG) injection vial 0-4 Units  0-4 Units SubCUTAneous TID WC    glucose chewable tablet 16 g  4 tablet Oral PRN    dextrose bolus 10% 125 mL  125 mL IntraVENous PRN    Or    dextrose bolus 10% 250 mL  250 mL IntraVENous PRN    glucagon (rDNA) injection 1 mg  1 mg SubCUTAneous PRN    dextrose 10 % infusion   IntraVENous Continuous PRN    albuterol sulfate HFA (PROVENTIL;VENTOLIN;PROAIR) 108 (90 Base) MCG/ACT inhaler 2 puff  2 puff Inhalation Q6H PRN    levothyroxine (SYNTHROID) tablet 88 mcg  88 mcg Oral Daily    sodium chloride flush 0.9 % injection 5-40 mL  5-40 mL IntraVENous 2 times per day    sodium chloride flush 0.9 % injection 5-40 mL  5-40 mL IntraVENous PRN    0.9 % sodium chloride infusion   IntraVENous PRN    ondansetron (ZOFRAN-ODT) disintegrating tablet 4 mg  4 mg Oral Q8H PRN    Or    ondansetron (ZOFRAN) injection 4 mg  4 mg IntraVENous Q6H PRN    acetaminophen (TYLENOL) tablet 650 mg  650 mg Oral Q6H PRN    Or    acetaminophen (TYLENOL) suppository 650 mg  650 mg Rectal Q6H PRN    polyethylene glycol (GLYCOLAX) packet 17 g  17 g Oral Daily PRN    metoprolol succinate (TOPROL XL) extended release tablet 100 mg  100 mg Oral Daily    cloNIDine (CATAPRES) tablet 0.1 mg  0.1 mg Oral BID PRN    hydrALAZINE (APRESOLINE) tablet 100 mg  100 mg Oral 2 times per day    allopurinol (ZYLOPRIM) tablet 150 mg  150 mg Oral Dinner    aspirin EC tablet 81 mg  81 mg Oral Dinner    fluticasone-vilanterol (BREO ELLIPTA) 100-25 MCG/INH inhaler - pt supplied (Patient Supplied)  1 puff Inhalation Daily         Objective:     BP (!) 151/68   Pulse 66   Temp 98 °F (36.7 °C) (Oral)   Resp 18   Ht 6' 1\" (1.854 m)   Wt (!) 332 lb 9.6 oz (150.9 kg)   SpO2 97%   BMI 43.88 kg/m²         Temp (24hrs), Av.1 °F (36.7 °C), Min:97.7 °F (36.5 °C), Max:98.4 °F (36.9 °C)      No intake/output data recorded.  1901 -  0700  In: 56 [P.O.:630]  Out: 5025 [Urine:5025]      BP (!) 151/68   Pulse 66   Temp 98 °F (36.7 °C) (Oral)   Resp 18   Ht 6' 1\" (1.854 m)   Wt (!) 332 lb 9.6 oz (150.9 kg)   SpO2 97%   BMI 43.88 kg/m²   Head: Normocephalic, without obvious abnormality  Neck: no JVD  Lungs: clear to auscultation bilaterally  Heart: regular rate and rhythm  Abdomen: soft, non-tender.   Extremities: 3+ edema          Data Review    Recent Results (from the past 48 hour(s))   POCT Glucose    Collection Time: 22 11:39 AM   Result Value Ref Range    POC Glucose 120 (H) 65 - 100 mg/dL    Performed by: Elliott Prow    POCT Glucose    Collection Time: 22  4:15 PM   Result Value Ref Range    POC Glucose 117 (H) 65 - 100 mg/dL    Performed by: Elliott Prow    POCT Glucose    Collection Time: 22  7:46 PM   Result Value Ref Range    POC Glucose 220 (H) 65 - 100 mg/dL    Performed by: Amanda    POCT Glucose    Collection Time: 22  3:08 AM   Result Value Ref Range    POC Glucose 69 65 - 100 mg/dL    Performed by: Shad    POCT Glucose    Collection Time: 22  4:48 AM   Result Value Ref Range    POC Glucose 135 (H) 65 - 100 mg/dL    Performed by: Shad    POCT Glucose    Collection Time: 22  8:04 AM   Result Value Ref Range    POC Glucose 168 (H) 65 - 100 mg/dL    Performed by: ValdizonSelenaPCT    POCT Glucose    Collection Time: 22 11:22 AM   Result Value Ref Range    POC Glucose 189 (H) 65 - 100 mg/dL    Performed by: ValdizonSelenaPCT    POCT Glucose    Collection Time: 08/06/22  5:48 PM   Result Value Ref Range    POC Glucose 219 (H) 65 - 100 mg/dL    Performed by: Stephanie    POCT Glucose    Collection Time: 08/06/22  7:45 PM   Result Value Ref Range    POC Glucose 195 (H) 65 - 100 mg/dL    Performed by: Amanda    POCT Glucose    Collection Time: 08/07/22  4:24 AM   Result Value Ref Range    POC Glucose 129 (H) 65 - 100 mg/dL    Performed by: Kenzie    Basic Metabolic Panel    Collection Time: 08/07/22  4:45 AM   Result Value Ref Range    Sodium 138 138 - 145 mmol/L    Potassium 3.9 3.5 - 5.1 mmol/L    Chloride 102 98 - 107 mmol/L    CO2 27 21 - 32 mmol/L    Anion Gap 9 7 - 16 mmol/L    Glucose 112 (H) 65 - 100 mg/dL     (H) 8 - 23 MG/DL    Creatinine 4.77 (H) 0.8 - 1.5 MG/DL    GFR African American 15 (L) >60 ml/min/1.73m2    GFR Non- 13 (L) >60 ml/min/1.73m2    Calcium 9.2 8.3 - 10.4 MG/DL   POCT Glucose    Collection Time: 08/07/22  7:20 AM   Result Value Ref Range    POC Glucose 87 65 - 100 mg/dL    Performed by: Ania Norris        Assessment     Patient Active Problem List    Diagnosis Date Noted    Acute on chronic heart failure with preserved ejection fraction (HCC) 08/03/2022    Volume overload 08/02/2022    Restrictive lung disease 06/03/2022    Chest pain 05/28/2019    Obesity, morbid ([facility] [address] 04/18/2018    Type 2 diabetes with nephropathy ([facility] [address] 09/23/4379    Diastolic CHF, chronic (HCC) 09/08/2017    Anemia in chronic kidney disease 04/08/2016    Acquired hypothyroidism 04/08/2016    Cough variant asthma 03/11/2016    Dyspnea 02/10/2016    Coronary atherosclerosis of native coronary vessel 02/10/2016    SEARS (dyspnea on exertion) 12/22/2015    Abnormal nuclear stress test 12/22/2015    Elevated liver function tests 08/20/2015    Hyponatremia 01/21/2015    Anemia 01/21/2015    PPD positive 10/28/2014    Seborrheic dermatitis 01/16/2013    Edema 10/11/2012    CKD (chronic kidney disease) 10/11/2012    JORJE (obstructive sleep apnea) 10/11/2012    Hypertension 10/11/2012    Diabetes mellitus type 2 in obese ([facility] [address] 10/11/2012           Problems Addressed by Nephrology         CKD IV  - Creatinine 3.6>>4.2>4.8. Lasix gtt changed to bolus Lasix. Will hold for now to allow some equilibration. Volume overload / Anasarca -Agree with Cardiology that he will never be free of edema and stockings will be helpful. Will need to restart diuretics at some point but hold for now. Discussed dialysis briefly but ideally he stabilized and can avoid this admission. Anemia of CKD IV - on IV iron. Check lab tomorrow.

## 2022-08-07 NOTE — PROGRESS NOTES
Bedside and Verbal shift change report to be given to Lorena Lennox, RN (oncoming nurse) by self (offgoing nurse). Report included the following information Nurse Handoff Report, Intake/Output, MAR, and Recent Results.

## 2022-08-07 NOTE — PROGRESS NOTES
SubCUTAneous TID WC    glucose chewable tablet 16 g  4 tablet Oral PRN    dextrose bolus 10% 125 mL  125 mL IntraVENous PRN    Or    dextrose bolus 10% 250 mL  250 mL IntraVENous PRN    glucagon (rDNA) injection 1 mg  1 mg SubCUTAneous PRN    dextrose 10 % infusion   IntraVENous Continuous PRN    albuterol sulfate HFA (PROVENTIL;VENTOLIN;PROAIR) 108 (90 Base) MCG/ACT inhaler 2 puff  2 puff Inhalation Q6H PRN    levothyroxine (SYNTHROID) tablet 88 mcg  88 mcg Oral Daily    sodium bicarbonate tablet 650 mg  650 mg Oral BID    sodium chloride flush 0.9 % injection 5-40 mL  5-40 mL IntraVENous 2 times per day    sodium chloride flush 0.9 % injection 5-40 mL  5-40 mL IntraVENous PRN    0.9 % sodium chloride infusion   IntraVENous PRN    ondansetron (ZOFRAN-ODT) disintegrating tablet 4 mg  4 mg Oral Q8H PRN    Or    ondansetron (ZOFRAN) injection 4 mg  4 mg IntraVENous Q6H PRN    acetaminophen (TYLENOL) tablet 650 mg  650 mg Oral Q6H PRN    Or    acetaminophen (TYLENOL) suppository 650 mg  650 mg Rectal Q6H PRN    polyethylene glycol (GLYCOLAX) packet 17 g  17 g Oral Daily PRN    metoprolol succinate (TOPROL XL) extended release tablet 100 mg  100 mg Oral Daily    cloNIDine (CATAPRES) tablet 0.1 mg  0.1 mg Oral BID PRN    hydrALAZINE (APRESOLINE) tablet 100 mg  100 mg Oral 2 times per day    allopurinol (ZYLOPRIM) tablet 150 mg  150 mg Oral Dinner    aspirin EC tablet 81 mg  81 mg Oral Dinner    fluticasone-vilanterol (BREO ELLIPTA) 100-25 MCG/INH inhaler - pt supplied (Patient Supplied)  1 puff Inhalation Daily       Data Review: data included in this note has been independently reviewed by the author     TELEMETRY: NSR    Assessment/Plan:     Patient Active Problem List   Diagnosis    Edema    CKD (chronic kidney disease)    JORJE (obstructive sleep apnea)    Dyspnea    Cough variant asthma    PPD positive    Hyponatremia    Chest pain    Anemia in chronic kidney disease    Obesity, morbid ([address]    SEARS (dyspnea on exertion)    Abnormal nuclear stress test    Type 2 diabetes with nephropathy (HCC)    Hypertension    Seborrheic dermatitis    Elevated liver function tests    Anemia    Diastolic CHF, chronic (HCC)    Diabetes mellitus type 2 in obese (HCC)    Coronary atherosclerosis of native coronary vessel    Acquired hypothyroidism    Restrictive lung disease    Volume overload    Acute on chronic heart failure with preserved ejection fraction ([address]     PLAN  Continue to manage pt symptoms until he is at dry weight. Suspected to have recurrent presentations with lower extremity edema. Patient was changed from continuous infusion Lasix to bolus dose by nephrology due to an increase in creatinine from 3.6-4.2. We will defer to nephrology on this with the abrupt change in his creatinine. He still has significant lower extremity edema and may benefit from increased activity and mechanical means such as Malick Ehrich wraps to help mitigate some of this edema.   But this of course is multifactorial due to obesity hypoventilation anasarca chronic kidney disease and he will almost always have at least a moderate degree of lower extremity edema    Marlys Vasquez

## 2022-08-08 VITALS
BODY MASS INDEX: 41.75 KG/M2 | HEIGHT: 73 IN | OXYGEN SATURATION: 95 % | WEIGHT: 315 LBS | SYSTOLIC BLOOD PRESSURE: 156 MMHG | HEART RATE: 64 BPM | TEMPERATURE: 98.1 F | RESPIRATION RATE: 18 BRPM | DIASTOLIC BLOOD PRESSURE: 73 MMHG

## 2022-08-08 LAB
ANION GAP SERPL CALC-SCNC: 10 MMOL/L (ref 7–16)
BASOPHILS # BLD: 0 K/UL (ref 0–0.2)
BASOPHILS NFR BLD: 1 % (ref 0–2)
BUN SERPL-MCNC: 112 MG/DL (ref 8–23)
CALCIUM SERPL-MCNC: 9.1 MG/DL (ref 8.3–10.4)
CHLORIDE SERPL-SCNC: 104 MMOL/L (ref 98–107)
CO2 SERPL-SCNC: 27 MMOL/L (ref 21–32)
CREAT SERPL-MCNC: 4.7 MG/DL (ref 0.8–1.5)
DIFFERENTIAL METHOD BLD: ABNORMAL
EOSINOPHIL # BLD: 0.2 K/UL (ref 0–0.8)
EOSINOPHIL NFR BLD: 4 % (ref 0.5–7.8)
ERYTHROCYTE [DISTWIDTH] IN BLOOD BY AUTOMATED COUNT: 14.9 % (ref 11.9–14.6)
GLUCOSE BLD STRIP.AUTO-MCNC: 110 MG/DL (ref 65–100)
GLUCOSE BLD STRIP.AUTO-MCNC: 156 MG/DL (ref 65–100)
GLUCOSE BLD STRIP.AUTO-MCNC: 166 MG/DL (ref 65–100)
GLUCOSE SERPL-MCNC: 111 MG/DL (ref 65–100)
HCT VFR BLD AUTO: 29.9 % (ref 41.1–50.3)
HGB BLD-MCNC: 9.5 G/DL (ref 13.6–17.2)
IMM GRANULOCYTES # BLD AUTO: 0 K/UL (ref 0–0.5)
IMM GRANULOCYTES NFR BLD AUTO: 0 % (ref 0–5)
LYMPHOCYTES # BLD: 1 K/UL (ref 0.5–4.6)
LYMPHOCYTES NFR BLD: 19 % (ref 13–44)
MCH RBC QN AUTO: 30.7 PG (ref 26.1–32.9)
MCHC RBC AUTO-ENTMCNC: 31.8 G/DL (ref 31.4–35)
MCV RBC AUTO: 96.8 FL (ref 79.6–97.8)
MONOCYTES # BLD: 0.7 K/UL (ref 0.1–1.3)
MONOCYTES NFR BLD: 13 % (ref 4–12)
NEUTS SEG # BLD: 3.3 K/UL (ref 1.7–8.2)
NEUTS SEG NFR BLD: 63 % (ref 43–78)
NRBC # BLD: 0 K/UL (ref 0–0.2)
PLATELET # BLD AUTO: 207 K/UL (ref 150–450)
PMV BLD AUTO: 11.3 FL (ref 9.4–12.3)
POTASSIUM SERPL-SCNC: 3.8 MMOL/L (ref 3.5–5.1)
RBC # BLD AUTO: 3.09 M/UL (ref 4.23–5.6)
SERVICE CMNT-IMP: ABNORMAL
SODIUM SERPL-SCNC: 141 MMOL/L (ref 138–145)
WBC # BLD AUTO: 5.3 K/UL (ref 4.3–11.1)

## 2022-08-08 PROCEDURE — 6370000000 HC RX 637 (ALT 250 FOR IP): Performed by: NURSE PRACTITIONER

## 2022-08-08 PROCEDURE — 36415 COLL VENOUS BLD VENIPUNCTURE: CPT

## 2022-08-08 PROCEDURE — 6370000000 HC RX 637 (ALT 250 FOR IP): Performed by: INTERNAL MEDICINE

## 2022-08-08 PROCEDURE — 85025 COMPLETE CBC W/AUTO DIFF WBC: CPT

## 2022-08-08 PROCEDURE — 2580000003 HC RX 258: Performed by: NURSE PRACTITIONER

## 2022-08-08 PROCEDURE — 80048 BASIC METABOLIC PNL TOTAL CA: CPT

## 2022-08-08 PROCEDURE — 82962 GLUCOSE BLOOD TEST: CPT

## 2022-08-08 PROCEDURE — 99238 HOSP IP/OBS DSCHRG MGMT 30/<: CPT | Performed by: INTERNAL MEDICINE

## 2022-08-08 RX ORDER — INSULIN GLARGINE 100 [IU]/ML
12 INJECTION, SOLUTION SUBCUTANEOUS 2 TIMES DAILY
Status: DISCONTINUED | OUTPATIENT
Start: 2022-08-08 | End: 2022-08-08 | Stop reason: HOSPADM

## 2022-08-08 RX ADMIN — INSULIN GLARGINE 12 UNITS: 100 INJECTION, SOLUTION SUBCUTANEOUS at 08:58

## 2022-08-08 RX ADMIN — SODIUM CHLORIDE, PRESERVATIVE FREE 10 ML: 5 INJECTION INTRAVENOUS at 08:58

## 2022-08-08 RX ADMIN — HYDRALAZINE HYDROCHLORIDE 100 MG: 50 TABLET, FILM COATED ORAL at 08:58

## 2022-08-08 RX ADMIN — LEVOTHYROXINE SODIUM 88 MCG: 0.09 TABLET ORAL at 06:17

## 2022-08-08 RX ADMIN — METOPROLOL SUCCINATE 100 MG: 100 TABLET, EXTENDED RELEASE ORAL at 08:58

## 2022-08-08 ASSESSMENT — PAIN SCALES - GENERAL
PAINLEVEL_OUTOF10: 0

## 2022-08-08 NOTE — CARE COORDINATION
CM following discharge order this AM. No CM needs identified. Disposition: Home with family assistance.

## 2022-08-08 NOTE — DIABETES MGMT
Patient admitted with volume overload. Blood glucose ranged  yesterday with patient receiving Lantus 37 units. Blood glucose this morning was 110. Creatinine 4.70. GFR 13. Reviewed patient current regimen: Lantus 25 units daily, Lantus 12 units HS, and Humalog correctional insulin. Patient would likely benefit from a decrease in basal insulin to reduce the risk of hypoglycemia. Provider updated via [employer] regarding recommendation. New order received to change Lantus to 12 units BID.

## 2022-08-08 NOTE — DISCHARGE INSTRUCTIONS
DISPOSITION: The patient is being discharged home in stable condition on a low saturated fat, low cholesterol and low salt diet. Pt is instructed to follow a fluid restricted diet with no more than 2 liters per day. Pt is instructed to advance activities as tolerated limited to fatigue or shortness of breath. Pt is instructed to call office or return to ER for immediate evaluation of any shortness of breath or chest pain.

## 2022-08-08 NOTE — PROGRESS NOTES
fluticasone-vilanterol (BREO ELLIPTA) 100-25 MCG/INH inhaler - pt supplied (Patient Supplied)  1 puff Inhalation Daily         Objective:     BP (!) 147/60   Pulse 58   Temp 97.7 °F (36.5 °C)   Resp 22   Ht 6' 1\" (1.854 m)   Wt (!) 324 lb 9.6 oz (147.2 kg)   SpO2 97%   BMI 42.83 kg/m²         Temp (24hrs), Av.7 °F (36.5 °C), Min:97.2 °F (36.2 °C), Max:98 °F (36.7 °C)      No intake/output data recorded.  1901 -  0700  In: 56 [P.O.:490]  Out: 3400 [Urine:3400]      BP (!) 147/60   Pulse 58   Temp 97.7 °F (36.5 °C)   Resp 22   Ht 6' 1\" (1.854 m)   Wt (!) 324 lb 9.6 oz (147.2 kg)   SpO2 97%   BMI 42.83 kg/m²   Head: Normocephalic, without obvious abnormality  Neck: no JVD  Lungs: clear to auscultation bilaterally  Heart: regular rate and rhythm  Abdomen: soft, non-tender.   Extremities: 3+ edema          Data Review    Recent Results (from the past 48 hour(s))   POCT Glucose    Collection Time: 22 11:22 AM   Result Value Ref Range    POC Glucose 189 (H) 65 - 100 mg/dL    Performed by: Jo    POCT Glucose    Collection Time: 22  5:48 PM   Result Value Ref Range    POC Glucose 219 (H) 65 - 100 mg/dL    Performed by: Stephanie    POCT Glucose    Collection Time: 22  7:45 PM   Result Value Ref Range    POC Glucose 195 (H) 65 - 100 mg/dL    Performed by: Amanda    POCT Glucose    Collection Time: 22  4:24 AM   Result Value Ref Range    POC Glucose 129 (H) 65 - 100 mg/dL    Performed by: Kenzie    Basic Metabolic Panel    Collection Time: 22  4:45 AM   Result Value Ref Range    Sodium 138 138 - 145 mmol/L    Potassium 3.9 3.5 - 5.1 mmol/L    Chloride 102 98 - 107 mmol/L    CO2 27 21 - 32 mmol/L    Anion Gap 9 7 - 16 mmol/L    Glucose 112 (H) 65 - 100 mg/dL     (H) 8 - 23 MG/DL    Creatinine 4.77 (H) 0.8 - 1.5 MG/DL    GFR African American 15 (L) >60 ml/min/1.73m2    GFR Non- 13 (L) >60 ml/min/1.73m2 Calcium 9.2 8.3 - 10.4 MG/DL   POCT Glucose    Collection Time: 08/07/22  7:20 AM   Result Value Ref Range    POC Glucose 87 65 - 100 mg/dL    Performed by: Cesar Matthew    POCT Glucose    Collection Time: 08/07/22 11:03 AM   Result Value Ref Range    POC Glucose 185 (H) 65 - 100 mg/dL    Performed by: Cesar Oh    POCT Glucose    Collection Time: 08/07/22 11:45 AM   Result Value Ref Range    POC Glucose 171 (H) 65 - 100 mg/dL    Performed by: Cesar Oh    POCT Glucose    Collection Time: 08/07/22  4:15 PM   Result Value Ref Range    POC Glucose 123 (H) 65 - 100 mg/dL    Performed by: Cesar Oh    POCT Glucose    Collection Time: 08/07/22  8:03 PM   Result Value Ref Range    POC Glucose 190 (H) 65 - 100 mg/dL    Performed by: Amanda    POCT Glucose    Collection Time: 08/08/22  3:10 AM   Result Value Ref Range    POC Glucose 156 (H) 65 - 100 mg/dL    Performed by: Luz Chow    Basic Metabolic Panel    Collection Time: 08/08/22  4:55 AM   Result Value Ref Range    Sodium 141 138 - 145 mmol/L    Potassium 3.8 3.5 - 5.1 mmol/L    Chloride 104 98 - 107 mmol/L    CO2 27 21 - 32 mmol/L    Anion Gap 10 7 - 16 mmol/L    Glucose 111 (H) 65 - 100 mg/dL     (H) 8 - 23 MG/DL    Creatinine 4.70 (H) 0.8 - 1.5 MG/DL    GFR African American 15 (L) >60 ml/min/1.73m2    GFR Non- 13 (L) >60 ml/min/1.73m2    Calcium 9.1 8.3 - 10.4 MG/DL   CBC with Auto Differential    Collection Time: 08/08/22  4:55 AM   Result Value Ref Range    WBC 5.3 4.3 - 11.1 K/uL    RBC 3.09 (L) 4.23 - 5.6 M/uL    Hemoglobin 9.5 (L) 13.6 - 17.2 g/dL    Hematocrit 29.9 (L) 41.1 - 50.3 %    MCV 96.8 79.6 - 97.8 FL    MCH 30.7 26.1 - 32.9 PG    MCHC 31.8 31.4 - 35.0 g/dL    RDW 14.9 (H) 11.9 - 14.6 %    Platelets 376 256 - 423 K/uL    MPV 11.3 9.4 - 12.3 FL    nRBC 0.00 0.0 - 0.2 K/uL    Differential Type AUTOMATED      Seg Neutrophils 63 43 - 78 %    Lymphocytes 19 13 - 44 %    Monocytes 13 (H) 4.0 - 12.0 % Eosinophils % 4 0.5 - 7.8 %    Basophils 1 0.0 - 2.0 %    Immature Granulocytes 0 0.0 - 5.0 %    Segs Absolute 3.3 1.7 - 8.2 K/UL    Absolute Lymph # 1.0 0.5 - 4.6 K/UL    Absolute Mono # 0.7 0.1 - 1.3 K/UL    Absolute Eos # 0.2 0.0 - 0.8 K/UL    Basophils Absolute 0.0 0.0 - 0.2 K/UL    Absolute Immature Granulocyte 0.0 0.0 - 0.5 K/UL   POCT Glucose    Collection Time: 08/08/22  5:21 AM   Result Value Ref Range    POC Glucose 110 (H) 65 - 100 mg/dL    Performed by: Amanda    POCT Glucose    Collection Time: 08/08/22  7:23 AM   Result Value Ref Range    POC Glucose 166 (H) 65 - 100 mg/dL    Performed by: Evan Rain        Assessment     Patient Active Problem List    Diagnosis Date Noted    Acute on chronic heart failure with preserved ejection fraction ([address] 08/03/2022    Volume overload 08/02/2022    Restrictive lung disease 06/03/2022    Chest pain 05/28/2019    Obesity, morbid ([address] 04/18/2018    Type 2 diabetes with nephropathy ([facility] [id number]/68/9662    Diastolic CHF, chronic ([address] 09/08/2017    Anemia in chronic kidney disease 04/08/2016    Acquired hypothyroidism 04/08/2016    Cough variant asthma 03/11/2016    Dyspnea 02/10/2016    Coronary atherosclerosis of native coronary vessel 02/10/2016    SEARS (dyspnea on exertion) 12/22/2015    Abnormal nuclear stress test 12/22/2015    Elevated liver function tests 08/20/2015    Hyponatremia 01/21/2015    Anemia 01/21/2015    PPD positive 10/28/2014    Seborrheic dermatitis 01/16/2013    Edema 10/11/2012    CKD (chronic kidney disease) 10/11/2012    JORJE (obstructive sleep apnea) 10/11/2012    Hypertension 10/11/2012    Diabetes mellitus type 2 in obese ([address] 10/11/2012           Problems Addressed by Nephrology         CKD IV  - Creatinine 3.6>>4.2>4.8. Lasix off. Cr is stable today.   Ideally, would have patient remain inpatient and ensure renal function is improving, but if patient insists on going home, will arrange for repeat labs as an outpatient on Wednesday with close outpatient follow up. Volume overload / Anasarca -Agree with Cardiology that he will never be free of edema and stockings will be helpful. Will need to restart diuretics at some point but would continue hold for now. Can restart as an outpatient once function recovers some. Anemia of CKD IV - on IV iron. Check lab tomorrow.

## 2022-08-08 NOTE — DISCHARGE SUMMARY
[facility] Cardiology Discharge Summary     Patient ID:  Isiah Edwards  [id number]  64 y.o.  1940    Admit date: 8/2/2022    Discharge date and time:  8/8/2022    Admitting Physician: Sheron Herman MD     Discharge Physician: Dr. Reilly Madrid    Admission Diagnoses: Volume overload [E87.70]    Discharge Diagnoses:   Patient Active Problem List    Diagnosis    Acute on chronic heart failure with preserved ejection fraction (HCC)    Volume overload    Restrictive lung disease    Obesity, morbid ([address]    Type 2 diabetes with nephropathy [address] [address]    Coronary atherosclerosis of native coronary vessel    SEARS (dyspnea on exertion)    Hyponatremia    Edema    CKD (chronic kidney disease)    JORJE (obstructive sleep apnea)    Hypertension     Cardiology Procedures this admission:  EchoCardiogram  Consults: nephrology    Hospital Course: Patient presented to [facility] Cardiology office with complaints of dyspnea on exertion, PND, and orthopnea. Patient was found with significant volume overload and directly admitted to telemetry floor. Pt had elevated BNP of 11,667. Pt has known EF of 60-65%. Pt was started on IV diuresis with Lasix drip. Pt had an echocardiogram showing:   Left Ventricle Normal left ventricular systolic function with a visually estimated EF of 60 - 65%. Left ventricle size is normal. Mildly increased wall thickness. Normal wall motion. Abnormal diastolic function. Left Atrium Left atrium is moderately dilated. Right Ventricle Right ventricle size is normal. Normal wall thickness. Normal systolic function. Right Atrium Right atrium is moderately dilated. Aortic Valve Moderately calcified cusp. Mild regurgitation. Moderate stenosis of the aortic valve. AV mean gradient is 26 mmHg. AV peak gradient is 43 mmHg. LVOT:AV VTI Index is 0.35. Mitral Valve Valve structure is normal. Mild regurgitation. No stenosis noted. Tricuspid Valve Valve structure is normal. Mild to moderate regurgitation.  No stenosis noted. Moderately elevated RVSP. The estimated RVSP is 51 mmHg. Pulmonic Valve Valve structure is normal. No regurgitation. No stenosis noted. Aorta Normal sized aortic root. Pericardium No pericardial effusion. Nephrology was consulted for volume overload and CKD stage IV. Patient was changed from continuous infusion Lasix to bolus dose on 8/6 by nephrology due to an increase in creatinine from 3.6-4.2. Creatinine 3.6>> 4.2> 4.8 on 8/8. Per nephrology creatinine was stable. Patient adamant about being discharged home on 8/8. Nephrology - would have patient remain inpatient and ensure renal function is improving, but if patient insists on going home, will arrange for repeat labs as an outpatient on Wednesday with close outpatient follow up. On the day of discharge, Pt was up feeling well without any complaints of CP, SOB. Pt's labs were stable. Pt was seen and examined by Dr. Amaris Cast and determined stable and ready for discharge. Pt was instructed on the importance of weighing self daily. If Pt gains more than 2 lbs overnight or 5 lbs in one week, Pt is instructed to call [facility] Cardiology at [phone]. For maximized medical therapy of congestive heart failure, patient will continue use of BB. ARB and diuretic discontinued at discharge secondary to GREGG on CKD. These will be restarted pending labs results outpatient on Wednesday by nephrology. The patient has been scheduled for 7 day transitional care follow up with [facility] Cardiology -- Dr. Turner Borges on August 18th at 12:00 pm in [facility]. Pt was given lab slip for a BMP/Mag on Wednesday 8/10. DISPOSITION: The patient is being discharged home in stable condition on a low saturated fat, low cholesterol and low salt diet. Pt is instructed to follow a fluid restricted diet with no more than 2 liters per day. Pt is instructed to advance activities as tolerated limited to fatigue or shortness of breath.  Pt is instructed to call office or return to ER for immediate evaluation of any shortness of breath or chest pain. Discharge Exam: BP (!) 156/73   Pulse 64   Temp 98.1 °F (36.7 °C) (Oral)   Resp 18   Ht 6' 1\" (1.854 m)   Wt (!) 324 lb 9.6 oz (147.2 kg)   SpO2 95%   BMI 42.83 kg/m²   Pt has been seen by Dr Yang Current: see his progress note for exam details.     Recent Results (from the past 24 hour(s))   POCT Glucose    Collection Time: 08/07/22 11:03 AM   Result Value Ref Range    POC Glucose 185 (H) 65 - 100 mg/dL    Performed by: Cisco Pallas    POCT Glucose    Collection Time: 08/07/22 11:45 AM   Result Value Ref Range    POC Glucose 171 (H) 65 - 100 mg/dL    Performed by: Lucho Pallas    POCT Glucose    Collection Time: 08/07/22  4:15 PM   Result Value Ref Range    POC Glucose 123 (H) 65 - 100 mg/dL    Performed by: Cisco Pallas    POCT Glucose    Collection Time: 08/07/22  8:03 PM   Result Value Ref Range    POC Glucose 190 (H) 65 - 100 mg/dL    Performed by: Amanda    POCT Glucose    Collection Time: 08/08/22  3:10 AM   Result Value Ref Range    POC Glucose 156 (H) 65 - 100 mg/dL    Performed by: Waldo Vasquez    Basic Metabolic Panel    Collection Time: 08/08/22  4:55 AM   Result Value Ref Range    Sodium 141 138 - 145 mmol/L    Potassium 3.8 3.5 - 5.1 mmol/L    Chloride 104 98 - 107 mmol/L    CO2 27 21 - 32 mmol/L    Anion Gap 10 7 - 16 mmol/L    Glucose 111 (H) 65 - 100 mg/dL     (H) 8 - 23 MG/DL    Creatinine 4.70 (H) 0.8 - 1.5 MG/DL    GFR African American 15 (L) >60 ml/min/1.73m2    GFR Non- 13 (L) >60 ml/min/1.73m2    Calcium 9.1 8.3 - 10.4 MG/DL   CBC with Auto Differential    Collection Time: 08/08/22  4:55 AM   Result Value Ref Range    WBC 5.3 4.3 - 11.1 K/uL    RBC 3.09 (L) 4.23 - 5.6 M/uL    Hemoglobin 9.5 (L) 13.6 - 17.2 g/dL    Hematocrit 29.9 (L) 41.1 - 50.3 %    MCV 96.8 79.6 - 97.8 FL    MCH 30.7 26.1 - 32.9 PG    MCHC 31.8 31.4 - 35.0 g/dL    RDW 14.9 (H) 11.9 - 14.6 %    Platelets 299 150 - 450 K/uL    MPV 11.3 9.4 - 12.3 FL    nRBC 0.00 0.0 - 0.2 K/uL    Differential Type AUTOMATED      Seg Neutrophils 63 43 - 78 %    Lymphocytes 19 13 - 44 %    Monocytes 13 (H) 4.0 - 12.0 %    Eosinophils % 4 0.5 - 7.8 %    Basophils 1 0.0 - 2.0 %    Immature Granulocytes 0 0.0 - 5.0 %    Segs Absolute 3.3 1.7 - 8.2 K/UL    Absolute Lymph # 1.0 0.5 - 4.6 K/UL    Absolute Mono # 0.7 0.1 - 1.3 K/UL    Absolute Eos # 0.2 0.0 - 0.8 K/UL    Basophils Absolute 0.0 0.0 - 0.2 K/UL    Absolute Immature Granulocyte 0.0 0.0 - 0.5 K/UL   POCT Glucose    Collection Time: 08/08/22  5:21 AM   Result Value Ref Range    POC Glucose 110 (H) 65 - 100 mg/dL    Performed by: Amanda    POCT Glucose    Collection Time: 08/08/22  7:23 AM   Result Value Ref Range    POC Glucose 166 (H) 65 - 100 mg/dL    Performed by: Hector Gee      Patient Instructions:   Current Discharge Medication List        CONTINUE these medications which have NOT CHANGED    Details   !! fluticasone-vilanterol (BREO ELLIPTA) 100-25 MCG/INH AEPB inhaler Inhale 1 puff into the lungs in the morning. !! fluticasone-vilanterol (BREO ELLIPTA) 100-25 MCG/INH AEPB inhaler Inhale into the lungs daily      albuterol sulfate  (90 Base) MCG/ACT inhaler 2 puffs qid prn      allopurinol (ZYLOPRIM) 300 MG tablet Take 150 mg by mouth daily      aspirin 81 MG EC tablet Take 81 mg by mouth      calcitRIOL (ROCALTROL) 0.25 MCG capsule Take 0.25 mcg by mouth daily      cloNIDine (CATAPRES) 0.1 MG tablet Take 0.1 mg by mouth 2 times daily as needed      Dulaglutide (TRULICITY) 1.5 GF/3.0LV SOPN Inject 1.5 mg into the skin every 7 days On sundays      hydrALAZINE (APRESOLINE) 50 MG tablet Take 100 mg by mouth in the morning and 100 mg before bedtime.       insulin glargine (LANTUS) 100 UNIT/ML injection vial Inject 50 Units into the skin 2 times daily      levothyroxine (SYNTHROID) 88 MCG tablet Take 88 mcg by mouth      nebivolol (BYSTOLIC) 10 MG tablet Take 10 mg by mouth daily      sodium bicarbonate 650 MG tablet Take 650 mg by mouth 2 times daily       ! ! - Potential duplicate medications found. Please discuss with provider.         STOP taking these medications       fluticasone-umeclidin-vilant (Robbie Heckle) 100-62.5-25 MCG/INH AEPB Comments:   Reason for Stopping:         losartan-hydroCHLOROthiazide (HYZAAR) 100-25 MG per tablet Comments:   Reason for Stopping:         torsemide (DEMADEX) 10 MG tablet Comments:   Reason for Stopping:                       Signed:  HEAVEN Kim  8/8/2022  10:08 AM

## 2022-08-10 ENCOUNTER — TELEPHONE (OUTPATIENT)
Dept: CARDIOLOGY CLINIC | Age: 82
End: 2022-08-10

## 2022-08-10 NOTE — TELEPHONE ENCOUNTER
Called pt advised of Dr. Evy Singh response. Pt gave a verbal understanding. Pt states doing better but breathing has improved, lost 21 lbs, pt states still has edema, doesn't feel the diuretic is helping anymore. Pt states his medication losartan/HCTZ and Torsemide were stopped for now and will have labs drawn today.

## 2022-08-10 NOTE — TELEPHONE ENCOUNTER
----- Message from Marlys Fuller, DO sent at 8/2/2022  5:19 PM EDT -----  Please call him, how does he feel, does he need more diuretic? More edema? More SEARS? Seeing renal as well?   Thanks

## 2022-08-12 ENCOUNTER — TELEPHONE (OUTPATIENT)
Dept: CARDIOLOGY CLINIC | Age: 82
End: 2022-08-12

## 2022-08-12 ENCOUNTER — HOSPITAL ENCOUNTER (EMERGENCY)
Dept: GENERAL RADIOLOGY | Age: 82
Discharge: HOME | DRG: 291 | End: 2022-08-15
Payer: MEDICARE

## 2022-08-12 ENCOUNTER — HOSPITAL ENCOUNTER (INPATIENT)
Age: 82
LOS: 3 days | Discharge: HOME | DRG: 291 | End: 2022-08-15
Attending: EMERGENCY MEDICINE | Admitting: INTERNAL MEDICINE
Payer: MEDICARE

## 2022-08-12 DIAGNOSIS — R77.8 ELEVATED TROPONIN: ICD-10-CM

## 2022-08-12 DIAGNOSIS — I50.32 CHRONIC HEART FAILURE WITH PRESERVED EJECTION FRACTION (HFPEF) (HCC): ICD-10-CM

## 2022-08-12 DIAGNOSIS — R06.09 DYSPNEA ON EXERTION: Primary | ICD-10-CM

## 2022-08-12 DIAGNOSIS — I50.32 CHRONIC HEART FAILURE WITH PRESERVED EJECTION FRACTION (HCC): ICD-10-CM

## 2022-08-12 DIAGNOSIS — N18.4 STAGE 4 CHRONIC KIDNEY DISEASE (HCC): ICD-10-CM

## 2022-08-12 LAB
ALBUMIN SERPL-MCNC: 2.8 G/DL (ref 3.2–4.6)
ALBUMIN/GLOB SERPL: 0.7 {RATIO} (ref 1.2–3.5)
ALP SERPL-CCNC: 69 U/L (ref 50–136)
ALT SERPL-CCNC: 27 U/L (ref 12–65)
ANION GAP SERPL CALC-SCNC: 7 MMOL/L (ref 7–16)
AST SERPL-CCNC: 60 U/L (ref 15–37)
BILIRUB SERPL-MCNC: 0.5 MG/DL (ref 0.2–1.1)
BUN SERPL-MCNC: 88 MG/DL (ref 8–23)
CALCIUM SERPL-MCNC: 9.2 MG/DL (ref 8.3–10.4)
CHLORIDE SERPL-SCNC: 108 MMOL/L (ref 98–107)
CO2 SERPL-SCNC: 18 MMOL/L (ref 21–32)
CREAT SERPL-MCNC: 3.8 MG/DL (ref 0.8–1.5)
EKG ATRIAL RATE: 87 BPM
EKG DIAGNOSIS: NORMAL
EKG P AXIS: 73 DEGREES
EKG P-R INTERVAL: 272 MS
EKG Q-T INTERVAL: 392 MS
EKG QRS DURATION: 114 MS
EKG QTC CALCULATION (BAZETT): 471 MS
EKG R AXIS: 10 DEGREES
EKG T AXIS: 123 DEGREES
EKG VENTRICULAR RATE: 87 BPM
ERYTHROCYTE [DISTWIDTH] IN BLOOD BY AUTOMATED COUNT: 15.1 % (ref 11.9–14.6)
GLOBULIN SER CALC-MCNC: 4.2 G/DL (ref 2.3–3.5)
GLUCOSE BLD STRIP.AUTO-MCNC: 127 MG/DL (ref 65–100)
GLUCOSE BLD STRIP.AUTO-MCNC: 247 MG/DL (ref 65–100)
GLUCOSE SERPL-MCNC: 395 MG/DL (ref 65–100)
HCT VFR BLD AUTO: 30.6 % (ref 41.1–50.3)
HGB BLD-MCNC: 9.6 G/DL (ref 13.6–17.2)
LIPASE SERPL-CCNC: 259 U/L (ref 73–393)
MAGNESIUM SERPL-MCNC: 2.6 MG/DL (ref 1.8–2.4)
MCH RBC QN AUTO: 31.1 PG (ref 26.1–32.9)
MCHC RBC AUTO-ENTMCNC: 31.4 G/DL (ref 31.4–35)
MCV RBC AUTO: 99 FL (ref 79.6–97.8)
NRBC # BLD: 0 K/UL (ref 0–0.2)
NT PRO BNP: ABNORMAL PG/ML
PLATELET # BLD AUTO: 210 K/UL (ref 150–450)
PMV BLD AUTO: 12 FL (ref 9.4–12.3)
POTASSIUM SERPL-SCNC: 5.9 MMOL/L (ref 3.5–5.1)
PROT SERPL-MCNC: 7 G/DL (ref 6.3–8.2)
RBC # BLD AUTO: 3.09 M/UL (ref 4.23–5.6)
SERVICE CMNT-IMP: ABNORMAL
SERVICE CMNT-IMP: ABNORMAL
SODIUM SERPL-SCNC: 133 MMOL/L (ref 138–145)
TROPONIN I SERPL HS-MCNC: 163.9 PG/ML (ref 0–14)
TROPONIN I SERPL HS-MCNC: 25.5 PG/ML (ref 0–14)
WBC # BLD AUTO: 6.2 K/UL (ref 4.3–11.1)

## 2022-08-12 PROCEDURE — 83880 ASSAY OF NATRIURETIC PEPTIDE: CPT

## 2022-08-12 PROCEDURE — 84484 ASSAY OF TROPONIN QUANT: CPT

## 2022-08-12 PROCEDURE — 6360000002 HC RX W HCPCS: Performed by: NURSE PRACTITIONER

## 2022-08-12 PROCEDURE — 85027 COMPLETE CBC AUTOMATED: CPT

## 2022-08-12 PROCEDURE — 99285 EMERGENCY DEPT VISIT HI MDM: CPT

## 2022-08-12 PROCEDURE — 93005 ELECTROCARDIOGRAM TRACING: CPT | Performed by: EMERGENCY MEDICINE

## 2022-08-12 PROCEDURE — 83735 ASSAY OF MAGNESIUM: CPT

## 2022-08-12 PROCEDURE — 2580000003 HC RX 258: Performed by: NURSE PRACTITIONER

## 2022-08-12 PROCEDURE — 6370000000 HC RX 637 (ALT 250 FOR IP): Performed by: INTERNAL MEDICINE

## 2022-08-12 PROCEDURE — 82962 GLUCOSE BLOOD TEST: CPT

## 2022-08-12 PROCEDURE — 99223 1ST HOSP IP/OBS HIGH 75: CPT | Performed by: INTERNAL MEDICINE

## 2022-08-12 PROCEDURE — 2140000001 HC CVICU INTERMEDIATE R&B

## 2022-08-12 PROCEDURE — 94760 N-INVAS EAR/PLS OXIMETRY 1: CPT

## 2022-08-12 PROCEDURE — 83690 ASSAY OF LIPASE: CPT

## 2022-08-12 PROCEDURE — 80053 COMPREHEN METABOLIC PANEL: CPT

## 2022-08-12 PROCEDURE — 6370000000 HC RX 637 (ALT 250 FOR IP): Performed by: NURSE PRACTITIONER

## 2022-08-12 PROCEDURE — 71046 X-RAY EXAM CHEST 2 VIEWS: CPT

## 2022-08-12 RX ORDER — SODIUM CHLORIDE 9 MG/ML
INJECTION, SOLUTION INTRAVENOUS PRN
Status: DISCONTINUED | OUTPATIENT
Start: 2022-08-12 | End: 2022-08-15 | Stop reason: HOSPADM

## 2022-08-12 RX ORDER — ACETAMINOPHEN 325 MG/1
650 TABLET ORAL EVERY 6 HOURS PRN
Status: DISCONTINUED | OUTPATIENT
Start: 2022-08-12 | End: 2022-08-15 | Stop reason: HOSPADM

## 2022-08-12 RX ORDER — ASPIRIN 81 MG/1
81 TABLET ORAL DAILY
Status: DISCONTINUED | OUTPATIENT
Start: 2022-08-12 | End: 2022-08-15 | Stop reason: HOSPADM

## 2022-08-12 RX ORDER — LEVOTHYROXINE SODIUM 88 UG/1
88 TABLET ORAL DAILY
Status: DISCONTINUED | OUTPATIENT
Start: 2022-08-13 | End: 2022-08-15 | Stop reason: HOSPADM

## 2022-08-12 RX ORDER — METOPROLOL SUCCINATE 100 MG/1
100 TABLET, EXTENDED RELEASE ORAL DAILY
Status: DISCONTINUED | OUTPATIENT
Start: 2022-08-12 | End: 2022-08-15 | Stop reason: HOSPADM

## 2022-08-12 RX ORDER — SODIUM CHLORIDE 0.9 % (FLUSH) 0.9 %
5-40 SYRINGE (ML) INJECTION PRN
Status: DISCONTINUED | OUTPATIENT
Start: 2022-08-12 | End: 2022-08-15 | Stop reason: HOSPADM

## 2022-08-12 RX ORDER — ALLOPURINOL 100 MG/1
150 TABLET ORAL DAILY
Status: DISCONTINUED | OUTPATIENT
Start: 2022-08-12 | End: 2022-08-15 | Stop reason: HOSPADM

## 2022-08-12 RX ORDER — CLONIDINE HYDROCHLORIDE 0.1 MG/1
0.1 TABLET ORAL 2 TIMES DAILY PRN
Status: DISCONTINUED | OUTPATIENT
Start: 2022-08-12 | End: 2022-08-15 | Stop reason: HOSPADM

## 2022-08-12 RX ORDER — INSULIN GLARGINE 100 [IU]/ML
50 INJECTION, SOLUTION SUBCUTANEOUS 2 TIMES DAILY
Status: DISCONTINUED | OUTPATIENT
Start: 2022-08-12 | End: 2022-08-13

## 2022-08-12 RX ORDER — HYDRALAZINE HYDROCHLORIDE 25 MG/1
100 TABLET, FILM COATED ORAL 2 TIMES DAILY
Status: DISCONTINUED | OUTPATIENT
Start: 2022-08-12 | End: 2022-08-13

## 2022-08-12 RX ORDER — FUROSEMIDE 10 MG/ML
20 INJECTION INTRAMUSCULAR; INTRAVENOUS DAILY
Status: CANCELLED | OUTPATIENT
Start: 2022-08-12

## 2022-08-12 RX ORDER — ALBUTEROL SULFATE 2.5 MG/3ML
2.5 SOLUTION RESPIRATORY (INHALATION) EVERY 6 HOURS PRN
Status: DISCONTINUED | OUTPATIENT
Start: 2022-08-12 | End: 2022-08-15 | Stop reason: HOSPADM

## 2022-08-12 RX ORDER — ALBUTEROL SULFATE 90 UG/1
2 AEROSOL, METERED RESPIRATORY (INHALATION) EVERY 6 HOURS PRN
Status: DISCONTINUED | OUTPATIENT
Start: 2022-08-12 | End: 2022-08-12 | Stop reason: CLARIF

## 2022-08-12 RX ORDER — ONDANSETRON 4 MG/1
4 TABLET, ORALLY DISINTEGRATING ORAL EVERY 8 HOURS PRN
Status: DISCONTINUED | OUTPATIENT
Start: 2022-08-12 | End: 2022-08-15 | Stop reason: HOSPADM

## 2022-08-12 RX ORDER — ASPIRIN 81 MG/1
81 TABLET ORAL DAILY
Status: DISCONTINUED | OUTPATIENT
Start: 2022-08-13 | End: 2022-08-12

## 2022-08-12 RX ORDER — ACETAMINOPHEN 650 MG/1
650 SUPPOSITORY RECTAL EVERY 6 HOURS PRN
Status: DISCONTINUED | OUTPATIENT
Start: 2022-08-12 | End: 2022-08-15 | Stop reason: HOSPADM

## 2022-08-12 RX ORDER — SODIUM BICARBONATE 650 MG/1
650 TABLET ORAL 2 TIMES DAILY
Status: DISCONTINUED | OUTPATIENT
Start: 2022-08-12 | End: 2022-08-15 | Stop reason: HOSPADM

## 2022-08-12 RX ORDER — POLYETHYLENE GLYCOL 3350 17 G/17G
17 POWDER, FOR SOLUTION ORAL DAILY PRN
Status: DISCONTINUED | OUTPATIENT
Start: 2022-08-12 | End: 2022-08-15 | Stop reason: HOSPADM

## 2022-08-12 RX ORDER — SODIUM CHLORIDE 0.9 % (FLUSH) 0.9 %
5-40 SYRINGE (ML) INJECTION EVERY 12 HOURS SCHEDULED
Status: DISCONTINUED | OUTPATIENT
Start: 2022-08-12 | End: 2022-08-15 | Stop reason: HOSPADM

## 2022-08-12 RX ORDER — FUROSEMIDE 10 MG/ML
80 INJECTION INTRAMUSCULAR; INTRAVENOUS 2 TIMES DAILY
Status: DISCONTINUED | OUTPATIENT
Start: 2022-08-12 | End: 2022-08-15 | Stop reason: HOSPADM

## 2022-08-12 RX ORDER — ONDANSETRON 2 MG/ML
4 INJECTION INTRAMUSCULAR; INTRAVENOUS EVERY 6 HOURS PRN
Status: DISCONTINUED | OUTPATIENT
Start: 2022-08-12 | End: 2022-08-15 | Stop reason: HOSPADM

## 2022-08-12 RX ORDER — FLUTICASONE FUROATE AND VILANTEROL 100; 25 UG/1; UG/1
1 POWDER RESPIRATORY (INHALATION) DAILY
Status: DISCONTINUED | OUTPATIENT
Start: 2022-08-12 | End: 2022-08-12

## 2022-08-12 RX ADMIN — ALLOPURINOL 150 MG: 100 TABLET ORAL at 22:13

## 2022-08-12 RX ADMIN — INSULIN GLARGINE 50 UNITS: 100 INJECTION, SOLUTION SUBCUTANEOUS at 22:37

## 2022-08-12 RX ADMIN — HYDRALAZINE HYDROCHLORIDE 100 MG: 25 TABLET, FILM COATED ORAL at 22:14

## 2022-08-12 RX ADMIN — SODIUM CHLORIDE, PRESERVATIVE FREE 10 ML: 5 INJECTION INTRAVENOUS at 22:14

## 2022-08-12 RX ADMIN — FUROSEMIDE 80 MG: 10 INJECTION, SOLUTION INTRAMUSCULAR; INTRAVENOUS at 22:13

## 2022-08-12 RX ADMIN — ASPIRIN 81 MG: 81 TABLET ORAL at 22:20

## 2022-08-12 RX ADMIN — SODIUM BICARBONATE 650 MG TABLET 650 MG: at 22:12

## 2022-08-12 ASSESSMENT — PAIN SCALES - GENERAL: PAINLEVEL_OUTOF10: 10

## 2022-08-12 NOTE — PROGRESS NOTES
Arrived to room 2226 via stretcher from ER. Patient moved over to bed under his own power. Patient's wife at bedside.

## 2022-08-12 NOTE — H&P
Subjective:     Patient is a 79 y.o. male who presents with exertional dyspnea which had an onset yesterday evening. He states the pain is equivocal to when he had 2 stents placed in his LAD some years ago. He does not mention any chest pain just the exertional dyspnea. Patient is not currently on oxygen or a O2 monitor. He has a diagnosis of acute on chronic heart failure with preserved ejection fraction. CXR from 8/12/22 shows bilateral pulmonary infiltrates, most likely characteristic of pulmonary edema. He was taken off of losartan and torsemide. Recent TTE on 8/4/22 shows EF 60-65%. Patient has multifactorial edema including edema from pulmonary hypertension chronic HFpEF venous insufficiency deconditioning and stage IV kidney disease. He spent a week in the hospital getting diuresed a week ago but we may have prematurely discontinued Lasix never achieving a dry weight he now presents again with recurrent shortness of breath exertional dyspnea and now a troponin that is elevated to 164. Weighing the patient today he is 346 pounds. He was initially admitted over a week ago at 350 pounds his discharge weight was 324 pounds. He has gained back essentially all of the fluid that we had diuresed over a week.     Patient Active Problem List    Diagnosis Date Noted    Acute on chronic heart failure with preserved ejection fraction ([address] 08/03/2022    Volume overload 08/02/2022    Restrictive lung disease 06/03/2022    Chest pain 05/28/2019    Obesity, morbid ([address] 04/18/2018    Type 2 diabetes with nephropathy ([address] 34/80/7337    Diastolic CHF, chronic ([address] 09/08/2017    Anemia in chronic kidney disease 04/08/2016    Acquired hypothyroidism 04/08/2016    Cough variant asthma 03/11/2016    Dyspnea 02/10/2016    Coronary atherosclerosis of native coronary vessel 02/10/2016    SEARS (dyspnea on exertion) 12/22/2015    Abnormal nuclear stress test 12/22/2015    Elevated liver function tests 08/20/2015    Hyponatremia 01/21/2015    Anemia 01/21/2015    PPD positive 10/28/2014    Seborrheic dermatitis 01/16/2013    Edema 10/11/2012    CKD (chronic kidney disease) 10/11/2012    JORJE (obstructive sleep apnea) 10/11/2012    Hypertension 10/11/2012    Diabetes mellitus type 2 in obese ([address] 10/11/2012     Past Medical History:   Diagnosis Date    Acquired hypothyroidism 4/8/2016    Anemia 1/21/2015    Anemia in chronic kidney disease 4/8/2016    Asthma     Chronic kidney disease     stage 4 kidney disease- Dr. Heidy Benson    CKD (chronic kidney disease) 10/11/2012    Coronary atherosclerosis of native coronary vessel 2/10/2016    Diabetes mellitus type 2 in obese ([address] 10/11/2012    avg 150, symptoms of hypoglycemia 90    Diastolic heart failure ([facility]     Dyspnea     Dyspnea 2/10/2016    Edema 10/11/2012    Elevated liver function tests 8/20/2015    Hypertension 10/11/2012    Hyponatremia 1/21/2015    Morbid obesity ([facility]     BMI 43.54    JORJE (obstructive sleep apnea) 10/11/2012    cpap    Seborrheic dermatitis 1/16/2013    Seborrheic dermatitis       Past Surgical History:   Procedure Laterality Date    CARDIAC CATHETERIZATION  2016    3 stent    CATARACT REMOVAL Bilateral 2017    [address]      Not in a hospital admission.   Allergies   Allergen Reactions    Amlodipine Shortness Of Breath    Iodine Shortness Of Breath    Metformin Shortness Of Breath    Ranolazine Other (See Comments)     Lips and tongue numbness    Spironolactone Other (See Comments)     Potassium level went really high  Increased potassium    Hydrocodone-Acetaminophen Other (See Comments)     Pt states that if he takes this  The medication makes him feel like he is going crazy    Omeprazole Other (See Comments)     Caused drastic drop in blood sugar      Social History     Tobacco Use    Smoking status: Former     Packs/day: 0.50     Years: 2.00     Pack years: 1.00     Types: Cigarettes     Quit date: 1/1/1962     Years since quittin.6    Smokeless tobacco: Never   Substance Use Topics    Alcohol use: No      Family History   Problem Relation Age of Onset    Cancer Father         lung    No Known Problems Sister     No Known Problems Brother     Hypertension Father     No Known Problems Sister     Hypertension Mother       Review of Systems  Cardiovascular: positive for dyspnea and exertional chest pressure/discomfort    Objective:     Patient Vitals for the past 8 hrs:   BP Temp Temp src Pulse Resp SpO2 Height Weight   22 1200 (!) 157/67 97.8 °F (36.6 °C) Oral 88 20 97 % 6' 1\" (1.854 m) (!) 324 lb (147 kg)     No intake/output data recorded. No intake/output data recorded. Physical Exam:    Physical Examination: General appearance - Appearance: in mild to moderate distress.    Neck/lymph - supple, no significant adenopathy  Chest/CV - clear to auscultation, no wheezes, rales or rhonchi, symmetric air entry, undiscernable  Heart - normal rate, regular rhythm, normal S1, S2, no murmurs, rubs, clicks or gallops  Abdomen/GI - soft, nontender, nondistended, no masses or organomegaly   Musculoskeletal - no joint tenderness, deformity or swelling  Extremities - pedal edema 1 +  Skin - normal coloration and turgor, no rashes, no suspicious skin lesions noted      Data included below includes an independent review by me    ECG: unchanged from previous tracings     Data Review:     Recent Results (from the past 24 hour(s))   EKG 12 Lead    Collection Time: 22 12:01 PM   Result Value Ref Range    Ventricular Rate 87 BPM    Atrial Rate 87 BPM    P-R Interval 272 ms    QRS Duration 114 ms    Q-T Interval 392 ms    QTc Calculation (Bazett) 471 ms    P Axis 73 degrees    R Axis 10 degrees    T Axis 123 degrees    Diagnosis Sinus rhythm with 1st degree A-V block    CBC    Collection Time: 22 12:11 PM   Result Value Ref Range    WBC 6.2 4.3 - 11.1 K/uL    RBC 3.09 (L) 4.23 - 5.6 M/uL    Hemoglobin 9.6 (L) 13.6 - 17.2 g/dL Hematocrit 30.6 (L) 41.1 - 50.3 %    MCV 99.0 (H) 79.6 - 97.8 FL    MCH 31.1 26.1 - 32.9 PG    MCHC 31.4 31.4 - 35.0 g/dL    RDW 15.1 (H) 11.9 - 14.6 %    Platelets 003 412 - 955 K/uL    MPV 12.0 9.4 - 12.3 FL    nRBC 0.00 0.0 - 0.2 K/uL   Comprehensive Metabolic Panel    Collection Time: 08/12/22 12:11 PM   Result Value Ref Range    Sodium 133 (L) 138 - 145 mmol/L    Potassium 5.9 (H) 3.5 - 5.1 mmol/L    Chloride 108 (H) 98 - 107 mmol/L    CO2 18 (L) 21 - 32 mmol/L    Anion Gap 7 7 - 16 mmol/L    Glucose 395 (H) 65 - 100 mg/dL    BUN 88 (H) 8 - 23 MG/DL    Creatinine 3.80 (H) 0.8 - 1.5 MG/DL    GFR African American 20 (L) >60 ml/min/1.73m2    GFR Non- 16 (L) >60 ml/min/1.73m2    Calcium 9.2 8.3 - 10.4 MG/DL    Total Bilirubin 0.5 0.2 - 1.1 MG/DL    ALT 27 12 - 65 U/L    AST 60 (H) 15 - 37 U/L    Alk Phosphatase 69 50 - 136 U/L    Total Protein 7.0 6.3 - 8.2 g/dL    Albumin 2.8 (L) 3.2 - 4.6 g/dL    Globulin 4.2 (H) 2.3 - 3.5 g/dL    Albumin/Globulin Ratio 0.7 (L) 1.2 - 3.5     Troponin    Collection Time: 08/12/22 12:11 PM   Result Value Ref Range    Troponin, High Sensitivity 25.5 (H) 0 - 14 pg/mL   Lipase    Collection Time: 08/12/22 12:11 PM   Result Value Ref Range    Lipase 259 73 - 393 U/L   Magnesium    Collection Time: 08/12/22 12:11 PM   Result Value Ref Range    Magnesium 2.6 (H) 1.8 - 2.4 mg/dL     CXR shows pulmonary edema. Assessment:       Patient is admitted with a recurrence of his fluid overload and edema. As in the narrative of this his initial weight on admission over a week ago was 350 pounds we diuresed him down to 324 pounds he is now back at 346 pounds with recurrent symptoms. I believe that he is going to not be able to be managed short of dialysis. Intravenous diuretics and certainly p.o. diuretics are not going to be effective in managing his fluid status. He now also has an elevated troponin that raises the specter of possibly ischemic heart disease issues.   We will again involve nephrology with his care but our direct question will be since volume and fluid management is not possible in an outpatient setting with medications is inappropriate candidate to consider for dialysis.   This will then allow us to proceed with consideration of left heart cath for his elevated troponin and his complaints of chest pain  [     MD Loni Lees

## 2022-08-12 NOTE — ED PROVIDER NOTES
[facility] Emergency Department Provider Note                   PCP:                Jordon Cox MD               Age: 79 y.o. Sex: male     No diagnosis found. DISPOSITION         New Prescriptions    No medications on file       Orders Placed This Encounter   Procedures    XR CHEST (2 VW)    CBC    Comprehensive Metabolic Panel    Troponin    Lipase    Magnesium    proBNP, N-TERMINAL    Cardiac Monitor    Pulse Oximetry    EKG 12 Lead    Saline lock IV         Robbin Montes is a 79 y.o. male who presents to the Emergency Department with chief complaint of    Chief Complaint   Patient presents with    Chest Pain      79year-old gentleman presents to the ER for 2 days of exertional dyspnea, onset yesterday evening. Patient states this feels just like when he had 2 stents placed in his LAD some years ago. He did not have chest pain with this today, or then when the stents were placed either, just exertional dyspnea. Patient has a history of heart failure with preserved ejection fraction, just got out of the hospital on 8 August following a 6-day admission for diuresis. They removed to 21 pounds of fluid. Breathing seemed okay for the past few days, however last night he noticed just making a short trip from the bed to the bathroom got him pretty winded. He did have to sleep propped up in the recliner last night but has been flat in bed since the recent discharge. No chest pain, no pressure, no dyspnea, no nausea. Patient does have a history of asthma and uses an inhaler once a day in the morning, he does have a rescue inhaler but has not used it since he has not been wheezing. There is no cough or fever. Patient states his leg edema is definitely better than when he was admitted to the hospital on August 2. Review of Systems   All other systems reviewed and are negative.       Past Medical History:   Diagnosis Date    Acquired hypothyroidism 4/8/2016    Anemia 1/21/2015    Anemia in chronic kidney disease 4/8/2016    Asthma     Chronic kidney disease     stage 4 kidney disease- Dr. Yadiel Floyd    CKD (chronic kidney disease) 10/11/2012    Coronary atherosclerosis of native coronary vessel 2/10/2016    Diabetes mellitus type 2 in obese ([facility] 10/11/2012    avg 150, symptoms of hypoglycemia 90    Diastolic heart failure ([facility]     Dyspnea     Dyspnea 2/10/2016    Edema 10/11/2012    Elevated liver function tests 8/20/2015    Hypertension 10/11/2012    Hyponatremia 1/21/2015    Morbid obesity ([facility]     BMI 43.54    JORJE (obstructive sleep apnea) 10/11/2012    cpap    Seborrheic dermatitis 1/16/2013    Seborrheic dermatitis         Past Surgical History:   Procedure Laterality Date    CARDIAC CATHETERIZATION  2016    3 stent    CATARACT REMOVAL Bilateral 2017    HERNIA REPAIR  1957        Family History   Problem Relation Age of Onset    Cancer Father         lung    No Known Problems Sister     No Known Problems Brother     Hypertension Father     No Known Problems Sister     Hypertension Mother         Social Connections: Not on file        Allergies   Allergen Reactions    Amlodipine Shortness Of Breath    Iodine Shortness Of Breath    Metformin Shortness Of Breath    Ranolazine Other (See Comments)     Lips and tongue numbness    Spironolactone Other (See Comments)     Potassium level went really high  Increased potassium    Hydrocodone-Acetaminophen Other (See Comments)     Pt states that if he takes this  The medication makes him feel like he is going crazy    Omeprazole Other (See Comments)     Caused drastic drop in blood sugar        Vitals signs and nursing note reviewed. Patient Vitals for the past 4 hrs:   Temp Pulse Resp BP SpO2   08/12/22 1200 97.8 °F (36.6 °C) 88 20 (!) 157/67 97 %          Physical Exam  Vitals and nursing note reviewed. Constitutional:       General: He is not in acute distress. Appearance: Normal appearance. He is obese. He is not ill-appearing.    HENT: Head: Normocephalic and atraumatic. Right Ear: External ear normal.      Left Ear: External ear normal.      Nose: Nose normal. No rhinorrhea. Eyes:      General: No scleral icterus. Right eye: No discharge. Left eye: No discharge. Extraocular Movements: Extraocular movements intact. Conjunctiva/sclera: Conjunctivae normal.      Pupils: Pupils are equal, round, and reactive to light. Cardiovascular:      Rate and Rhythm: Normal rate. Pulmonary:      Effort: Pulmonary effort is normal. No respiratory distress. Breath sounds: Normal breath sounds. No wheezing, rhonchi or rales. Abdominal:      General: Abdomen is flat. Palpations: Abdomen is soft. Tenderness: There is no abdominal tenderness. There is no guarding or rebound. Musculoskeletal:         General: No swelling, tenderness or signs of injury. Normal range of motion. Cervical back: Normal range of motion and neck supple. No rigidity. Right lower leg: Edema present. Left lower leg: Edema present. Skin:     General: Skin is warm and dry. Coloration: Skin is not jaundiced or pale. Neurological:      General: No focal deficit present. Mental Status: He is alert and oriented to person, place, and time. Gait: Gait normal.   Psychiatric:         Behavior: Behavior normal.        MDM  Number of Diagnoses or Management Options  Chronic heart failure with preserved ejection fraction ([address]): established, worsening  Dyspnea on exertion: new, needed workup  Stage 4 chronic kidney disease ([address]): established, worsening  Diagnosis management comments: Exertional dyspnea without chest pain which patient likens to his anginal equivalent, just recently discharged after significant diuresis, yet still short of breath. Chest x-ray looks clear and lungs sound pretty clear baking the question if this may be something other than just his CHF.   Will discuss with cardiology, EKG is negative for acute coronary syndrome, troponin minimally elevated 25.5 which is actually probably very reasonable considering his heart failure       Amount and/or Complexity of Data Reviewed  Clinical lab tests: reviewed and ordered (Results Include:    Recent Results (from the past 24 hour(s))  -EKG 12 Lead:   Collection Time: 08/12/22 12:01 PM       Result                      Value             Ref Range           Ventricular Rate            87                BPM                 Atrial Rate                 87                BPM                 P-R Interval                272               ms                  QRS Duration                114               ms                  Q-T Interval                392               ms                  QTc Calculation (Bazet*     471               ms                  P Axis                      73                degrees             R Axis                      10                degrees             T Axis                      123               degrees             Diagnosis                                                     Sinus rhythm with 1st degree A-V block  -CBC:   Collection Time: 08/12/22 12:11 PM       Result                      Value             Ref Range           WBC                         6.2               4.3 - 11.1 K*       RBC                         3.09 (L)          4.23 - 5.6 M*       Hemoglobin                  9.6 (L)           13.6 - 17.2 *       Hematocrit                  30.6 (L)          41.1 - 50.3 %       MCV                         99.0 (H)          79.6 - 97.8 *       MCH                         31.1              26.1 - 32.9 *       MCHC                        31.4              31.4 - 35.0 *       RDW                         15.1 (H)          11.9 - 14.6 %       Platelets                   210               150 - 450 K/*       MPV                         12.0              9.4 - 12.3 FL       nRBC                        0.00              0.0 - 0.2 K/*  -Comprehensive Metabolic Panel:   Collection Time: 08/12/22 12:11 PM       Result                      Value             Ref Range           Sodium                      133 (L)           138 - 145 mm*       Potassium                   5.9 (H)           3.5 - 5.1 mm*       Chloride                    108 (H)           98 - 107 mmo*       CO2                         18 (L)            21 - 32 mmol*       Anion Gap                   7                 7 - 16 mmol/L       Glucose                     395 (H)           65 - 100 mg/*       BUN                         88 (H)            8 - 23 MG/DL        Creatinine                  3.80 (H)          0.8 - 1.5 MG*       GFR         20 (L)            >60 ml/min/1*       GFR Non- Americ*     16 (L)            >60 ml/min/1*       Calcium                     9.2               8.3 - 10.4 M*       Total Bilirubin             0.5               0.2 - 1.1 MG*       ALT                         27                12 - 65 U/L         AST                         60 (H)            15 - 37 U/L         Alk Phosphatase             69                50 - 136 U/L        Total Protein               7.0               6.3 - 8.2 g/*       Albumin                     2.8 (L)           3.2 - 4.6 g/*       Globulin                    4.2 (H)           2.3 - 3.5 g/*       Albumin/Globulin Ratio      0.7 (L)           1.2 - 3.5      -Troponin:   Collection Time: 08/12/22 12:11 PM       Result                      Value             Ref Range           Troponin, High Sensiti*     25.5 (H)          0 - 14 pg/mL   -Lipase:   Collection Time: 08/12/22 12:11 PM       Result                      Value             Ref Range           Lipase                      259               73 - 393 U/L   -Magnesium:   Collection Time: 08/12/22 12:11 PM       Result                      Value             Ref Range           Magnesium                   2.6 (H)           1.8 - 2.4 mg*    )  Tests in the radiology section of CPT®: reviewed and ordered  Decide to obtain previous medical records or to obtain history from someone other than the patient: yes  Obtain history from someone other than the patient: yes  Discuss the patient with other providers: yes (cardiology)    Risk of Complications, Morbidity, and/or Mortality  Presenting problems: moderate  Diagnostic procedures: low  Management options: low    Patient Progress  Patient progress: improved      Procedures    Labs Reviewed   CBC - Abnormal; Notable for the following components:       Result Value    RBC 3.09 (*)     Hemoglobin 9.6 (*)     Hematocrit 30.6 (*)     MCV 99.0 (*)     RDW 15.1 (*)     All other components within normal limits   COMPREHENSIVE METABOLIC PANEL - Abnormal; Notable for the following components:    Sodium 133 (*)     Potassium 5.9 (*)     Chloride 108 (*)     CO2 18 (*)     Glucose 395 (*)     BUN 88 (*)     Creatinine 3.80 (*)     GFR  20 (*)     GFR Non- 16 (*)     AST 60 (*)     Albumin 2.8 (*)     Globulin 4.2 (*)     Albumin/Globulin Ratio 0.7 (*)     All other components within normal limits   TROPONIN - Abnormal; Notable for the following components:    Troponin, High Sensitivity 25.5 (*)     All other components within normal limits   MAGNESIUM - Abnormal; Notable for the following components:    Magnesium 2.6 (*)     All other components within normal limits   LIPASE   TROPONIN   PROBNP, N-TERMINAL        XR CHEST (2 VW)   Final Result   No acute cardiopulmonary abnormality. [place] Coma Scale  Eye Opening: Spontaneous  Best Verbal Response: Oriented  Best Motor Response: Obeys commands  [place] Coma Scale Score: 15                     Voice dictation software was used during the making of this note. This software is not perfect and grammatical and other typographical errors may be present. This note has not been completely proofread for errors.        Rico Gorman MD  08/12/22 1537

## 2022-08-12 NOTE — PROGRESS NOTES
TRANSFER - IN REPORT:    Verbal report received from [facility] on [employer]  being received from [facility] for routine progression of patient care      Report consisted of patient's Situation, Background, Assessment and   Recommendations(SBAR). Information from the following report(s) Recent Results was reviewed with the receiving nurse. Opportunity for questions and clarification was provided.

## 2022-08-12 NOTE — ED TRIAGE NOTES
Patient arrives in wheelchair to triage with mask in place. Reports around midnight began having chest pain and shortness of breath. Reports significant cardiac hx. Reports he was recently discharged from hospital after fluid overload, lost 21lbs.
oriented to person, place, time and situation

## 2022-08-12 NOTE — TELEPHONE ENCOUNTER
Pain and pressure in left side of chest with racing, pounding heart beat and increased SOB on any exertion since last night. Feels heart is going to jump out of his chest.  Very bad SOB when walking from room to room in home. No nausea, diaphoresis, or radiation of chest pain. Symptoms similar to symptoms prior to LAD KAMLESH placement in past.   Does not have NTG. Took ASA 81 mg, this morning. D/C'd from [facility] on 8/8/22 after hospitalization for acute on chronic heart failure with preserved EF and volume overload. Advised patient to call EMS and ask to be taken to [facility] ER, now. Advised patient to take 3 more ASA 81 mg, now. Patient verbalized understanding, but states son will take him to [facility] ER. He asks for Dr. Bruno Linares to be notified that he is going to [facility] ER with chest pain and SOB .

## 2022-08-12 NOTE — PROGRESS NOTES
Patient oriented to room and call light. Patient had no phone in the room and one was provided. Patient aware to call the call light for any needs.

## 2022-08-13 PROBLEM — M1A.30X0 CHRONIC GOUT DUE TO RENAL IMPAIRMENT WITHOUT TOPHUS: Status: ACTIVE | Noted: 2022-04-25

## 2022-08-13 PROBLEM — I35.0 MODERATE AORTIC STENOSIS BY PRIOR ECHOCARDIOGRAM: Status: ACTIVE | Noted: 2022-08-13

## 2022-08-13 PROBLEM — E66.01 OBESITY, MORBID (HCC): Status: ACTIVE | Noted: 2018-04-18

## 2022-08-13 PROBLEM — I50.32 CHRONIC HEART FAILURE WITH PRESERVED EJECTION FRACTION (HFPEF) (HCC): Status: ACTIVE | Noted: 2022-08-03

## 2022-08-13 PROBLEM — D50.9 IDA (IRON DEFICIENCY ANEMIA): Status: ACTIVE | Noted: 2022-08-13

## 2022-08-13 PROBLEM — E11.21 TYPE 2 DIABETES WITH NEPHROPATHY (HCC): Status: ACTIVE | Noted: 2018-04-18

## 2022-08-13 LAB
ANION GAP SERPL CALC-SCNC: 4 MMOL/L (ref 7–16)
BUN SERPL-MCNC: 85 MG/DL (ref 8–23)
CALCIUM SERPL-MCNC: 9.2 MG/DL (ref 8.3–10.4)
CHLORIDE SERPL-SCNC: 113 MMOL/L (ref 98–107)
CO2 SERPL-SCNC: 23 MMOL/L (ref 21–32)
CREAT SERPL-MCNC: 3.6 MG/DL (ref 0.8–1.5)
EKG ATRIAL RATE: 82 BPM
EKG DIAGNOSIS: NORMAL
EKG P AXIS: 76 DEGREES
EKG P-R INTERVAL: 264 MS
EKG Q-T INTERVAL: 416 MS
EKG QRS DURATION: 116 MS
EKG QTC CALCULATION (BAZETT): 486 MS
EKG R AXIS: 8 DEGREES
EKG T AXIS: 119 DEGREES
EKG VENTRICULAR RATE: 82 BPM
EST. AVERAGE GLUCOSE BLD GHB EST-MCNC: 143 MG/DL
FERRITIN SERPL-MCNC: 345 NG/ML (ref 8–388)
FOLATE SERPL-MCNC: 20.9 NG/ML (ref 3.1–17.5)
GLUCOSE BLD STRIP.AUTO-MCNC: 129 MG/DL (ref 65–100)
GLUCOSE BLD STRIP.AUTO-MCNC: 200 MG/DL (ref 65–100)
GLUCOSE BLD STRIP.AUTO-MCNC: 246 MG/DL (ref 65–100)
GLUCOSE BLD STRIP.AUTO-MCNC: 266 MG/DL (ref 65–100)
GLUCOSE SERPL-MCNC: 136 MG/DL (ref 65–100)
HBA1C MFR BLD: 6.6 % (ref 4.8–5.6)
IRON SATN MFR SERPL: 8 %
IRON SERPL-MCNC: 21 UG/DL (ref 35–150)
POTASSIUM SERPL-SCNC: 4.4 MMOL/L (ref 3.5–5.1)
SERVICE CMNT-IMP: ABNORMAL
SODIUM SERPL-SCNC: 140 MMOL/L (ref 138–145)
TIBC SERPL-MCNC: 252 UG/DL (ref 250–450)
VIT B12 SERPL-MCNC: 289 PG/ML (ref 193–986)

## 2022-08-13 PROCEDURE — 5A09357 ASSISTANCE WITH RESPIRATORY VENTILATION, LESS THAN 24 CONSECUTIVE HOURS, CONTINUOUS POSITIVE AIRWAY PRESSURE: ICD-10-PCS | Performed by: INTERNAL MEDICINE

## 2022-08-13 PROCEDURE — 80048 BASIC METABOLIC PNL TOTAL CA: CPT

## 2022-08-13 PROCEDURE — 2140000001 HC CVICU INTERMEDIATE R&B

## 2022-08-13 PROCEDURE — 94660 CPAP INITIATION&MGMT: CPT

## 2022-08-13 PROCEDURE — 6370000000 HC RX 637 (ALT 250 FOR IP): Performed by: INTERNAL MEDICINE

## 2022-08-13 PROCEDURE — 93005 ELECTROCARDIOGRAM TRACING: CPT | Performed by: INTERNAL MEDICINE

## 2022-08-13 PROCEDURE — 36415 COLL VENOUS BLD VENIPUNCTURE: CPT

## 2022-08-13 PROCEDURE — 82728 ASSAY OF FERRITIN: CPT

## 2022-08-13 PROCEDURE — 2580000003 HC RX 258: Performed by: NURSE PRACTITIONER

## 2022-08-13 PROCEDURE — 83036 HEMOGLOBIN GLYCOSYLATED A1C: CPT

## 2022-08-13 PROCEDURE — 99233 SBSQ HOSP IP/OBS HIGH 50: CPT | Performed by: INTERNAL MEDICINE

## 2022-08-13 PROCEDURE — 6370000000 HC RX 637 (ALT 250 FOR IP): Performed by: FAMILY MEDICINE

## 2022-08-13 PROCEDURE — 82746 ASSAY OF FOLIC ACID SERUM: CPT

## 2022-08-13 PROCEDURE — 6370000000 HC RX 637 (ALT 250 FOR IP): Performed by: NURSE PRACTITIONER

## 2022-08-13 PROCEDURE — 82962 GLUCOSE BLOOD TEST: CPT

## 2022-08-13 PROCEDURE — 6360000002 HC RX W HCPCS: Performed by: NURSE PRACTITIONER

## 2022-08-13 PROCEDURE — 83540 ASSAY OF IRON: CPT

## 2022-08-13 PROCEDURE — 82607 VITAMIN B-12: CPT

## 2022-08-13 RX ORDER — INSULIN GLARGINE 100 [IU]/ML
0.15 INJECTION, SOLUTION SUBCUTANEOUS 2 TIMES DAILY
Status: DISCONTINUED | OUTPATIENT
Start: 2022-08-13 | End: 2022-08-13

## 2022-08-13 RX ORDER — DEXTROSE MONOHYDRATE 100 MG/ML
INJECTION, SOLUTION INTRAVENOUS CONTINUOUS PRN
Status: DISCONTINUED | OUTPATIENT
Start: 2022-08-13 | End: 2022-08-15 | Stop reason: HOSPADM

## 2022-08-13 RX ORDER — INSULIN GLARGINE 100 [IU]/ML
50 INJECTION, SOLUTION SUBCUTANEOUS NIGHTLY
Status: DISCONTINUED | OUTPATIENT
Start: 2022-08-13 | End: 2022-08-13

## 2022-08-13 RX ORDER — DEXTROSE MONOHYDRATE 100 MG/ML
INJECTION, SOLUTION INTRAVENOUS CONTINUOUS PRN
Status: DISCONTINUED | OUTPATIENT
Start: 2022-08-13 | End: 2022-08-13 | Stop reason: SDUPTHER

## 2022-08-13 RX ORDER — DIPHENHYDRAMINE HYDROCHLORIDE 50 MG/ML
25 INJECTION INTRAMUSCULAR; INTRAVENOUS
Status: ACTIVE | OUTPATIENT
Start: 2022-08-13 | End: 2022-08-13

## 2022-08-13 RX ORDER — CYANOCOBALAMIN 1000 UG/ML
1000 INJECTION INTRAMUSCULAR; SUBCUTANEOUS ONCE
Status: DISCONTINUED | OUTPATIENT
Start: 2022-08-14 | End: 2022-08-15 | Stop reason: HOSPADM

## 2022-08-13 RX ORDER — ISOSORBIDE MONONITRATE 30 MG/1
30 TABLET, EXTENDED RELEASE ORAL DAILY
Status: DISCONTINUED | OUTPATIENT
Start: 2022-08-13 | End: 2022-08-15 | Stop reason: HOSPADM

## 2022-08-13 RX ORDER — INSULIN LISPRO 100 [IU]/ML
0.05 INJECTION, SOLUTION INTRAVENOUS; SUBCUTANEOUS
Status: DISCONTINUED | OUTPATIENT
Start: 2022-08-13 | End: 2022-08-14

## 2022-08-13 RX ORDER — INSULIN LISPRO 100 [IU]/ML
0-4 INJECTION, SOLUTION INTRAVENOUS; SUBCUTANEOUS NIGHTLY
Status: DISCONTINUED | OUTPATIENT
Start: 2022-08-13 | End: 2022-08-14

## 2022-08-13 RX ORDER — ERGOCALCIFEROL 1.25 MG/1
50000 CAPSULE ORAL WEEKLY
Status: DISCONTINUED | OUTPATIENT
Start: 2022-08-13 | End: 2022-08-15 | Stop reason: HOSPADM

## 2022-08-13 RX ORDER — INSULIN LISPRO 100 [IU]/ML
0-4 INJECTION, SOLUTION INTRAVENOUS; SUBCUTANEOUS
Status: DISCONTINUED | OUTPATIENT
Start: 2022-08-13 | End: 2022-08-14

## 2022-08-13 RX ORDER — HYDRALAZINE HYDROCHLORIDE 25 MG/1
50 TABLET, FILM COATED ORAL EVERY 8 HOURS SCHEDULED
Status: DISCONTINUED | OUTPATIENT
Start: 2022-08-13 | End: 2022-08-15 | Stop reason: HOSPADM

## 2022-08-13 RX ORDER — INSULIN GLARGINE 100 [IU]/ML
50 INJECTION, SOLUTION SUBCUTANEOUS 2 TIMES DAILY
Status: DISCONTINUED | OUTPATIENT
Start: 2022-08-13 | End: 2022-08-14

## 2022-08-13 RX ADMIN — FUROSEMIDE 80 MG: 10 INJECTION, SOLUTION INTRAMUSCULAR; INTRAVENOUS at 09:53

## 2022-08-13 RX ADMIN — METOPROLOL SUCCINATE 100 MG: 100 TABLET, EXTENDED RELEASE ORAL at 09:56

## 2022-08-13 RX ADMIN — HYDRALAZINE HYDROCHLORIDE 50 MG: 25 TABLET, FILM COATED ORAL at 21:08

## 2022-08-13 RX ADMIN — ALLOPURINOL 150 MG: 100 TABLET ORAL at 09:55

## 2022-08-13 RX ADMIN — INSULIN LISPRO 1 UNITS: 100 INJECTION, SOLUTION INTRAVENOUS; SUBCUTANEOUS at 18:12

## 2022-08-13 RX ADMIN — SODIUM BICARBONATE 650 MG TABLET 650 MG: at 21:08

## 2022-08-13 RX ADMIN — HYDRALAZINE HYDROCHLORIDE 50 MG: 25 TABLET, FILM COATED ORAL at 09:54

## 2022-08-13 RX ADMIN — ASPIRIN 81 MG: 81 TABLET ORAL at 09:53

## 2022-08-13 RX ADMIN — SODIUM CHLORIDE, PRESERVATIVE FREE 10 ML: 5 INJECTION INTRAVENOUS at 09:57

## 2022-08-13 RX ADMIN — HYDRALAZINE HYDROCHLORIDE 50 MG: 25 TABLET, FILM COATED ORAL at 16:29

## 2022-08-13 RX ADMIN — ISOSORBIDE MONONITRATE 30 MG: 30 TABLET, EXTENDED RELEASE ORAL at 09:54

## 2022-08-13 RX ADMIN — LEVOTHYROXINE SODIUM 88 MCG: 0.09 TABLET ORAL at 09:55

## 2022-08-13 RX ADMIN — INSULIN GLARGINE 25 UNITS: 100 INJECTION, SOLUTION SUBCUTANEOUS at 10:12

## 2022-08-13 RX ADMIN — SODIUM BICARBONATE 650 MG TABLET 650 MG: at 09:56

## 2022-08-13 RX ADMIN — INSULIN LISPRO 8 UNITS: 100 INJECTION, SOLUTION INTRAVENOUS; SUBCUTANEOUS at 18:11

## 2022-08-13 RX ADMIN — FUROSEMIDE 80 MG: 10 INJECTION, SOLUTION INTRAMUSCULAR; INTRAVENOUS at 18:11

## 2022-08-13 RX ADMIN — ERGOCALCIFEROL 50000 UNITS: 1.25 CAPSULE ORAL at 18:58

## 2022-08-13 RX ADMIN — SODIUM CHLORIDE, PRESERVATIVE FREE 10 ML: 5 INJECTION INTRAVENOUS at 20:58

## 2022-08-13 ASSESSMENT — PAIN SCALES - GENERAL
PAINLEVEL_OUTOF10: 0

## 2022-08-13 NOTE — PLAN OF CARE
Problem: Discharge Planning  Goal: Discharge to home or other facility with appropriate resources  Outcome: Progressing  Flowsheets (Taken 8/12/2022 1825)  Discharge to home or other facility with appropriate resources:   Identify barriers to discharge with patient and caregiver   Arrange for needed discharge resources and transportation as appropriate   Identify discharge learning needs (meds, wound care, etc)   Arrange for interpreters to assist at discharge as needed   Refer to discharge planning if patient needs post-hospital services based on physician order or complex needs related to functional status, cognitive ability or social support system     Problem: Pain  Goal: Verbalizes/displays adequate comfort level or baseline comfort level  Outcome: Progressing     Problem: Skin/Tissue Integrity  Goal: Absence of new skin breakdown  Description: 1. Monitor for areas of redness and/or skin breakdown  2. Assess vascular access sites hourly  3. Every 4-6 hours minimum:  Change oxygen saturation probe site  4. Every 4-6 hours:  If on nasal continuous positive airway pressure, respiratory therapy assess nares and determine need for appliance change or resting period.   Outcome: Progressing     Problem: Safety - Adult  Goal: Free from fall injury  Outcome: Progressing  Flowsheets (Taken 8/12/2022 1825)  Free From Fall Injury:   Instruct family/caregiver on patient safety   Based on caregiver fall risk screen, instruct family/caregiver to ask for assistance with transferring infant if caregiver noted to have fall risk factors

## 2022-08-13 NOTE — CONSULTS
[facility] Hospitalist Consult   Admit Date:  2022  1:49 PM   Name:  Minh Rivreo   Age:  79 y.o. Sex:  male  :  1940   MRN:  [id number]   Room:      Presenting Complaint: Chest Pain    Reason(s) for Admission: Dyspnea on exertion [R06.00]  Elevated troponin [R77.8]  Volume overload [E87.70]  Stage 4 chronic kidney disease ([facility] [N18.4]  Chronic heart failure with preserved ejection fraction ([facility] [I50.32]     Hospitalists consulted by Jose Manuel Forte MD for: DM    History of Presenting Illness:   Minh Rivero is a 79 y.o. male with history of Obesity, CKD4, HFpEF, moderate AS, CAD s/p stents, T2DM who was admitted for volume overload. He presented yesterday on 22 with SEARS similar to when he had 2 stents placed in his LAD some years ago. He was discharged on 22 after 6 day admission for diuresis. Per discharge summary, patient insisted on DC despite nephrology recommendations not to discharge. Weight down from 350 lbs to 324 lbs at time of discharge and on day of this admission, up to 346 lbs. Rec'ing IV lasix with improved SOB. No angina. Patient reports DM diagnosed about 10 years ago. Tells me he does not need help managing that he adjusts his lantus and diet per his glucose at home for which he checks his glucose 2-3 times per day. He was on glargine 50 BID but was having hypoglycemic episdoes and reduced regimen to 25 U BID. Review of Systems:  10 systems reviewed and negative except as noted in HPI. Assessment & Plan:     Acute on chronic HFpEF - per cardiology. T2DM - check a1c. Cont lantus 50 BID, add lispro 8 U TID before meals plus low dose SSI given renal failure. Consider GLP1RA rx on discharge if renal fc allows. Goal 140-180 inpatient. Needs lifestyle modifications, weight loss. Add low carbs to diet order. Macrocytic anemia - check anemia labs. CKD4 - renal fc slightly improvd today.  Bcr ~3     JORJE - cpap nightly     Hypothyroidism - synthroid Obesity - adds to complexity. Lifestyle modifications needed. Check cortisol level in AM     Vitamin D def - start ergocalcitriol weekly x8-12wks    Nephrotic range proteinuria 1421 on 22. Perhaps from diabetes. Per nephrology. Discharge Planning:      Per parimy     Diet:  ADULT DIET; Regular; Low Fat/Low Chol/High Fiber/2 gm Na; 1500 ml; No Caffeine; Add Chocolate Glucerna x 2 to each meal please. DVT PPx: scds   Code status: Full Code    Principal Problem:    Volume overload  Active Problems:    Chronic heart failure with preserved ejection fraction (HFpEF) ([facility])    Moderate aortic stenosis by prior echocardiogram    Chronic kidney disease (CKD), stage IV (severe) ([facility])    CAD in native artery  Resolved Problems:    * No resolved hospital problems.  *      Past History:    Past Medical History:   Diagnosis Date    Acquired hypothyroidism 2016    Anemia 2015    Anemia in chronic kidney disease 2016    Asthma     Chronic kidney disease     stage 4 kidney disease- Dr. Stella Looney    CKD (chronic kidney disease) 10/11/2012    Coronary atherosclerosis of native coronary vessel 2/10/2016    Diabetes mellitus type 2 in obese ([address] 10/11/2012    avg 150, symptoms of hypoglycemia 90    Diastolic heart failure ([address]     Dyspnea     Dyspnea 2/10/2016    Edema 10/11/2012    Elevated liver function tests 2015    Hypertension 10/11/2012    Hyponatremia 2015    Morbid obesity ([address]     BMI 43.54    JORJE (obstructive sleep apnea) 10/11/2012    cpap    Seborrheic dermatitis 2013    Seborrheic dermatitis        Past Surgical History:   Procedure Laterality Date    CARDIAC CATHETERIZATION      3 stent    CATARACT REMOVAL Bilateral 2017    HERNIA REPAIR          Social History     Tobacco Use    Smoking status: Former     Packs/day: 0.50     Years: 2.00     Pack years: 1.00     Types: Cigarettes     Quit date: 1962     Years since quittin.6    Smokeless tobacco: Never   Substance IntraVENous Continuous PRN    allopurinol (ZYLOPRIM) tablet 150 mg  150 mg Oral Daily    cloNIDine (CATAPRES) tablet 0.1 mg  0.1 mg Oral BID PRN    insulin glargine (LANTUS) injection vial 50 Units  50 Units SubCUTAneous BID    levothyroxine (SYNTHROID) tablet 88 mcg  88 mcg Oral Daily    metoprolol succinate (TOPROL XL) extended release tablet 100 mg  100 mg Oral Daily    sodium bicarbonate tablet 650 mg  650 mg Oral BID    sodium chloride flush 0.9 % injection 5-40 mL  5-40 mL IntraVENous 2 times per day    sodium chloride flush 0.9 % injection 5-40 mL  5-40 mL IntraVENous PRN    0.9 % sodium chloride infusion   IntraVENous PRN    ondansetron (ZOFRAN-ODT) disintegrating tablet 4 mg  4 mg Oral Q8H PRN    Or    ondansetron (ZOFRAN) injection 4 mg  4 mg IntraVENous Q6H PRN    acetaminophen (TYLENOL) tablet 650 mg  650 mg Oral Q6H PRN    Or    acetaminophen (TYLENOL) suppository 650 mg  650 mg Rectal Q6H PRN    polyethylene glycol (GLYCOLAX) packet 17 g  17 g Oral Daily PRN    furosemide (LASIX) injection 80 mg  80 mg IntraVENous BID    albuterol (PROVENTIL) nebulizer solution 2.5 mg  2.5 mg Nebulization Q6H PRN    mometasone-formoterol (DULERA) 100-5 MCG/ACT inhaler 2 puff  2 puff Inhalation BID    aspirin EC tablet 81 mg  81 mg Oral Daily       Objective:   Patient Vitals for the past 24 hrs:   Temp Pulse Resp BP SpO2   08/13/22 1607 98.1 °F (36.7 °C) 75 18 (!) 150/67 97 %   08/13/22 1126 97.8 °F (36.6 °C) 68 20 (!) 129/59 100 %   08/13/22 0709 98.7 °F (37.1 °C) 69 18 (!) 143/70 98 %   08/13/22 0300 97.9 °F (36.6 °C) 76 18 (!) 148/69 --   08/12/22 2309 97.6 °F (36.4 °C) 83 20 (!) 154/70 96 %   08/12/22 2030 -- -- -- -- 94 %   08/12/22 1945 98.6 °F (37 °C) 78 16 (!) 163/79 94 %   08/12/22 1825 97.8 °F (36.6 °C) 78 20 (!) 172/81 97 %       Oxygen Therapy  SpO2: 97 %  Pulse Oximeter Device Mode: Intermittent  O2 Device: None (Room air)    Estimated body mass index is 43.96 kg/m² as calculated from the following: Height as of this encounter: 6' 1\" (1.854 m). Weight as of this encounter: 333 lb 3.2 oz (151.1 kg). Intake/Output Summary (Last 24 hours) at 8/13/2022 1729  Last data filed at 8/13/2022 1607  Gross per 24 hour   Intake 1390 ml   Output 4400 ml   Net -3010 ml         Physical Exam:    Blood pressure (!) 150/67, pulse 75, temperature 98.1 °F (36.7 °C), temperature source Temporal, resp. rate 18, height 6' 1\" (1.854 m), weight (!) 333 lb 3.2 oz (151.1 kg), SpO2 97 %. General:    Obese. Pale. Head:  Normocephalic, atraumatic  Eyes:  Sclerae appear normal.  Pupils equally round. ENT:  Nares appear normal, no drainage. Moist oral mucosa  Neck:   Trachea midline   CV:   RRR. No m/r/g. No jugular venous distension. Lungs:   Unlabored. Speaking in full sentences. Diminished breath sounds. Abdomen: Bowel sounds present. Soft, nontender, nondistended. Extremities: No cyanosis or clubbing. No edema  Skin:     No rashes and pale coloration. Warm and dry. Neuro:  CN II-XII grossly intact. Sensation intact. A&Ox3  Psych:  Normal mood and irritable affect. I have personally reviewed labs and tests showing:  Recent Labs:  Recent Results (from the past 24 hour(s))   POCT Glucose    Collection Time: 08/12/22  6:34 PM   Result Value Ref Range    POC Glucose 127 (H) 65 - 100 mg/dL    Performed by: Darleen    POCT Glucose    Collection Time: 08/12/22  9:21 PM   Result Value Ref Range    POC Glucose 247 (H) 65 - 100 mg/dL    Performed by:  Alicia    POCT Glucose    Collection Time: 08/13/22  2:55 AM   Result Value Ref Range    POC Glucose 200 (H) 65 - 100 mg/dL    Performed by: Juwan    Basic Metabolic Panel w/ Reflex to MG    Collection Time: 08/13/22  6:25 AM   Result Value Ref Range    Sodium 140 138 - 145 mmol/L    Potassium 4.4 3.5 - 5.1 mmol/L    Chloride 113 (H) 98 - 107 mmol/L    CO2 23 21 - 32 mmol/L    Anion Gap 4 (L) 7 - 16 mmol/L    Glucose 136 (H) 65 - 100 mg/dL    BUN 85 (H) 8 - 23 MG/DL    Creatinine 3.60 (H) 0.8 - 1.5 MG/DL    GFR African American 21 (L) >60 ml/min/1.73m2    GFR Non- 17 (L) >60 ml/min/1.73m2    Calcium 9.2 8.3 - 10.4 MG/DL   EKG 12 Lead    Collection Time: 08/13/22  8:44 AM   Result Value Ref Range    Ventricular Rate 82 BPM    Atrial Rate 82 BPM    P-R Interval 264 ms    QRS Duration 116 ms    Q-T Interval 416 ms    QTc Calculation (Bazett) 486 ms    P Axis 76 degrees    R Axis 8 degrees    T Axis 119 degrees    Diagnosis Sinus rhythm with 1st degree A-V block    POCT Glucose    Collection Time: 08/13/22 11:31 AM   Result Value Ref Range    POC Glucose 266 (H) 65 - 100 mg/dL    Performed by: Mari    POCT Glucose    Collection Time: 08/13/22  4:12 PM   Result Value Ref Range    POC Glucose 246 (H) 65 - 100 mg/dL    Performed by: Mari        I have personally reviewed imaging studies showing:  XR CHEST (2 VW)    Result Date: 8/12/2022  EXAMINATION: XR CHEST (2 VW) 8/12/2022 12:34 PM ACCESSION NUMBER: [id number] COMPARISON: Chest radiograph dated 8/2/2022 INDICATION: Chest Pain TECHNIQUE: PA and lateral views of the chest were obtained. FINDINGS: Support Devices: *  None Cardiac Silhouette: Borderline cardiomegaly, unchanged. Mediastinum: Normal mediastinal contours. Aortic arch calcifications. Lungs: No airspace consolidation. No pneumothorax or sizable pleural effusion. Upper Abdomen: Normal Miscellaneous: No fracture or suspicious osseous lesion. No acute cardiopulmonary abnormality. Echocardiogram:  08/02/22    TRANSTHORACIC ECHOCARDIOGRAM (TTE) COMPLETE (CONTRAST/BUBBLE/3D PRN) 08/04/2022  3:56 PM (Final)    Interpretation Summary  Formatting of this result is different from the original.      Left Ventricle: Normal left ventricular systolic function with a visually estimated EF of 60 - 65%. Left ventricle size is normal. Mildly increased wall thickness. Normal wall motion.  Abnormal diastolic function. Aortic Valve: Moderately calcified cusp. Mild regurgitation. Moderate stenosis of the aortic valve. AV mean gradient is 26 mmHg. AV peak gradient is 43 mmHg. LVOT:AV VTI Index is 0.35. Mitral Valve: Mild regurgitation. Tricuspid Valve: Moderately elevated RVSP. The estimated RVSP is 51 mmHg. Left Atrium: Left atrium is moderately dilated. Right Atrium: Right atrium is moderately dilated. Technical qualifiers: Color flow Doppler was performed and pulse wave and/or continuous wave Doppler was performed. Contrast used: Definity. RVSP 51 mmHg    Signed by: Danyel Alarcon MD on 8/4/2022  3:56 PM        Signed:  Kiya Jimenez DO, DO    Part of this note may have been written by using a voice dictation software. The note has been proof read but may still contain some grammatical/other typographical errors.

## 2022-08-13 NOTE — CONSULTS
GEN GENERIC H&P/CONSULT    Subjective:   Patient is a 79 y.o. male with CKD 4, CAD, CHF with preserved EF, IIDM. He presented with increasing SOB, weight gain about 25 pounds over a week . Readmitted for decompensated CHF. This his second admission in a week   Pt was on Demadex 10 mg Tid prior to this admission. Now on IV Lasix with improvement.   Troponin mildly elevated    Latest Reference Range & Units 8/12/22 12:11   Sodium 138 - 145 mmol/L 133 (L)   Potassium 3.5 - 5.1 mmol/L 5.9 (H)   Chloride 98 - 107 mmol/L 108 (H)   CO2 21 - 32 mmol/L 18 (L)   BUN,BUNPL 8 - 23 MG/DL 88 (H)   Creatinine 0.8 - 1.5 MG/DL 3.80 (H)   Anion Gap 7 - 16 mmol/L 7   GFR Non-African American >60 ml/min/1.73m2 16 (L)   GFR African American >60 ml/min/1.73m2 20 (L)   Magnesium 1.8 - 2.4 mg/dL 2.6 (H)   GLUCOSE - FASTING 65 - 100 mg/dL 395 (H)   CALCIUM, SERUM, 299113 8.3 - 10.4 MG/DL 9.2   ALBUMIN/GLOBULIN RATIO 1.2 - 3.5   0.7 (L)   Total Protein 6.3 - 8.2 g/dL 7.0      Latest Reference Range & Units 7/2/21 11:26 8/2/22 10:50 8/3/22 06:20 8/4/22 04:00 8/5/22 05:49 8/7/22 04:45 8/8/22 04:55 8/12/22 12:11 8/13/22 06:25   Creatinine 0.8 - 1.5 MG/DL 3.02 (H) 3.40 (H) 3.60 (H) 3.80 (H) 4.20 (H) 4.77 (H) 4.70 (H) 3.80 (H) 3.60 (H)         Past Medical History:   Diagnosis Date    Acquired hypothyroidism 4/8/2016    Anemia 1/21/2015    Anemia in chronic kidney disease 4/8/2016    Asthma     Chronic kidney disease     stage 4 kidney disease- Dr. Parviz Wilkes    CKD (chronic kidney disease) 10/11/2012    Coronary atherosclerosis of native coronary vessel 2/10/2016    Diabetes mellitus type 2 in obese ([facility] [address] 10/11/2012    avg 150, symptoms of hypoglycemia 90    Diastolic heart failure (HCC)     Dyspnea     Dyspnea 2/10/2016    Edema 10/11/2012    Elevated liver function tests 8/20/2015    Hypertension 10/11/2012    Hyponatremia 1/21/2015    Morbid obesity (HCC)     BMI 43.54    JORJE (obstructive sleep apnea) 10/11/2012    cpap    Seborrheic dermatitis Take 10 mg by mouth 3 times daily 10/5/21 8/8/22  Ar Automatic Reconciliation     Allergies   Allergen Reactions    Amlodipine Shortness Of Breath    Iodine Shortness Of Breath    Metformin Shortness Of Breath    Ranolazine Other (See Comments)     Lips and tongue numbness    Spironolactone Other (See Comments)     Potassium level went really high  Increased potassium    Hydrocodone-Acetaminophen Other (See Comments)     Pt states that if he takes this  The medication makes him feel like he is going crazy    Omeprazole Other (See Comments)     Caused drastic drop in blood sugar      Social History     Tobacco Use    Smoking status: Former     Packs/day: 0.50     Years: 2.00     Pack years: 1.00     Types: Cigarettes     Quit date: 1962     Years since quittin.6    Smokeless tobacco: Never   Substance Use Topics    Alcohol use: No      Family History   Problem Relation Age of Onset    Cancer Father         lung    No Known Problems Sister     No Known Problems Brother     Hypertension Father     No Known Problems Sister     Hypertension Mother           Review of Systems  As above     Objective:       BP (!) 143/70   Pulse 69   Temp 98.7 °F (37.1 °C) (Temporal)   Resp 18   Ht 6' 1\" (1.854 m)   Wt (!) 333 lb 3.2 oz (151.1 kg)   SpO2 98%   BMI 43.96 kg/m²     701 -  1900  In: 240 [P.O.:240]  Out: -    190 -  0700  In: 250 [P.O.:250]  Out: 3200 [Urine:3200]      Data Review:     Recent Results (from the past 24 hour(s))   EKG 12 Lead    Collection Time: 22 12:01 PM   Result Value Ref Range    Ventricular Rate 87 BPM    Atrial Rate 87 BPM    P-R Interval 272 ms    QRS Duration 114 ms    Q-T Interval 392 ms    QTc Calculation (Bazett) 471 ms    P Axis 73 degrees    R Axis 10 degrees    T Axis 123 degrees    Diagnosis Sinus rhythm with 1st degree A-V block    CBC    Collection Time: 22 12:11 PM   Result Value Ref Range    WBC 6.2 4.3 - 11.1 K/uL    RBC 3.09 (L) 4.23 - 5.6 M/uL    Hemoglobin 9.6 (L) 13.6 - 17.2 g/dL    Hematocrit 30.6 (L) 41.1 - 50.3 %    MCV 99.0 (H) 79.6 - 97.8 FL    MCH 31.1 26.1 - 32.9 PG    MCHC 31.4 31.4 - 35.0 g/dL    RDW 15.1 (H) 11.9 - 14.6 %    Platelets 223 753 - 569 K/uL    MPV 12.0 9.4 - 12.3 FL    nRBC 0.00 0.0 - 0.2 K/uL   Comprehensive Metabolic Panel    Collection Time: 08/12/22 12:11 PM   Result Value Ref Range    Sodium 133 (L) 138 - 145 mmol/L    Potassium 5.9 (H) 3.5 - 5.1 mmol/L    Chloride 108 (H) 98 - 107 mmol/L    CO2 18 (L) 21 - 32 mmol/L    Anion Gap 7 7 - 16 mmol/L    Glucose 395 (H) 65 - 100 mg/dL    BUN 88 (H) 8 - 23 MG/DL    Creatinine 3.80 (H) 0.8 - 1.5 MG/DL    GFR African American 20 (L) >60 ml/min/1.73m2    GFR Non- 16 (L) >60 ml/min/1.73m2    Calcium 9.2 8.3 - 10.4 MG/DL    Total Bilirubin 0.5 0.2 - 1.1 MG/DL    ALT 27 12 - 65 U/L    AST 60 (H) 15 - 37 U/L    Alk Phosphatase 69 50 - 136 U/L    Total Protein 7.0 6.3 - 8.2 g/dL    Albumin 2.8 (L) 3.2 - 4.6 g/dL    Globulin 4.2 (H) 2.3 - 3.5 g/dL    Albumin/Globulin Ratio 0.7 (L) 1.2 - 3.5     Troponin    Collection Time: 08/12/22 12:11 PM   Result Value Ref Range    Troponin, High Sensitivity 25.5 (H) 0 - 14 pg/mL   Lipase    Collection Time: 08/12/22 12:11 PM   Result Value Ref Range    Lipase 259 73 - 393 U/L   Magnesium    Collection Time: 08/12/22 12:11 PM   Result Value Ref Range    Magnesium 2.6 (H) 1.8 - 2.4 mg/dL   Troponin    Collection Time: 08/12/22  2:56 PM   Result Value Ref Range    Troponin, High Sensitivity 163.9 (HH) 0 - 14 pg/mL   Brain Natriuretic Peptide    Collection Time: 08/12/22  2:56 PM   Result Value Ref Range    NT Pro-BNP 15,603 (H) <450 PG/ML   POCT Glucose    Collection Time: 08/12/22  6:34 PM   Result Value Ref Range    POC Glucose 127 (H) 65 - 100 mg/dL    Performed by: Darleen    POCT Glucose    Collection Time: 08/12/22  9:21 PM   Result Value Ref Range    POC Glucose 247 (H) 65 - 100 mg/dL    Performed by: Alicia    POCT Glucose    Collection Time: 08/13/22  2:55 AM   Result Value Ref Range    POC Glucose 200 (H) 65 - 100 mg/dL    Performed by: Juwan    Basic Metabolic Panel w/ Reflex to MG    Collection Time: 08/13/22  6:25 AM   Result Value Ref Range    Sodium 140 138 - 145 mmol/L    Potassium 4.4 3.5 - 5.1 mmol/L    Chloride 113 (H) 98 - 107 mmol/L    CO2 23 21 - 32 mmol/L    Anion Gap 4 (L) 7 - 16 mmol/L    Glucose 136 (H) 65 - 100 mg/dL    BUN 85 (H) 8 - 23 MG/DL    Creatinine 3.60 (H) 0.8 - 1.5 MG/DL    GFR African American 21 (L) >60 ml/min/1.73m2    GFR Non- 17 (L) >60 ml/min/1.73m2    Calcium 9.2 8.3 - 10.4 MG/DL   EKG 12 Lead    Collection Time: 08/13/22  8:44 AM   Result Value Ref Range    Ventricular Rate 82 BPM    Atrial Rate 82 BPM    P-R Interval 264 ms    QRS Duration 116 ms    Q-T Interval 416 ms    QTc Calculation (Bazett) 486 ms    P Axis 76 degrees    R Axis 8 degrees    T Axis 119 degrees    Diagnosis Sinus rhythm with 1st degree A-V block            Assessment:     Principal Problem:    Volume overload  Resolved Problems:    * No resolved hospital problems. *      CKD 4: relatively stable     Excerebration of CHF: with recurrence in a week. Pt was taking only a small dose of diuretic ( demadex 10 mg bid to tid), may not be compliant with low sodium diet. Try higher dose of PO diuretic: Demadex 50 mg bid. Consider to add NGLT2 inhibitor. If trial fail, consider dialysis.     CAD  IIDM  Obesity     Thanks   Dr Harrison Webber

## 2022-08-13 NOTE — PROGRESS NOTES
[facility] CARDIOLOGY PROGRESS NOTE           8/13/2022 1:13 PM    Admit Date: 8/12/2022      Subjective: The patient reports SEARS prompted him to report to the hospital.  The patient denies angina. ROS:  No obvious pertinent positives on review of systems except for what was outlined above. Objective:      Vitals:    08/12/22 2309 08/13/22 0300 08/13/22 0709 08/13/22 1126   BP: (!) 154/70 (!) 148/69 (!) 143/70 (!) 129/59   Pulse: 83 76 69 68   Resp: 20 18 18 20   Temp: 97.6 °F (36.4 °C) 97.9 °F (36.6 °C) 98.7 °F (37.1 °C) 97.8 °F (36.6 °C)   TempSrc: Temporal Oral Temporal Temporal   SpO2: 96%  98% 100%   Weight:  (!) 333 lb 3.2 oz (151.1 kg)     Height:           Physical Exam:  General-No Acute Distress  Neck- supple, no JVD  CV- regular rate and rhythm no RG  Lung- clear bilaterally  Abd- soft, nontender, nondistended  Ext- 1+ edema bilaterally.   Skin- warm and dry    Data Review:   Recent Labs     08/12/22  1211 08/13/22  0625   * 140   K 5.9* 4.4   MG 2.6*  --    BUN 88* 85*   WBC 6.2  --    HGB 9.6*  --    HCT 30.6*  --      --        No results found for: CHOL  No results found for: TRIG  No results found for: HDL  No results found for: LDLCHOLESTEROL, LDLCALC  No results found for: LABVLDL, VLDL  No results found for: [facility]     Lab Results   Component Value Date/Time     08/13/2022 06:25 AM     08/12/2022 12:11 PM     08/08/2022 04:55 AM    K 4.4 08/13/2022 06:25 AM    K 5.9 08/12/2022 12:11 PM    K 3.8 08/08/2022 04:55 AM     08/13/2022 06:25 AM     08/12/2022 12:11 PM     08/08/2022 04:55 AM    CO2 23 08/13/2022 06:25 AM    CO2 18 08/12/2022 12:11 PM    CO2 27 08/08/2022 04:55 AM    BUN 85 08/13/2022 06:25 AM    BUN 88 08/12/2022 12:11 PM     08/08/2022 04:55 AM    CREATININE 3.60 08/13/2022 06:25 AM    CREATININE 3.80 08/12/2022 12:11 PM    CREATININE 4.70 08/08/2022 04:55 AM    GLUCOSE 136 08/13/2022 06:25 AM GLUCOSE 395 08/12/2022 12:11 PM    GLUCOSE 111 08/08/2022 04:55 AM    CALCIUM 9.2 08/13/2022 06:25 AM    CALCIUM 9.2 08/12/2022 12:11 PM    CALCIUM 9.1 08/08/2022 04:55 AM         Lab Results   Component Value Date    ALT 27 08/12/2022    ALT 22 08/05/2022    ALT 20 08/04/2022    AST 60 (H) 08/12/2022    AST 21 08/05/2022    AST 18 08/04/2022          Assessment/Plan:     Chronic HFpEF  CKD stage IV  Hypervolemia  CAD in native artery  Moderate aortic stenosis by prior echocardiogram    The patient has competing causes of hypervolemia including HFpEF, hypoalbuminemia and severe CKD. He had a TTE on August 4 that was noted to demonstrate an RVSP of 51 mmHg and E/e' 15 with a normal EF. Moderate AS was also noted. His H2FPEF score = 6 diagnostic of HFpEF (heavy, hypertension, pulmonary hypertension, elder, filling pressure). Nephrology is on board and recommends a higher outpatient dose of diuretics. The patient is angina-free. His troponin is elevated in the setting of know CAD, renal failure, and HFpEF. Obtain an inpatient consult to internal medicine for diabetes management. Continue with baby aspirin daily. Decrease hydralazine to 50 mg TID with low DBP this morning and add Imdur in the setting of CAD (hydralazine as a lone agent is contraindicated in CAD due to potential for myocardial stimulation). Monitor chemistry profiles on IVP Lasix. Continue with Toprol XL. Consider statin therapy. The patient reports the use of Garcia catheter for comfort, so he doesn't have to walk to the bathroom. It was discussed with the patient that he has an increased risk of UTI with a Garcia catheter in place. Obtain a consult to NANCY Gonzalez MD

## 2022-08-14 LAB
ANION GAP SERPL CALC-SCNC: 9 MMOL/L (ref 7–16)
BUN SERPL-MCNC: 93 MG/DL (ref 8–23)
CALCIUM SERPL-MCNC: 9.4 MG/DL (ref 8.3–10.4)
CHLORIDE SERPL-SCNC: 109 MMOL/L (ref 98–107)
CO2 SERPL-SCNC: 23 MMOL/L (ref 21–32)
CORTIS AM PEAK SERPL-MCNC: 11.6 UG/DL (ref 7–25)
CREAT SERPL-MCNC: 4 MG/DL (ref 0.8–1.5)
GLUCOSE BLD STRIP.AUTO-MCNC: 125 MG/DL (ref 65–100)
GLUCOSE BLD STRIP.AUTO-MCNC: 158 MG/DL (ref 65–100)
GLUCOSE BLD STRIP.AUTO-MCNC: 173 MG/DL (ref 65–100)
GLUCOSE BLD STRIP.AUTO-MCNC: 177 MG/DL (ref 65–100)
GLUCOSE BLD STRIP.AUTO-MCNC: 218 MG/DL (ref 65–100)
GLUCOSE SERPL-MCNC: 148 MG/DL (ref 65–100)
MAGNESIUM SERPL-MCNC: 2.6 MG/DL (ref 1.8–2.4)
POTASSIUM SERPL-SCNC: 4.5 MMOL/L (ref 3.5–5.1)
SERVICE CMNT-IMP: ABNORMAL
SODIUM SERPL-SCNC: 141 MMOL/L (ref 138–145)

## 2022-08-14 PROCEDURE — 82962 GLUCOSE BLOOD TEST: CPT

## 2022-08-14 PROCEDURE — 6370000000 HC RX 637 (ALT 250 FOR IP): Performed by: NURSE PRACTITIONER

## 2022-08-14 PROCEDURE — 6370000000 HC RX 637 (ALT 250 FOR IP): Performed by: FAMILY MEDICINE

## 2022-08-14 PROCEDURE — 36415 COLL VENOUS BLD VENIPUNCTURE: CPT

## 2022-08-14 PROCEDURE — 6370000000 HC RX 637 (ALT 250 FOR IP): Performed by: INTERNAL MEDICINE

## 2022-08-14 PROCEDURE — 94760 N-INVAS EAR/PLS OXIMETRY 1: CPT

## 2022-08-14 PROCEDURE — 82533 TOTAL CORTISOL: CPT

## 2022-08-14 PROCEDURE — 83735 ASSAY OF MAGNESIUM: CPT

## 2022-08-14 PROCEDURE — 2140000001 HC CVICU INTERMEDIATE R&B

## 2022-08-14 PROCEDURE — 2580000003 HC RX 258: Performed by: NURSE PRACTITIONER

## 2022-08-14 PROCEDURE — 80048 BASIC METABOLIC PNL TOTAL CA: CPT

## 2022-08-14 PROCEDURE — 83090 ASSAY OF HOMOCYSTEINE: CPT

## 2022-08-14 PROCEDURE — 99233 SBSQ HOSP IP/OBS HIGH 50: CPT | Performed by: INTERNAL MEDICINE

## 2022-08-14 PROCEDURE — 76937 US GUIDE VASCULAR ACCESS: CPT

## 2022-08-14 RX ORDER — INSULIN GLARGINE 100 [IU]/ML
12 INJECTION, SOLUTION SUBCUTANEOUS EVERY EVENING
Status: DISCONTINUED | OUTPATIENT
Start: 2022-08-14 | End: 2022-08-15 | Stop reason: HOSPADM

## 2022-08-14 RX ORDER — TORSEMIDE 20 MG/1
40 TABLET ORAL 2 TIMES DAILY
Status: DISCONTINUED | OUTPATIENT
Start: 2022-08-14 | End: 2022-08-15 | Stop reason: HOSPADM

## 2022-08-14 RX ORDER — INSULIN GLARGINE 100 [IU]/ML
25 INJECTION, SOLUTION SUBCUTANEOUS EVERY MORNING
Status: DISCONTINUED | OUTPATIENT
Start: 2022-08-14 | End: 2022-08-15 | Stop reason: HOSPADM

## 2022-08-14 RX ADMIN — ISOSORBIDE MONONITRATE 30 MG: 30 TABLET, EXTENDED RELEASE ORAL at 09:34

## 2022-08-14 RX ADMIN — SODIUM BICARBONATE 650 MG TABLET 650 MG: at 20:51

## 2022-08-14 RX ADMIN — METOPROLOL SUCCINATE 100 MG: 100 TABLET, EXTENDED RELEASE ORAL at 09:34

## 2022-08-14 RX ADMIN — HYDRALAZINE HYDROCHLORIDE 50 MG: 25 TABLET, FILM COATED ORAL at 06:28

## 2022-08-14 RX ADMIN — SODIUM CHLORIDE, PRESERVATIVE FREE 10 ML: 5 INJECTION INTRAVENOUS at 20:52

## 2022-08-14 RX ADMIN — INSULIN GLARGINE 12 UNITS: 100 INJECTION, SOLUTION SUBCUTANEOUS at 20:52

## 2022-08-14 RX ADMIN — ALLOPURINOL 150 MG: 100 TABLET ORAL at 09:34

## 2022-08-14 RX ADMIN — ASPIRIN 81 MG: 81 TABLET ORAL at 09:34

## 2022-08-14 RX ADMIN — HYDRALAZINE HYDROCHLORIDE 50 MG: 25 TABLET, FILM COATED ORAL at 20:55

## 2022-08-14 RX ADMIN — SODIUM BICARBONATE 650 MG TABLET 650 MG: at 09:34

## 2022-08-14 RX ADMIN — INSULIN GLARGINE 50 UNITS: 100 INJECTION, SOLUTION SUBCUTANEOUS at 09:35

## 2022-08-14 RX ADMIN — TORSEMIDE 40 MG: 20 TABLET ORAL at 12:22

## 2022-08-14 RX ADMIN — LEVOTHYROXINE SODIUM 88 MCG: 0.09 TABLET ORAL at 06:28

## 2022-08-14 ASSESSMENT — PAIN SCALES - GENERAL
PAINLEVEL_OUTOF10: 0

## 2022-08-14 NOTE — PROGRESS NOTES
Admit Date: 8/12/2022      Subjective:      Patient is a 79 y.o. male with CKD 4, CAD, CHF with preserved EF, IIDM. He presented with increasing SOB, weight gain about 25 pounds over a week . Readmitted for decompensated CHF. This his second admission in a week  Pt was on Demadex 10 mg Tid prior to this admission. Treated with IV Lasix with improvement. Troponin mildly elevated     Review of Systems  Cardio-vascular: no chest pain, no SOB  GI: no N/V/D  : no dysuria, no hematuria    Objective:     Patient Vitals for the past 8 hrs:   BP Temp Temp src Pulse Resp SpO2 Weight   08/14/22 0712 (!) 117/58 98 °F (36.7 °C) Temporal 63 18 97 % --   08/14/22 0330 139/63 98.2 °F (36.8 °C) Oral 69 16 94 % (!) 327 lb 4.8 oz (148.5 kg)     08/14 0701 - 08/14 1900  In: 240 [P.O.:240]  Out: 0       Physical Exam:   Not in LIVE  Lungs: decreased BS  CV: RR, no rub  Abdomen: soft, not tender, no rebound.   Ext: + edema          Data Review   Recent Results (from the past 8 hour(s))   POCT Glucose    Collection Time: 08/14/22  3:32 AM   Result Value Ref Range    POC Glucose 125 (H) 65 - 100 mg/dL    Performed by: [facility] [facility]    POCT Glucose    Collection Time: 08/14/22  6:21 AM   Result Value Ref Range    POC Glucose 158 (H) 65 - 100 mg/dL    Performed by: [employer]    Basic Metabolic Panel    Collection Time: 08/14/22  6:22 AM   Result Value Ref Range    Sodium 141 138 - 145 mmol/L    Potassium 4.5 3.5 - 5.1 mmol/L    Chloride 109 (H) 98 - 107 mmol/L    CO2 23 21 - 32 mmol/L    Anion Gap 9 7 - 16 mmol/L    Glucose 148 (H) 65 - 100 mg/dL    BUN 93 (H) 8 - 23 MG/DL    Creatinine 4.00 (H) 0.8 - 1.5 MG/DL    GFR  19 (L) >60 ml/min/1.73m2    GFR Non- 15 (L) >60 ml/min/1.73m2    Calcium 9.4 8.3 - 10.4 MG/DL   Magnesium    Collection Time: 08/14/22  6:22 AM   Result Value Ref Range    Magnesium 2.6 (H) 1.8 - 2.4 mg/dL   Cortisol AM, Total    Collection Time: 08/14/22  6:22 AM   Result Value Ref Range    Cortisol - AM 11.6 7 - 25 ug/dL           Assessment:     Principal Problem:    Volume overload  Active Problems:    Chronic heart failure with preserved ejection fraction (HFpEF) (HCC)    Moderate aortic stenosis by prior echocardiogram    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 *      Plan:   CKD 4: slightly worse      Excerebration of CHF: with recurrence in a week. Pt was taking only a small dose of diuretic ( demadex 10 mg bid to tid), may not be compliant with low sodium diet. Much improvement after IV Llasix.   Change to PO diuretic: 40 mg bid      CAD  IIDM  Obesity      Gayla King MD

## 2022-08-14 NOTE — PROGRESS NOTES
US Guided PIV access    Called for assistance with IV access. Ultrasound was used to find the vein which was compressible and does not have any features of an artery or nerve bundle. Skin was cleaned and disinfected prior to IV puncture. Under real-time ultrasound guidance peripheral access was obtained in the left forearm using 22 G 1.75\" Peripheral IV catheter x 1 attempt. Blood return was present and IV flushed without difficulty. IV dressing applied, no immediate complications noted, and patient tolerated the procedure well.     Razia Shields RN, PCCN, VA-BC

## 2022-08-14 NOTE — PROGRESS NOTES
[facility] CARDIOLOGY PROGRESS NOTE           8/14/2022 7:29 AM    Admit Date: 8/12/2022      Subjective: The patient denies angina and dyspnea. The patient reports his swelling has improved. ROS:  No obvious pertinent positives on review of systems except for what was outlined above.     Objective:      Vitals:    08/13/22 1607 08/13/22 2000 08/13/22 2215 08/14/22 0330   BP: (!) 150/67 135/63 128/78 139/63   Pulse: 75 64 66 69   Resp: 18 18 16 16   Temp: 98.1 °F (36.7 °C) 98.9 °F (37.2 °C) 98.4 °F (36.9 °C) 98.2 °F (36.8 °C)   TempSrc: Temporal Temporal Oral Oral   SpO2: 97% 96% 95% 94%   Weight:    (!) 327 lb 4.8 oz (148.5 kg)   Height:           Physical Exam:  General-No Acute Distress  Neck- supple, no JVD  CV- regular rate and rhythm no RG  Lung- clear bilaterally  Abd- soft, nontender, nondistended  Ext- trace edema bilaterally  Skin- warm and dry    Data Review:   Recent Labs     08/12/22  1211 08/13/22  0625 08/14/22  0622   * 140 141   K 5.9* 4.4 4.5   MG 2.6*  --  2.6*   BUN 88* 85* 93*   WBC 6.2  --   --    HGB 9.6*  --   --    HCT 30.6*  --   --      --   --        No results found for: CHOL  No results found for: TRIG  No results found for: HDL  No results found for: LDLCHOLESTEROL, LDLCALC  No results found for: LABVLDL, VLDL  No results found for: [facility]     Lab Results   Component Value Date/Time     08/14/2022 06:22 AM     08/13/2022 06:25 AM     08/12/2022 12:11 PM    K 4.5 08/14/2022 06:22 AM    K 4.4 08/13/2022 06:25 AM    K 5.9 08/12/2022 12:11 PM     08/14/2022 06:22 AM     08/13/2022 06:25 AM     08/12/2022 12:11 PM    CO2 23 08/14/2022 06:22 AM    CO2 23 08/13/2022 06:25 AM    CO2 18 08/12/2022 12:11 PM    BUN 93 08/14/2022 06:22 AM    BUN 85 08/13/2022 06:25 AM    BUN 88 08/12/2022 12:11 PM    CREATININE 4.00 08/14/2022 06:22 AM    CREATININE 3.60 08/13/2022 06:25 AM    CREATININE 3.80 08/12/2022 12:11 PM GLUCOSE 148 08/14/2022 06:22 AM    GLUCOSE 136 08/13/2022 06:25 AM    GLUCOSE 395 08/12/2022 12:11 PM    CALCIUM 9.4 08/14/2022 06:22 AM    CALCIUM 9.2 08/13/2022 06:25 AM    CALCIUM 9.2 08/12/2022 12:11 PM         Lab Results   Component Value Date    ALT 27 08/12/2022    ALT 22 08/05/2022    ALT 20 08/04/2022    AST 60 (H) 08/12/2022    AST 21 08/05/2022    AST 18 08/04/2022          Assessment/Plan:     Chronic HFpEF  CKD stage IV  Hypervolemia  CAD in native artery  Moderate aortic stenosis by prior echocardiogram     The patient has competing causes of hypervolemia including HFpEF, hypoalbuminemia and severe CKD. He had a TTE on August 4 that was noted to demonstrate an RVSP of 51 mmHg and E/e' 15 with a normal EF. Moderate AS was also noted. His H2FPEF score = 6 diagnostic of HFpEF (heavy, hypertension, pulmonary hypertension, elder, filling pressure). Nephrology is on board and recommends a higher outpatient dose of diuretics. Hold IV Lasix for today and await nephrology recommendations in the setting of severe chronic renal insufficiency. The patient is angina-free. His troponin is elevated in the setting of known CAD, renal failure, and HFpEF. Internal medicine is on board for diabetes management. Continue with baby aspirin daily and Toprol XL. Continue hydralazine with Imdur in the setting of CAD to avoid myocardial stimulation from hydralazine alone. Monitor chemistry profiles. Consider statin therapy. His Garcia catheter has been removed. Follow up PT consult.     Sunita Barnard MD

## 2022-08-14 NOTE — THERAPY DISCHARGE
Physical Therapy Note:    Attempted to see patient this AM for physical therapy evaluation session. Patient states he is IND with mobility and has been walking in the hallways alone throughout admission. Pt declines PT needs and will be discharged from caseload at this time. Please reconsult if pt status changes.     Rajwinder Ovalle, PT

## 2022-08-14 NOTE — PROGRESS NOTES
Hospitalist Progress Note   Admit Date:  2022  1:49 PM   Name:  Alfie Wan   Age:  79 y.o. Sex:  male  :  1940   MRN:  [id number]   Room:      Reason(s) for Admission: Dyspnea on exertion [R06.00]  Elevated troponin [R77.8]  Volume overload [E87.70]  Stage 4 chronic kidney disease ([facility] [address] [N18.4]  Chronic heart failure with preserved ejection fraction [facility]) [I50.32]     Hospital Course & Interval History:   Alfie Wan is a 79 y.o. male with history of Obesity, CKD4, HFpEF, moderate AS, CAD s/p stents, T2DM whow as admitted to the Cardiology service on  with AoC dCHF exacerbation and GREGG. Neprhology consulted. He was started on IV diuresis. Hospitalist consulted for DM management. Subjective/24hr Events (22): In bed, on RA, Garcia out, decent sugar control so far. Annoyed that changes were made to his insulin regimen without consulting him first. He feels he is more than capable of managing his own blood sugars. No chest pain or SOB. Assessment & Plan:   # DM2   - Good control. Change Lantus back to his home regimen of 12U QHS and 25U qAM. Stop SSI (he has been refusing). Otherwise he insists he can manage this on his own. # Acute on chronic dCHF exacerbation   - Per Cardiology    # GREGG on CKD4   - Per Nephrology    # JORJE   - CPAP    # Hypothyroidism   - Synthroid    # Obesity   - Complicates care. Patient has no interest in our service assisting with his glucose control. Fortunately his A1C is 6.6 and readings so far are acceptable. I have adjusted his Lantus which I discussed with him earlier. Will sign off, call with questions.     Will Gracy Muñoz MD    Hospital Problems             Last Modified POA    * (Principal) Volume overload 2022 Yes    Chronic heart failure with preserved ejection fraction (HFpEF) ([facility] [address] 2022 Yes    Moderate aortic stenosis by prior echocardiogram 2022 Yes    Chronic gout due to renal impairment without tophus 8/13/2022 Yes    Hypothyroidism due to Hashimoto's thyroiditis 8/13/2022 Yes    Overview Signed 8/13/2022  5:38 PM by Olive Lopez DO     Last Assessment & Plan:   Formatting of this note might be different from the original.  Continue thyroid at 88 mcg daily, Recheck TFTs 6M. Chronic kidney disease (CKD), stage IV (severe) (HCC) 8/13/2022 Yes    JORJE (obstructive sleep apnea) 8/13/2022 Yes    Anemia in chronic kidney disease 8/13/2022 Yes    Obesity, morbid ([address] 8/13/2022 Yes    Type 2 diabetes with nephropathy ([facility] [address] 8/13/2022 Yes    CAD in native artery 8/13/2022 Yes    Overview Signed 3/19/2022 11:48 PM by Yasmine Martínez     12/15: LAD PCI, small vessel dz           PHU (iron deficiency anemia) 8/13/2022 Yes    Overview Signed 8/13/2022  8:49 PM by Olive Lopez DO     TSAT 8% SFe 8 8/13/22 start IV FEx3 per hf affirm             Objective:   Patient Vitals for the past 24 hrs:   Temp Pulse Resp BP SpO2   08/14/22 1124 98.6 °F (37 °C) 66 20 (!) 113/59 98 %   08/14/22 1039 -- 64 17 -- 95 %   08/14/22 0712 98 °F (36.7 °C) 63 18 (!) 117/58 97 %   08/14/22 0330 98.2 °F (36.8 °C) 69 16 139/63 94 %   08/13/22 2215 98.4 °F (36.9 °C) 66 16 128/78 95 %   08/13/22 2000 98.9 °F (37.2 °C) 64 18 135/63 96 %   08/13/22 1607 98.1 °F (36.7 °C) 75 18 (!) 150/67 97 %       Estimated body mass index is 43.18 kg/m² as calculated from the following:    Height as of this encounter: 6' 1\" (1.854 m). Weight as of this encounter: 327 lb 4.8 oz (148.5 kg). Intake/Output Summary (Last 24 hours) at 8/14/2022 1227  Last data filed at 8/14/2022 0849  Gross per 24 hour   Intake 1140 ml   Output 3850 ml   Net -2710 ml         Physical Exam:   Blood pressure (!) 113/59, pulse 66, temperature 98.6 °F (37 °C), temperature source Oral, resp. rate 20, height 6' 1\" (1.854 m), weight (!) 327 lb 4.8 oz (148.5 kg), SpO2 98 %. General:    Well nourished. No overt distress. Morbidly obese.   Head:  Normocephalic, atraumatic  Eyes:  Sclerae appear normal. Pupils equally round. ENT:  Nares appear normal, no drainage. Moist oral mucosa  Neck:  No restricted ROM. Trachea midline. CV:   RRR. No m/r/g. No jugular venous distension. Lungs:   CTAB. No wheezing, rhonchi, or rales. Respirations even, unlabored. Abdomen: Bowel sounds present. Soft, nontender, nondistended. Extremities: No cyanosis or clubbing. Trace LE edema. Skin:     No rashes and normal coloration. Warm and dry. Neuro:  CN II-XII grossly intact. Sensation intact. A&Ox3  Psych:  Normal mood and affect. I have reviewed ordered lab tests and independently visualized imaging below:    Recent Labs:  Recent Results (from the past 48 hour(s))   Troponin    Collection Time: 08/12/22  2:56 PM   Result Value Ref Range    Troponin, High Sensitivity 163.9 (HH) 0 - 14 pg/mL   Brain Natriuretic Peptide    Collection Time: 08/12/22  2:56 PM   Result Value Ref Range    NT Pro-BNP 15,603 (H) <450 PG/ML   POCT Glucose    Collection Time: 08/12/22  6:34 PM   Result Value Ref Range    POC Glucose 127 (H) 65 - 100 mg/dL    Performed by: Darleen    POCT Glucose    Collection Time: 08/12/22  9:21 PM   Result Value Ref Range    POC Glucose 247 (H) 65 - 100 mg/dL    Performed by:  Alicia    POCT Glucose    Collection Time: 08/13/22  2:55 AM   Result Value Ref Range    POC Glucose 200 (H) 65 - 100 mg/dL    Performed by: Juwan    Basic Metabolic Panel w/ Reflex to MG    Collection Time: 08/13/22  6:25 AM   Result Value Ref Range    Sodium 140 138 - 145 mmol/L    Potassium 4.4 3.5 - 5.1 mmol/L    Chloride 113 (H) 98 - 107 mmol/L    CO2 23 21 - 32 mmol/L    Anion Gap 4 (L) 7 - 16 mmol/L    Glucose 136 (H) 65 - 100 mg/dL    BUN 85 (H) 8 - 23 MG/DL    Creatinine 3.60 (H) 0.8 - 1.5 MG/DL    GFR African American 21 (L) >60 ml/min/1.73m2    GFR Non- 17 (L) >60 ml/min/1.73m2    Calcium 9.2 8.3 - 10.4 MG/DL   EKG 12 Lead Collection Time: 08/13/22  8:44 AM   Result Value Ref Range    Ventricular Rate 82 BPM    Atrial Rate 82 BPM    P-R Interval 264 ms    QRS Duration 116 ms    Q-T Interval 416 ms    QTc Calculation (Bazett) 486 ms    P Axis 76 degrees    R Axis 8 degrees    T Axis 119 degrees    Diagnosis Sinus rhythm with 1st degree A-V block    POCT Glucose    Collection Time: 08/13/22 11:31 AM   Result Value Ref Range    POC Glucose 266 (H) 65 - 100 mg/dL    Performed by: [employer]    POCT Glucose    Collection Time: 08/13/22  4:12 PM   Result Value Ref Range    POC Glucose 246 (H) 65 - 100 mg/dL    Performed by:  [employer]    Hemoglobin A1C    Collection Time: 08/13/22  6:32 PM   Result Value Ref Range    Hemoglobin A1C 6.6 (H) 4.8 - 5.6 %    eAG 143 mg/dL   Ferritin    Collection Time: 08/13/22  6:32 PM   Result Value Ref Range    Ferritin 345 8 - 388 NG/ML   Transferrin Saturation    Collection Time: 08/13/22  6:32 PM   Result Value Ref Range    Iron 21 (L) 35 - 150 ug/dL    TIBC 252 250 - 450 ug/dL    TRANSFERRIN SATURATION 8 (L) >20 %   Vitamin B12    Collection Time: 08/13/22  6:32 PM   Result Value Ref Range    Vitamin B-12 289 193 - 986 pg/mL   Folate    Collection Time: 08/13/22  6:32 PM   Result Value Ref Range    Folate 20.9 (H) 3.1 - 17.5 ng/mL   POCT Glucose    Collection Time: 08/13/22  8:27 PM   Result Value Ref Range    POC Glucose 129 (H) 65 - 100 mg/dL    Performed by: [employer]    POCT Glucose    Collection Time: 08/14/22  3:32 AM   Result Value Ref Range    POC Glucose 125 (H) 65 - 100 mg/dL    Performed by: [employer]    POCT Glucose    Collection Time: 08/14/22  6:21 AM   Result Value Ref Range    POC Glucose 158 (H) 65 - 100 mg/dL    Performed by: [employer]    Basic Metabolic Panel    Collection Time: 08/14/22  6:22 AM   Result Value Ref Range    Sodium 141 138 - 145 mmol/L    Potassium 4.5 3.5 - 5.1 mmol/L    Chloride 109 (H) 98 - 107 mmol/L    CO2 23 21 - 32 mmol/L    Anion dextrose 10 % infusion   IntraVENous Continuous PRN    vitamin D (ERGOCALCIFEROL) capsule 50,000 Units  50,000 Units Oral Weekly    ferric gluconate (FERRLECIT) 125 mg in sodium chloride 0.9 % 100 mL IVPB  125 mg IntraVENous Daily    cyanocobalamin injection 1,000 mcg  1,000 mcg IntraMUSCular Once    allopurinol (ZYLOPRIM) tablet 150 mg  150 mg Oral Daily    cloNIDine (CATAPRES) tablet 0.1 mg  0.1 mg Oral BID PRN    levothyroxine (SYNTHROID) tablet 88 mcg  88 mcg Oral Daily    metoprolol succinate (TOPROL XL) extended release tablet 100 mg  100 mg Oral Daily    sodium bicarbonate tablet 650 mg  650 mg Oral BID    sodium chloride flush 0.9 % injection 5-40 mL  5-40 mL IntraVENous 2 times per day    sodium chloride flush 0.9 % injection 5-40 mL  5-40 mL IntraVENous PRN    0.9 % sodium chloride infusion   IntraVENous PRN    ondansetron (ZOFRAN-ODT) disintegrating tablet 4 mg  4 mg Oral Q8H PRN    Or    ondansetron (ZOFRAN) injection 4 mg  4 mg IntraVENous Q6H PRN    acetaminophen (TYLENOL) tablet 650 mg  650 mg Oral Q6H PRN    Or    acetaminophen (TYLENOL) suppository 650 mg  650 mg Rectal Q6H PRN    polyethylene glycol (GLYCOLAX) packet 17 g  17 g Oral Daily PRN    [Held by provider] furosemide (LASIX) injection 80 mg  80 mg IntraVENous BID    albuterol (PROVENTIL) nebulizer solution 2.5 mg  2.5 mg Nebulization Q6H PRN    mometasone-formoterol (DULERA) 100-5 MCG/ACT inhaler 2 puff  2 puff Inhalation BID    aspirin EC tablet 81 mg  81 mg Oral Daily       Signed:  Ericka Alarcon MD    Part of this note may have been written by using a voice dictation software. The note has been proof read but may still contain some grammatical/other typographical errors.

## 2022-08-15 VITALS
SYSTOLIC BLOOD PRESSURE: 114 MMHG | TEMPERATURE: 97.9 F | HEIGHT: 73 IN | OXYGEN SATURATION: 97 % | DIASTOLIC BLOOD PRESSURE: 54 MMHG | HEART RATE: 60 BPM | BODY MASS INDEX: 41.75 KG/M2 | RESPIRATION RATE: 20 BRPM | WEIGHT: 315 LBS

## 2022-08-15 LAB
ANION GAP SERPL CALC-SCNC: 9 MMOL/L (ref 7–16)
BUN SERPL-MCNC: 97 MG/DL (ref 8–23)
CALCIUM SERPL-MCNC: 9.1 MG/DL (ref 8.3–10.4)
CHLORIDE SERPL-SCNC: 108 MMOL/L (ref 98–107)
CO2 SERPL-SCNC: 21 MMOL/L (ref 21–32)
CREAT SERPL-MCNC: 4.4 MG/DL (ref 0.8–1.5)
GLUCOSE BLD STRIP.AUTO-MCNC: 138 MG/DL (ref 65–100)
GLUCOSE BLD STRIP.AUTO-MCNC: 141 MG/DL (ref 65–100)
GLUCOSE SERPL-MCNC: 131 MG/DL (ref 65–100)
MAGNESIUM SERPL-MCNC: 2.7 MG/DL (ref 1.8–2.4)
POTASSIUM SERPL-SCNC: 4.4 MMOL/L (ref 3.5–5.1)
SERVICE CMNT-IMP: ABNORMAL
SERVICE CMNT-IMP: ABNORMAL
SODIUM SERPL-SCNC: 138 MMOL/L (ref 138–145)

## 2022-08-15 PROCEDURE — 80048 BASIC METABOLIC PNL TOTAL CA: CPT

## 2022-08-15 PROCEDURE — 6370000000 HC RX 637 (ALT 250 FOR IP): Performed by: INTERNAL MEDICINE

## 2022-08-15 PROCEDURE — 6370000000 HC RX 637 (ALT 250 FOR IP): Performed by: NURSE PRACTITIONER

## 2022-08-15 PROCEDURE — 82962 GLUCOSE BLOOD TEST: CPT

## 2022-08-15 PROCEDURE — 36415 COLL VENOUS BLD VENIPUNCTURE: CPT

## 2022-08-15 PROCEDURE — 83735 ASSAY OF MAGNESIUM: CPT

## 2022-08-15 PROCEDURE — 99238 HOSP IP/OBS DSCHRG MGMT 30/<: CPT | Performed by: INTERNAL MEDICINE

## 2022-08-15 RX ORDER — HYDRALAZINE HYDROCHLORIDE 50 MG/1
50 TABLET, FILM COATED ORAL 3 TIMES DAILY
Qty: 90 TABLET | Refills: 3 | Status: SHIPPED | OUTPATIENT
Start: 2022-08-15 | End: 2022-09-06 | Stop reason: SDUPTHER

## 2022-08-15 RX ORDER — METOPROLOL SUCCINATE 100 MG/1
100 TABLET, EXTENDED RELEASE ORAL DAILY
Qty: 30 TABLET | Refills: 3 | Status: SHIPPED | OUTPATIENT
Start: 2022-08-16 | End: 2022-09-06 | Stop reason: SDUPTHER

## 2022-08-15 RX ORDER — ISOSORBIDE MONONITRATE 30 MG/1
30 TABLET, EXTENDED RELEASE ORAL DAILY
Qty: 30 TABLET | Refills: 3 | Status: SHIPPED | OUTPATIENT
Start: 2022-08-15 | End: 2022-09-06 | Stop reason: SDUPTHER

## 2022-08-15 RX ADMIN — TORSEMIDE 40 MG: 20 TABLET ORAL at 08:25

## 2022-08-15 RX ADMIN — INSULIN GLARGINE 25 UNITS: 100 INJECTION, SOLUTION SUBCUTANEOUS at 08:22

## 2022-08-15 RX ADMIN — ASPIRIN 81 MG: 81 TABLET ORAL at 08:24

## 2022-08-15 RX ADMIN — METOPROLOL SUCCINATE 100 MG: 100 TABLET, EXTENDED RELEASE ORAL at 08:24

## 2022-08-15 RX ADMIN — ISOSORBIDE MONONITRATE 30 MG: 30 TABLET, EXTENDED RELEASE ORAL at 08:27

## 2022-08-15 RX ADMIN — LEVOTHYROXINE SODIUM 88 MCG: 0.09 TABLET ORAL at 06:19

## 2022-08-15 RX ADMIN — ALLOPURINOL 150 MG: 100 TABLET ORAL at 08:22

## 2022-08-15 RX ADMIN — SODIUM BICARBONATE 650 MG TABLET 650 MG: at 08:26

## 2022-08-15 RX ADMIN — HYDRALAZINE HYDROCHLORIDE 50 MG: 25 TABLET, FILM COATED ORAL at 06:19

## 2022-08-15 NOTE — CARE COORDINATION
Pt is a readmission from August 2-8, 2022. Readmission assessment completed. Pt resides with his spouse and is independent with ADLs at baseline. Pt to d/c home today. Family will provide transportation home. No supportive care needs identified. Pt agrees with d/c plan. Milestones met.       08/12/22 5730   Service Assessment   Patient Orientation Alert and Oriented;Person;Place; Self   Cognition Alert   History Provided By Patient;Medical Record   Primary Caregiver Self   Support Systems Spouse/Significant Other   Patient's Healthcare Decision Maker is: Legal Next of Kin   PCP Verified by CM Yes  Beena Judge MD)   Last Visit to PCP Within last 3 months   Prior Functional Level Independent in ADLs/IADLs   Current Functional Level Independent in ADLs/IADLs   Can patient return to prior living arrangement Yes   Ability to make needs known: Good   Family able to assist with home care needs: Yes   Would you like for me to discuss the discharge plan with any other family members/significant others, and if so, who? No   Financial Resources Medicare; Other (Comment)  [employer])   Social/Functional History   Lives With Spouse   Type of [address] One level   [address] [facility]   Ambulation Assistance Independent   Transfer Assistance Independent   Active  Yes   Mode of Transportation Car   Occupation Retired   Discharge Planning   Type of [address] Prior To Admission None   Potential Assistance Needed N/A   DME Ordered? No   Type of Home Care Services None   Patient expects to be discharged to: House   One/Two Story Residence One story   Services At/After Discharge   Transition of Care Consult (CM Consult) Discharge [address] Discharge None   [employer] Information Provided?  No   Mode of Transport at Discharge Other (see comment)  (Family)   Confirm Follow Up Transport Family   Condition of Participation: Discharge Planning   The Plan for Transition of Care is related to the following treatment goals: Pt to obtain care to become medically stable and to return with a safe transition. The Patient and/or Patient Representative was provided with a Choice of Provider? Patient   The Patient and/Or Patient Representative agree with the Discharge Plan? Yes   Freedom of Choice list was provided with basic dialogue that supports the patient's individualized plan of care/goals, treatment preferences, and shares the quality data associated with the providers?   Yes

## 2022-08-15 NOTE — DISCHARGE SUMMARY
[place] [facility] Discharge Summary     Patient ID:  Jose Manuel Maxwell  [id number]  75 y.o.  1940    Admit date: 8/12/2022    Discharge date and time:  8-    Admitting Physician: Rand Chew MD     Discharge Physician: Alana Lujan PA-C/Dr. Carter    Admission Diagnoses: Dyspnea on exertion [R06.00]  Elevated troponin [R77.8]  Volume overload [E87.70]  Stage 4 chronic kidney disease ([address] [N18.4]  Chronic heart failure with preserved ejection fraction ([address] [I50.32]    Discharge Diagnoses:   Patient Active Problem List    Diagnosis Date Noted    Moderate aortic stenosis by prior echocardiogram 08/13/2022    Chronic heart failure with preserved ejection fraction (HFpEF) ([address] 08/03/2022    Volume overload 08/02/2022    Restrictive lung disease 06/03/2022    Chronic gout due to renal impairment without tophus 04/25/2022    Hypothyroidism due to Hashimoto's thyroiditis 10/20/2016    PHU (iron deficiency anemia) 08/13/2022    Chest pain 05/28/2019    Obesity, morbid ([address] 04/18/2018    Type 2 diabetes with nephropathy ([address] 73/43/1394    Diastolic CHF, chronic ([address] 09/08/2017    Anemia in chronic kidney disease 04/08/2016    Acquired hypothyroidism 04/08/2016    Cough variant asthma 03/11/2016    Dyspnea 02/10/2016    CAD in native artery 02/10/2016    SEARS (dyspnea on exertion) 12/22/2015    Abnormal nuclear stress test 12/22/2015    Elevated liver function tests 08/20/2015    Hyponatremia 01/21/2015    Anemia 01/21/2015    PPD positive 10/28/2014    Seborrheic dermatitis 01/16/2013    Edema 10/11/2012    Chronic kidney disease (CKD), stage IV (severe) ([address] 10/11/2012    JORJE (obstructive sleep apnea) 10/11/2012    Hypertension 10/11/2012    Diabetes mellitus type 2 in obese ([address] 10/11/2012     Consults: nephrology and hospitalist    Hospital Course: Pt was admitted with complaints of SOB, orthopnea and 25 lb weight gain.  He was recently admitted with volume overload from 8/2 to 8/8 and was treated with IV Lasix, had worsening renal function and was DC home off ARB and diuretics. Pt returned several days later, again volume overloaded. He was started on IV Lasix, nephrology consulted. Pt was changed from ARB to Hydralazine and Imdur combination. Hospitalist consulted for DM management and recommended insulin changes. Pt was unhappy with changes and changed back to home meds. Pt improved with IV lasix and was changed to PO Demadex. Pt was up feeling well without any complaints of CP, SOB, palpitations or LE swelling. Pt's labs were WNL. Pt was seen and examined by Dr. Dr. Roro Evans and determined stable and ready for discharge. Dr. Roro Evans ordered Iron infusion however Pt did not want to stay for infusion. Will follow up with PCP for Iron replacement and Vitamin D replacement recommendations. Pt was instructed on the importance of weighing self daily. If Pt gains more than 2 lbs overnight or 5 lbs in one week, Pt is instructed to call [facility] at [phone]. Pt was given lab slip for a BMP in one week. Pt will have early follow up in our office. New meds sent to Pt pharmacy choice: [facility]. DISPOSITION: The patient is being discharged home in stable condition on a low saturated fat, low cholesterol and low salt diet. Pt is instructed to follow a fluid restricted diet with no more than 2 liters per day. Pt is instructed to advance activities as tolerated limited to fatigue or shortness of breath. Pt is instructed to call office or return to ER for immediate evaluation of any shortness of breath or chest pain.     Follow up with [facility] Dr. Hi Begum for a TC 7 on Friday 8/19 at 12 PM in the [facility] office  Follow up with PCP and nephrology     Discharge Exam: BP (!) 114/54   Pulse 60   Temp 97.9 °F (36.6 °C) (Temporal)   Resp 20   Ht 6' 1\" (1.854 m)   Wt (!) 327 lb 1.6 oz (148.4 kg)   SpO2 97%   BMI 43.16 kg/m²    Pt has been seen by Dr. Roro Evans: see his progress note for exam details. Recent Results (from the past 24 hour(s))   POCT Glucose    Collection Time: 08/14/22 11:27 AM   Result Value Ref Range    POC Glucose 177 (H) 65 - 100 mg/dL    Performed by: ConsueloTate    POCT Glucose    Collection Time: 08/14/22  4:46 PM   Result Value Ref Range    POC Glucose 218 (H) 65 - 100 mg/dL    Performed by: MirianlenTate    POCT Glucose    Collection Time: 08/14/22  8:47 PM   Result Value Ref Range    POC Glucose 173 (H) 65 - 100 mg/dL    Performed by: Juwan    POCT Glucose    Collection Time: 08/15/22  1:54 AM   Result Value Ref Range    POC Glucose 141 (H) 65 - 100 mg/dL    Performed by: Juwan    Basic Metabolic Panel    Collection Time: 08/15/22  5:58 AM   Result Value Ref Range    Sodium 138 138 - 145 mmol/L    Potassium 4.4 3.5 - 5.1 mmol/L    Chloride 108 (H) 98 - 107 mmol/L    CO2 21 21 - 32 mmol/L    Anion Gap 9 7 - 16 mmol/L    Glucose 131 (H) 65 - 100 mg/dL    BUN 97 (H) 8 - 23 MG/DL    Creatinine 4.40 (H) 0.8 - 1.5 MG/DL    GFR African American 17 (L) >60 ml/min/1.73m2    GFR Non- 14 (L) >60 ml/min/1.73m2    Calcium 9.1 8.3 - 10.4 MG/DL   Magnesium    Collection Time: 08/15/22  5:58 AM   Result Value Ref Range    Magnesium 2.7 (H) 1.8 - 2.4 mg/dL   POCT Glucose    Collection Time: 08/15/22  6:14 AM   Result Value Ref Range    POC Glucose 138 (H) 65 - 100 mg/dL    Performed by: Juwan          Patient Instructions:   Current Discharge Medication List        START taking these medications    Details   isosorbide mononitrate (IMDUR) 30 MG extended release tablet Take 1 tablet by mouth in the morning. Qty: 30 tablet, Refills: 3      metoprolol succinate (TOPROL XL) 100 MG extended release tablet Take 1 tablet by mouth in the morning.   Qty: 30 tablet, Refills: 3           CONTINUE these medications which have CHANGED    Details   hydrALAZINE (APRESOLINE) 50 MG tablet Take 1 tablet by mouth in the morning and 1 tablet at noon and 1 tablet before bedtime.   Qty: 90 tablet, Refills: 3      torsemide 40 MG TABS Take 40 mg by mouth in the morning and at bedtime  Qty: 60 tablet, Refills: 3           CONTINUE these medications which have NOT CHANGED    Details   fluticasone-vilanterol (BREO ELLIPTA) 100-25 MCG/INH AEPB inhaler Inhale into the lungs daily      albuterol sulfate  (90 Base) MCG/ACT inhaler 2 puffs qid prn      allopurinol (ZYLOPRIM) 300 MG tablet Take 150 mg by mouth daily      aspirin 81 MG EC tablet Take 81 mg by mouth      calcitRIOL (ROCALTROL) 0.25 MCG capsule Take 0.25 mcg by mouth daily      cloNIDine (CATAPRES) 0.1 MG tablet Take 0.1 mg by mouth 2 times daily as needed      Dulaglutide (TRULICITY) 1.5 SA/8.0AS SOPN Inject 1.5 mg into the skin every 7 days On sundays      insulin glargine (LANTUS) 100 UNIT/ML injection vial Inject 25 Units into the skin 2 times daily      levothyroxine (SYNTHROID) 88 MCG tablet Take 88 mcg by mouth      sodium bicarbonate 650 MG tablet Take 650 mg by mouth 2 times daily           STOP taking these medications       losartan-hydroCHLOROthiazide (HYZAAR) 100-25 MG per tablet Comments:   Reason for Stopping: CKD        nebivolol (BYSTOLIC) 10 MG tablet Comments:   Reason for Stopping: changed to Toprol XL              Dr. Jhoana White, PA-C  8/15/2022  8:40 AM

## 2022-08-15 NOTE — PROGRESS NOTES
am  8/15/2022 7:39 AM    Admit Date: 8/12/2022    Admit Diagnosis: Dyspnea on exertion [R06.00]  Elevated troponin [R77.8]  Volume overload [E87.70]  Stage 4 chronic kidney disease ([facility] [N18.4]  Chronic heart failure with preserved ejection fraction (HCC) [I50.32]      Subjective:    Patient : Patient is feeling better. Notes reduced leg swelling and improved shortness of breath. Objective:    BP (!) 178/74   Pulse 68   Temp 97.8 °F (36.6 °C) (Oral)   Resp 18   Ht 6' 1\" (1.854 m)   Wt (!) 327 lb 1.6 oz (148.4 kg)   SpO2 98%   BMI 43.16 kg/m²     ROS:  General ROS: negative for - chills  Hematological and Lymphatic ROS: negative for - blood clots or jaundice  Respiratory ROS: no cough, shortness of breath, or wheezing  Cardiovascular ROS: negative for - chest pain  Gastrointestinal ROS: no abdominal pain, change in bowel habits, or black or bloody stools  Neurological ROS: no TIA or stroke symptoms    Physical Exam:      Physical Examination: General appearance - Appearance: improved.    Neck/lymph - supple, no significant adenopathy  Chest/CV - no tachypnea, retractions or cyanosis  Heart - normal rate, regular rhythm, normal S1, S2, no murmurs, rubs, clicks or gallops  Abdomen/GI - soft, nontender, nondistended, no masses or organomegaly   Musculoskeletal - no joint tenderness, deformity or swelling  Extremities - pedal edema 1 +  Skin - normal coloration and turgor, no rashes, no suspicious skin lesions noted    Current Facility-Administered Medications   Medication Dose Route Frequency    torsemide (DEMADEX) tablet 40 mg  40 mg Oral BID    insulin glargine (LANTUS) injection vial 12 Units  12 Units SubCUTAneous QPM    insulin glargine (LANTUS) injection vial 25 Units  25 Units SubCUTAneous QAM    isosorbide mononitrate (IMDUR) extended release tablet 30 mg  30 mg Oral Daily    hydrALAZINE (APRESOLINE) tablet 50 mg  50 mg Oral 3 times per day    glucose chewable tablet 16 g  4 tablet Oral PRN    dextrose bolus 10% 125 mL  125 mL IntraVENous PRN    Or    dextrose bolus 10% 250 mL  250 mL IntraVENous PRN    glucagon (rDNA) injection 1 mg  1 mg SubCUTAneous PRN    dextrose 10 % infusion   IntraVENous Continuous PRN    vitamin D (ERGOCALCIFEROL) capsule 50,000 Units  50,000 Units Oral Weekly    ferric gluconate (FERRLECIT) 125 mg in sodium chloride 0.9 % 100 mL IVPB  125 mg IntraVENous Daily    cyanocobalamin injection 1,000 mcg  1,000 mcg IntraMUSCular Once    allopurinol (ZYLOPRIM) tablet 150 mg  150 mg Oral Daily    cloNIDine (CATAPRES) tablet 0.1 mg  0.1 mg Oral BID PRN    levothyroxine (SYNTHROID) tablet 88 mcg  88 mcg Oral Daily    metoprolol succinate (TOPROL XL) extended release tablet 100 mg  100 mg Oral Daily    sodium bicarbonate tablet 650 mg  650 mg Oral BID    sodium chloride flush 0.9 % injection 5-40 mL  5-40 mL IntraVENous 2 times per day    sodium chloride flush 0.9 % injection 5-40 mL  5-40 mL IntraVENous PRN    0.9 % sodium chloride infusion   IntraVENous PRN    ondansetron (ZOFRAN-ODT) disintegrating tablet 4 mg  4 mg Oral Q8H PRN    Or    ondansetron (ZOFRAN) injection 4 mg  4 mg IntraVENous Q6H PRN    acetaminophen (TYLENOL) tablet 650 mg  650 mg Oral Q6H PRN    Or    acetaminophen (TYLENOL) suppository 650 mg  650 mg Rectal Q6H PRN    polyethylene glycol (GLYCOLAX) packet 17 g  17 g Oral Daily PRN    [Held by provider] furosemide (LASIX) injection 80 mg  80 mg IntraVENous BID    albuterol (PROVENTIL) nebulizer solution 2.5 mg  2.5 mg Nebulization Q6H PRN    mometasone-formoterol (DULERA) 100-5 MCG/ACT inhaler 2 puff  2 puff Inhalation BID    aspirin EC tablet 81 mg  81 mg Oral Daily       Data Review: data included in this note has been independently reviewed by the author     TELEMETRY: NSR    Assessment/Plan:     Patient Active Problem List   Diagnosis    Edema    Chronic kidney disease (CKD), stage IV (severe) (HCC)    JORJE (obstructive sleep apnea)    Dyspnea    Cough variant asthma    PPD positive    Hyponatremia    Chest pain    Anemia in chronic kidney disease    Obesity, morbid (HCC)    SEARS (dyspnea on exertion)    Abnormal nuclear stress test    Type 2 diabetes with nephropathy (HCC)    Hypertension    Seborrheic dermatitis    Elevated liver function tests    Anemia    Diastolic CHF, chronic (HCC)    Diabetes mellitus type 2 in obese (HCC)    CAD in native artery    Acquired hypothyroidism    Restrictive lung disease    Volume overload    Chronic heart failure with preserved ejection fraction (HFpEF) (HCC)    Moderate aortic stenosis by prior echocardiogram    Chronic gout due to renal impairment without tophus    Hypothyroidism due to Hashimoto's thyroiditis    PHU (iron deficiency anemia)     PLAN  HFpEF  On hydralazine and imdur in place of ACE/ARF/ARNI. On BB. On trial of oral diuretic to go home. In light of GFR of 15, patient is not a candidate for SGLT2i or aldactone. Overall I suspect that fluid management again will be nearly impossible with p.o. diuretics I have had a discussion with the patient that we will attempt our best efforts to keep him out of the hospital with p.o.  Lasix but that it is very likely here and the nephrologist will need to have a theresa discussion on impending dialysis    He is likely at a baseline and would be stable for discharge today        Selam Resendiz

## 2022-08-16 ENCOUNTER — TELEPHONE (OUTPATIENT)
Dept: CARDIOLOGY CLINIC | Age: 82
End: 2022-08-16

## 2022-08-16 ENCOUNTER — TELEPHONE (OUTPATIENT)
Dept: INTERNAL MEDICINE CLINIC | Facility: CLINIC | Age: 82
End: 2022-08-16

## 2022-08-16 LAB — HCYS SERPL-SCNC: 28.5 UMOL/L (ref 0–21.3)

## 2022-08-16 NOTE — TELEPHONE ENCOUNTER
Patient returned call stating his PCP is with [facility] and will contact their office to schedule follow up appt. States he would like to get Home Health to come out to give Lasix if needed to keep out of the hospital.     Advised patient to to discuss with cardiologist and PCP to get orders for [facility] and to seei f this is something they can do.

## 2022-08-16 NOTE — TELEPHONE ENCOUNTER
Triage called pt for transitional care call following recent dc from [facility]. Admit date: 8/12/22  Discharge date: 8/15/22  Admission diagnosis: dyspnea on exertion, elevated troponin, volume overload, Chronic heart failure with preserved EF  Cardiology procedures this admission: none    TC 8/19/22 at 12:00 with Dr. Dunne at [facility] office. Pt states he is breathing better since dc. Taking all medications as prescribed. Weight is lower since being at home. Triage reviewed dc instructions. Pt verbalizes understanding and agrees to plan. BP is 106/54. HR 63. Denies any dizziness/light headedness. Pt states he will have labs done tomorrow and confirmed TC appt. Requests home health consult. Triage encouraged pt to discuss with Dr. Dunne at [facility] appt. Pt verbalizes understanding and agrees to plan.

## 2022-08-16 NOTE — TELEPHONE ENCOUNTER
Called patient to schedule TCV appt following discharge from [facility] 08/15/2022. Dx Dyspnea on exertion. Patient has appt with [facility] 08/19/2022. L/M for patient to call.

## 2022-08-19 ENCOUNTER — OFFICE VISIT (OUTPATIENT)
Dept: CARDIOLOGY CLINIC | Age: 82
End: 2022-08-19
Payer: MEDICARE

## 2022-08-19 VITALS
SYSTOLIC BLOOD PRESSURE: 139 MMHG | HEIGHT: 73 IN | WEIGHT: 315 LBS | BODY MASS INDEX: 41.75 KG/M2 | HEART RATE: 79 BPM | DIASTOLIC BLOOD PRESSURE: 65 MMHG

## 2022-08-19 DIAGNOSIS — I50.32 CHRONIC HEART FAILURE WITH PRESERVED EJECTION FRACTION (HFPEF) (HCC): ICD-10-CM

## 2022-08-19 DIAGNOSIS — Z09 HOSPITAL DISCHARGE FOLLOW-UP: ICD-10-CM

## 2022-08-19 DIAGNOSIS — I50.32 DIASTOLIC CHF, CHRONIC (HCC): ICD-10-CM

## 2022-08-19 DIAGNOSIS — I35.0 MODERATE AORTIC STENOSIS BY PRIOR ECHOCARDIOGRAM: ICD-10-CM

## 2022-08-19 DIAGNOSIS — R06.09 DOE (DYSPNEA ON EXERTION): ICD-10-CM

## 2022-08-19 DIAGNOSIS — I25.118 ATHEROSCLEROTIC HEART DISEASE OF NATIVE CORONARY ARTERY WITH OTHER FORMS OF ANGINA PECTORIS (HCC): Primary | ICD-10-CM

## 2022-08-19 DIAGNOSIS — I10 PRIMARY HYPERTENSION: ICD-10-CM

## 2022-08-19 DIAGNOSIS — N18.4 CHRONIC KIDNEY DISEASE (CKD), STAGE IV (SEVERE) (HCC): ICD-10-CM

## 2022-08-19 LAB
ANION GAP SERPL CALC-SCNC: 6 MMOL/L (ref 7–16)
BUN SERPL-MCNC: 88 MG/DL (ref 8–23)
CALCIUM SERPL-MCNC: 9.1 MG/DL (ref 8.3–10.4)
CHLORIDE SERPL-SCNC: 110 MMOL/L (ref 98–107)
CO2 SERPL-SCNC: 20 MMOL/L (ref 21–32)
CREAT SERPL-MCNC: 4.1 MG/DL (ref 0.8–1.5)
GLUCOSE SERPL-MCNC: 134 MG/DL (ref 65–100)
POTASSIUM SERPL-SCNC: 4.3 MMOL/L (ref 3.5–5.1)
SODIUM SERPL-SCNC: 136 MMOL/L (ref 136–145)

## 2022-08-19 PROCEDURE — 1111F DSCHRG MED/CURRENT MED MERGE: CPT | Performed by: INTERNAL MEDICINE

## 2022-08-19 PROCEDURE — 99496 TRANSJ CARE MGMT HIGH F2F 7D: CPT | Performed by: INTERNAL MEDICINE

## 2022-08-19 NOTE — PROGRESS NOTES
[address], [address]  PHONE: [phone]     22    NAME:  Kyung Noriega  : 1940  MRN: [id number]       SUBJECTIVE:   Kyung Noriega is a 79 y.o. male seen for a follow up visit regarding the following:     Chief Complaint   Patient presents with    Follow-Up from Hospital     Edema,sob     TC - 7 Appt today:    Admit date: 2022     Discharge date and time:  8-      HPI: Here for CAD (LAD PCI 12/15), JORJE on CPAP. He has CKD Stage III - IV. LHC 2019: stable mod CAD, BNP was 13   Echo 2020: normal EF, mild AI and MR. Echo 2022 and 2022: normal EF, mod AS, RVSP 51     CKD ongoing, seeing renal in one week. He has NYHA Class III sx now. SEARS ongoing. No CP. Compliant with CPAP. Some SEARS, some SOB. No new CP, not as active. Patient denies recent history of orthopnea, PND, excessive dizziness and/or syncope. Stopped statin, does not trust them. Edema on norvasc   High K on aldactone   Intolerant to ranexa   Edema on hydralazine. Past Medical History, Past Surgical History, Family history, Social History, and Medications were all reviewed with the patient today and updated as necessary. Current Outpatient Medications   Medication Sig Dispense Refill    isosorbide mononitrate (IMDUR) 30 MG extended release tablet Take 1 tablet by mouth in the morning. 30 tablet 3    hydrALAZINE (APRESOLINE) 50 MG tablet Take 1 tablet by mouth in the morning and 1 tablet at noon and 1 tablet before bedtime. (Patient taking differently: Take 100 mg by mouth in the morning and 100 mg in the evening.) 90 tablet 3    metoprolol succinate (TOPROL XL) 100 MG extended release tablet Take 1 tablet by mouth in the morning.  30 tablet 3    torsemide 40 MG TABS Take 40 mg by mouth in the morning and at bedtime 60 tablet 3    fluticasone-vilanterol (BREO ELLIPTA) 100-25 MCG/INH AEPB inhaler Inhale into the lungs daily      albuterol sulfate  (90 Base) MCG/ACT inhaler 2 puffs qid prn      allopurinol (ZYLOPRIM) 300 MG tablet Take 150 mg by mouth daily      aspirin 81 MG EC tablet Take 81 mg by mouth      calcitRIOL (ROCALTROL) 0.25 MCG capsule Take 0.25 mcg by mouth daily      cloNIDine (CATAPRES) 0.1 MG tablet Take 0.1 mg by mouth 2 times daily as needed      Dulaglutide (TRULICITY) 1.5 RS/6.0WY SOPN Inject 1.5 mg into the skin every 7 days On sundays      insulin glargine (LANTUS) 100 UNIT/ML injection vial Inject 25 Units into the skin 2 times daily      levothyroxine (SYNTHROID) 88 MCG tablet Take 88 mcg by mouth      sodium bicarbonate 650 MG tablet Take 650 mg by mouth 2 times daily       No current facility-administered medications for this visit. Allergies   Allergen Reactions    Amlodipine Shortness Of Breath    Iodine Shortness Of Breath    Metformin Shortness Of Breath    Ranolazine Other (See Comments)     Lips and tongue numbness    Spironolactone Other (See Comments)     Potassium level went really high  Increased potassium    Hydrocodone-Acetaminophen Other (See Comments)     Pt states that if he takes this  The medication makes him feel like he is going crazy    Omeprazole Other (See Comments)     Caused drastic drop in blood sugar     Patient Active Problem List    Diagnosis Date Noted    Moderate aortic stenosis by prior echocardiogram 08/13/2022     Priority: Medium    Chronic heart failure with preserved ejection fraction (HFpEF) ([facility] 08/03/2022     Priority: Medium    Volume overload 08/02/2022     Priority: Medium    Restrictive lung disease 06/03/2022     Priority: Medium    Chronic gout due to renal impairment without tophus 04/25/2022     Priority: Medium    Hypothyroidism due to Hashimoto's thyroiditis 10/20/2016     Priority: Medium     Last Assessment & Plan:   Formatting of this note might be different from the original.  Continue thyroid at 88 mcg daily, Recheck TFTs 6M.       PHU (iron deficiency anemia) 2022     TSAT 8% SFe 8 22 start IV FEx3 per hf affirm       Chest pain 2019    Obesity, morbid ([facility] 2018    Type 2 diabetes with nephropathy ([facility]     Diastolic CHF, chronic ([facility] 2017    Anemia in chronic kidney disease 2016    Acquired hypothyroidism 2016    Cough variant asthma 2016    Dyspnea 02/10/2016    CAD in native artery 02/10/2016     12/15: LAD PCI, small vessel dz        SEARS (dyspnea on exertion) 2015    Abnormal nuclear stress test 2015    Elevated liver function tests 2015    Hyponatremia 2015    Anemia 2015    PPD positive 10/28/2014    Seborrheic dermatitis 2013    Edema 10/11/2012     Edema on norvasc        Chronic kidney disease (CKD), stage IV (severe) ([facility] 10/11/2012    JORJE (obstructive sleep apnea) 10/11/2012    Hypertension 10/11/2012    Diabetes mellitus type 2 in obese ([facility] 10/11/2012      Past Surgical History:   Procedure Laterality Date    CARDIAC CATHETERIZATION  2016    3 stent    CATARACT REMOVAL Bilateral 2017    HERNIA REPAIR  1957     Family History   Problem Relation Age of Onset    Cancer Father         lung    No Known Problems Sister     No Known Problems Brother     Hypertension Father     No Known Problems Sister     Hypertension Mother      Social History     Tobacco Use    Smoking status: Former     Packs/day: 0.50     Years: 2.00     Pack years: 1.00     Types: Cigarettes     Quit date: 1962     Years since quittin.6    Smokeless tobacco: Never   Substance Use Topics    Alcohol use: No         ROS:    No obvious pertinent positives on review of systems except for what was outlined in the HPI today.       PHYSICAL EXAM:     /65   Pulse 79   Ht 6' 1\" (1.854 m)   Wt (!) 322 lb (146.1 kg)   BMI 42.48 kg/m²    General/Constitutional:   Alert and oriented x 3, no acute distress  HEENT:   normocephalic, atraumatic, moist mucous membranes  Neck:   No JVD or carotid bruits bilaterally  Cardiovascular:   regular rate and rhythm, no murmur/rub/gallop appreciated  Pulmonary:   clear to auscultation bilaterally, no respiratory distress  Abdomen:   soft, non-tender, non-distended  Ext:   No sig LE edema bilaterally  Skin:  warm and dry, no obvious rashes seen  Neuro:   no obvious sensory or motor deficits  Psychiatric:   normal mood and affect      Lab Results   Component Value Date/Time     08/18/2022 09:18 AM    K 4.3 08/18/2022 09:18 AM     08/18/2022 09:18 AM    CO2 20 08/18/2022 09:18 AM    BUN 88 08/18/2022 09:18 AM    CREATININE 4.10 08/18/2022 09:18 AM    GLUCOSE 134 08/18/2022 09:18 AM    CALCIUM 9.1 08/18/2022 09:18 AM        Lab Results   Component Value Date    WBC 6.2 08/12/2022    HGB 9.6 (L) 08/12/2022    HCT 30.6 (L) 08/12/2022    MCV 99.0 (H) 08/12/2022     08/12/2022       Lab Results   Component Value Date    TSH 0.924 07/02/2021       Lab Results   Component Value Date    LABA1C 6.6 (H) 08/13/2022     Lab Results   Component Value Date     08/13/2022       No results found for: CHOL  No results found for: TRIG  No results found for: HDL  No results found for: LDLCHOLESTEROL, LDLCALC  No results found for: LABVLDL, VLDL  No results found for: CHOLHDLRATIO        I have Independently reviewed prior care notes, any ER records available, cardiac testing, labs and results with the patient and before seeing the patient today. Also independently reviewed outside records when available.        ASSESSMENT:    Juno Cobb was seen today for follow-up from hospital.    Diagnoses and all orders for this visit:    Atherosclerotic heart disease of native coronary artery with other forms of angina pectoris (HCC)    Chronic heart failure with preserved ejection fraction (HFpEF) (HCC)    Moderate aortic stenosis by prior echocardiogram    Diastolic CHF, chronic ([facility] - [address] Hematology [facility] Oncology    Primary hypertension    Chronic kidney disease (CKD), stage IV (severe) ([facility]    SEARS (dyspnea on exertion)    Hospital discharge follow-up  -     AR DISCHARGE MEDS RECONCILED W/ CURRENT OUTPATIENT MED LIST        PLAN:         1. CAD/DHF/SEARS:   on imdur now, has SEARS, angina? Needs [facility] at some point, but CKD remains issue. Seeing renal next week. SEARS likely multi-factorial from CAD, DHF, deconditioning, weight, pHTN. Tough situation. On BID torsemide. 2. HTN:  Remain on meds. Avoid salt intake. Avoid nephro-toxic meds. 3 HPL:  Stopped statin on his own, does not trust them. 4 CRI: stage IV, seeing renal.  More diuretics as needed. 5. JORJE: remain on CPAP. 6. Anemia: refer to hematology. Patient has been instructed and agrees to call our office with any issues or other concerns related to their cardiac condition(s) and/or complaint(s). Return in about 1 month (around 9/19/2022).        ISABELL CONTRERAS DO  8/19/2022

## 2022-08-30 ENCOUNTER — TELEPHONE (OUTPATIENT)
Dept: ONCOLOGY | Age: 82
End: 2022-08-30

## 2022-09-01 ENCOUNTER — TELEPHONE (OUTPATIENT)
Dept: CARDIOLOGY CLINIC | Age: 82
End: 2022-09-01

## 2022-09-01 NOTE — TELEPHONE ENCOUNTER
BP is good when he first gets up and then when he takes meds it drops a lot. Should he half this or ?  Please call

## 2022-09-01 NOTE — TELEPHONE ENCOUNTER
Pt.is calling reporting he was started on Metoprolol and Imdur few weeks ago. /140's most days. Last few days BP has dropped w/in an hour of taking his BP.141/71 then after meds 113/59 hr=64 he felt terrible later in afternoon 135/74 hr=59. First thing today 147/68 HR=59 2 hours after meds 104/54 hr=62  What he takes in the morning Imdur 30mg qday and Toprol xl 100. He feels this is too much at once. Wants 's recommendation. Hydralazine is still 100mg q12h.

## 2022-09-02 NOTE — TELEPHONE ENCOUNTER
I called and informed pt. of MD response and he v/u. I made fu appt. for Tuesday BP check in [facility].

## 2022-09-02 NOTE — TELEPHONE ENCOUNTER
Try this:  Toprol at night after supper  Imdur in AM.   Try halving the hydralazine as well. Come in mid AM Monday as well for nurse office BP check with home machine.    Thanks

## 2022-09-06 ENCOUNTER — NURSE ONLY (OUTPATIENT)
Dept: CARDIOLOGY CLINIC | Age: 82
End: 2022-09-06

## 2022-09-06 ENCOUNTER — TELEPHONE (OUTPATIENT)
Dept: CARDIOLOGY CLINIC | Age: 82
End: 2022-09-06

## 2022-09-06 VITALS — HEART RATE: 68 BPM | DIASTOLIC BLOOD PRESSURE: 60 MMHG | SYSTOLIC BLOOD PRESSURE: 138 MMHG

## 2022-09-06 RX ORDER — HYDRALAZINE HYDROCHLORIDE 50 MG/1
50 TABLET, FILM COATED ORAL 2 TIMES DAILY
Qty: 90 TABLET | Refills: 3 | Status: SHIPPED | OUTPATIENT
Start: 2022-09-06

## 2022-09-06 RX ORDER — ISOSORBIDE MONONITRATE 30 MG/1
30 TABLET, EXTENDED RELEASE ORAL DAILY
Qty: 90 TABLET | Refills: 3 | Status: SHIPPED | OUTPATIENT
Start: 2022-09-06

## 2022-09-06 RX ORDER — METOPROLOL SUCCINATE 100 MG/1
100 TABLET, EXTENDED RELEASE ORAL EVERY EVENING
Qty: 90 TABLET | Refills: 3 | Status: SHIPPED | OUTPATIENT
Start: 2022-09-06

## 2022-09-06 NOTE — TELEPHONE ENCOUNTER
Pt called and informed to continue current treatment plan and to keep follow up appt and call with any new questions or issues. Pt voiced understanding.  Refills sent in per pt request.

## 2022-09-06 NOTE — TELEPHONE ENCOUNTER
Pt in for BP check. He states he feels much better since changing times of taking BP meds and decreasing Hydralazine to 50 mg Q12 hr. Todays BP with home machine was 141/75 P-68. Manual /60 P-68. He states his BP has not dropped since the change. He wanted to let you know his lab work was better from nephrology. Creatinine 3.8, Iron was good. Also, he has lost 12 lbs over last 3 weeks and swelling in feet has resolved.

## 2022-09-19 ENCOUNTER — OFFICE VISIT (OUTPATIENT)
Dept: CARDIOLOGY CLINIC | Age: 82
End: 2022-09-19
Payer: MEDICARE

## 2022-09-19 VITALS
SYSTOLIC BLOOD PRESSURE: 138 MMHG | BODY MASS INDEX: 41.75 KG/M2 | HEIGHT: 73 IN | HEART RATE: 57 BPM | DIASTOLIC BLOOD PRESSURE: 67 MMHG | WEIGHT: 315 LBS

## 2022-09-19 DIAGNOSIS — I35.0 MODERATE AORTIC STENOSIS BY PRIOR ECHOCARDIOGRAM: ICD-10-CM

## 2022-09-19 DIAGNOSIS — I50.32 DIASTOLIC CHF, CHRONIC (HCC): Primary | ICD-10-CM

## 2022-09-19 DIAGNOSIS — I10 PRIMARY HYPERTENSION: ICD-10-CM

## 2022-09-19 DIAGNOSIS — I50.32 CHRONIC HEART FAILURE WITH PRESERVED EJECTION FRACTION (HFPEF) (HCC): ICD-10-CM

## 2022-09-19 PROCEDURE — G8427 DOCREV CUR MEDS BY ELIG CLIN: HCPCS | Performed by: INTERNAL MEDICINE

## 2022-09-19 PROCEDURE — 1036F TOBACCO NON-USER: CPT | Performed by: INTERNAL MEDICINE

## 2022-09-19 PROCEDURE — G8417 CALC BMI ABV UP PARAM F/U: HCPCS | Performed by: INTERNAL MEDICINE

## 2022-09-19 PROCEDURE — 1123F ACP DISCUSS/DSCN MKR DOCD: CPT | Performed by: INTERNAL MEDICINE

## 2022-09-19 PROCEDURE — 99214 OFFICE O/P EST MOD 30 MIN: CPT | Performed by: INTERNAL MEDICINE

## 2022-09-19 NOTE — PROGRESS NOTES
[address], [address] [facility], [address] [place] [address]  PHONE: [phone]     22    NAME:  Campbell Ramirez  : 1940  MRN: [id number]       SUBJECTIVE:   Campbell Ramirez is a 79 y.o. male seen for a follow up visit regarding the following:     Chief Complaint   Patient presents with    Cardiomyopathy       HPI: Here for CAD (LAD PCI 12/15), JORJE on CPAP. He has CKD Stage III - IV. LHC 2019: stable mod CAD, BNP was 13   Echo 2020: normal EF, mild AI and MR. Echo 2022 and 2022: normal EF, mod AS, RVSP 51     Orthostatic at times, weak at times with low BP. CKD ongoing, seeing renal in one week. He has NYHA Class III sx now. SEARS ongoing. No CP. Compliant with CPAP. Some SEARS, some SOB. No new CP, not as active. Patient denies recent history of orthopnea, PND, excessive dizziness and/or syncope. Stopped statin, does not trust them. Edema on norvasc   High K on aldactone   Intolerant to ranexa   Edema on hydralazine. Past Medical History, Past Surgical History, Family history, Social History, and Medications were all reviewed with the patient today and updated as necessary.      Current Outpatient Medications   Medication Sig Dispense Refill    isosorbide mononitrate (IMDUR) 30 MG extended release tablet Take 1 tablet by mouth daily 90 tablet 3    hydrALAZINE (APRESOLINE) 50 MG tablet Take 1 tablet by mouth in the morning and at bedtime 90 tablet 3    metoprolol succinate (TOPROL XL) 100 MG extended release tablet Take 1 tablet by mouth every evening 90 tablet 3    torsemide 40 MG TABS Take 40 mg by mouth in the morning and at bedtime 60 tablet 3    fluticasone-vilanterol (BREO ELLIPTA) 100-25 MCG/INH AEPB inhaler Inhale into the lungs daily      albuterol sulfate  (90 Base) MCG/ACT inhaler 2 puffs qid prn      allopurinol (ZYLOPRIM) 300 MG tablet Take 150 mg by mouth daily      aspirin 81 MG EC tablet Take 81 mg by mouth      calcitRIOL (ROCALTROL) 0.25 MCG capsule Take 0.25 mcg by mouth daily      cloNIDine (CATAPRES) 0.1 MG tablet Take 0.1 mg by mouth 2 times daily as needed      Dulaglutide (TRULICITY) 1.5 KK/6.4EB SOPN Inject 1.5 mg into the skin every 7 days On sundays      insulin glargine (LANTUS) 100 UNIT/ML injection vial Inject 25 Units into the skin 2 times daily      levothyroxine (SYNTHROID) 88 MCG tablet Take 88 mcg by mouth      sodium bicarbonate 650 MG tablet Take 650 mg by mouth 2 times daily       No current facility-administered medications for this visit. Allergies   Allergen Reactions    Amlodipine Shortness Of Breath    Iodine Shortness Of Breath    Metformin Shortness Of Breath    Ranolazine Other (See Comments)     Lips and tongue numbness    Spironolactone Other (See Comments)     Potassium level went really high  Increased potassium    Hydrocodone-Acetaminophen Other (See Comments)     Pt states that if he takes this  The medication makes him feel like he is going crazy    Omeprazole Other (See Comments)     Caused drastic drop in blood sugar     Patient Active Problem List    Diagnosis Date Noted    Moderate aortic stenosis by prior echocardiogram 08/13/2022     Priority: Medium    Chronic heart failure with preserved ejection fraction (HFpEF) [facility] 08/03/2022     Priority: Medium    Volume overload 08/02/2022     Priority: Medium    Restrictive lung disease 06/03/2022     Priority: Medium    Chronic gout due to renal impairment without tophus 04/25/2022     Priority: Medium    Hypothyroidism due to Hashimoto's thyroiditis 10/20/2016     Priority: Medium     Last Assessment & Plan:   Formatting of this note might be different from the original.  Continue thyroid at 88 mcg daily, Recheck TFTs 6M.       PHU (iron deficiency anemia) 08/13/2022     TSAT 8% SFe 8 8/13/22 start IV FEx3 per hf affirm       Chest pain 05/28/2019    Obesity, morbid ([facility] 04/18/2018    Type 2 diabetes with nephropathy ([facility] 35/73/5302    Diastolic CHF, chronic ([facility] 2017    Anemia in chronic kidney disease 2016    Acquired hypothyroidism 2016    Cough variant asthma 2016    Dyspnea 02/10/2016    CAD in native artery 02/10/2016     12/15: LAD PCI, small vessel dz        SEARS (dyspnea on exertion) 2015    Abnormal nuclear stress test 2015    Elevated liver function tests 2015    Hyponatremia 2015    Anemia 2015    PPD positive 10/28/2014    Seborrheic dermatitis 2013    Edema 10/11/2012     Edema on norvasc        Chronic kidney disease (CKD), stage IV (severe) ([facility] 10/11/2012    JORJE (obstructive sleep apnea) 10/11/2012    Hypertension 10/11/2012    Diabetes mellitus type 2 in obese ([facility] 10/11/2012      Past Surgical History:   Procedure Laterality Date    CARDIAC CATHETERIZATION      3 stent    CATARACT REMOVAL Bilateral 2017    HERNIA REPAIR       Family History   Problem Relation Age of Onset    Cancer Father         lung    No Known Problems Sister     No Known Problems Brother     Hypertension Father     No Known Problems Sister     Hypertension Mother      Social History     Tobacco Use    Smoking status: Former     Packs/day: 0.50     Years: 2.00     Pack years: 1.00     Types: Cigarettes     Quit date: 1962     Years since quittin.7    Smokeless tobacco: Never   Substance Use Topics    Alcohol use: No         ROS:    No obvious pertinent positives on review of systems except for what was outlined in the HPI today.       PHYSICAL EXAM:     /67   Pulse 57   Ht 6' 1\" (1.854 m)   Wt (!) 320 lb (145.2 kg)   BMI 42.22 kg/m²    General/Constitutional:   Alert and oriented x 3, no acute distress  HEENT:   normocephalic, atraumatic, moist mucous membranes  Neck:   No JVD or carotid bruits bilaterally  Cardiovascular:   regular rate and rhythm, no murmur/rub/gallop appreciated  Pulmonary:   clear to auscultation bilaterally, no respiratory distress  Abdomen:   soft, non-tender, non-distended  Ext:   No sig LE edema bilaterally  Skin:  warm and dry, no obvious rashes seen  Neuro:   no obvious sensory or motor deficits  Psychiatric:   normal mood and affect      Lab Results   Component Value Date/Time     08/18/2022 09:18 AM    K 4.3 08/18/2022 09:18 AM     08/18/2022 09:18 AM    CO2 20 08/18/2022 09:18 AM    BUN 88 08/18/2022 09:18 AM    CREATININE 4.10 08/18/2022 09:18 AM    GLUCOSE 134 08/18/2022 09:18 AM    CALCIUM 9.1 08/18/2022 09:18 AM        Lab Results   Component Value Date    WBC 6.2 08/12/2022    HGB 9.6 (L) 08/12/2022    HCT 30.6 (L) 08/12/2022    MCV 99.0 (H) 08/12/2022     08/12/2022       Lab Results   Component Value Date    TSH 0.924 07/02/2021       Lab Results   Component Value Date    LABA1C 6.6 (H) 08/13/2022     Lab Results   Component Value Date     08/13/2022       No results found for: CHOL  No results found for: TRIG  No results found for: HDL  No results found for: LDLCHOLESTEROL, LDLCALC  No results found for: LABVLDL, VLDL  No results found for: CHOLHDLRATIO        I have Independently reviewed prior care notes, any ER records available, cardiac testing, labs and results with the patient and before seeing the patient today. Also independently reviewed outside records when available. ASSESSMENT:    Renu Wall was seen today for cardiomyopathy. Diagnoses and all orders for this visit:    Diastolic CHF, chronic (HCC)    Chronic heart failure with preserved ejection fraction (HFpEF) ([facility]    Primary hypertension    Moderate aortic stenosis by prior echocardiogram        PLAN:      1. CAD/DHF/SEARS:   on imdur now, has SEARS, angina? Needs C at some point, but CKD remains issue. Seeing renal next week. no active CP now. SEARS likely multi-factorial from CAD, DHF, deconditioning, weight, pHTN. Tough situation. On BID torsemide. reduce as he can. 2. HTN:  Remain on meds. Avoid salt intake. Avoid nephro-toxic meds. Can reduce hyd as needed. Halve imdur as well.       3 HPL:  Stopped statin on his own, does not trust them. 4 CRI: stage IV, seeing renal.  More diuretics as needed. 5. JORJE: remain on CPAP. 6. Anemia: referred to hematology. Patient has been instructed and agrees to call our office with any issues or other concerns related to their cardiac condition(s) and/or complaint(s). Return in about 3 months (around 12/19/2022).        ISABELL CONTRERAS,   9/19/2022

## 2022-12-01 NOTE — PROGRESS NOTES
He is a very pleasant 65-year-old male seen today for follow-up of cough variant asthma, sleep apnea, obesity, exertional dyspnea, JORJE. At prior visit, family member had mentioned family history of cirrhosis and wondered if he needed to have alpha-1 level. This was obtained and was normal.       DIAGNOSTICS:       CPFt's 3/2016 - Spirometry is consistent with mild obstructive defect with decreased forced vital capacity, lung volume and atelectasis is consistent with mild gas trapping. The diffusion capacity is normal. Overall this is consistent with GOLD stage II COPD. CXR 3/2016-unremarkable. Echocardiogram 12/2015- EF 65-70%. Diastolic dysfunction. Myocardial stress test - CONCLUSION:   1. Stress EKG: Normal.  2. SPECT Perfusion Imaging: Fixed defect likely related to diaphragmatic attenuation - probably normal.  3. LV Systolic Function is  normal.   ECHO - 9/22/2017-EF 70-75%, hyperdynamic, mild LVH. Diastolic function indeterminant. RVSP estimated at 22 mmHg. CXR 10/2016-no acute findings. Ambulatory oximetry 10/5/2016 - baseline saturation 97%, dropped to 95% with ambulation. Baseline heart rate 82, increased to 124 with ambulation. Elevated d-dimer 10/2016, subsequent negative ventilation/perfusion study. Spirometry 6/20/2017-moderate combined obstructive and restrictive defect, interval improvement since prior study. Spirometry 5/25/2018 - patient declined, (back pain). Spirometry 3/29/2019-moderate restrictive defect, interval decline in FVC, no significant change in FEV1. Previous complete pulmonary function test have demonstrated obstructive defect with air trapping. Echo 8/3/2020-EF 12-02%, + diastolic dysfunction, mild aortic valve sclerosis without significant stenosis, minimal aortic regurgitation. Mild MR. Trace TR.  RVSP could not be estimated due to no tricuspid regurgitation.   Spirometry 12/3/2020-mild restrictive defect, slight improvement in FVC and FEV1 compared to last study, significant improvement compared to baseline study. Normal alpha-1 level 6/2021. CXR 6/1/2022- lung parenchyma is clear, no acute cardiopulmonary abnormality. Ambulatory oximetry 6/3/2022-baseline saturation 97% room air at rest, 97% room air with ambulation. Baseline heart rate 58, maximum 73 with ambulation.

## 2022-12-02 ENCOUNTER — OFFICE VISIT (OUTPATIENT)
Dept: PULMONOLOGY | Age: 82
End: 2022-12-02
Payer: MEDICARE

## 2022-12-02 VITALS
WEIGHT: 311 LBS | SYSTOLIC BLOOD PRESSURE: 136 MMHG | HEART RATE: 73 BPM | TEMPERATURE: 97 F | OXYGEN SATURATION: 99 % | BODY MASS INDEX: 41.22 KG/M2 | HEIGHT: 73 IN | DIASTOLIC BLOOD PRESSURE: 82 MMHG

## 2022-12-02 DIAGNOSIS — E66.01 OBESITY, MORBID (HCC): ICD-10-CM

## 2022-12-02 DIAGNOSIS — J45.991 COUGH VARIANT ASTHMA: Primary | Chronic | ICD-10-CM

## 2022-12-02 DIAGNOSIS — J98.4 RESTRICTIVE LUNG DISEASE: ICD-10-CM

## 2022-12-02 DIAGNOSIS — G47.33 OSA (OBSTRUCTIVE SLEEP APNEA): ICD-10-CM

## 2022-12-02 DIAGNOSIS — R06.09 DOE (DYSPNEA ON EXERTION): ICD-10-CM

## 2022-12-02 LAB
EXPIRATORY TIME: NORMAL
FEF 25-75% %PRED-PRE: NORMAL
FEF 25-75% PRED: NORMAL
FEF 25-75%-PRE: NORMAL
FEV1 %PRED-PRE: 80 %
FEV1 PRED: NORMAL
FEV1/FVC %PRED-PRE: NORMAL
FEV1/FVC PRED: NORMAL
FEV1/FVC: 71 %
FEV1: 2.5 L
FVC %PRED-PRE: 80 %
FVC PRED: NORMAL
FVC: 3.52 L
PEF %PRED-PRE: NORMAL
PEF PRED: NORMAL
PEF-PRE: NORMAL

## 2022-12-02 PROCEDURE — 99213 OFFICE O/P EST LOW 20 MIN: CPT | Performed by: NURSE PRACTITIONER

## 2022-12-02 PROCEDURE — 3078F DIAST BP <80 MM HG: CPT | Performed by: NURSE PRACTITIONER

## 2022-12-02 PROCEDURE — 1036F TOBACCO NON-USER: CPT | Performed by: NURSE PRACTITIONER

## 2022-12-02 PROCEDURE — G8427 DOCREV CUR MEDS BY ELIG CLIN: HCPCS | Performed by: NURSE PRACTITIONER

## 2022-12-02 PROCEDURE — 3074F SYST BP LT 130 MM HG: CPT | Performed by: NURSE PRACTITIONER

## 2022-12-02 PROCEDURE — 1123F ACP DISCUSS/DSCN MKR DOCD: CPT | Performed by: NURSE PRACTITIONER

## 2022-12-02 PROCEDURE — G8417 CALC BMI ABV UP PARAM F/U: HCPCS | Performed by: NURSE PRACTITIONER

## 2022-12-02 PROCEDURE — 94010 BREATHING CAPACITY TEST: CPT | Performed by: NURSE PRACTITIONER

## 2022-12-02 PROCEDURE — G8484 FLU IMMUNIZE NO ADMIN: HCPCS | Performed by: NURSE PRACTITIONER

## 2022-12-02 RX ORDER — ALBUTEROL SULFATE 90 UG/1
AEROSOL, METERED RESPIRATORY (INHALATION)
Qty: 1 EACH | Refills: 11 | Status: SHIPPED | OUTPATIENT
Start: 2022-12-02

## 2022-12-02 RX ORDER — FLUTICASONE FUROATE AND VILANTEROL 100; 25 UG/1; UG/1
POWDER RESPIRATORY (INHALATION)
Qty: 1 EACH | Refills: 11 | Status: SHIPPED | OUTPATIENT
Start: 2022-12-02

## 2022-12-02 ASSESSMENT — ENCOUNTER SYMPTOMS
HEMOPTYSIS: 0
WHEEZING: 0
COUGH: 0
SPUTUM PRODUCTION: 0
SHORTNESS OF BREATH: 1

## 2022-12-02 ASSESSMENT — PULMONARY FUNCTION TESTS
FEV1/FVC: 71
FVC: 3.52
FEV1: 2.50
FVC_PERCENT_PREDICTED_PRE: 80
FEV1_PERCENT_PREDICTED_PRE: 80

## 2022-12-02 NOTE — PATIENT INSTRUCTIONS
Continue Breo 100, 1 inhalation every morning, rinse mouth after use. Albuterol 2 puffs 4 times daily as needed for shortness of breath or wheezing. Follow-up in 1 year with spirometry, sooner if needed. He will contact us for refill for breo [facility] when needed. He is up-to-date on pneumonia, flu and COVID vaccines.

## 2022-12-02 NOTE — PROGRESS NOTES
[address] [facility] [address], [address] [facility]  [phone]    Patient Name:  Nathaly Lee    YOB: 1940    Office Visit 2022      CHIEF COMPLAINT:      Chief Complaint   Patient presents with    Follow-up    Other     Chronic obstructive asthma         ASSESSMENT:   (Medical Decision Making)                                                                                                                                          Encounter Diagnoses   Name Primary? Cough variant asthma Yes    Restrictive lung disease     JORJE (obstructive sleep apnea)     SEARS (dyspnea on exertion)     Obesity, morbid ([address]      He is stable symptomatically, actually improved. Spirometry shows improvement in FVC, no change in FEV 1. Compliant with breo, has not required albuterol. Restrictive defect - resolved. Likely due to interval weight/fluid loss. Compliant with CPAP, followed in sleep clinic. BMI is 41. Weight is down from peak of 354 to 311 today. PLAN:     Orders Placed This Encounter    Spirometry Without Bronchodilator    CPAP Machine MISC     Sig: by Does not apply route At HS    albuterol sulfate HFA (PROVENTIL;VENTOLIN;PROAIR) 108 (90 Base) MCG/ACT inhaler     Si puffs 4 times daily if needed for shortness of breath or wheezing. Patient will call when refills are needed     Dispense:  1 each     Refill:  11    Fluticasone Furoate-Vilanterol (BREO ELLIPTA) 100-25 MCG/ACT AEPB     Si inhalation every morning, rinse mouth after use. Dispense:  1 each     Refill:  11           CLINT CARCAMO, APRN - CNP    Total  time spent with patient - 28 min. Collaborating MD: Dr. Naomy Diane:    He is a very pleasant 79-year-old male seen today for follow-up of cough variant asthma, sleep apnea, obesity, exertional dyspnea, JORJE.   At prior visit, family member had mentioned family history of cirrhosis and wondered if he needed to have alpha-1 level. This was obtained and was normal.    Hospitalization 8/2022 secondary to volume overload, CHF with preserved EF. Had experienced volume overload and 25 pound weight gain. Chest x-rays obtained during the hospitalization are reviewed, suggestive of cardiomegaly, unchanged. Initially there was mild pulmonary venous congestion and perihilar edema, this improved on follow-up imaging. Today, he reports interval improvement in shortness of breath. He currently denies wheezing or cough. He is compliant with Breo. He has not required albuterol. States that he will need new prescription for [employer] program after he meets $600 expense at the beginning of the year. He request that I send him monthly refills to his local pharmacy until that time. He is compliant with CPAP and continues to note good response. He is followed in the sleep clinic. He and his wife had made attempts to follow the [employer] recommended at his last visit. He states that she has been able to lose 28 pounds. His weight is down from 354-311 and significant improvement in peripheral edema. He is compliant with diuretic regimen and weighs daily. DIAGNOSTICS:       CPFt's 3/2016 - Spirometry is consistent with mild obstructive defect with decreased forced vital capacity, lung volume and atelectasis is consistent with mild gas trapping. The diffusion capacity is normal. Overall this is consistent with GOLD stage II COPD. CXR 3/2016-unremarkable. Echocardiogram 12/2015- EF 65-70%. Diastolic dysfunction. Myocardial stress test - CONCLUSION:   1. Stress EKG: Normal.  2. SPECT Perfusion Imaging: Fixed defect likely related to diaphragmatic attenuation - probably normal.  3. LV Systolic Function is  normal.   ECHO - 9/22/2017-EF 70-75%, hyperdynamic, mild LVH. Diastolic function indeterminant. RVSP estimated at 22 mmHg. CXR 10/2016-no acute findings.   Ambulatory oximetry 10/5/2016 - baseline saturation 97%, dropped to 95% with ambulation. Baseline heart rate 82, increased to 124 with ambulation. Elevated d-dimer 10/2016, subsequent negative ventilation/perfusion study. Spirometry 6/20/2017-moderate combined obstructive and restrictive defect, interval improvement since prior study. Spirometry 5/25/2018 - patient declined, (back pain). Spirometry 3/29/2019-moderate restrictive defect, interval decline in FVC, no significant change in FEV1. Previous complete pulmonary function test have demonstrated obstructive defect with air trapping. Echo 8/3/2020-EF 22-32%, + diastolic dysfunction, mild aortic valve sclerosis without significant stenosis, minimal aortic regurgitation. Mild MR. Trace TR.  RVSP could not be estimated due to no tricuspid regurgitation. Spirometry 12/3/2020-mild restrictive defect, slight improvement in FVC and FEV1 compared to last study, significant improvement compared to baseline study. Normal alpha-1 level 6/2021. CXR 6/1/2022- lung parenchyma is clear, no acute cardiopulmonary abnormality. Ambulatory oximetry 6/3/2022-baseline saturation 97% room air at rest, 97% room air with ambulation. Baseline heart rate 58, maximum 73 with ambulation. CXR 8/2/2022-mild pulmonary venous congestion and perihilar edema with probable mild CHF/fluid imbalance. CXR 8/12/2022-borderline cardiomegaly, unchanged. No acute cardiopulmonary abnormality. Spirometry 12/2/2022-normal.  Interval improvement in FVC. No significant change in FEV1.  _____________________________________________________________      REVIEW OF SYSTEMS:    Review of Systems   Constitutional: Positive for weight loss. Cardiovascular:  Negative for chest pain and leg swelling. Respiratory:  Positive for shortness of breath. Negative for cough, hemoptysis, sputum production and wheezing.          PHYSICAL EXAM:    Vitals:    12/02/22 1043   BP: 136/82   Pulse: 73   Temp: 97 °F (36.1 °C)   TempSrc: Skin   SpO2: 99%   Weight: (!) 311 lb (141.1 kg)   Height: 6' 1\" (1.854 m)          Body mass index is 41.03 kg/m². GENERAL APPEARANCE:  The patient is morbidly obese and in no respiratory distress. HEENT:  PERRL. Conjunctivae unremarkable. NECK/LYMPHATIC:   Symmetrical with no elevation of jugular venous pulsation. Trachea midline. LUNGS:   Normal respiratory effort with symmetrical lung expansion. Breath sounds-decreased but completely clear. Kayce Quiver HEART:   There is a normal rate and regular rhythm. No rub, or gallop. II/VI slightly harsh murmur, loudest at right sternal border. There is no edema in the lower extremities. NEURO:  The patient is alert and oriented to person, place, and time. Memory appears intact and mood is normal.  No gross sensorimotor deficits are present. DIAGNOSTIC TESTS: Studies were personally reviewed by me and discussed with the patient. Spirometry:    12/2020: Today:    Office Spirometry Results Latest Ref Rng & Units 12/2/2022   FVC L 3.52   FEV1 L 2.50   FEV1 %PRED-PRE % 80   FVC %PRED-PRE % 80   FEV1/FVC % 71           CXR:      XR CHEST (2 VW) 08/12/2022      FINDINGS:  Support Devices:  *  None    Cardiac Silhouette: Borderline cardiomegaly, unchanged. Mediastinum: Normal mediastinal contours. Aortic arch calcifications. Lungs: No airspace consolidation. No pneumothorax or sizable pleural effusion. Upper Abdomen: Normal    Miscellaneous: No fracture or suspicious osseous lesion. Impression  No acute cardiopulmonary abnormality.           Past Medical History:   Diagnosis Date    Acquired hypothyroidism 4/8/2016    Anemia 1/21/2015    Anemia in chronic kidney disease 4/8/2016    Asthma     Chronic kidney disease     stage 4 kidney disease- Dr. Smith Becerril    CKD (chronic kidney disease) 10/11/2012    Coronary atherosclerosis of native coronary vessel 2/10/2016    Diabetes mellitus type 2 in obese ([address] 10/11/2012    avg 150, symptoms of hypoglycemia 90    Diastolic heart failure (HCC)     Dyspnea     Dyspnea 2/10/2016    Edema 10/11/2012    Elevated liver function tests 2015    Hypertension 10/11/2012    Hyponatremia 2015    Morbid obesity ([facility]     BMI 43.54    JORJE (obstructive sleep apnea) 10/11/2012    cpap    Seborrheic dermatitis 2013    Seborrheic dermatitis        Patient Active Problem List   Diagnosis    Edema    Chronic kidney disease (CKD), stage IV (severe) (HCC)    JORJE (obstructive sleep apnea)    Dyspnea    Cough variant asthma    PPD positive    Hyponatremia    Chest pain    Anemia in chronic kidney disease    Obesity, morbid (HCC)    SEARS (dyspnea on exertion)    Abnormal nuclear stress test    Type 2 diabetes with nephropathy (HCC)    Hypertension    Seborrheic dermatitis    Elevated liver function tests    Anemia    Diastolic CHF, chronic (HCC)    Diabetes mellitus type 2 in obese (HCC)    CAD in native artery    Acquired hypothyroidism    Restrictive lung disease    Volume overload    Chronic heart failure with preserved ejection fraction (HFpEF) (HCC)    Moderate aortic stenosis by prior echocardiogram    Chronic gout due to renal impairment without tophus    Hypothyroidism due to Hashimoto's thyroiditis    PHU (iron deficiency anemia)       Past Surgical History:   Procedure Laterality Date    CARDIAC CATHETERIZATION      3 stent    CATARACT REMOVAL Bilateral 2017    HERNIA REPAIR           Social History     Socioeconomic History    Marital status:      Spouse name: None    Number of children: None    Years of education: None    Highest education level: None   Tobacco Use    Smoking status: Former     Packs/day: 0.50     Years: 2.00     Pack years: 1.00     Types: Cigarettes     Quit date: 1962     Years since quittin.9    Smokeless tobacco: Never   Substance and Sexual Activity    Alcohol use: No    Drug use: No       Family History   Problem Relation Age of Onset    Cancer Father         lung    No Known Problems Sister     No Known Problems Brother     Hypertension Father     No Known Problems Sister     Hypertension Mother        Allergies   Allergen Reactions    Amlodipine Shortness Of Breath    Iodine Shortness Of Breath    Metformin Shortness Of Breath    Ranolazine Other (See Comments)     Lips and tongue numbness    Spironolactone Other (See Comments)     Potassium level went really high  Increased potassium    Hydrocodone-Acetaminophen Other (See Comments)     Pt states that if he takes this  The medication makes him feel like he is going crazy    Omeprazole Other (See Comments)     Caused drastic drop in blood sugar       Current Outpatient Medications   Medication Sig    CPAP Machine MISC by Does not apply route At HS    isosorbide mononitrate (IMDUR) 30 MG extended release tablet Take 1 tablet by mouth daily    hydrALAZINE (APRESOLINE) 50 MG tablet Take 1 tablet by mouth in the morning and at bedtime    metoprolol succinate (TOPROL XL) 100 MG extended release tablet Take 1 tablet by mouth every evening    torsemide 40 MG TABS Take 40 mg by mouth in the morning and at bedtime    fluticasone-vilanterol (BREO ELLIPTA) 100-25 MCG/INH AEPB inhaler Inhale into the lungs daily    albuterol sulfate  (90 Base) MCG/ACT inhaler 2 puffs qid prn    allopurinol (ZYLOPRIM) 300 MG tablet Take 150 mg by mouth daily    aspirin 81 MG EC tablet Take 81 mg by mouth    calcitRIOL (ROCALTROL) 0.25 MCG capsule Take 0.25 mcg by mouth daily    cloNIDine (CATAPRES) 0.1 MG tablet Take 0.1 mg by mouth 2 times daily as needed    Dulaglutide (TRULICITY) 1.5 DL/0.1YC SOPN Inject 1.5 mg into the skin every 7 days On sundays    insulin glargine (LANTUS) 100 UNIT/ML injection vial Inject 25 Units into the skin 2 times daily    levothyroxine (SYNTHROID) 88 MCG tablet Take 88 mcg by mouth    sodium bicarbonate 650 MG tablet Take 650 mg by mouth 2 times daily     No current facility-administered medications for this visit. Over 50% of today's office visit was spent in face to face time reviewing test results, prognosis, importance of compliance, education about disease process, benefits of medications, instructions for management of acute symptoms, and follow up plans. Electronically signed. Dictated using voice recognition software.   Proof read but unrecognized errors may exist.

## 2023-01-24 ENCOUNTER — TELEPHONE (OUTPATIENT)
Dept: CARDIOLOGY CLINIC | Age: 83
End: 2023-01-24

## 2023-01-24 ENCOUNTER — OFFICE VISIT (OUTPATIENT)
Dept: CARDIOLOGY CLINIC | Age: 83
End: 2023-01-24
Payer: MEDICARE

## 2023-01-24 VITALS
BODY MASS INDEX: 41.75 KG/M2 | DIASTOLIC BLOOD PRESSURE: 70 MMHG | SYSTOLIC BLOOD PRESSURE: 136 MMHG | HEIGHT: 73 IN | WEIGHT: 315 LBS | HEART RATE: 80 BPM

## 2023-01-24 DIAGNOSIS — R07.9 CHEST PAIN, UNSPECIFIED TYPE: Primary | ICD-10-CM

## 2023-01-24 DIAGNOSIS — N18.4 CHRONIC KIDNEY DISEASE (CKD), STAGE IV (SEVERE) (HCC): ICD-10-CM

## 2023-01-24 DIAGNOSIS — I50.32 DIASTOLIC CHF, CHRONIC (HCC): ICD-10-CM

## 2023-01-24 DIAGNOSIS — G47.33 OSA (OBSTRUCTIVE SLEEP APNEA): ICD-10-CM

## 2023-01-24 DIAGNOSIS — I50.32 CHRONIC HEART FAILURE WITH PRESERVED EJECTION FRACTION (HFPEF) (HCC): ICD-10-CM

## 2023-01-24 DIAGNOSIS — I10 PRIMARY HYPERTENSION: ICD-10-CM

## 2023-01-24 DIAGNOSIS — I35.0 MODERATE AORTIC STENOSIS BY PRIOR ECHOCARDIOGRAM: ICD-10-CM

## 2023-01-24 DIAGNOSIS — I25.10 CAD IN NATIVE ARTERY: ICD-10-CM

## 2023-01-24 PROCEDURE — G8484 FLU IMMUNIZE NO ADMIN: HCPCS | Performed by: INTERNAL MEDICINE

## 2023-01-24 PROCEDURE — 1036F TOBACCO NON-USER: CPT | Performed by: INTERNAL MEDICINE

## 2023-01-24 PROCEDURE — G8427 DOCREV CUR MEDS BY ELIG CLIN: HCPCS | Performed by: INTERNAL MEDICINE

## 2023-01-24 PROCEDURE — 99215 OFFICE O/P EST HI 40 MIN: CPT | Performed by: INTERNAL MEDICINE

## 2023-01-24 PROCEDURE — 93000 ELECTROCARDIOGRAM COMPLETE: CPT | Performed by: INTERNAL MEDICINE

## 2023-01-24 PROCEDURE — 3078F DIAST BP <80 MM HG: CPT | Performed by: INTERNAL MEDICINE

## 2023-01-24 PROCEDURE — 1123F ACP DISCUSS/DSCN MKR DOCD: CPT | Performed by: INTERNAL MEDICINE

## 2023-01-24 PROCEDURE — 3075F SYST BP GE 130 - 139MM HG: CPT | Performed by: INTERNAL MEDICINE

## 2023-01-24 PROCEDURE — G8417 CALC BMI ABV UP PARAM F/U: HCPCS | Performed by: INTERNAL MEDICINE

## 2023-01-24 RX ORDER — HYDRALAZINE HYDROCHLORIDE 50 MG/1
50 TABLET, FILM COATED ORAL 2 TIMES DAILY
Qty: 90 TABLET | Refills: 3 | Status: SHIPPED | OUTPATIENT
Start: 2023-01-24

## 2023-01-24 RX ORDER — METOPROLOL SUCCINATE 100 MG/1
100 TABLET, EXTENDED RELEASE ORAL EVERY EVENING
Qty: 90 TABLET | Refills: 3 | Status: SHIPPED | OUTPATIENT
Start: 2023-01-24

## 2023-01-24 RX ORDER — ISOSORBIDE MONONITRATE 30 MG/1
30 TABLET, EXTENDED RELEASE ORAL DAILY
Qty: 90 TABLET | Refills: 3 | Status: ON HOLD | OUTPATIENT
Start: 2023-01-24 | End: 2023-01-28 | Stop reason: SDUPTHER

## 2023-01-24 NOTE — TELEPHONE ENCOUNTER
----- Message from Gavin Dumotn DO sent at 1/24/2023 10:20 AM EST -----  Need to arrange for elective admission to tele bed at [facility] on Wed for hydration, renal consult before [address] [facility] Thursday. Please arrange for tele bed Wed, let me know about bed Wed AM please and I can let them know. Thanks    Note forwarded to scheduling to make it so.

## 2023-01-24 NOTE — PROGRESS NOTES
[address], [address] [employer] [address]  PHONE: [phone]     23    NAME:  Brett Flores  : 1940  MRN: [id number]       SUBJECTIVE:   Brett Flores is a 79 y.o. male seen for a follow up visit regarding the following:     Chief Complaint   Patient presents with    Coronary Artery Disease     3 month follow up    Chest Pain     C/o chest pain when he walks       HPI: Here for CAD (LAD PCI 12/15), JORJE on CPAP. He has CKD Stage III - IV. LHC 2019: stable mod CAD, BNP was 13   Echo 2020: normal EF, mild AI and MR. Echo 2022 and 2022: normal EF, mod AS, RVSP 51       BP better, but stamina worsening, weaker. Some SEARS, some SOB, worsening. More angina now. More exertional SOB. More exertional CP and pressure now. Orthostasis better now. He has NYHA Class III sx now. Compliant with CPAP. Patient denies recent history of orthopnea, PND, excessive dizziness and/or syncope. Stopped statin, does not trust them. Past Medical History, Past Surgical History, Family history, Social History, and Medications were all reviewed with the patient today and updated as necessary. Current Outpatient Medications   Medication Sig Dispense Refill    hydrALAZINE (APRESOLINE) 50 MG tablet Take 1 tablet by mouth in the morning and at bedtime 90 tablet 3    metoprolol succinate (TOPROL XL) 100 MG extended release tablet Take 1 tablet by mouth every evening 90 tablet 3    isosorbide mononitrate (IMDUR) 30 MG extended release tablet Take 1 tablet by mouth daily 90 tablet 3    CPAP Machine MISC by Does not apply route At HS      albuterol sulfate HFA (PROVENTIL;VENTOLIN;PROAIR) 108 (90 Base) MCG/ACT inhaler 2 puffs 4 times daily if needed for shortness of breath or wheezing. Patient will call when refills are needed 1 each 11    Fluticasone Furoate-Vilanterol (BREO ELLIPTA) 100-25 MCG/ACT AEPB 1 inhalation every morning, rinse mouth after use.  1 each 11 torsemide 40 MG TABS Take 40 mg by mouth in the morning and at bedtime 60 tablet 3    allopurinol (ZYLOPRIM) 300 MG tablet Take 150 mg by mouth daily      aspirin 81 MG EC tablet Take 81 mg by mouth      calcitRIOL (ROCALTROL) 0.25 MCG capsule Take 0.25 mcg by mouth daily      cloNIDine (CATAPRES) 0.1 MG tablet Take 0.1 mg by mouth 2 times daily as needed      Dulaglutide (TRULICITY) 1.5 TT/7.3UM SOPN Inject 1.5 mg into the skin every 7 days On sundays      insulin glargine (LANTUS) 100 UNIT/ML injection vial Inject 25 Units into the skin 2 times daily      levothyroxine (SYNTHROID) 88 MCG tablet Take 88 mcg by mouth      sodium bicarbonate 650 MG tablet Take 650 mg by mouth 2 times daily       No current facility-administered medications for this visit. Allergies   Allergen Reactions    Amlodipine Shortness Of Breath    Iodine Shortness Of Breath    Metformin Shortness Of Breath    Ranolazine Other (See Comments)     Lips and tongue numbness    Spironolactone Other (See Comments)     Potassium level went really high  Increased potassium    Hydrocodone-Acetaminophen Other (See Comments)     Pt states that if he takes this  The medication makes him feel like he is going crazy    Omeprazole Other (See Comments)     Caused drastic drop in blood sugar     Patient Active Problem List    Diagnosis Date Noted    Moderate aortic stenosis by prior echocardiogram 08/13/2022     Priority: Medium    Chronic heart failure with preserved ejection fraction (HFpEF) ([facility] 08/03/2022     Priority: Medium    Volume overload 08/02/2022     Priority: Medium    Restrictive lung disease 06/03/2022     Priority: Medium    Chronic gout due to renal impairment without tophus 04/25/2022     Priority: Medium    Hypothyroidism due to Hashimoto's thyroiditis 10/20/2016     Priority: Medium     Last Assessment & Plan:   Formatting of this note might be different from the original.  Continue thyroid at 88 mcg daily, Recheck TFTs 6M. PHU (iron deficiency anemia) 2022     TSAT 8% SFe 8 22 start IV FEx3 per hf affirm       Chest pain 2019    Obesity, morbid ([facility] 2018    Type 2 diabetes with nephropathy [facility]     Diastolic CHF, chronic ([facility] 2017    Anemia in chronic kidney disease 2016    Acquired hypothyroidism 2016    Cough variant asthma 2016    Dyspnea 02/10/2016    CAD in native artery 02/10/2016     12/15: LAD PCI, small vessel dz        SEARS (dyspnea on exertion) 2015    Abnormal nuclear stress test 2015    Elevated liver function tests 2015    Hyponatremia 2015    Anemia 2015    PPD positive 10/28/2014    Seborrheic dermatitis 2013    Edema 10/11/2012     Edema on norvasc        Chronic kidney disease (CKD), stage IV (severe) ([facility] 10/11/2012    JORJE (obstructive sleep apnea) 10/11/2012    Hypertension 10/11/2012    Diabetes mellitus type 2 in obese ([facility] 10/11/2012      Past Surgical History:   Procedure Laterality Date    CARDIAC CATHETERIZATION  2016    3 stent    CATARACT REMOVAL Bilateral 2017    HERNIA REPAIR  1957     Family History   Problem Relation Age of Onset    Cancer Father         lung    No Known Problems Sister     No Known Problems Brother     Hypertension Father     No Known Problems Sister     Hypertension Mother      Social History     Tobacco Use    Smoking status: Former     Packs/day: 0.50     Years: 2.00     Pack years: 1.00     Types: Cigarettes     Quit date: 1962     Years since quittin.1    Smokeless tobacco: Never   Substance Use Topics    Alcohol use: No         ROS:    Constitutional:   Negative for fevers and unexplained weight loss. Eyes:   Negative for visual disturbance. ENT:   Negative for significant hearing loss and tinnitus. Respiratory:   Negative for hemoptysis. Cardiovascular:   Negative except as noted in HPI. Gastrointestinal:   Negative for melena and abdominal pain.   Genitourinary: Negative for hematuria, renal stones. Integumentary:   Negative for rash or non-healing wounds  Hematologic/Lymphatic:   Negative for excessive bleeding hx or clotting disorder. Musculoskeletal:  Negative for active, unexplained/severe joint pain. Neurological:   Negative for stroke. Behavioral/Psych:   Negative for suicidal ideations. Endocrine:   Negative for uncontrolled diabetic symptoms including polyuria, polydipsia and poor wound healing. PHYSICAL EXAM:     /70   Pulse 80   Ht 6' 1\" (1.854 m)   Wt (!) 315 lb (142.9 kg)   BMI 41.56 kg/m²    General/Constitutional:   Alert and oriented x 3, no acute distress  HEENT:   normocephalic, atraumatic, moist mucous membranes  Neck:   No JVD or carotid bruits bilaterally  Cardiovascular:   regular rate and rhythm, 2.6 SM  Pulmonary:   clear to auscultation bilaterally, no respiratory distress  Abdomen:   soft, non-tender, non-distended  Ext:   No sig LE edema bilaterally  Skin:  warm and dry, no obvious rashes seen  Neuro:   no obvious sensory or motor deficits  Psychiatric:   normal mood and affect      EKG Today and independently reviewed by me: sinus rhythm, normal intervals and non-specific ST/T wave changes.         Lab Results   Component Value Date/Time     08/18/2022 09:18 AM    K 4.3 08/18/2022 09:18 AM     08/18/2022 09:18 AM    CO2 20 08/18/2022 09:18 AM    BUN 88 08/18/2022 09:18 AM    CREATININE 4.10 08/18/2022 09:18 AM    GLUCOSE 134 08/18/2022 09:18 AM    CALCIUM 9.1 08/18/2022 09:18 AM        Lab Results   Component Value Date    WBC 6.2 08/12/2022    HGB 9.6 (L) 08/12/2022    HCT 30.6 (L) 08/12/2022    MCV 99.0 (H) 08/12/2022     08/12/2022       Lab Results   Component Value Date    TSH 0.924 07/02/2021       Lab Results   Component Value Date    LABA1C 6.6 (H) 08/13/2022     Lab Results   Component Value Date     08/13/2022       No results found for: CHOL  No results found for: TRIG  No results found for: HDL  No results found for: LDLCHOLESTEROL, LDLCALC  No results found for: LABVLDL, VLDL  No results found for: CHOLHDLRATIO        I have Independently reviewed prior care notes, any ER records available, cardiac testing, labs and results with the patient and before seeing the patient today. Also independently reviewed outside records when available. ASSESSMENT:    Mac Barron was seen today for coronary artery disease and chest pain. Diagnoses and all orders for this visit:    Chest pain, unspecified type  -     EKG 12 lead    Chronic heart failure with preserved ejection fraction (HFpEF) (HCC)    Diastolic CHF, chronic (HCC)    Primary hypertension    CAD in native artery    Moderate aortic stenosis by prior echocardiogram    JORJE (obstructive sleep apnea)    Chronic kidney disease (CKD), stage IV (severe) ([facility]    Other orders  -     hydrALAZINE (APRESOLINE) 50 MG tablet; Take 1 tablet by mouth in the morning and at bedtime  -     metoprolol succinate (TOPROL XL) 100 MG extended release tablet; Take 1 tablet by mouth every evening  -     isosorbide mononitrate (IMDUR) 30 MG extended release tablet; Take 1 tablet by mouth daily        PLAN:      1. CAD/DHF/SEARS:   on imdur now, more angina now, but CKD is severe. He is worsening. Tough situation. Double imdur to 60 in AM.  On Toprol 100. (Ranexa \"did not work\" in the past). Plan for overnight admission, hydration, renal consult and then [facility] for more angina ([place] [facility] [place] SEARS likely multi-factorial from CAD, AS, DHF, deconditioning, weight, pHTN. Tough situation. On BID torsemide. reduce as he can. 2. HTN:  he has adjusted his meds. Avoid nephro-toxic meds. 3 HPL:  Stopped statin on his own, does not trust them. 4 CRI: stage IV, seeing renal.  More diuretics as needed. 5. JORJE: remain on CPAP.        6. Anemia: follow, need labs    Patient has been instructed and agrees to call our office with any issues or other concerns related to their cardiac condition(s) and/or complaint(s). Return in about 1 month (around 2/24/2023).        ISABELL CONTRERAS, DO  1/24/2023

## 2023-01-25 ENCOUNTER — HOSPITAL ENCOUNTER (INPATIENT)
Age: 83
LOS: 3 days | Discharge: HOME | DRG: 247 | End: 2023-01-28
Attending: INTERNAL MEDICINE | Admitting: INTERNAL MEDICINE
Payer: MEDICARE

## 2023-01-25 DIAGNOSIS — Z09 HOSPITAL DISCHARGE FOLLOW-UP: Primary | ICD-10-CM

## 2023-01-25 DIAGNOSIS — R07.9 CHEST PAIN, UNSPECIFIED TYPE: ICD-10-CM

## 2023-01-25 DIAGNOSIS — I20.0 UNSTABLE ANGINA (HCC): ICD-10-CM

## 2023-01-25 DIAGNOSIS — I25.10 CAD IN NATIVE ARTERY: ICD-10-CM

## 2023-01-25 LAB
GLUCOSE BLD STRIP.AUTO-MCNC: 206 MG/DL (ref 65–100)
SERVICE CMNT-IMP: ABNORMAL

## 2023-01-25 PROCEDURE — 6370000000 HC RX 637 (ALT 250 FOR IP): Performed by: NURSE PRACTITIONER

## 2023-01-25 PROCEDURE — G0378 HOSPITAL OBSERVATION PER HR: HCPCS

## 2023-01-25 PROCEDURE — 99223 1ST HOSP IP/OBS HIGH 75: CPT | Performed by: INTERNAL MEDICINE

## 2023-01-25 PROCEDURE — 96374 THER/PROPH/DIAG INJ IV PUSH: CPT

## 2023-01-25 PROCEDURE — 82962 GLUCOSE BLOOD TEST: CPT

## 2023-01-25 PROCEDURE — 2580000003 HC RX 258: Performed by: NURSE PRACTITIONER

## 2023-01-25 RX ORDER — ALLOPURINOL 300 MG/1
150 TABLET ORAL DAILY
Status: DISCONTINUED | OUTPATIENT
Start: 2023-01-25 | End: 2023-01-28 | Stop reason: HOSPADM

## 2023-01-25 RX ORDER — ONDANSETRON 4 MG/1
4 TABLET, ORALLY DISINTEGRATING ORAL EVERY 8 HOURS PRN
Status: DISCONTINUED | OUTPATIENT
Start: 2023-01-25 | End: 2023-01-28 | Stop reason: HOSPADM

## 2023-01-25 RX ORDER — CALCITRIOL 0.25 UG/1
0.25 CAPSULE, LIQUID FILLED ORAL DAILY
Status: DISCONTINUED | OUTPATIENT
Start: 2023-01-25 | End: 2023-01-28 | Stop reason: HOSPADM

## 2023-01-25 RX ORDER — SODIUM CHLORIDE 0.9 % (FLUSH) 0.9 %
5-40 SYRINGE (ML) INJECTION PRN
Status: DISCONTINUED | OUTPATIENT
Start: 2023-01-25 | End: 2023-01-28 | Stop reason: HOSPADM

## 2023-01-25 RX ORDER — HYDRALAZINE HYDROCHLORIDE 50 MG/1
100 TABLET, FILM COATED ORAL NIGHTLY
Status: DISCONTINUED | OUTPATIENT
Start: 2023-01-26 | End: 2023-01-28 | Stop reason: HOSPADM

## 2023-01-25 RX ORDER — METOPROLOL SUCCINATE 100 MG/1
100 TABLET, EXTENDED RELEASE ORAL EVERY EVENING
Status: DISCONTINUED | OUTPATIENT
Start: 2023-01-25 | End: 2023-01-28 | Stop reason: HOSPADM

## 2023-01-25 RX ORDER — DEXTROSE MONOHYDRATE 100 MG/ML
INJECTION, SOLUTION INTRAVENOUS CONTINUOUS PRN
Status: DISCONTINUED | OUTPATIENT
Start: 2023-01-25 | End: 2023-01-28 | Stop reason: HOSPADM

## 2023-01-25 RX ORDER — ISOSORBIDE MONONITRATE 60 MG/1
60 TABLET, EXTENDED RELEASE ORAL DAILY
Status: DISCONTINUED | OUTPATIENT
Start: 2023-01-26 | End: 2023-01-28 | Stop reason: HOSPADM

## 2023-01-25 RX ORDER — ALBUTEROL SULFATE 90 UG/1
2 AEROSOL, METERED RESPIRATORY (INHALATION) EVERY 6 HOURS PRN
Status: DISCONTINUED | OUTPATIENT
Start: 2023-01-25 | End: 2023-01-28 | Stop reason: HOSPADM

## 2023-01-25 RX ORDER — POLYETHYLENE GLYCOL 3350 17 G/17G
17 POWDER, FOR SOLUTION ORAL DAILY PRN
Status: DISCONTINUED | OUTPATIENT
Start: 2023-01-25 | End: 2023-01-28 | Stop reason: HOSPADM

## 2023-01-25 RX ORDER — SODIUM CHLORIDE 0.9 % (FLUSH) 0.9 %
5-40 SYRINGE (ML) INJECTION EVERY 12 HOURS SCHEDULED
Status: DISCONTINUED | OUTPATIENT
Start: 2023-01-25 | End: 2023-01-28 | Stop reason: HOSPADM

## 2023-01-25 RX ORDER — INSULIN GLARGINE 100 [IU]/ML
25 INJECTION, SOLUTION SUBCUTANEOUS 2 TIMES DAILY
Status: DISCONTINUED | OUTPATIENT
Start: 2023-01-25 | End: 2023-01-28 | Stop reason: HOSPADM

## 2023-01-25 RX ORDER — LEVOTHYROXINE SODIUM 88 UG/1
88 TABLET ORAL DAILY
Status: DISCONTINUED | OUTPATIENT
Start: 2023-01-25 | End: 2023-01-28 | Stop reason: HOSPADM

## 2023-01-25 RX ORDER — HYDRALAZINE HYDROCHLORIDE 50 MG/1
50 TABLET, FILM COATED ORAL ONCE
Status: COMPLETED | OUTPATIENT
Start: 2023-01-25 | End: 2023-01-25

## 2023-01-25 RX ORDER — SODIUM CHLORIDE 9 MG/ML
INJECTION, SOLUTION INTRAVENOUS PRN
Status: DISCONTINUED | OUTPATIENT
Start: 2023-01-25 | End: 2023-01-28 | Stop reason: HOSPADM

## 2023-01-25 RX ORDER — SODIUM BICARBONATE 650 MG/1
650 TABLET ORAL 2 TIMES DAILY
Status: DISCONTINUED | OUTPATIENT
Start: 2023-01-25 | End: 2023-01-28 | Stop reason: HOSPADM

## 2023-01-25 RX ORDER — SODIUM CHLORIDE 9 MG/ML
INJECTION, SOLUTION INTRAVENOUS CONTINUOUS
Status: DISCONTINUED | OUTPATIENT
Start: 2023-01-25 | End: 2023-01-28 | Stop reason: HOSPADM

## 2023-01-25 RX ORDER — NITROGLYCERIN 0.4 MG/1
0.4 TABLET SUBLINGUAL EVERY 5 MIN PRN
Status: DISCONTINUED | OUTPATIENT
Start: 2023-01-25 | End: 2023-01-28 | Stop reason: HOSPADM

## 2023-01-25 RX ORDER — HYDRALAZINE HYDROCHLORIDE 50 MG/1
50 TABLET, FILM COATED ORAL 2 TIMES DAILY
Status: DISCONTINUED | OUTPATIENT
Start: 2023-01-25 | End: 2023-01-25

## 2023-01-25 RX ORDER — ASPIRIN 81 MG/1
81 TABLET ORAL DAILY
Status: DISCONTINUED | OUTPATIENT
Start: 2023-01-25 | End: 2023-01-28 | Stop reason: HOSPADM

## 2023-01-25 RX ORDER — ROSUVASTATIN CALCIUM 20 MG/1
20 TABLET, COATED ORAL NIGHTLY
Status: DISCONTINUED | OUTPATIENT
Start: 2023-01-25 | End: 2023-01-25

## 2023-01-25 RX ORDER — ISOSORBIDE MONONITRATE 30 MG/1
30 TABLET, EXTENDED RELEASE ORAL DAILY
Status: DISCONTINUED | OUTPATIENT
Start: 2023-01-25 | End: 2023-01-25

## 2023-01-25 RX ORDER — ONDANSETRON 2 MG/ML
4 INJECTION INTRAMUSCULAR; INTRAVENOUS EVERY 6 HOURS PRN
Status: DISCONTINUED | OUTPATIENT
Start: 2023-01-25 | End: 2023-01-28 | Stop reason: HOSPADM

## 2023-01-25 RX ADMIN — HYDRALAZINE HYDROCHLORIDE 50 MG: 50 TABLET, FILM COATED ORAL at 19:57

## 2023-01-25 RX ADMIN — HYDRALAZINE HYDROCHLORIDE 50 MG: 50 TABLET, FILM COATED ORAL at 22:16

## 2023-01-25 RX ADMIN — ASPIRIN 81 MG: 81 TABLET, COATED ORAL at 17:43

## 2023-01-25 RX ADMIN — INSULIN GLARGINE 25 UNITS: 100 INJECTION, SOLUTION SUBCUTANEOUS at 22:15

## 2023-01-25 RX ADMIN — METOPROLOL SUCCINATE 100 MG: 100 TABLET, EXTENDED RELEASE ORAL at 17:43

## 2023-01-25 RX ADMIN — SODIUM CHLORIDE, PRESERVATIVE FREE 5 ML: 5 INJECTION INTRAVENOUS at 19:57

## 2023-01-25 RX ADMIN — ALLOPURINOL 150 MG: 300 TABLET ORAL at 17:43

## 2023-01-25 RX ADMIN — SODIUM CHLORIDE: 9 INJECTION, SOLUTION INTRAVENOUS at 17:45

## 2023-01-25 RX ADMIN — SODIUM BICARBONATE 650 MG: 650 TABLET ORAL at 19:57

## 2023-01-25 ASSESSMENT — ENCOUNTER SYMPTOMS
EYES NEGATIVE: 1
SHORTNESS OF BREATH: 1
NAUSEA: 0
VOMITING: 0
HEARTBURN: 0

## 2023-01-25 NOTE — Clinical Note
Aspirin Registry Question:   Aspirin was given prior to the procedure.    [email or web address]

## 2023-01-25 NOTE — H&P
[facility] CARDIOLOGY History &Physical                 Primary Cardiologist: Dr Darron Luna    Primary Care Physician: Mak Porter MD    Admitting Physician: Dr Mary Arellano:     Patient is a 79 y.o. male with a hx of obesity, CKD, diabetes mellitus type 2, asthma, acquired hypothyroidism, anemia, hypertension, JORJE, and elevated LFTs. Patient was seen in the office by Dr. Darron Luna was noted better blood pressure control but worsening STEMI, weakness, and anginal-like symptoms with short walks of greater than 50 feet. Patient's angina self resolves with stopping what ever activity is initiated but does not take nitro at home. Also has exertional shortness of breath but denies any nausea, vomiting, diaphoresis, or any other new medical problems. Patient's medications and stable at home with no recent changes To indoor to 60 mg, and Toprol 200. .  Admitted from home by Dr. Darron Luna with ends for left heart cath, nephrology consult, hydration, full lab work-up, and echocardiogram as well. HPI: Here for CAD (LAD PCI 12/15), JORJE on CPAP. He has CKD Stage III - IV. LHC 5/2019: stable mod CAD, BNP was 13   Echo 8/2020: normal EF, mild AI and MR.    Echo 4/2022 and 8/2022: normal EF, mod AS, RVSP 51    Past Medical History:   Diagnosis Date    Acquired hypothyroidism 4/8/2016    Anemia 1/21/2015    Anemia in chronic kidney disease 4/8/2016    Asthma     Chronic kidney disease     stage 4 kidney disease- Dr. Wilner Pedersen    CKD (chronic kidney disease) 10/11/2012    Coronary atherosclerosis of native coronary vessel 2/10/2016    Diabetes mellitus type 2 in obese ([address] 10/11/2012    avg 150, symptoms of hypoglycemia 90    Diastolic heart failure ([address]     Dyspnea     Dyspnea 2/10/2016    Edema 10/11/2012    Elevated liver function tests 8/20/2015    Hypertension 10/11/2012    Hyponatremia 1/21/2015    Morbid obesity ([address]     BMI 43.54    JORJE (obstructive sleep apnea) 10/11/2012    cpap    Seborrheic dermatitis 1/16/2013    Seborrheic dermatitis       Past Surgical History:   Procedure Laterality Date    CARDIAC CATHETERIZATION  2016    3 stent    CATARACT REMOVAL Bilateral 2017    HERNIA REPAIR  1957      Allergies   Allergen Reactions    Amlodipine Shortness Of Breath    Iodine Shortness Of Breath    Metformin Shortness Of Breath    Ranolazine Other (See Comments)     Lips and tongue numbness    Spironolactone Other (See Comments)     Potassium level went really high  Increased potassium    Hydrocodone-Acetaminophen Other (See Comments)     Pt states that if he takes this  The medication makes him feel like he is going crazy    Omeprazole Other (See Comments)     Caused drastic drop in blood sugar     Social History       Tobacco History       Smoking Status  Former Quit Date  1/1/1962 Smoking Frequency  0.50 packs/day for 2.00 years (1.00 pk-yrs) Smoking Tobacco Type  Cigarettes quit in 1/1/1962      Smokeless Tobacco Use  Never              Alcohol History       Alcohol Use Status  No              Drug Use       Drug Use Status  No              Sexual Activity       Sexually Active  Not Asked                  FH:   Family History   Problem Relation Age of Onset    Cancer Father         lung    No Known Problems Sister     No Known Problems Brother     Hypertension Father     No Known Problems Sister     Hypertension Mother         Review of Systems   Constitutional: Negative for chills, diaphoresis, fever, malaise/fatigue, night sweats, weight gain and weight loss. HENT: Negative. Eyes: Negative. Cardiovascular:  Positive for chest pain. Respiratory:  Positive for shortness of breath. Endocrine: Negative. Hematologic/Lymphatic: Negative. Skin: Negative. Musculoskeletal: Negative. Gastrointestinal:  Negative for heartburn, nausea and vomiting. Genitourinary: Negative. Neurological: Negative. Negative for dizziness and weakness. Psychiatric/Behavioral: Negative.          Objective: BP (!) 146/69   Pulse 69   Temp 97.4 °F (36.3 °C) (Oral)   Resp 18   SpO2 98%     No data found. No intake/output data recorded. No intake/output data recorded. Physical Exam:  General: Well Developed, morbidly obese, No Acute Distress  HEENT: pupils equal and round, no abnormalities noted  Neck: supple, no JVD, no carotid bruits  Heart: S1S2 AS murumur 3/6 with RRR without murmurs or gallops  Lungs: Clear throughout auscultation bilaterally w/ hypovolume due to size  Abd: soft, nontender, nondistended, with good bowel sounds  Ext: warm, no edema, calves supple/nontender, pulses 2+ bilaterally  Skin: warm and dry  Psychiatric: Normal mood and affect  Neurologic: Alert and oriented X 3      Data Review:     No results for input(s): WBC, HGB, HCT, MCV, PLT, LABHEPA in the last 720 hours. No results for input(s): NA, K, CL, CO2, BUN, CREATININE, GLUCOSE, CALCIUM, PROT, LABALBU, BILITOT, ALKPHOS, AST, ALT, LABGLOM, GFRAA, AGRATIO, GLOB in the last 720 hours. No results for input(s): CKTOTAL, CKMB, CKMBINDEX, DDIMER, TROPONINI in the last 720 hours. Assessment/Plan:     Principal Problem:      Accelerating angina [facility]) admitted now for possible left heart cath, CMP now with BMP daily, CBC with differential now with CBC daily, check echo, twelve-lead EKG, and with a history of CKD will consult nephrology. We will start with IV pretreatment for left heart cath. Patient will continue aspirin daily but refuses statin therapy. We will also continue beta-blocker therapy with nitrates as well. Further recommendation pending clinical course and 10 recommendations. Currently patient is n.p.o. after midnight for possible left heart cath tomorrow. Plan:     CKD: IV hydration overnight and consults nephrology    Morbid Obesity: Encourage healthy lifestyle choices.     Aortic stenosis: Repeat echocardiogram this hospital admission currently patient states she does not want open heart surgery if needed but will need further discussion pending results of heart cath and echocardiogram.    MELLY Mcintyre - CNP  1/25/2023  5:09 PM

## 2023-01-25 NOTE — Clinical Note
Patient:   CRISPIN MCGRATH            MRN: [id number]            FIN: [id number]              Age:   71 years     Sex:  MALE     :  46   Associated Diagnoses:   None   Author:   RUBENS HUNTLEY     Subjective   Chief Complaint: We are seeing this patient in follow up for: Elevated troponin  I spoke to the bedside RN. Patient was resumed on CBI last night for recurrent hematuria.  He is on 5L NC, saturating well. On chronic O2 due to COPD.   Continues to be bradycardic, HR 50s. Not symptomatic.   I have independently reviewed the medical chart, along with speaking to patient and/or RN via telephone. Remote rounding implented due to COVID 19 pandemic and request for single provider entry in patient rooms to reduce traffic, minimized use of PPE, and/or reduce exposure to staff.        Objective   Intake and Output   I & O between:  2020 09:17 TO 2020 09:17  Med Dosing Weight:  154.8  kg   2020  24 Hour Intake:   200.00  ( 1.29 mL/kg )  24 Hour Output:   1350.00           24 Hour Urine/Stool Output:   0.0  24 Hour Balance:   -1150.00           24 Hour Urine Output:   1350.00  ( 0.36 mL/kg/hr )     VS/Measurements     Vitals between:   2020 09:17:25   TO   2020 09:17:25                   LAST RESULT MINIMUM MAXIMUM  Temperature 36.5 36.3 37  Heart Rate 47 47 55  Respiratory Rate 20 18 20  NISBP           126 107 136  NIDBP           65 61 72  NIMBP           85 76 91  SpO2                    99 96 99    Physical examination deferred due to COVID status and need to conserve PPE.       Results Review   General results   Interpretation:         Result title:  CT UROGRAM WO/W CON  Result status:  Final  Verified by:  TARIQ SIMMONS on 2020 7:05  IMPRESSION:1.   No evidence of renal stone or etiology for hematuria.2.   Cholelithiasis as described.3.   Enlarged prostate                                     Labs between:  2020 09:17 to 2020 09:17  CBC:     Prepped: right groin and right radial. Site was clipped and prepped. Prepped with: ChloraPrep. Patient was draped after wet prep solution dried.              WBC  HgB  Hct  Plt  MCV  RDW   27-APR-2020 7.9  (L) 12.0  (L) 37.1  155  94.9  13.5   DIFF:                 Seg  Neutroph//ABS  Lymph//ABS  Mono//ABS  EOS/ABS  27-APR-2020 NOT APPLICABLE  75 // 6.0 10 // (L) 0.8  10 // 0.8 3 // 0.2  BMP:                 Na  Cl  BUN  Glu   27-APR-2020 138  106  (H) 23  95                              K  CO2  Cr  Ca                              3.9  25  (H) 1.25  8.7   COAG:                 INR  PT  PTT  Ddimer  Fibrinogen    27-APR-2020 1.1  11.4                        Cardiovascular Labs   No cardiovascular lab results found over the previous 48 hours.     Telemetry reviewed 4/24/20 showing: Atrial fibrillation, HR 50s, occasional PVCs.  Telemetry reviewed 4/26/20 showing: Atrial fibrillation, HR 50's, NSVT 9 beats  Telemtery reviewed 4/27/20 showing: Atril fibrillation, HR 30-50s  TTE 4/9/2020  1. Left ventricle: The cavity size is normal. Wall thickness is moderately    increased. Systolic function is normal. The estimated ejection fraction    is 60-65%, by single plane method of disks. There is no evidence of a    thrombus revealed by acoustic contrast opacification.  2. Left atrium: The atrium is mildly dilated.        Impression and Plan   Acute respiratory failure: Status post bronchoscopy  -At baseline   -Has chronic COPD and is maintained on oxygen at home at 5L  Elevated troponin   -Mild elevation at 0.06->0.10->0.07, most likely demand ischemia due to hypoxia, infection, LINDSEY  -CAD with previous CABG   -Echo showed EF 60 to 65% with moderate LVH  -Continue statin, aspirin  -No plans for invasive work-up during this admission  -Outpatient follow-up with his primary cardiologist, [place] [facility]  -Management per primary service.  -Hydroxychloroquine completed  -Ceftriaxone completed     Hypertension  -Blood pressure is overall controlled  Renal insufficiency: Acute on chronic  -Creatinine peaked at 2.5 on 4/9  -Creatinine downtrending  to 1.25  today  Permanent Atrial fibrillation with slow VR/bradycarda  -HR in 40's-50s - stable over several days. Asymptomatic.   -Telemetry reviewed, no prolonged pauses noted  -Not on any rate effecting meds  -He has normal LV function  -Warfarin currently on hold. Resume when OK per urology, now planning for outpatient cystoscopy.  Would hold anticoagulation until that is completed as the patient had gross hematuria when warfarin was resumed  -Continue ASA   -NSVT noted - check electrolytes  Hematuria  -CBI currently clamped  -OP Cysto planned  - following  -Warfarin on hold until ok per urology  COPD  -Management per primary service.  -Chronically on 5L NC  Debilitation  -Physical therapy says he will need trained physical therapy once he leaves the hospital, refusing subacute rehab  Hyperlipidemia  -Continue statin therapy

## 2023-01-25 NOTE — Clinical Note
TRANSFER - IN REPORT:     Verbal report received from: [facility] . Report consisted of patient's Situation, Background, Assessment and   Recommendations(SBAR). Opportunity for questions and clarification was provided. Assessment completed upon patient's arrival to unit and care assumed. Patient transported with a Registered Nurse.

## 2023-01-26 ENCOUNTER — APPOINTMENT (OUTPATIENT)
Dept: NON INVASIVE DIAGNOSTICS | Age: 83
DRG: 247 | End: 2023-01-26
Attending: INTERNAL MEDICINE
Payer: MEDICARE

## 2023-01-26 LAB
ALBUMIN SERPL-MCNC: 2.6 G/DL (ref 3.2–4.6)
ALBUMIN/GLOB SERPL: 0.8 (ref 0.4–1.6)
ALP SERPL-CCNC: 60 U/L (ref 50–136)
ALT SERPL-CCNC: 17 U/L (ref 12–65)
ANION GAP SERPL CALC-SCNC: 10 MMOL/L (ref 2–11)
AST SERPL-CCNC: 14 U/L (ref 15–37)
BASOPHILS # BLD: 0.1 K/UL (ref 0–0.2)
BASOPHILS NFR BLD: 1 % (ref 0–2)
BILIRUB SERPL-MCNC: 0.3 MG/DL (ref 0.2–1.1)
BUN SERPL-MCNC: 63 MG/DL (ref 8–23)
CALCIUM SERPL-MCNC: 8.8 MG/DL (ref 8.3–10.4)
CHLORIDE SERPL-SCNC: 109 MMOL/L (ref 101–110)
CO2 SERPL-SCNC: 22 MMOL/L (ref 21–32)
CREAT SERPL-MCNC: 3.6 MG/DL (ref 0.8–1.5)
DIFFERENTIAL METHOD BLD: ABNORMAL
ECHO AO ASC DIAM: 3.6 CM
ECHO AO ASCENDING AORTA INDEX: 1.38 CM/M2
ECHO AO ROOT DIAM: 3.7 CM
ECHO AO ROOT INDEX: 1.42 CM/M2
ECHO AV AREA PEAK VELOCITY: 0.9 CM2
ECHO AV AREA VTI: 0.9 CM2
ECHO AV AREA/BSA PEAK VELOCITY: 0.3 CM2/M2
ECHO AV AREA/BSA VTI: 0.3 CM2/M2
ECHO AV MEAN GRADIENT: 25 MMHG
ECHO AV MEAN VELOCITY: 2.3 M/S
ECHO AV PEAK GRADIENT: 43 MMHG
ECHO AV PEAK VELOCITY: 3.3 M/S
ECHO AV VELOCITY RATIO: 0.24
ECHO AV VTI: 81.3 CM
ECHO BSA: 2.7 M2
ECHO EST RA PRESSURE: 3 MMHG
ECHO IVC PROX: 2 CM
ECHO LA AREA 2C: 32.3 CM2
ECHO LA AREA 4C: 35.5 CM2
ECHO LA DIAMETER INDEX: 2.12 CM/M2
ECHO LA DIAMETER: 5.5 CM
ECHO LA MAJOR AXIS: 7 CM
ECHO LA MINOR AXIS: 6.2 CM
ECHO LA TO AORTIC ROOT RATIO: 1.49
ECHO LA VOL 2C: 135 ML (ref 18–58)
ECHO LA VOL 4C: 145 ML (ref 18–58)
ECHO LA VOL BP: 147 ML (ref 18–58)
ECHO LA VOL/BSA BIPLANE: 57 ML/M2 (ref 16–34)
ECHO LA VOLUME INDEX A2C: 52 ML/M2 (ref 16–34)
ECHO LA VOLUME INDEX A4C: 56 ML/M2 (ref 16–34)
ECHO LV E' LATERAL VELOCITY: 9 CM/S
ECHO LV E' SEPTAL VELOCITY: 6 CM/S
ECHO LV EDV A2C: 213 ML
ECHO LV EDV A4C: 217 ML
ECHO LV EDV INDEX A4C: 83 ML/M2
ECHO LV EDV NDEX A2C: 82 ML/M2
ECHO LV EJECTION FRACTION A2C: 54 %
ECHO LV EJECTION FRACTION A4C: 56 %
ECHO LV EJECTION FRACTION BIPLANE: 54 % (ref 55–100)
ECHO LV ESV A2C: 99 ML
ECHO LV ESV A4C: 96 ML
ECHO LV ESV INDEX A2C: 38 ML/M2
ECHO LV ESV INDEX A4C: 37 ML/M2
ECHO LV FRACTIONAL SHORTENING: 27 % (ref 28–44)
ECHO LV INTERNAL DIMENSION DIASTOLE INDEX: 2.42 CM/M2
ECHO LV INTERNAL DIMENSION DIASTOLIC: 6.3 CM (ref 4.2–5.9)
ECHO LV INTERNAL DIMENSION SYSTOLIC INDEX: 1.77 CM/M2
ECHO LV INTERNAL DIMENSION SYSTOLIC: 4.6 CM
ECHO LV IVSD: 1.2 CM (ref 0.6–1)
ECHO LV MASS 2D: 340.4 G (ref 88–224)
ECHO LV MASS INDEX 2D: 130.9 G/M2 (ref 49–115)
ECHO LV POSTERIOR WALL DIASTOLIC: 1.2 CM (ref 0.6–1)
ECHO LV RELATIVE WALL THICKNESS RATIO: 0.38
ECHO LVOT AREA: 3.5 CM2
ECHO LVOT AV VTI INDEX: 0.24
ECHO LVOT DIAM: 2.1 CM
ECHO LVOT MEAN GRADIENT: 1 MMHG
ECHO LVOT PEAK GRADIENT: 3 MMHG
ECHO LVOT PEAK VELOCITY: 0.8 M/S
ECHO LVOT STROKE VOLUME INDEX: 26.5 ML/M2
ECHO LVOT SV: 68.9 ML
ECHO LVOT VTI: 19.9 CM
ECHO MV A VELOCITY: 0.52 M/S
ECHO MV AREA VTI: 2.4 CM2
ECHO MV E DECELERATION TIME (DT): 145 MS
ECHO MV E VELOCITY: 1.32 M/S
ECHO MV E/A RATIO: 2.54
ECHO MV E/E' LATERAL: 14.67
ECHO MV E/E' RATIO (AVERAGED): 18.33
ECHO MV E/E' SEPTAL: 22
ECHO MV LVOT VTI INDEX: 1.44
ECHO MV MAX VELOCITY: 1.5 M/S
ECHO MV MEAN GRADIENT: 5 MMHG
ECHO MV MEAN VELOCITY: 1 M/S
ECHO MV PEAK GRADIENT: 9 MMHG
ECHO MV VTI: 28.7 CM
ECHO RIGHT VENTRICULAR SYSTOLIC PRESSURE (RVSP): 57 MMHG
ECHO RV BASAL DIMENSION: 4.3 CM
ECHO RV FREE WALL PEAK S': 9 CM/S
ECHO RV TAPSE: 1.9 CM (ref 1.7–?)
ECHO TV REGURGITANT MAX VELOCITY: 3.67 M/S
ECHO TV REGURGITANT PEAK GRADIENT: 54 MMHG
EOSINOPHIL # BLD: 0.4 K/UL (ref 0–0.8)
EOSINOPHIL NFR BLD: 9 % (ref 0.5–7.8)
ERYTHROCYTE [DISTWIDTH] IN BLOOD BY AUTOMATED COUNT: 13.5 % (ref 11.9–14.6)
EST. AVERAGE GLUCOSE BLD GHB EST-MCNC: 166 MG/DL
GLOBULIN SER CALC-MCNC: 3.1 G/DL (ref 2.8–4.5)
GLUCOSE BLD STRIP.AUTO-MCNC: 102 MG/DL (ref 65–100)
GLUCOSE BLD STRIP.AUTO-MCNC: 103 MG/DL (ref 65–100)
GLUCOSE BLD STRIP.AUTO-MCNC: 139 MG/DL (ref 65–100)
GLUCOSE BLD STRIP.AUTO-MCNC: 197 MG/DL (ref 65–100)
GLUCOSE SERPL-MCNC: 84 MG/DL (ref 65–100)
HBA1C MFR BLD: 7.4 % (ref 4.8–5.6)
HCT VFR BLD AUTO: 29.7 % (ref 41.1–50.3)
HGB BLD-MCNC: 9.8 G/DL (ref 13.6–17.2)
IMM GRANULOCYTES # BLD AUTO: 0 K/UL (ref 0–0.5)
IMM GRANULOCYTES NFR BLD AUTO: 0 % (ref 0–5)
LV EF: 63 %
LVEF MODALITY: ABNORMAL
LYMPHOCYTES # BLD: 0.6 K/UL (ref 0.5–4.6)
LYMPHOCYTES NFR BLD: 12 % (ref 13–44)
MCH RBC QN AUTO: 30.7 PG (ref 26.1–32.9)
MCHC RBC AUTO-ENTMCNC: 33 G/DL (ref 31.4–35)
MCV RBC AUTO: 93.1 FL (ref 82–102)
MONOCYTES # BLD: 0.5 K/UL (ref 0.1–1.3)
MONOCYTES NFR BLD: 11 % (ref 4–12)
NEUTS SEG # BLD: 3.3 K/UL (ref 1.7–8.2)
NEUTS SEG NFR BLD: 67 % (ref 43–78)
NRBC # BLD: 0 K/UL (ref 0–0.2)
PLATELET # BLD AUTO: 199 K/UL (ref 150–450)
PMV BLD AUTO: 10.4 FL (ref 9.4–12.3)
POTASSIUM SERPL-SCNC: 4.1 MMOL/L (ref 3.5–5.1)
PROT SERPL-MCNC: 5.7 G/DL (ref 6.3–8.2)
RBC # BLD AUTO: 3.19 M/UL (ref 4.23–5.6)
SERVICE CMNT-IMP: ABNORMAL
SODIUM SERPL-SCNC: 141 MMOL/L (ref 133–143)
WBC # BLD AUTO: 4.9 K/UL (ref 4.3–11.1)

## 2023-01-26 PROCEDURE — 6370000000 HC RX 637 (ALT 250 FOR IP): Performed by: NURSE PRACTITIONER

## 2023-01-26 PROCEDURE — 2580000003 HC RX 258: Performed by: INTERNAL MEDICINE

## 2023-01-26 PROCEDURE — 99232 SBSQ HOSP IP/OBS MODERATE 35: CPT | Performed by: INTERNAL MEDICINE

## 2023-01-26 PROCEDURE — 85025 COMPLETE CBC W/AUTO DIFF WBC: CPT

## 2023-01-26 PROCEDURE — 2580000003 HC RX 258: Performed by: NURSE PRACTITIONER

## 2023-01-26 PROCEDURE — 80053 COMPREHEN METABOLIC PANEL: CPT

## 2023-01-26 PROCEDURE — 93306 TTE W/DOPPLER COMPLETE: CPT | Performed by: INTERNAL MEDICINE

## 2023-01-26 PROCEDURE — 82962 GLUCOSE BLOOD TEST: CPT

## 2023-01-26 PROCEDURE — A4216 STERILE WATER/SALINE, 10 ML: HCPCS | Performed by: INTERNAL MEDICINE

## 2023-01-26 PROCEDURE — 6360000004 HC RX CONTRAST MEDICATION: Performed by: INTERNAL MEDICINE

## 2023-01-26 PROCEDURE — 1100000003 HC PRIVATE W/ TELEMETRY

## 2023-01-26 PROCEDURE — 36415 COLL VENOUS BLD VENIPUNCTURE: CPT

## 2023-01-26 PROCEDURE — C8929 TTE W OR WO FOL WCON,DOPPLER: HCPCS

## 2023-01-26 PROCEDURE — 83036 HEMOGLOBIN GLYCOSYLATED A1C: CPT

## 2023-01-26 RX ADMIN — SODIUM CHLORIDE, PRESERVATIVE FREE 10 ML: 5 INJECTION INTRAVENOUS at 21:00

## 2023-01-26 RX ADMIN — METOPROLOL SUCCINATE 100 MG: 100 TABLET, EXTENDED RELEASE ORAL at 17:24

## 2023-01-26 RX ADMIN — HYDRALAZINE HYDROCHLORIDE 100 MG: 50 TABLET, FILM COATED ORAL at 21:00

## 2023-01-26 RX ADMIN — ISOSORBIDE MONONITRATE 60 MG: 60 TABLET, EXTENDED RELEASE ORAL at 09:24

## 2023-01-26 RX ADMIN — SODIUM CHLORIDE, PRESERVATIVE FREE 10 ML: 5 INJECTION INTRAVENOUS at 09:29

## 2023-01-26 RX ADMIN — PERFLUTREN 0.45 ML: 6.52 INJECTION, SUSPENSION INTRAVENOUS at 10:04

## 2023-01-26 RX ADMIN — LEVOTHYROXINE SODIUM 88 MCG: 0.09 TABLET ORAL at 06:09

## 2023-01-26 RX ADMIN — CALCITRIOL 0.25 MCG: 0.25 CAPSULE ORAL at 09:24

## 2023-01-26 RX ADMIN — SODIUM BICARBONATE 650 MG: 650 TABLET ORAL at 09:24

## 2023-01-26 RX ADMIN — ALLOPURINOL 150 MG: 300 TABLET ORAL at 09:24

## 2023-01-26 RX ADMIN — SODIUM CHLORIDE: 9 INJECTION, SOLUTION INTRAVENOUS at 14:02

## 2023-01-26 RX ADMIN — SODIUM BICARBONATE 650 MG: 650 TABLET ORAL at 21:00

## 2023-01-26 RX ADMIN — SODIUM CHLORIDE: 9 INJECTION, SOLUTION INTRAVENOUS at 03:36

## 2023-01-26 RX ADMIN — ASPIRIN 81 MG: 81 TABLET, COATED ORAL at 09:24

## 2023-01-26 NOTE — PROGRESS NOTES
am  1/26/2023 10:36 AM    Admit Date: 1/25/2023    Admit Diagnosis: Accelerating angina ([facility] [I20.0]      Subjective:    Patient is feeling reasonably well today. Still has CP and shortness of breath on exertion. Denies palpitations or lower extremity edema. Pt informed of [facility] pending nephrology consult. Objective:    BP (!) 144/72   Pulse 72   Temp 97.7 °F (36.5 °C) (Oral)   Resp 18   Ht 6' 1\" (1.854 m)   Wt (!) 312 lb 9.6 oz (141.8 kg)   SpO2 97%   BMI 41.24 kg/m²     ROS:  General ROS: negative for - chills  Hematological and Lymphatic ROS: negative for - blood clots or jaundice  Respiratory ROS: positive for - shortness of breath  negative for - cough or wheezing  Cardiovascular ROS: positive for - chest pain and dyspnea on exertion  negative for - palpitations or rapid heart rate  Gastrointestinal ROS: no abdominal pain, change in bowel habits, or black or bloody stools  Neurological ROS: no TIA or stroke symptoms    Physical Exam:      Physical Examination: General appearance - Appearance: alert, well appearing, and in no distress.    Neck/lymph - supple, no significant adenopathy  Chest/CV - clear to auscultation, no wheezes, rales or rhonchi, symmetric air entry  Heart - normal rate and regular rhythm, aortic stenosis murmur  Abdomen/GI - soft, nontender, nondistended, no masses or organomegaly   Musculoskeletal - no joint tenderness, deformity or swelling  Extremities - peripheral pulses normal, no pedal edema, no clubbing or cyanosis  Skin - normal coloration and turgor, no rashes, no suspicious skin lesions noted    Current Facility-Administered Medications   Medication Dose Route Frequency    perflutren lipid microspheres syringe  0.45 mL IntraVENous ONCE PRN    metoprolol succinate (TOPROL XL) extended release tablet 100 mg  100 mg Oral QPM    sodium bicarbonate tablet 650 mg  650 mg Oral BID    levothyroxine (SYNTHROID) tablet 88 mcg  88 mcg Oral Daily    insulin glargine (LANTUS) injection vial 25 Units  25 Units SubCUTAneous BID    mometasone-formoterol (DULERA) 100-5 MCG/ACT inhaler 2 puff  2 puff Inhalation BID    calcitRIOL (ROCALTROL) capsule 0.25 mcg  0.25 mcg Oral Daily    aspirin EC tablet 81 mg  81 mg Oral Daily    allopurinol (ZYLOPRIM) tablet 150 mg  150 mg Oral Daily    albuterol sulfate HFA (PROVENTIL;VENTOLIN;PROAIR) 108 (90 Base) MCG/ACT inhaler 2 puff  2 puff Inhalation Q6H PRN    sodium chloride flush 0.9 % injection 5-40 mL  5-40 mL IntraVENous 2 times per day    sodium chloride flush 0.9 % injection 5-40 mL  5-40 mL IntraVENous PRN    0.9 % sodium chloride infusion   IntraVENous PRN    ondansetron (ZOFRAN-ODT) disintegrating tablet 4 mg  4 mg Oral Q8H PRN    Or    ondansetron (ZOFRAN) injection 4 mg  4 mg IntraVENous Q6H PRN    polyethylene glycol (GLYCOLAX) packet 17 g  17 g Oral Daily PRN    nitroGLYCERIN (NITROSTAT) SL tablet 0.4 mg  0.4 mg SubLINGual Q5 Min PRN    0.9 % sodium chloride infusion   IntraVENous Continuous    glucose chewable tablet 16 g  4 tablet Oral PRN    dextrose bolus 10% 125 mL  125 mL IntraVENous PRN    Or    dextrose bolus 10% 250 mL  250 mL IntraVENous PRN    glucagon (rDNA) injection 1 mg  1 mg SubCUTAneous PRN    dextrose 10 % infusion   IntraVENous Continuous PRN    isosorbide mononitrate (IMDUR) extended release tablet 60 mg  60 mg Oral Daily    hydrALAZINE (APRESOLINE) tablet 100 mg  100 mg Oral Nightly       Data Review: data included in this note has been independently reviewed by the author     TELEMETRY:     Assessment/Plan:     Patient Active Problem List   Diagnosis    Edema    Chronic kidney disease (CKD), stage IV (severe) (HCC)    JORJE (obstructive sleep apnea)    Dyspnea    Cough variant asthma    PPD positive    Hyponatremia    Chest pain    Anemia in chronic kidney disease    Obesity, morbid (HCC)    SEARS (dyspnea on exertion)    Abnormal nuclear stress test    Type 2 diabetes with nephropathy (HCC)    Hypertension    Seborrheic dermatitis Elevated liver function tests    Anemia    Diastolic CHF, chronic (HCC)    Diabetes mellitus type 2 in obese (HCC)    CAD in native artery    Acquired hypothyroidism    Restrictive lung disease    Volume overload    Chronic heart failure with preserved ejection fraction (HFpEF) (HCC)    Moderate aortic stenosis by prior echocardiogram    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 Nephrology states 20% of contrast induced nephropathy requiring hemodialysis    Patient has stage IV CKD and is borderline needing hemodialysis but he also has unstable angina and needs cath we will maximize his renal function with 24 hours of hydration and plan for heart cath tomorrow we will inform the patient of his high risk of developing contrast-induced nephropathy and/or progression to either temporary or permanent dialysis    Ozzy Merida  I have personally seen and evaluated the patient and reviewed the students note and agree with the following assessment and plan and findings. I was present for and observed the key components of this note. Any appropriate additions or editing of the information have been done by me.     Flor Ochoa MD, [facility]  Cardiology

## 2023-01-26 NOTE — CARE COORDINATION
Patient was seen in the office by Dr. Perez Hence was noted better blood pressure control but worsening STEMI, weakness, and anginal-like symptoms

## 2023-01-26 NOTE — PLAN OF CARE
Problem: Discharge Planning  Goal: Discharge to home or other facility with appropriate resources  Outcome: Progressing  Flowsheets (Taken 1/26/2023 0730)  Discharge to home or other facility with appropriate resources:   Identify barriers to discharge with patient and caregiver   Arrange for needed discharge resources and transportation as appropriate   Identify discharge learning needs (meds, wound care, etc)   Refer to discharge planning if patient needs post-hospital services based on physician order or complex needs related to functional status, cognitive ability or social support system     Problem: Safety - Adult  Goal: Free from fall injury  Outcome: Progressing  Flowsheets (Taken 1/26/2023 0730)  Free From Fall Injury: Instruct family/caregiver on patient safety

## 2023-01-26 NOTE — CONSULTS
Nephrology consult    Admission Date:  1/25/2023    Admission Diagnosis  Accelerating angina ([facility] [I20.0]    History of Present Illness:    Mr. Mya Alberto is an 80 yo M with a PMH of HTN, DM, CAD s/p multiple stents and CKD IV with baseline Cr in the low 3's who was admitted from the cardiology clinic for worsening anginal symptoms. Patient tells me, he feels okay at rest, but has chest pressure and shortness of breathing with even walking across a room. Cardiology planning on [facility] and nephrology consulted for contrast nephropathy risk assessment and recommendations. He has been on NS at 100cc/hr overnight  On my assessment today, he is laying flat in bed and breathing comfortably. Denies chest pain currently. Cr this AM was 3.6.     Past Medical History:   Diagnosis Date    Acquired hypothyroidism 4/8/2016    Anemia 1/21/2015    Anemia in chronic kidney disease 4/8/2016    Asthma     Chronic kidney disease     stage 4 kidney disease- Dr. Curtis Kim    CKD (chronic kidney disease) 10/11/2012    Coronary atherosclerosis of native coronary vessel 2/10/2016    Diabetes mellitus type 2 in obese ([facility] 10/11/2012    avg 150, symptoms of hypoglycemia 90    Diastolic heart failure ([address]     Dyspnea     Dyspnea 2/10/2016    Edema 10/11/2012    Elevated liver function tests 8/20/2015    Hypertension 10/11/2012    Hyponatremia 1/21/2015    Morbid obesity ([facility]     BMI 43.54    JORJE (obstructive sleep apnea) 10/11/2012    cpap    Seborrheic dermatitis 1/16/2013    Seborrheic dermatitis       Past Surgical History:   Procedure Laterality Date    CARDIAC CATHETERIZATION  2016    3 stent    CATARACT REMOVAL Bilateral 2017    HERNIA REPAIR  1957      Current Facility-Administered Medications   Medication Dose Route Frequency    perflutren lipid microspheres syringe  0.45 mL IntraVENous ONCE PRN    metoprolol succinate (TOPROL XL) extended release tablet 100 mg  100 mg Oral QPM    sodium bicarbonate tablet 650 mg  650 mg Oral BID levothyroxine (SYNTHROID) tablet 88 mcg  88 mcg Oral Daily    insulin glargine (LANTUS) injection vial 25 Units  25 Units SubCUTAneous BID    mometasone-formoterol (DULERA) 100-5 MCG/ACT inhaler 2 puff  2 puff Inhalation BID    calcitRIOL (ROCALTROL) capsule 0.25 mcg  0.25 mcg Oral Daily    aspirin EC tablet 81 mg  81 mg Oral Daily    allopurinol (ZYLOPRIM) tablet 150 mg  150 mg Oral Daily    albuterol sulfate HFA (PROVENTIL;VENTOLIN;PROAIR) 108 (90 Base) MCG/ACT inhaler 2 puff  2 puff Inhalation Q6H PRN    sodium chloride flush 0.9 % injection 5-40 mL  5-40 mL IntraVENous 2 times per day    sodium chloride flush 0.9 % injection 5-40 mL  5-40 mL IntraVENous PRN    0.9 % sodium chloride infusion   IntraVENous PRN    ondansetron (ZOFRAN-ODT) disintegrating tablet 4 mg  4 mg Oral Q8H PRN    Or    ondansetron (ZOFRAN) injection 4 mg  4 mg IntraVENous Q6H PRN    polyethylene glycol (GLYCOLAX) packet 17 g  17 g Oral Daily PRN    nitroGLYCERIN (NITROSTAT) SL tablet 0.4 mg  0.4 mg SubLINGual Q5 Min PRN    0.9 % sodium chloride infusion   IntraVENous Continuous    glucose chewable tablet 16 g  4 tablet Oral PRN    dextrose bolus 10% 125 mL  125 mL IntraVENous PRN    Or    dextrose bolus 10% 250 mL  250 mL IntraVENous PRN    glucagon (rDNA) injection 1 mg  1 mg SubCUTAneous PRN    dextrose 10 % infusion   IntraVENous Continuous PRN    isosorbide mononitrate (IMDUR) extended release tablet 60 mg  60 mg Oral Daily    hydrALAZINE (APRESOLINE) tablet 100 mg  100 mg Oral Nightly     Allergies   Allergen Reactions    Amlodipine Shortness Of Breath    Iodine Shortness Of Breath    Metformin Shortness Of Breath    Ranolazine Other (See Comments)     Lips and tongue numbness    Spironolactone Other (See Comments)     Potassium level went really high  Increased potassium    Hydrocodone-Acetaminophen Other (See Comments)     Pt states that if he takes this  The medication makes him feel like he is going crazy    Omeprazole Other (See Comments)     Caused drastic drop in blood sugar      Social History     Tobacco Use    Smoking status: Former     Packs/day: 0.50     Years: 2.00     Pack years: 1.00     Types: Cigarettes     Quit date: 1962     Years since quittin.1    Smokeless tobacco: Never   Substance Use Topics    Alcohol use: No      Family History   Problem Relation Age of Onset    Cancer Father         lung    No Known Problems Sister     No Known Problems Brother     Hypertension Father     No Known Problems Sister     Hypertension Mother         Review of Systems  Gen - no fever, no chills, appetite okay  HEENT - no sore throat, no decreased vision, no hearing loss  Neck - no neck mass  CV - + exertional chest pain, no palpitation, no orthopnea  Lung - no shortness of breath, no cough, no hemoptysis  Abd - no tenderness, no nausea/vomiting, no bloody stool  Ext - no edema, no clubbing, no cyanosis  Musculoskeletal - no joint pain, no back pain  Neurologic - no headaches, no dizziness, no seizures  Psychiatric - no anxiety, no depression  Skin - no rashes, no pupura  Genitourinary - no decreased urine output, no hematuria, no foamy urine    Objective:     Vitals:    23 0345 23 0531 23 0715 23 0808   BP:  (!) 141/78  (!) 144/72   Pulse:  71  72   Resp:  18  18   Temp:    97.7 °F (36.5 °C)   TempSrc:    Oral   SpO2:  97%  97%   Weight: (!) 312 lb 9.6 oz (141.8 kg)  (!) 312 lb 9.6 oz (141.8 kg)    Height:   6' 1\" (1.854 m)        Intake/Output Summary (Last 24 hours) at 2023 1020  Last data filed at 2023 0730  Gross per 24 hour   Intake 0 ml   Output 0 ml   Net 0 ml       Physical Exam  GEN :in no distress, alert and oriented  HEENT: anicteric sclerae, eomi. Oropharynx without lesions. Mucous membranes are moist.  Neck - supple without JVD, no thyromegaly. No lymphadenopathy.   CV - regular rate and rhythm, no murmur, no rub  Lung - clear bilaterally, lungs expand symmetrically  Chest wall - normal appearance  Abd - soft, nontender, bowel sounds present, no hepatosplenomegaly  Ext - no clubbing, no cyanosis, no edema  Neurologic - nonfocal  Genitourinary - bladder nonpalpable  Skin - no rashes, no purpura, no ecchymoses  Psychiatric: Normal mood and affect. Data Review:   Recent Labs     01/26/23  0502   WBC 4.9   HGB 9.8*   HCT 29.7*        Recent Labs     01/26/23  0502      K 4.1      CO2 22   BUN 63*     No results for input(s): PH, PCO2, PO2, PCO2 in the last 72 hours. Problem List:     Patient Active Problem List    Diagnosis Date Noted    [facility] 01/25/2023    Moderate aortic stenosis by prior echocardiogram 08/13/2022    Chronic heart failure with preserved ejection fraction (HFpEF) ([facility] 08/03/2022    Volume overload 08/02/2022    Restrictive lung disease 06/03/2022    Chronic gout due to renal impairment without tophus 04/25/2022    Hypothyroidism due to Hashimoto's thyroiditis 10/20/2016    PHU (iron deficiency anemia) 08/13/2022    Chest pain 05/28/2019    Obesity, morbid ([facility] 04/18/2018    Type 2 diabetes with nephropathy ([facility] 25/46/7123    Diastolic CHF, chronic ([facility] 09/08/2017    Anemia in chronic kidney disease 04/08/2016    Acquired hypothyroidism 04/08/2016    Cough variant asthma 03/11/2016    Dyspnea 02/10/2016    CAD in native artery 02/10/2016    SEARS (dyspnea on exertion) 12/22/2015    Abnormal nuclear stress test 12/22/2015    Elevated liver function tests 08/20/2015    Hyponatremia 01/21/2015    Anemia 01/21/2015    PPD positive 10/28/2014    Seborrheic dermatitis 01/16/2013    Edema 10/11/2012    Chronic kidney disease (CKD), stage IV (severe) ([address] 10/11/2012    JORJE (obstructive sleep apnea) 10/11/2012    Hypertension 10/11/2012    Diabetes mellitus type 2 in obese ([address] 10/11/2012       Plan:     CKD IV- Cr is 3.6 which is near his baseline. Long discussion regarding his risk of contrast induced nephropathy.   Given his age, DM, h/o decompensated heart failure and advanced CKD, he is at extremely high risk for contrast injury. We talked about the very real possibility of GREGG severe enough to push patient into ESRD requiring dialysis. Patient stated understanding of the risk and after consideration, feels his anginal symptoms are significantly impairing his daily life and would like to proceed with heart catheterization. Agree with volume expansion as a precautionary measure. We will continue to follow.

## 2023-01-26 NOTE — CARE COORDINATION
Pt was seen in the office by Dr. Rios Harrington and had worsening STEMI, weakness, and anginal-like symptoms. Has a PMHx of obesity, CKD, DM2, asthma, acquired hypothyroidism, anemia, HTN, JORJE, and elevated LFTs. Pt admitted for accelerated angina. Pt lives with his spouse. Indep at baseline. Family supportive. On RA. Denies DME. PCP confirmed. Insurance verified and able to afford home meds. No discharge needs identified but will remain available. 01/26/23 4812   Service Assessment   Patient Orientation Alert and Oriented   Cognition Alert   History Provided By Patient   Primary Caregiver Self   Support Systems Spouse/Significant Other;Children;Family Members;[employer]/[place] [employer];Friends/Neighbors   PCP Verified by CM Yes  (Adrian)   Prior Functional Level Independent in ADLs/IADLs   Current Functional Level Independent in ADLs/IADLs   Can patient return to prior living arrangement Yes   Ability to make needs known: Good   Family able to assist with home care needs: Yes   Would you like for me to discuss the discharge plan with any other family members/significant others, and if so, who? Yes   Financial Resources Medicare   Community Resources None   Social/Functional History   Lives With Spouse   Type of [address] [facility] One level   [address] [facility] Yes   Mode of Transportation Car   Occupation Retired   Discharge Planning   Type of [address] Prior To Admission None   Potential Assistance Needed N/A   DME Ordered? No   Potential Assistance Purchasing Medications No   Type of Home Care Services None   Patient expects to be discharged to: [facility] Discharge   Transition of Care Consult (CM Consult) Discharge [facility] Discharge None   [facility] Information Provided?  No   Mode of Transport at Discharge Other (see comment)  (Family)   Confirm Follow Up Transport Family

## 2023-01-27 LAB
ALBUMIN SERPL-MCNC: 2.5 G/DL (ref 3.2–4.6)
ALBUMIN/GLOB SERPL: 0.8 (ref 0.4–1.6)
ALP SERPL-CCNC: 64 U/L (ref 50–136)
ALT SERPL-CCNC: 19 U/L (ref 12–65)
ANION GAP SERPL CALC-SCNC: 9 MMOL/L (ref 2–11)
AST SERPL-CCNC: 18 U/L (ref 15–37)
BILIRUB SERPL-MCNC: 0.5 MG/DL (ref 0.2–1.1)
BUN SERPL-MCNC: 60 MG/DL (ref 8–23)
CALCIUM SERPL-MCNC: 8.6 MG/DL (ref 8.3–10.4)
CHLORIDE SERPL-SCNC: 112 MMOL/L (ref 101–110)
CO2 SERPL-SCNC: 19 MMOL/L (ref 21–32)
CREAT SERPL-MCNC: 3.3 MG/DL (ref 0.8–1.5)
ECHO BSA: 2.7 M2
ERYTHROCYTE [DISTWIDTH] IN BLOOD BY AUTOMATED COUNT: 13.5 % (ref 11.9–14.6)
GLOBULIN SER CALC-MCNC: 3.1 G/DL (ref 2.8–4.5)
GLUCOSE BLD STRIP.AUTO-MCNC: 111 MG/DL (ref 65–100)
GLUCOSE BLD STRIP.AUTO-MCNC: 113 MG/DL (ref 65–100)
GLUCOSE BLD STRIP.AUTO-MCNC: 120 MG/DL (ref 65–100)
GLUCOSE BLD STRIP.AUTO-MCNC: 136 MG/DL (ref 65–100)
GLUCOSE BLD STRIP.AUTO-MCNC: 247 MG/DL (ref 65–100)
GLUCOSE BLD STRIP.AUTO-MCNC: 395 MG/DL (ref 65–100)
GLUCOSE SERPL-MCNC: 116 MG/DL (ref 65–100)
HCT VFR BLD AUTO: 30.5 % (ref 41.1–50.3)
HGB BLD-MCNC: 9.9 G/DL (ref 13.6–17.2)
MAGNESIUM SERPL-MCNC: 2.5 MG/DL (ref 1.8–2.4)
MCH RBC QN AUTO: 30.8 PG (ref 26.1–32.9)
MCHC RBC AUTO-ENTMCNC: 32.5 G/DL (ref 31.4–35)
MCV RBC AUTO: 95 FL (ref 82–102)
NRBC # BLD: 0 K/UL (ref 0–0.2)
PLATELET # BLD AUTO: 191 K/UL (ref 150–450)
PMV BLD AUTO: 10.4 FL (ref 9.4–12.3)
POTASSIUM SERPL-SCNC: 4.3 MMOL/L (ref 3.5–5.1)
PROT SERPL-MCNC: 5.6 G/DL (ref 6.3–8.2)
RBC # BLD AUTO: 3.21 M/UL (ref 4.23–5.6)
SERVICE CMNT-IMP: ABNORMAL
SODIUM SERPL-SCNC: 140 MMOL/L (ref 133–143)
WBC # BLD AUTO: 5.8 K/UL (ref 4.3–11.1)

## 2023-01-27 PROCEDURE — 99152 MOD SED SAME PHYS/QHP 5/>YRS: CPT | Performed by: INTERNAL MEDICINE

## 2023-01-27 PROCEDURE — 2709999900 HC NON-CHARGEABLE SUPPLY: Performed by: INTERNAL MEDICINE

## 2023-01-27 PROCEDURE — 82962 GLUCOSE BLOOD TEST: CPT

## 2023-01-27 PROCEDURE — C1874 STENT, COATED/COV W/DEL SYS: HCPCS | Performed by: INTERNAL MEDICINE

## 2023-01-27 PROCEDURE — 6360000004 HC RX CONTRAST MEDICATION: Performed by: INTERNAL MEDICINE

## 2023-01-27 PROCEDURE — 2580000003 HC RX 258: Performed by: INTERNAL MEDICINE

## 2023-01-27 PROCEDURE — 83735 ASSAY OF MAGNESIUM: CPT

## 2023-01-27 PROCEDURE — 2500000003 HC RX 250 WO HCPCS: Performed by: INTERNAL MEDICINE

## 2023-01-27 PROCEDURE — 93458 L HRT ARTERY/VENTRICLE ANGIO: CPT | Performed by: INTERNAL MEDICINE

## 2023-01-27 PROCEDURE — 99153 MOD SED SAME PHYS/QHP EA: CPT | Performed by: INTERNAL MEDICINE

## 2023-01-27 PROCEDURE — 1100000003 HC PRIVATE W/ TELEMETRY

## 2023-01-27 PROCEDURE — C1769 GUIDE WIRE: HCPCS | Performed by: INTERNAL MEDICINE

## 2023-01-27 PROCEDURE — 6370000000 HC RX 637 (ALT 250 FOR IP): Performed by: NURSE PRACTITIONER

## 2023-01-27 PROCEDURE — 6370000000 HC RX 637 (ALT 250 FOR IP): Performed by: INTERNAL MEDICINE

## 2023-01-27 PROCEDURE — C1887 CATHETER, GUIDING: HCPCS | Performed by: INTERNAL MEDICINE

## 2023-01-27 PROCEDURE — 2580000003 HC RX 258: Performed by: NURSE PRACTITIONER

## 2023-01-27 PROCEDURE — 99024 POSTOP FOLLOW-UP VISIT: CPT | Performed by: INTERNAL MEDICINE

## 2023-01-27 PROCEDURE — 80053 COMPREHEN METABOLIC PANEL: CPT

## 2023-01-27 PROCEDURE — 36415 COLL VENOUS BLD VENIPUNCTURE: CPT

## 2023-01-27 PROCEDURE — B2151ZZ FLUOROSCOPY OF LEFT HEART USING LOW OSMOLAR CONTRAST: ICD-10-PCS | Performed by: INTERNAL MEDICINE

## 2023-01-27 PROCEDURE — 4A023N7 MEASUREMENT OF CARDIAC SAMPLING AND PRESSURE, LEFT HEART, PERCUTANEOUS APPROACH: ICD-10-PCS | Performed by: INTERNAL MEDICINE

## 2023-01-27 PROCEDURE — 6360000002 HC RX W HCPCS: Performed by: INTERNAL MEDICINE

## 2023-01-27 PROCEDURE — C9600 PERC DRUG-EL COR STENT SING: HCPCS | Performed by: INTERNAL MEDICINE

## 2023-01-27 PROCEDURE — C1894 INTRO/SHEATH, NON-LASER: HCPCS | Performed by: INTERNAL MEDICINE

## 2023-01-27 PROCEDURE — C1725 CATH, TRANSLUMIN NON-LASER: HCPCS | Performed by: INTERNAL MEDICINE

## 2023-01-27 PROCEDURE — 85027 COMPLETE CBC AUTOMATED: CPT

## 2023-01-27 PROCEDURE — 92928 PRQ TCAT PLMT NTRAC ST 1 LES: CPT | Performed by: INTERNAL MEDICINE

## 2023-01-27 PROCEDURE — A4216 STERILE WATER/SALINE, 10 ML: HCPCS | Performed by: INTERNAL MEDICINE

## 2023-01-27 PROCEDURE — B2111ZZ FLUOROSCOPY OF MULTIPLE CORONARY ARTERIES USING LOW OSMOLAR CONTRAST: ICD-10-PCS | Performed by: INTERNAL MEDICINE

## 2023-01-27 PROCEDURE — 027034Z DILATION OF CORONARY ARTERY, ONE ARTERY WITH DRUG-ELUTING INTRALUMINAL DEVICE, PERCUTANEOUS APPROACH: ICD-10-PCS | Performed by: INTERNAL MEDICINE

## 2023-01-27 DEVICE — STENT ONYXNG30012UX ONYX 3.00X12RX
Type: IMPLANTABLE DEVICE | Site: HEART | Status: FUNCTIONAL
Brand: ONYX FRONTIER™

## 2023-01-27 RX ORDER — LIDOCAINE HYDROCHLORIDE 10 MG/ML
INJECTION, SOLUTION INFILTRATION; PERINEURAL PRN
Status: DISCONTINUED | OUTPATIENT
Start: 2023-01-27 | End: 2023-01-27 | Stop reason: HOSPADM

## 2023-01-27 RX ORDER — HEPARIN SODIUM 200 [USP'U]/100ML
INJECTION, SOLUTION INTRAVENOUS CONTINUOUS PRN
Status: DISCONTINUED | OUTPATIENT
Start: 2023-01-27 | End: 2023-01-27 | Stop reason: HOSPADM

## 2023-01-27 RX ORDER — CLOPIDOGREL BISULFATE 75 MG/1
TABLET ORAL PRN
Status: DISCONTINUED | OUTPATIENT
Start: 2023-01-27 | End: 2023-01-27 | Stop reason: HOSPADM

## 2023-01-27 RX ORDER — INSULIN LISPRO 100 [IU]/ML
0-8 INJECTION, SOLUTION INTRAVENOUS; SUBCUTANEOUS
Status: DISCONTINUED | OUTPATIENT
Start: 2023-01-28 | End: 2023-01-28 | Stop reason: HOSPADM

## 2023-01-27 RX ORDER — MAGNESIUM HYDROXIDE/ALUMINUM HYDROXICE/SIMETHICONE 120; 1200; 1200 MG/30ML; MG/30ML; MG/30ML
SUSPENSION ORAL PRN
Status: DISCONTINUED | OUTPATIENT
Start: 2023-01-27 | End: 2023-01-27 | Stop reason: HOSPADM

## 2023-01-27 RX ORDER — CLOPIDOGREL BISULFATE 75 MG/1
75 TABLET ORAL DAILY
Status: DISCONTINUED | OUTPATIENT
Start: 2023-01-28 | End: 2023-01-28 | Stop reason: HOSPADM

## 2023-01-27 RX ORDER — INSULIN LISPRO 100 [IU]/ML
0-4 INJECTION, SOLUTION INTRAVENOUS; SUBCUTANEOUS NIGHTLY
Status: DISCONTINUED | OUTPATIENT
Start: 2023-01-27 | End: 2023-01-28 | Stop reason: HOSPADM

## 2023-01-27 RX ORDER — MIDAZOLAM HYDROCHLORIDE 1 MG/ML
INJECTION INTRAMUSCULAR; INTRAVENOUS PRN
Status: DISCONTINUED | OUTPATIENT
Start: 2023-01-27 | End: 2023-01-27 | Stop reason: HOSPADM

## 2023-01-27 RX ORDER — BIVALIRUDIN 250 MG/5ML
INJECTION, POWDER, LYOPHILIZED, FOR SOLUTION INTRAVENOUS PRN
Status: DISCONTINUED | OUTPATIENT
Start: 2023-01-27 | End: 2023-01-27 | Stop reason: HOSPADM

## 2023-01-27 RX ORDER — DIPHENHYDRAMINE HYDROCHLORIDE 50 MG/ML
INJECTION INTRAMUSCULAR; INTRAVENOUS PRN
Status: DISCONTINUED | OUTPATIENT
Start: 2023-01-27 | End: 2023-01-27 | Stop reason: HOSPADM

## 2023-01-27 RX ADMIN — SODIUM CHLORIDE: 9 INJECTION, SOLUTION INTRAVENOUS at 19:50

## 2023-01-27 RX ADMIN — SODIUM CHLORIDE, PRESERVATIVE FREE 10 ML: 5 INJECTION INTRAVENOUS at 20:15

## 2023-01-27 RX ADMIN — CALCITRIOL 0.25 MCG: 0.25 CAPSULE ORAL at 08:48

## 2023-01-27 RX ADMIN — LEVOTHYROXINE SODIUM 88 MCG: 0.09 TABLET ORAL at 06:03

## 2023-01-27 RX ADMIN — SODIUM CHLORIDE: 9 INJECTION, SOLUTION INTRAVENOUS at 09:56

## 2023-01-27 RX ADMIN — SODIUM BICARBONATE 650 MG: 650 TABLET ORAL at 08:48

## 2023-01-27 RX ADMIN — SODIUM BICARBONATE 650 MG: 650 TABLET ORAL at 20:14

## 2023-01-27 RX ADMIN — SODIUM CHLORIDE, PRESERVATIVE FREE 10 ML: 5 INJECTION INTRAVENOUS at 08:51

## 2023-01-27 RX ADMIN — INSULIN GLARGINE 25 UNITS: 100 INJECTION, SOLUTION SUBCUTANEOUS at 21:38

## 2023-01-27 RX ADMIN — INSULIN LISPRO 4 UNITS: 100 INJECTION, SOLUTION INTRAVENOUS; SUBCUTANEOUS at 21:39

## 2023-01-27 RX ADMIN — ASPIRIN 81 MG: 81 TABLET, COATED ORAL at 08:48

## 2023-01-27 RX ADMIN — METOPROLOL SUCCINATE 100 MG: 100 TABLET, EXTENDED RELEASE ORAL at 18:28

## 2023-01-27 RX ADMIN — HYDRALAZINE HYDROCHLORIDE 100 MG: 50 TABLET, FILM COATED ORAL at 20:14

## 2023-01-27 RX ADMIN — ISOSORBIDE MONONITRATE 60 MG: 60 TABLET, EXTENDED RELEASE ORAL at 08:49

## 2023-01-27 RX ADMIN — INSULIN GLARGINE 25 UNITS: 100 INJECTION, SOLUTION SUBCUTANEOUS at 16:00

## 2023-01-27 RX ADMIN — ALLOPURINOL 150 MG: 300 TABLET ORAL at 08:49

## 2023-01-27 NOTE — PROGRESS NOTES
Acute indications: ACS > 24 hrs. NYHA class: III. Angina type: typical.  Patient took anti-anginal meds within the past 2 weeks (beta blockers). Valvular disease: aortic valve. PCI Indication(s): NSTE - ACS. AUC Score = 7.

## 2023-01-27 NOTE — PROGRESS NOTES
TRANSFER - OUT REPORT:    Verbal report given to Daryn Sandy RN on Anais Bruce  being transferred to [phone] for routine post-op       Report consisted of patients Situation, Background, Assessment and Recommendations(SBAR). Information from the following report(s) SBAR, Procedure Summary, and Cardiac Rhythm SR 1degree HB  was reviewed with the receiving nurse. Opportunity for questions and clarification was provided.

## 2023-01-27 NOTE — PROGRESS NOTES
am  1/27/2023 7:25 AM    Admit Date: 1/25/2023    Admit Diagnosis: [facility] [[facility]]      Subjective:    Patient has a C scheduled for today. Overall similar condition to yesterday with shortness of breath on exertion. No palpitations or lower extremity edema. Patient acknowledges possibility of contrast induced nephropathy requiring temporary or permanent hemodialysis and has determined the risk acceptable. Objective:    BP (!) 140/84   Pulse 77   Temp 97.8 °F (36.6 °C) (Temporal)   Resp 20   Ht 6' 1\" (1.854 m)   Wt (!) 312 lb 9.6 oz (141.8 kg)   SpO2 96%   BMI 41.24 kg/m²     ROS:  General ROS: negative for - chills  Hematological and Lymphatic ROS: negative for - blood clots or jaundice  Respiratory ROS: no cough, shortness of breath, or wheezing  Cardiovascular ROS: positive for - chest pain on exertion, no palpitations or lower extremity edema  Gastrointestinal ROS: no abdominal pain, change in bowel habits, or black or bloody stools  Neurological ROS: no TIA or stroke symptoms    Physical Exam:      Physical Examination: General appearance - Appearance: alert, well appearing, and in no distress.    Neck/lymph - supple, no significant adenopathy  Chest/CV - clear to auscultation, no wheezes, rales or rhonchi, symmetric air entry  Heart - normal rate, regular rhythm, normal S1, S2, no murmurs, rubs, clicks or gallops  Abdomen/GI - soft, nontender, nondistended, no masses or organomegaly   Musculoskeletal - no joint tenderness, deformity or swelling  Extremities - peripheral pulses normal, no pedal edema, no clubbing or cyanosis  Skin - normal coloration and turgor, no rashes, no suspicious skin lesions noted    Current Facility-Administered Medications   Medication Dose Route Frequency    metoprolol succinate (TOPROL XL) extended release tablet 100 mg  100 mg Oral QPM    sodium bicarbonate tablet 650 mg  650 mg Oral BID    levothyroxine (SYNTHROID) tablet 88 mcg  88 mcg Oral Daily insulin glargine (LANTUS) injection vial 25 Units  25 Units SubCUTAneous BID    mometasone-formoterol (DULERA) 100-5 MCG/ACT inhaler 2 puff  2 puff Inhalation BID    calcitRIOL (ROCALTROL) capsule 0.25 mcg  0.25 mcg Oral Daily    aspirin EC tablet 81 mg  81 mg Oral Daily    allopurinol (ZYLOPRIM) tablet 150 mg  150 mg Oral Daily    albuterol sulfate HFA (PROVENTIL;VENTOLIN;PROAIR) 108 (90 Base) MCG/ACT inhaler 2 puff  2 puff Inhalation Q6H PRN    sodium chloride flush 0.9 % injection 5-40 mL  5-40 mL IntraVENous 2 times per day    sodium chloride flush 0.9 % injection 5-40 mL  5-40 mL IntraVENous PRN    0.9 % sodium chloride infusion   IntraVENous PRN    ondansetron (ZOFRAN-ODT) disintegrating tablet 4 mg  4 mg Oral Q8H PRN    Or    ondansetron (ZOFRAN) injection 4 mg  4 mg IntraVENous Q6H PRN    polyethylene glycol (GLYCOLAX) packet 17 g  17 g Oral Daily PRN    nitroGLYCERIN (NITROSTAT) SL tablet 0.4 mg  0.4 mg SubLINGual Q5 Min PRN    0.9 % sodium chloride infusion   IntraVENous Continuous    glucose chewable tablet 16 g  4 tablet Oral PRN    dextrose bolus 10% 125 mL  125 mL IntraVENous PRN    Or    dextrose bolus 10% 250 mL  250 mL IntraVENous PRN    glucagon (rDNA) injection 1 mg  1 mg SubCUTAneous PRN    dextrose 10 % infusion   IntraVENous Continuous PRN    isosorbide mononitrate (IMDUR) extended release tablet 60 mg  60 mg Oral Daily    hydrALAZINE (APRESOLINE) tablet 100 mg  100 mg Oral Nightly       Data Review: data included in this note has been independently reviewed by the author     TELEMETRY:     Assessment/Plan:     Patient Active Problem List   Diagnosis    Edema    Chronic kidney disease (CKD), stage IV (severe) (HCC)    JORJE (obstructive sleep apnea)    Dyspnea    Cough variant asthma    PPD positive    Hyponatremia    Chest pain    Anemia in chronic kidney disease    Obesity, morbid (HCC)    SEARS (dyspnea on exertion)    Abnormal nuclear stress test    Type 2 diabetes with nephropathy (HCC) Hypertension    Seborrheic dermatitis    Elevated liver function tests    Anemia    Diastolic CHF, chronic (HCC)    Diabetes mellitus type 2 in obese (HCC)    CAD in native artery    Acquired hypothyroidism    Restrictive lung disease    Volume overload    Chronic heart failure with preserved ejection fraction (HFpEF) (HCC)    Moderate aortic stenosis by prior echocardiogram    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 Patient acknowledges high risk for contrast induced nephropathy requiring temporary or permanent hemodialysis and has deemed the risk acceptable. Araceli Eilse  I have personally seen and evaluated the patient and reviewed the students note and agree with the following assessment and plan and findings. I was present for and observed the key components of this note. Any appropriate additions or editing of the information have been done by me.     Renan Delatorre MD, [facility]  Cardiology

## 2023-01-27 NOTE — PROGRESS NOTES
Rudy Jacobs  Admission Date: 1/25/2023         [facility] Progress Note: 1/27/2023    Follow-up for: CKD IV    Subjective:     Patient seen and examined with daughter at the bedside.  Did have anginal symptoms again overnight.  Feels okay this AM. Cr is slightly better.    ROS:  Gen - no fever, no chills, appetite unchanged  CV - no chest pain, no palpitation  Lung - no shortness of breath, no cough  Abd - no tenderness, no nausea/vomiting, no diarrhea  Ext - no edema    Current Facility-Administered Medications   Medication Dose Route Frequency    metoprolol succinate (TOPROL XL) extended release tablet 100 mg  100 mg Oral QPM    sodium bicarbonate tablet 650 mg  650 mg Oral BID    levothyroxine (SYNTHROID) tablet 88 mcg  88 mcg Oral Daily    insulin glargine (LANTUS) injection vial 25 Units  25 Units SubCUTAneous BID    mometasone-formoterol (DULERA) 100-5 MCG/ACT inhaler 2 puff  2 puff Inhalation BID    calcitRIOL (ROCALTROL) capsule 0.25 mcg  0.25 mcg Oral Daily    aspirin EC tablet 81 mg  81 mg Oral Daily    allopurinol (ZYLOPRIM) tablet 150 mg  150 mg Oral Daily    albuterol sulfate HFA (PROVENTIL;VENTOLIN;PROAIR) 108 (90 Base) MCG/ACT inhaler 2 puff  2 puff Inhalation Q6H PRN    sodium chloride flush 0.9 % injection 5-40 mL  5-40 mL IntraVENous 2 times per day    sodium chloride flush 0.9 % injection 5-40 mL  5-40 mL IntraVENous PRN    0.9 % sodium chloride infusion   IntraVENous PRN    ondansetron (ZOFRAN-ODT) disintegrating tablet 4 mg  4 mg Oral Q8H PRN    Or    ondansetron (ZOFRAN) injection 4 mg  4 mg IntraVENous Q6H PRN    polyethylene glycol (GLYCOLAX) packet 17 g  17 g Oral Daily PRN    nitroGLYCERIN (NITROSTAT) SL tablet 0.4 mg  0.4 mg SubLINGual Q5 Min PRN    0.9 % sodium chloride infusion   IntraVENous Continuous    glucose chewable tablet 16 g  4 tablet Oral PRN    dextrose bolus 10% 125 mL  125 mL IntraVENous PRN    Or    dextrose bolus 10% 250 mL  250 mL  IntraVENous PRN    glucagon (rDNA) injection 1 mg  1 mg SubCUTAneous PRN    dextrose 10 % infusion   IntraVENous Continuous PRN    isosorbide mononitrate (IMDUR) extended release tablet 60 mg  60 mg Oral Daily    hydrALAZINE (APRESOLINE) tablet 100 mg  100 mg Oral Nightly         Objective:     Vitals:    01/26/23 2025 01/26/23 2324 01/27/23 0405 01/27/23 0756   BP: (!) 156/87 137/75 (!) 140/84 139/83   Pulse: 73 70 77 81   Resp: 20 20 20 18   Temp: 98.1 °F (36.7 °C) 97.9 °F (36.6 °C) 97.8 °F (36.6 °C) 97.8 °F (36.6 °C)   TempSrc: Temporal Oral Temporal Oral   SpO2: 96% 94% 96% 96%   Weight:   (!) 312 lb 9.6 oz (141.8 kg)    Height:         Intake and Output:   01/25 1901 - 01/27 0700  In: 615 [P.O.:615]  Out: 200 [Urine:200]  No intake/output data recorded.    Physical Exam:   Constitutional:  the patient is well developed and in no acute distress  HEENT:  Sclera clear, pupils equal, oral mucosa moist  Lungs: Clear to auscultation  Cardiovascular:  Regular rate, S1, S2, no Rub   Abd/GI: soft and non-tender; with positive bowel sounds.  Ext: warm without cyanosis. There is trace lower leg edema.  Skin:  no jaundice or rashes  Neuro: no gross neuro deficits   Psychiatric: Calm.       LAB  Recent Labs     01/26/23  0502 01/27/23  0408   WBC 4.9 5.8   HGB 9.8* 9.9*   HCT 29.7* 30.5*    191     Recent Labs     01/26/23  0502 01/27/23  0408    140   K 4.1 4.3    112*   CO2 22 19*   BUN 63* 60*   CREATININE 3.60* 3.30*   MG  --  2.5*     No results for input(s): PH, PCO2, PO2, HCO3 in the last 72 hours.      Assessment/Plan:  (Medical Decision Making)     CKD IV- Cr is 3.3 today appear to be his  baseline.  Long discussion yesterday regarding his risk of contrast induced nephropathy.   We talked about the very real possibility of GREGG severe enough to push patient into ESRD requiring dialysis.   Patient stated understanding of the risk and after consideration, feels his anginal symptoms are significantly  impairing his daily life and would like to proceed with heart catheterization. -[facility] today  -Continue IVF until procedure and for 6hrs afterwards  -f/u labs next week to assess for MAGGIE. Had a previously scheduled follow up Dr. Shirlene Kidd the week after.     Marielena Javier MD  [facility]

## 2023-01-27 NOTE — PROGRESS NOTES
[facility] by   Right radial access  PCI to LCX  Right wrist TR band with 14ml  Pepcid 20mg IV  Benadryl 50mg IV  Solucortef 100mg IV  Versed 2mg IV  Angiomax gtt till completed  Plavix 600mg PO  Mylanta 30ml PO  Access site soft. Clean, dry, and intact; with no signs of bleeding or hematoma  Pt. Tolerated procedure  TRANSFER - OUT REPORT:    Verbal report given to RN on Bart Nyhan  being transferred to [facility] for routine progression of patient care       Report consisted of patient's Situation, Background, Assessment and   Recommendations(SBAR). Information from the following report(s) Nurse Handoff Report and MAR was reviewed with the receiving nurse. [place] Assessment: No data recorded  Lines:   Peripheral IV 01/25/23 Proximal;Right;Ventral Forearm (Active)   Site Assessment Clean, dry & intact 01/27/23 1229   Line Status Infusing;Capped 01/27/23 [id number] Connections checked and tightened 01/27/23 1229   Phlebitis Assessment No symptoms 01/27/23 1229   Infiltration Assessment 0 01/27/23 1229   Alcohol Cap Used Yes 01/27/23 1229   Dressing Status Clean, dry & intact 01/27/23 1229   Dressing Type Transparent 01/27/23 1229        Opportunity for questions and clarification was provided.       Patient transported with:  Registered Nurse

## 2023-01-28 ENCOUNTER — APPOINTMENT (OUTPATIENT)
Dept: GENERAL RADIOLOGY | Age: 83
End: 2023-01-28
Payer: MEDICARE

## 2023-01-28 ENCOUNTER — HOSPITAL ENCOUNTER (OUTPATIENT)
Age: 83
Setting detail: OBSERVATION
Discharge: HOME | End: 2023-01-29
Attending: STUDENT IN AN ORGANIZED HEALTH CARE EDUCATION/TRAINING PROGRAM | Admitting: INTERNAL MEDICINE
Payer: MEDICARE

## 2023-01-28 VITALS
OXYGEN SATURATION: 98 % | TEMPERATURE: 97.9 F | RESPIRATION RATE: 17 BRPM | HEIGHT: 73 IN | BODY MASS INDEX: 41.75 KG/M2 | HEART RATE: 76 BPM | WEIGHT: 315 LBS | SYSTOLIC BLOOD PRESSURE: 132 MMHG | DIASTOLIC BLOOD PRESSURE: 69 MMHG

## 2023-01-28 DIAGNOSIS — J81.0 ACUTE PULMONARY EDEMA (HCC): Primary | ICD-10-CM

## 2023-01-28 DIAGNOSIS — R77.8 ELEVATED TROPONIN: ICD-10-CM

## 2023-01-28 LAB
ANION GAP SERPL CALC-SCNC: 11 MMOL/L (ref 2–11)
BUN SERPL-MCNC: 56 MG/DL (ref 8–23)
CALCIUM SERPL-MCNC: 8.7 MG/DL (ref 8.3–10.4)
CHLORIDE SERPL-SCNC: 112 MMOL/L (ref 101–110)
CO2 SERPL-SCNC: 18 MMOL/L (ref 21–32)
CREAT SERPL-MCNC: 3.1 MG/DL (ref 0.8–1.5)
ERYTHROCYTE [DISTWIDTH] IN BLOOD BY AUTOMATED COUNT: 13.7 % (ref 11.9–14.6)
GLUCOSE BLD STRIP.AUTO-MCNC: 156 MG/DL (ref 65–100)
GLUCOSE SERPL-MCNC: 181 MG/DL (ref 65–100)
HCT VFR BLD AUTO: 28.3 % (ref 41.1–50.3)
HGB BLD-MCNC: 9.1 G/DL (ref 13.6–17.2)
MCH RBC QN AUTO: 30.5 PG (ref 26.1–32.9)
MCHC RBC AUTO-ENTMCNC: 32.2 G/DL (ref 31.4–35)
MCV RBC AUTO: 95 FL (ref 82–102)
NRBC # BLD: 0 K/UL (ref 0–0.2)
PLATELET # BLD AUTO: 177 K/UL (ref 150–450)
PMV BLD AUTO: 10.9 FL (ref 9.4–12.3)
POTASSIUM SERPL-SCNC: 4.4 MMOL/L (ref 3.5–5.1)
RBC # BLD AUTO: 2.98 M/UL (ref 4.23–5.6)
SERVICE CMNT-IMP: ABNORMAL
SODIUM SERPL-SCNC: 141 MMOL/L (ref 133–143)
WBC # BLD AUTO: 4.8 K/UL (ref 4.3–11.1)

## 2023-01-28 PROCEDURE — 82962 GLUCOSE BLOOD TEST: CPT

## 2023-01-28 PROCEDURE — 6370000000 HC RX 637 (ALT 250 FOR IP): Performed by: INTERNAL MEDICINE

## 2023-01-28 PROCEDURE — 99238 HOSP IP/OBS DSCHRG MGMT 30/<: CPT | Performed by: INTERNAL MEDICINE

## 2023-01-28 PROCEDURE — 71045 X-RAY EXAM CHEST 1 VIEW: CPT

## 2023-01-28 PROCEDURE — 80048 BASIC METABOLIC PNL TOTAL CA: CPT

## 2023-01-28 PROCEDURE — 2580000003 HC RX 258: Performed by: NURSE PRACTITIONER

## 2023-01-28 PROCEDURE — 36415 COLL VENOUS BLD VENIPUNCTURE: CPT

## 2023-01-28 PROCEDURE — 99285 EMERGENCY DEPT VISIT HI MDM: CPT

## 2023-01-28 PROCEDURE — 93005 ELECTROCARDIOGRAM TRACING: CPT | Performed by: STUDENT IN AN ORGANIZED HEALTH CARE EDUCATION/TRAINING PROGRAM

## 2023-01-28 PROCEDURE — 85027 COMPLETE CBC AUTOMATED: CPT

## 2023-01-28 PROCEDURE — 6370000000 HC RX 637 (ALT 250 FOR IP): Performed by: NURSE PRACTITIONER

## 2023-01-28 RX ORDER — CLOPIDOGREL BISULFATE 75 MG/1
75 TABLET ORAL DAILY
Qty: 90 TABLET | Refills: 3 | Status: SHIPPED | OUTPATIENT
Start: 2023-01-29

## 2023-01-28 RX ORDER — FUROSEMIDE 10 MG/ML
20 INJECTION INTRAMUSCULAR; INTRAVENOUS ONCE
Status: DISCONTINUED | OUTPATIENT
Start: 2023-01-28 | End: 2023-01-29

## 2023-01-28 RX ORDER — IPRATROPIUM BROMIDE AND ALBUTEROL SULFATE 2.5; .5 MG/3ML; MG/3ML
1 SOLUTION RESPIRATORY (INHALATION)
Status: COMPLETED | OUTPATIENT
Start: 2023-01-28 | End: 2023-01-29

## 2023-01-28 RX ORDER — NITROGLYCERIN 0.4 MG/1
0.4 TABLET SUBLINGUAL EVERY 5 MIN PRN
Qty: 25 TABLET | Refills: 3 | Status: SHIPPED | OUTPATIENT
Start: 2023-01-28

## 2023-01-28 RX ORDER — ISOSORBIDE MONONITRATE 60 MG/1
60 TABLET, EXTENDED RELEASE ORAL DAILY
Qty: 30 TABLET | Refills: 5 | Status: SHIPPED | OUTPATIENT
Start: 2023-01-28

## 2023-01-28 RX ADMIN — CLOPIDOGREL BISULFATE 75 MG: 75 TABLET ORAL at 08:30

## 2023-01-28 RX ADMIN — LEVOTHYROXINE SODIUM 88 MCG: 0.09 TABLET ORAL at 04:47

## 2023-01-28 RX ADMIN — ASPIRIN 81 MG: 81 TABLET, COATED ORAL at 08:33

## 2023-01-28 RX ADMIN — ALLOPURINOL 150 MG: 300 TABLET ORAL at 08:29

## 2023-01-28 RX ADMIN — SODIUM CHLORIDE, PRESERVATIVE FREE 10 ML: 5 INJECTION INTRAVENOUS at 08:48

## 2023-01-28 RX ADMIN — ISOSORBIDE MONONITRATE 60 MG: 60 TABLET, EXTENDED RELEASE ORAL at 08:29

## 2023-01-28 RX ADMIN — INSULIN GLARGINE 25 UNITS: 100 INJECTION, SOLUTION SUBCUTANEOUS at 08:31

## 2023-01-28 RX ADMIN — CALCITRIOL 0.25 MCG: 0.25 CAPSULE ORAL at 08:29

## 2023-01-28 RX ADMIN — SODIUM BICARBONATE 650 MG: 650 TABLET ORAL at 08:30

## 2023-01-28 ASSESSMENT — ENCOUNTER SYMPTOMS
WHEEZING: 1
SHORTNESS OF BREATH: 1

## 2023-01-28 NOTE — PLAN OF CARE
Discharge instructions were reviewed with patient. An opportunity was given for questions. All medications were reviewed, and information was given on new medications, if any. Patient verbalized understanding, and has no questions at this time. IV's and telemetry box removed.

## 2023-01-28 NOTE — PROGRESS NOTES
[facility] CARDIOLOGY PROGRESS NOTE           1/28/2023 8:01 AM    Admit Date: 1/25/2023      Subjective:   No cp or sob      Objective:      Vitals:    01/27/23 2014 01/27/23 2317 01/28/23 0434 01/28/23 0435   BP: 132/69 130/85  (!) 114/58   Pulse:  84  71   Resp:  18  18   Temp:  97.6 °F (36.4 °C)  98 °F (36.7 °C)   TempSrc:  Oral  Axillary   SpO2:  97%  97%   Weight:   (!) 322 lb 11.2 oz (146.4 kg)    Height:           Physical Exam:  General-No Acute Distress  Neck- supple, no JVD  CV- regular rate and rhythm no MRG  Lung- clear bilaterally  Abd- soft, nontender, nondistended  Ext- no edema bilaterally.   Skin- warm and dry    Data Review:   Recent Labs     01/27/23  0408 01/28/23  0325    141   K 4.3 4.4   MG 2.5*  --    BUN 60* 56*   WBC 5.8 4.8   HGB 9.9* 9.1*   HCT 30.5* 28.3*    177       Assessment/Plan:     S/p pci  Ckd    ///    Stable  Home today           Sanna Maharaj MD  1/28/2023 8:01 AM

## 2023-01-28 NOTE — DISCHARGE INSTRUCTIONS
The patient is being discharged home in stable condition on a low saturated fat, low cholesterol and low salt diet. The patient is instructed to advance activities as tolerated to the limit of fatigue or shortness of breath. The patient is instructed to avoid all heavy lifting, straining, stooping or squatting for 3-5 days. The patient is instructed to watch the cath site for bleeding/oozing; if seen, the patient is instructed to apply firm pressure with a clean cloth and call [facility] at [phone]. The patient is instructed to watch for signs of infection which include: increasing area of redness, fever/hot to touch or purulent drainage at the catheterization site. The patient is instructed not to soak in a bathtub for 7-10 days, but is cleared to shower. The patient is instructed to call the office or return to the ER for immediate evaluation for any shortness of breath or chest pain not relieved by NTG. No lifting of 10 lbs or more for 7 days, no tub baths for a week, may shower, no creams, lotions powders, or ointments to site for a week. Learning About Coronary Angioplasty  What is a coronary angioplasty? Coronary angioplasty is a procedure that uses a thin tube called a catheter to open a blocked or narrowed coronary artery. Coronary arteries are the blood vessels that bring oxygen to the heart muscle. Angioplasty also may be called percutaneous coronary intervention (PCI). Angioplasty can widen an artery that has been narrowed by fatty buildup (plaque) or blocked by a blood clot. The procedure helps blood flow more normally to the heart muscle. How is the procedure done? Coronary angioplasty is done in a cardiac catheterization laboratory (\"cath lab\"). It is done by a heart specialist called a cardiologist. The whole procedure may take 1½ to 3 hours. You lie on a table under a large X-ray machine. You will get medicine through an IV in one of your veins.  It helps you relax and not feel pain. You will be awake during the procedure. But you may not be able to remember much about it. The doctor will inject some medicine into your wrist or groin to numb the skin. You will feel a small needle poke you. It's like having a blood test. You may feel some pressure when the doctor puts in the catheter. But you will not feel pain. The doctor will look at X-ray pictures on a monitor (like a TV screen) to move the catheter to your heart. The doctor then puts a dye into the catheter. This makes your heart's arteries show up on a screen. The doctor can then see any arteries that are blocked or narrowed. You may feel warm or flushed for a short time when the doctor injects dye into your artery. If you have a blocked or narrow artery, the doctor uses a catheter with a tiny balloon at the tip. The doctor puts it into the blocked or narrow area and inflates it. The balloon presses the fatty buildup against the walls of the artery. This buildup is called plaque. This creates more room for blood to flow. In most cases, the doctor then puts a stent in the artery. A stent is a small, expandable tube. It presses against the walls of the artery. The stent is left in the artery to keep the artery open. This helps blood flow. The catheter is removed from your body. What happens right after the procedure? The catheter will be removed. Pressure may be applied to the area where the catheter was put into your blood vessel. This will help prevent bleeding. A small device may also be used to close the blood vessel. You may have a bandage or a compression device on the catheter site. After the procedure, you will be taken to a room where the catheter site and your heart rate, blood pressure, and temperature will be checked several times. If the catheter was put in your groin, you will need to lie still and keep your leg straight for up to a few hours.  If the catheter was put in your wrist, you may need to keep your arm still for at least 2 hours. You may go home the same day. Or you may stay at least 1 night in the hospital. When you go home, you will get instructions from your doctor to help you recover well and prevent problems. Make sure to drink plenty of fluids (unless your doctor tells you not to) for several hours after the procedure. This will help flush the dye out of your body. Follow-up care is a key part of your treatment and safety. Be sure to make and go to all appointments, and call your doctor if you are having problems. It's also a good idea to know your test results and keep a list of the medicines you take. Where can you learn more? Go to http://www.woods.com/ and enter M058 to learn more about \"Learning About Coronary Angioplasty. \"  Current as of: September 7, 2022               Content Version: 13.5  © 1211-1208 [employer]. Care instructions adapted under license by [facility] ([facility]). If you have questions about a medical condition or this instruction, always ask your healthcare professional. [address] any warranty or liability for your use of this information. nitroglycerin (oral/sublingual)  Pronunciation:  JORGE luna GLI ser in (OR al/sub LISA gwal)  Brand:  GoNitro, Nitrolingual, Nitromist, Nitrostat, Nitro-Time  What is the most important information I should know about nitroglycerin? Get emergency medical help if you still have chest pain after using a total of 3 doses in 15 minutes, or if you have chest pain that seems unusual.  What is nitroglycerin? Nitroglycerin is used to treat or prevent attacks of chest pain (angina). Nitroglycerin may also be used for purposes not listed in this medication guide. What should I discuss with my healthcare provider before taking nitroglycerin?   You may not be able to use nitroglycerin if you have:  severe anemia (low red blood cells);  increased pressure inside the skull;  circulation problems or shock (pale or clammy skin, cold sweat, numbness or tingling, fast or irregular heartbeats, or feeling like you might pass out); or  if you also take riociguat or vericiguat. Do not use erectile dysfunction medicine (such as Viagra, Cialis, or Levitra) or you could have a sudden and serious decrease in blood pressure. Tell your doctor if you have ever had:  an allergic reaction to nitroglycerin;  a heart attack or other heart problems; or  low blood pressure. Tell your doctor if you are pregnant or breastfeeding. Not approved for use by anyone younger than 25years old. How should I take nitroglycerin? Follow all directions on your prescription label and read all medication guides or instruction sheets. Use the medicine exactly as directed. Try to rest or stay seated when you take nitroglycerin (may cause dizziness or fainting). Read and carefully follow any Instructions for Use provided with your medicine. Ask your doctor or pharmacist if you do not understand these instructions. Use this medicine at the first sign of chest pain. Use another dose every 5 minutes as needed, up to a total of 3 doses. Get emergency medical help if you still have chest pain after using a total of 3 doses in 15 minutes, or if your chest pain seems unusual.  You may use nitroglycerin sublingual within 5 to 10 minutes before an activity that might cause chest pain. This medicine can affect the results of certain medical tests. Tell any doctor who treats you that you are using nitroglycerin. If you take nitroglycerin on a regular schedule to prevent angina, do not stop taking it suddenly or you could have a severe attack of angina. Keep this medicine on hand at all times. Get your prescription refilled before you run out of medicine completely. Store nitroglycerin at room temperature away from moisture and heat. Keep the spray away from open flame or high heat, such as in a car on a hot day.  The canister may explode if it gets too hot.  What happens if I miss a dose? If you take nitroglycerin on a regular schedule, skip any missed dose if it's almost time for your next dose. Do not use two doses at one time. What happens if I overdose? Seek emergency medical attention or call the Poison Help line at 1-548.206.9427. An overdose of nitroglycerin can be fatal.  Overdose symptoms may include a throbbing headache, confusion, pounding heartbeats, vision problems, vomiting, bloody diarrhea, sweating, clammy skin, blue lips, weak or shallow breathing, loss of movement, seizure, or fainting. What should I avoid while taking nitroglycerin? Avoid getting up too fast from a sitting or lying position, or you may feel dizzy. Drinking alcohol can increase certain side effects such as dizziness, drowsiness, feeling light-headed, or fainting. What are the possible side effects of nitroglycerin? Get emergency medical help if you have signs of an allergic reaction: hives, sweating, pale skin; nausea, vomiting; feeling weak or light-headed; difficult breathing; swelling of your face, lips, tongue, or throat. Call your doctor at once if you have:  severe or throbbing headaches that do not become less severe with continued use of nitroglycerin;  pounding heartbeats or fluttering in your chest;  slow heart rate;  a light-headed feeling, like you might pass out;  blurred vision or dry mouth; or  heart attack symptoms --chest pain or pressure, pain spreading to your jaw or shoulder, nausea, sweating. Nitroglycerin can cause severe headaches that should become less severe as you continue to use the medicine. Common side effects may include:  flushing (sudden warmth, redness, or tingly feeling);  feeling light-headed, fainting;  headache, dizziness; or  numbness, tingling, burning pain. This is not a complete list of side effects and others may occur. Call your doctor for medical advice about side effects.  You may report side effects to FDA at 1-800-FDA-1088. What other drugs will affect nitroglycerin? Tell your doctor about all your current medicines, especially:  aspirin, heparin;  a diuretic (water pill);  any medicine to treat erectile dysfunction;  medicine to treat depression or psychiatric illness;  medicine used to treat blood clots;  heart or blood pressure medicine; or  migraine headache medicine, such as ergot medicine (dihydroergotamine, ergotamine, ergonovine, methylergonovine). This list is not complete and many other drugs may affect nitroglycerin. This includes prescription and over-the-counter medicines, vitamins, and herbal products. Not all possible drug interactions are listed here. Where can I get more information? Your pharmacist can provide more information about nitroglycerin. Remember, keep this and all other medicines out of the reach of children, never share your medicines with others, and use this medication only for the indication prescribed. Every effort has been made to ensure that the information provided by [address] is accurate, up-to-date, and complete, but no guarantee is made to that effect. Drug information contained herein may be time sensitive. [facility] information has been compiled for use by healthcare practitioners and consumers in the [place] and therefore [facility] does not warrant that uses outside of the [place] are appropriate, unless specifically indicated otherwise. [facility]'s drug information does not endorse drugs, diagnose patients or recommend therapy. [facility] drug information is an informational resource designed to assist licensed healthcare practitioners in caring for their patients and/or to serve consumers viewing this service as a supplement to, and not a substitute for, the expertise, skill, knowledge and judgment of healthcare practitioners.  The absence of a warning for a given drug or drug combination in no way should be construed to indicate that the drug or drug combination is safe, effective or appropriate for any given patient. [facility] does not assume any responsibility for any aspect of healthcare administered with the aid of information [facility] provides. The information contained herein is not intended to cover all possible uses, directions, precautions, warnings, drug interactions, allergic reactions, or adverse effects. If you have questions about the drugs you are taking, check with your doctor, nurse or pharmacist.  Copyright 5956-6526 [address]. Version: 16.01. Revision date: 6/4/2021. Care instructions adapted under license by [facility] ([facility]). If you have questions about a medical condition or this instruction, always ask your healthcare professional. [place] [address] any warranty or liability for your use of this information. clopidogrel  Pronunciation:  andreia SMITH oh grearia  Brand:  Plavix  What is the most important information I should know about clopidogrel? You should not use this medicine if you have any active bleeding such as a stomach ulcer or bleeding in the brain. Clopidogrel increases your risk of bleeding, which can be severe or life-threatening. Call your doctor or seek emergency medical attention if you have bleeding that will not stop, if you have blood in your urine, black or bloody stools, or if you cough up blood or vomit that looks like coffee grounds. Do not stop taking clopidogrel without first talking to your doctor, even if you have signs of bleeding. Stopping clopidogrel may increase your risk of a heart attack or stroke. What is clopidogrel? Clopidogrel is used to lower your risk of having a stroke, blood clot, or serious heart problem after you've had a heart attack, severe chest pain (angina), or circulation problems. Clopidogrel may also be used for purposes not listed in this medication guide. What should I discuss with my healthcare provider before taking clopidogrel?   You should not use clopidogrel if you are allergic to it, or if you have:  any active bleeding; or  a stomach ulcer or bleeding in the brain (such as from a head injury). Tell your doctor if you have ever had:  an ulcer in your stomach or intestines; or  a bleeding disorder or blood clotting disorder. Clopidogrel may not work as well if you have certain genetic factors that affect the breakdown of this medicine in your body. Your doctor may perform a blood test to make sure clopidogrel is right for you. This medicine is not expected to harm an unborn baby. However, taking clopidogrel within 1 week before childbirth can cause bleeding in the mother. Tell your doctor if you are pregnant or plan to become pregnant. You should not breastfeed while using this medicine. How should I take clopidogrel? Follow all directions on your prescription label and read all medication guides or instruction sheets. Use these medicines exactly as directed. Clopidogrel can be taken with or without food. Clopidogrel is sometimes taken together with aspirin. Take aspirin only if your doctor tells you to. Clopidogrel keeps your blood from coagulating (clotting) and can make it easier for you to bleed, even from a minor injury. Contact your doctor or seek emergency medical attention if you have any bleeding that will not stop. You may need to stop using clopidogrel for a short time before a surgery, medical procedure, or dental work. Any healthcare provider who treats you should know that you are taking clopidogrel. Do not stop taking clopidogrel without first talking to your doctor, even if you have signs of bleeding. Stopping the medicine could increase your risk of a heart attack or stroke. Store at room temperature away from moisture and heat. What happens if I miss a dose? Take the medicine as soon as you can, but skip the missed dose if it is almost time for your next dose. Do not take two doses at one time.   What happens if I overdose? Seek emergency medical attention or call the Poison Help line at 1-858.719.6954. Overdose can cause excessive bleeding. What should I avoid while taking clopidogrel? Avoid alcohol. It can increase your risk of stomach bleeding. Avoid activities that may increase your risk of bleeding or injury. Use extra care to prevent bleeding while shaving or brushing your teeth. If you also take aspirin: Ask a doctor or pharmacist before using medicines for pain, fever, swelling, or cold/flu symptoms. They may contain ingredients similar to aspirin (such as salicylates, ibuprofen, ketoprofen, or naproxen). Taking these products together can increase your risk of bleeding. What are the possible side effects of clopidogrel? Get emergency medical help if you have signs of an allergic reaction: hives; difficult breathing; swelling of your face, lips, tongue, or throat. Clopidogrel increases your risk of bleeding, which can be severe or life-threatening. Call your doctor or seek emergency medical attention if you have bleeding that will not stop, if you have blood in your urine, black or bloody stools, or if you cough up blood or vomit that looks like coffee grounds. Also call your doctor at once if you have:  nosebleeds, pale skin, easy bruising, purple spots under your skin or in your mouth;  jaundice (yellowing of your skin or eyes);  fast heartbeats, shortness of breath;  headache, fever, weakness, feeling tired;  little or no urination;  a seizure;  low blood sugar --headache, hunger, sweating, irritability, dizziness, fast heart rate, and feeling anxious or shaky; or  signs of a blood clot --sudden numbness or weakness, confusion, problems with vision or speech. Common side effects may include:  bleeding. This is not a complete list of side effects and others may occur. Call your doctor for medical advice about side effects. You may report side effects to FDA at 3-530-AFV-1185.   What other drugs will affect clopidogrel? Sometimes it is not safe to use certain medications at the same time. Some drugs can affect your blood levels of other drugs you take, which may increase side effects or make the medications less effective. Tell your doctor about all your other medicines, especially:  a stomach acid reducer such as omeprazole, Nexium, or Prilosec;  an antidepressant such as citalopram, fluoxetine, sertraline, Cymbalta, Effexor, Lexapro, Pristiq, or Prozac;  rifampin;  a blood thinner --warfarin, Coumadin, Cira [employer]; or  NSAIDs (nonsteroidal anti-inflammatory drugs) --aspirin, ibuprofen (Advil, Motrin), naproxen (Aleve), celecoxib, diclofenac, indomethacin, meloxicam, and others. This list is not complete. Other drugs may affect clopidogrel, including prescription and over-the-counter medicines, vitamins, and herbal products. Not all possible drug interactions are listed here. Where can I get more information? Your pharmacist can provide more information about clopidogrel. Remember, keep this and all other medicines out of the reach of children, never share your medicines with others, and use this medication only for the indication prescribed. Every effort has been made to ensure that the information provided by [address] is accurate, up-to-date, and complete, but no guarantee is made to that effect. Drug information contained herein may be time sensitive. [facility] information has been compiled for use by healthcare practitioners and consumers in the [facility] and therefore [facility] does not warrant that uses outside of the [facility] are appropriate, unless specifically indicated otherwise. [facility]'s drug information does not endorse drugs, diagnose patients or recommend therapy.  [facility] drug information is an informational resource designed to assist licensed healthcare practitioners in caring for their patients and/or to serve consumers viewing this service as a supplement to, and not a substitute for, the expertise, skill, knowledge and judgment of healthcare practitioners. The absence of a warning for a given drug or drug combination in no way should be construed to indicate that the drug or drug combination is safe, effective or appropriate for any given patient. [facility] does not assume any responsibility for any aspect of healthcare administered with the aid of information [facility] provides. The information contained herein is not intended to cover all possible uses, directions, precautions, warnings, drug interactions, allergic reactions, or adverse effects. If you have questions about the drugs you are taking, check with your doctor, nurse or pharmacist.  Copyright 3016-7186 [address]: 18.01. Revision date: 3/31/2021. Care instructions adapted under license by [facility] ([facility]). If you have questions about a medical condition or this instruction, always ask your healthcare professional. [address]  any warranty or liability for your use of this information.

## 2023-01-28 NOTE — DISCHARGE SUMMARY
[facility] Cardiology Discharge Summary     Patient ID:  Paolo Saldivar  [id number]  7 y.o.  1940    Admit date: 1/25/2023    Discharge date:  1/28/2023    Admitting Physician: Gutierrez Dougherty DO     Discharge Physician:  Dr. Dl Jacome    Admission Diagnoses: Accelerating angina [facility]) [I20.0]    Discharge Diagnoses:   Patient Active Problem List    Diagnosis    Accelerating angina     Moderate aortic stenosis by prior echocardiogram    Chronic heart failure with preserved ejection fraction (HFpEF)     Restrictive lung disease    Chronic gout due to renal impairment without tophus    Hypothyroidism due to Hashimoto's thyroiditis    PHU (iron deficiency anemia)    Obesity, morbid     Type 2 diabetes with nephropathy     Diastolic CHF, chronic     Anemia in chronic kidney disease    Acquired hypothyroidism    CAD in native artery    SEARS (dyspnea on exertion)    Chronic kidney disease (CKD), stage IV (severe)     JORJE (obstructive sleep apnea)    Hypertension    Diabetes mellitus type 2 in obese [facility])       Cardiology Procedures:  Left heart catheterization with PCI  EchoCardiogram  Consults: Nephrology    Hospital Course: Patient was seen at the office of [facility] Cardiology by Dr. Rina Oneil for complaints of CP and SEARS. He was admitted to the Telemetry floor for further work-up and pre-hydration for LHC. Nephrology was consulted due to Ptss renal impairment. Pt was subsequently scheduled for a LHC at [facility] on 1/27/23. Patient underwent cardiac catheterization by Dr. John Sr. Patient was found to have a 99 stenosis of the pLCx that was stented with a 2.5 x 12mm [place] KAMLESH with 10% residual stenosis. Patient tolerated the procedure well and was taken to the Telemetry floor for recovery. ECHO showed:     Left Ventricle: Normal left ventricular systolic function with a visually estimated EF of 60 - 65%. Left ventricle size is normal. Mildly increased wall thickness. Normal wall motion.  Abnormal diastolic function. Aortic Valve: Trileaflet valve. Moderate sclerosis of the aortic valve cusp. Moderate stenosis of the aortic valve. AV mean gradient is 25 mmHg. AV area by continuity VTI is 0.9 cm2. Mitral Valve: Mild regurgitation. Tricuspid Valve: The estimated RVSP is 57 mmHg. Left Atrium: Left atrium is severely dilated. Right Atrium: Right atrium is moderately dilated. Aorta: Dilated aortic root. Ao root diameter is 3.7 cm. Technical qualifiers: Technically difficult study due to patient's body habitus, color flow Doppler was performed and pulse wave and/or continuous wave Doppler was performed. Contrast used: Definity. The following morning patient was up feeling well without any complaints of chest pain or shortness of breath. Patient's RRA cath site was clean, dry and intact without hematoma or bruit. Patient's labs were stable with Cr of 3.1. Patient was seen and examined by Dr. Manuela Camejo and determined stable and ready for discharge. Patient was instructed on the importance of medication compliance including taking Aspirin and Plavix everyday without missing a dose. After receiving drug eluting stents, the patient will remain on dual anti-platelet therapy for at least 1 year. For maximized medical therapy for CAD, patient will continue BB and Imdur but no ACE/ARB due to CKD and no stain due to patient refuses. The patient will follow up with [place] [facility] Dr. Lesley Obrien on 2/21/23 @ 9:00AM in the [facility] office and has been referred to cardiac rehab. [place] [facility] office has been informed you need a follow up appointment after discharge. You will be called on Monday with the follow up appointment. If you have NOT heard from our office by Tuesday, please contact our office for appointment at [phone]. DISPOSITION: The patient is being discharged home in stable condition on a low saturated fat, low cholesterol and low salt diet.  The patient is instructed to advance activities as tolerated to the limit of fatigue or shortness of breath. The patient is instructed to avoid all heavy lifting, straining, stooping or squatting for 3-5 days. The patient is instructed to watch the cath site for bleeding/oozing; if seen, the patient is instructed to apply firm pressure with a clean cloth and call [address] Cardiology at [phone]. The patient is instructed to watch for signs of infection which include: increasing area of redness, fever/hot to touch or purulent drainage at the catheterization site. The patient is instructed not to soak in a bathtub for 7-10 days, but is cleared to shower. The patient is instructed to call the office or return to the ER for immediate evaluation for any shortness of breath or chest pain not relieved by NTG. Discharge Exam: /85   Pulse 84   Temp 97.6 °F (36.4 °C) (Oral)   Resp 18   Ht 6' 1\" (1.854 m)   Wt (!) 312 lb 9.6 oz (141.8 kg)   SpO2 97%   BMI 41.24 kg/m²       Patient has been seen by Dr. Timi Nava: see his progress note for exam details.     Recent Results (from the past 24 hour(s))   Comprehensive Metabolic Panel    Collection Time: 01/27/23  4:08 AM   Result Value Ref Range    Sodium 140 133 - 143 mmol/L    Potassium 4.3 3.5 - 5.1 mmol/L    Chloride 112 (H) 101 - 110 mmol/L    CO2 19 (L) 21 - 32 mmol/L    Anion Gap 9 2 - 11 mmol/L    Glucose 116 (H) 65 - 100 mg/dL    BUN 60 (H) 8 - 23 MG/DL    Creatinine 3.30 (H) 0.8 - 1.5 MG/DL    Est, Glom Filt Rate 18 (L) >60 ml/min/1.73m2    Calcium 8.6 8.3 - 10.4 MG/DL    Total Bilirubin 0.5 0.2 - 1.1 MG/DL    ALT 19 12 - 65 U/L    AST 18 15 - 37 U/L    Alk Phosphatase 64 50 - 136 U/L    Total Protein 5.6 (L) 6.3 - 8.2 g/dL    Albumin 2.5 (L) 3.2 - 4.6 g/dL    Globulin 3.1 2.8 - 4.5 g/dL    Albumin/Globulin Ratio 0.8 0.4 - 1.6     CBC    Collection Time: 01/27/23  4:08 AM   Result Value Ref Range    WBC 5.8 4.3 - 11.1 K/uL    RBC 3.21 (L) 4.23 - 5.6 M/uL    Hemoglobin 9.9 (L) 13.6 - 17.2 g/dL    Hematocrit 30.5 (L) 41.1 - 50.3 %    MCV 95.0 82 - 102 FL    MCH 30.8 26.1 - 32.9 PG    MCHC 32.5 31.4 - 35.0 g/dL    RDW 13.5 11.9 - 14.6 %    Platelets 026 217 - 726 K/uL    MPV 10.4 9.4 - 12.3 FL    nRBC 0.00 0.0 - 0.2 K/uL   Magnesium    Collection Time: 01/27/23  4:08 AM   Result Value Ref Range    Magnesium 2.5 (H) 1.8 - 2.4 mg/dL   POCT Glucose    Collection Time: 01/27/23  4:47 AM   Result Value Ref Range    POC Glucose 113 (H) 65 - 100 mg/dL    Performed by: Alyssa Carpenter    POCT Glucose    Collection Time: 01/27/23  7:53 AM   Result Value Ref Range    POC Glucose 120 (H) 65 - 100 mg/dL    Performed by: Jay    POCT Glucose    Collection Time: 01/27/23 11:54 AM   Result Value Ref Range    POC Glucose 111 (H) 65 - 100 mg/dL    Performed by: Jay    Cardiac procedure    Collection Time: 01/27/23  2:24 PM   Result Value Ref Range    Body Surface Area 2.7 m2   POCT Glucose    Collection Time: 01/27/23  3:59 PM   Result Value Ref Range    POC Glucose 136 (H) 65 - 100 mg/dL    Performed by: Jessica Aragon    POCT Glucose    Collection Time: 01/27/23  5:50 PM   Result Value Ref Range    POC Glucose 247 (H) 65 - 100 mg/dL    Performed by: Jessica Aragon    POCT Glucose    Collection Time: 01/27/23  7:23 PM   Result Value Ref Range    POC Glucose 395 (H) 65 - 100 mg/dL    Performed by: Amanda          Patient Instructions:   Current Discharge Medication List        START taking these medications    Details   clopidogrel (PLAVIX) 75 MG tablet Take 1 tablet by mouth daily  Qty: 90 tablet, Refills: 3      nitroGLYCERIN (NITROSTAT) 0.4 MG SL tablet Place 1 tablet under the tongue every 5 minutes as needed for Chest pain up to max of 3 total doses. If no relief after 1 dose, call 911.   Qty: 25 tablet, Refills: 3           CONTINUE these medications which have CHANGED    Details   isosorbide mononitrate (IMDUR) 60 MG extended release tablet Take 1 tablet by mouth daily  Qty: 30 tablet, Refills: 5           CONTINUE these medications which have NOT CHANGED    Details   hydrALAZINE (APRESOLINE) 50 MG tablet Take 1 tablet by mouth in the morning and at bedtime  Qty: 90 tablet, Refills: 3      metoprolol succinate (TOPROL XL) 100 MG extended release tablet Take 1 tablet by mouth every evening  Qty: 90 tablet, Refills: 3      CPAP Machine MISC by Does not apply route At HS      albuterol sulfate HFA (PROVENTIL;VENTOLIN;PROAIR) 108 (90 Base) MCG/ACT inhaler 2 puffs 4 times daily if needed for shortness of breath or wheezing. Patient will call when refills are needed  Qty: 1 each, Refills: 11      Fluticasone Furoate-Vilanterol (BREO ELLIPTA) 100-25 MCG/ACT AEPB 1 inhalation every morning, rinse mouth after use.   Qty: 1 each, Refills: 11      torsemide 40 MG TABS Take 40 mg by mouth in the morning and at bedtime  Qty: 60 tablet, Refills: 3      allopurinol (ZYLOPRIM) 300 MG tablet Take 150 mg by mouth daily      aspirin 81 MG EC tablet Take 81 mg by mouth      calcitRIOL (ROCALTROL) 0.25 MCG capsule Take 0.25 mcg by mouth daily      Dulaglutide (TRULICITY) 1.5 QF/0.0FU SOPN Inject 1.5 mg into the skin every 7 days On sundays      insulin glargine (LANTUS) 100 UNIT/ML injection vial Inject 25 Units into the skin 2 times daily      levothyroxine (SYNTHROID) 88 MCG tablet Take 88 mcg by mouth      sodium bicarbonate 650 MG tablet Take 650 mg by mouth 2 times daily           Was not taking prior to admission       cloNIDine (CATAPRES) 0.1 MG tablet Comments:   Reason for Stopping:         losartan-hydroCHLOROthiazide (HYZAAR) 100-25 MG per tablet Comments:   Reason for Stopping:         nebivolol (BYSTOLIC) 10 MG tablet Comments:   Reason for Stopping:

## 2023-01-28 NOTE — PROGRESS NOTES
Reviewed notes for new spiritual concerns      Will continue to assess how we can best serve this family        [place].       Per notes:       CAYLA UNKNOWN    LIVES IN [place]    FULL CODE    HAS DIRECTIVES    MY CHART IS ACTIVE    HIGH RISK FALLS    LOS  3 DAYS

## 2023-01-28 NOTE — CARE COORDINATION
Pt is for discharge home today with family and no needs/supportive care orders recieved for CM at this time. Pt has been referred to [facility].       01/28/23 1620   Social/Functional History   Lives With Spouse   Type of [address] level   [address] [facility]    Active  Yes   Mode of Transportation Car   Occupation Retired   Services At/After Discharge   Transition of [address] ([address] Discharge Planning; Other   Services At/After Discharge Outpatient  (referral to [facility])   [address] Provided? No   Mode of Transport at Discharge Other (see comment)  (family)   Confirm Follow Up Transport Family   Condition of Participation: Discharge Planning   The Plan for Transition of Care is related to the following treatment goals: Pt will return home with supportive family. The Patient and/Or Patient Representative agree with the Discharge Plan?  Yes

## 2023-01-28 NOTE — PROGRESS NOTES
Kyleigh Ardon  Admission Date: 1/25/2023         [facility] Nephrology Progress Note: 1/28/2023    Follow-up for: CKD IV    Subjective:     Patient seen and examined. [facility] yesterday. Feels better today.     ROS:  Gen - no fever, no chills, appetite unchanged  CV - no chest pain, no palpitation  Lung - no shortness of breath, no cough  Abd - no tenderness, no nausea/vomiting, no diarrhea  Ext - no edema    Current Facility-Administered Medications   Medication Dose Route Frequency    clopidogrel (PLAVIX) tablet 75 mg  75 mg Oral Daily    insulin lispro (HUMALOG) injection vial 0-8 Units  0-8 Units SubCUTAneous TID WC    insulin lispro (HUMALOG) injection vial 0-4 Units  0-4 Units SubCUTAneous Nightly    metoprolol succinate (TOPROL XL) extended release tablet 100 mg  100 mg Oral QPM    sodium bicarbonate tablet 650 mg  650 mg Oral BID    levothyroxine (SYNTHROID) tablet 88 mcg  88 mcg Oral Daily    insulin glargine (LANTUS) injection vial 25 Units  25 Units SubCUTAneous BID    mometasone-formoterol (DULERA) 100-5 MCG/ACT inhaler 2 puff  2 puff Inhalation BID    calcitRIOL (ROCALTROL) capsule 0.25 mcg  0.25 mcg Oral Daily    aspirin EC tablet 81 mg  81 mg Oral Daily    allopurinol (ZYLOPRIM) tablet 150 mg  150 mg Oral Daily    albuterol sulfate HFA (PROVENTIL;VENTOLIN;PROAIR) 108 (90 Base) MCG/ACT inhaler 2 puff  2 puff Inhalation Q6H PRN    sodium chloride flush 0.9 % injection 5-40 mL  5-40 mL IntraVENous 2 times per day    sodium chloride flush 0.9 % injection 5-40 mL  5-40 mL IntraVENous PRN    0.9 % sodium chloride infusion   IntraVENous PRN    ondansetron (ZOFRAN-ODT) disintegrating tablet 4 mg  4 mg Oral Q8H PRN    Or    ondansetron (ZOFRAN) injection 4 mg  4 mg IntraVENous Q6H PRN    polyethylene glycol (GLYCOLAX) packet 17 g  17 g Oral Daily PRN    nitroGLYCERIN (NITROSTAT) SL tablet 0.4 mg  0.4 mg SubLINGual Q5 Min PRN    0.9 % sodium chloride infusion   IntraVENous Continuous    glucose chewable tablet 16 g  4 tablet Oral PRN    dextrose bolus 10% 125 mL  125 mL IntraVENous PRN    Or    dextrose bolus 10% 250 mL  250 mL IntraVENous PRN    glucagon (rDNA) injection 1 mg  1 mg SubCUTAneous PRN    dextrose 10 % infusion   IntraVENous Continuous PRN    isosorbide mononitrate (IMDUR) extended release tablet 60 mg  60 mg Oral Daily    hydrALAZINE (APRESOLINE) tablet 100 mg  100 mg Oral Nightly         Objective:     Vitals:    01/27/23 2317 01/28/23 0434 01/28/23 0435 01/28/23 0802   BP: 130/85  (!) 114/58 132/69   Pulse: 84  71 76   Resp: 18  18 17   Temp: 97.6 °F (36.4 °C)  98 °F (36.7 °C) 97.9 °F (36.6 °C)   TempSrc: Oral  Axillary Oral   SpO2: 97%  97% 98%   Weight:  (!) 322 lb 11.2 oz (146.4 kg)     Height:         Intake and Output:   01/26 1901 - 01/28 0700  In: 240 [P.O.:240]  Out: 10   01/28 0701 - 01/28 1900  In: 240 [P.O.:240]  Out: 0     Physical Exam:   Constitutional:  the patient is well developed and in no acute distress  HEENT:  Sclera clear, pupils equal, oral mucosa moist  Lungs: Clear to auscultation  Cardiovascular:  Regular rate, S1, S2, no Rub   Abd/GI: soft and non-tender; with positive bowel sounds. Ext: warm without cyanosis. There is trace lower leg edema. Skin:  no jaundice or rashes  Neuro: no gross neuro deficits   Psychiatric: Calm. LAB  Recent Labs     01/26/23  0502 01/27/23  0408 01/28/23  0325   WBC 4.9 5.8 4.8   HGB 9.8* 9.9* 9.1*   HCT 29.7* 30.5* 28.3*    191 177       Recent Labs     01/26/23  0502 01/27/23  0408 01/28/23  0325    140 141   K 4.1 4.3 4.4    112* 112*   CO2 22 19* 18*   BUN 63* 60* 56*   CREATININE 3.60* 3.30* 3.10*   MG  --  2.5*  --        No results for input(s): PH, PCO2, PO2, HCO3 in the last 72 hours. Assessment/Plan:  (Medical Decision Making)     CKD IV- Cr is 3.1 today appear to be his  baseline. -[facility] yesterday  -f/u labs next week to assess for MAGGIE.   Had a previously scheduled follow up Dr. Last White the week after.    Senia Walker MD  [address] [facility], [place]

## 2023-01-29 ENCOUNTER — APPOINTMENT (OUTPATIENT)
Dept: GENERAL RADIOLOGY | Age: 83
End: 2023-01-29
Payer: MEDICARE

## 2023-01-29 VITALS
DIASTOLIC BLOOD PRESSURE: 74 MMHG | BODY MASS INDEX: 41.75 KG/M2 | WEIGHT: 315 LBS | OXYGEN SATURATION: 98 % | HEIGHT: 73 IN | SYSTOLIC BLOOD PRESSURE: 135 MMHG | TEMPERATURE: 98.4 F | HEART RATE: 79 BPM | RESPIRATION RATE: 17 BRPM

## 2023-01-29 PROBLEM — E66.01 OBESITY, MORBID (HCC): Chronic | Status: ACTIVE | Noted: 2018-04-18

## 2023-01-29 PROBLEM — J98.4 RESTRICTIVE LUNG DISEASE: Chronic | Status: ACTIVE | Noted: 2022-06-03

## 2023-01-29 PROBLEM — I35.0 MODERATE AORTIC STENOSIS BY PRIOR ECHOCARDIOGRAM: Chronic | Status: ACTIVE | Noted: 2022-08-13

## 2023-01-29 PROBLEM — M1A.30X0 CHRONIC GOUT DUE TO RENAL IMPAIRMENT WITHOUT TOPHUS: Chronic | Status: ACTIVE | Noted: 2022-04-25

## 2023-01-29 PROBLEM — I50.32 CHRONIC HEART FAILURE WITH PRESERVED EJECTION FRACTION (HFPEF) (HCC): Chronic | Status: ACTIVE | Noted: 2022-08-03

## 2023-01-29 PROBLEM — E11.21 TYPE 2 DIABETES WITH NEPHROPATHY (HCC): Chronic | Status: ACTIVE | Noted: 2018-04-18

## 2023-01-29 LAB
ALBUMIN SERPL-MCNC: 3.2 G/DL (ref 3.2–4.6)
ALBUMIN/GLOB SERPL: 0.8 (ref 0.4–1.6)
ALP SERPL-CCNC: 82 U/L (ref 50–136)
ALT SERPL-CCNC: 17 U/L (ref 12–65)
ANION GAP SERPL CALC-SCNC: 10 MMOL/L (ref 2–11)
ANION GAP SERPL CALC-SCNC: 9 MMOL/L (ref 2–11)
AST SERPL-CCNC: 26 U/L (ref 15–37)
BASOPHILS # BLD: 0.1 K/UL (ref 0–0.2)
BASOPHILS # BLD: 0.1 K/UL (ref 0–0.2)
BASOPHILS NFR BLD: 1 % (ref 0–2)
BASOPHILS NFR BLD: 1 % (ref 0–2)
BILIRUB SERPL-MCNC: 0.4 MG/DL (ref 0.2–1.1)
BUN SERPL-MCNC: 59 MG/DL (ref 8–23)
BUN SERPL-MCNC: 61 MG/DL (ref 8–23)
CALCIUM SERPL-MCNC: 9 MG/DL (ref 8.3–10.4)
CALCIUM SERPL-MCNC: 9.1 MG/DL (ref 8.3–10.4)
CHLORIDE SERPL-SCNC: 109 MMOL/L (ref 101–110)
CHLORIDE SERPL-SCNC: 110 MMOL/L (ref 101–110)
CO2 SERPL-SCNC: 19 MMOL/L (ref 21–32)
CO2 SERPL-SCNC: 21 MMOL/L (ref 21–32)
CREAT SERPL-MCNC: 3.3 MG/DL (ref 0.8–1.5)
CREAT SERPL-MCNC: 3.4 MG/DL (ref 0.8–1.5)
DIFFERENTIAL METHOD BLD: ABNORMAL
DIFFERENTIAL METHOD BLD: ABNORMAL
EKG ATRIAL RATE: 78 BPM
EKG ATRIAL RATE: 91 BPM
EKG DIAGNOSIS: NORMAL
EKG DIAGNOSIS: NORMAL
EKG P AXIS: 71 DEGREES
EKG P AXIS: 83 DEGREES
EKG P-R INTERVAL: 226 MS
EKG P-R INTERVAL: 240 MS
EKG Q-T INTERVAL: 395 MS
EKG Q-T INTERVAL: 424 MS
EKG QRS DURATION: 118 MS
EKG QRS DURATION: 131 MS
EKG QTC CALCULATION (BAZETT): 483 MS
EKG QTC CALCULATION (BAZETT): 486 MS
EKG R AXIS: -17 DEGREES
EKG R AXIS: 6 DEGREES
EKG T AXIS: 131 DEGREES
EKG T AXIS: 139 DEGREES
EKG VENTRICULAR RATE: 78 BPM
EKG VENTRICULAR RATE: 91 BPM
EOSINOPHIL # BLD: 0.1 K/UL (ref 0–0.8)
EOSINOPHIL # BLD: 0.2 K/UL (ref 0–0.8)
EOSINOPHIL NFR BLD: 1 % (ref 0.5–7.8)
EOSINOPHIL NFR BLD: 2 % (ref 0.5–7.8)
ERYTHROCYTE [DISTWIDTH] IN BLOOD BY AUTOMATED COUNT: 13.9 % (ref 11.9–14.6)
ERYTHROCYTE [DISTWIDTH] IN BLOOD BY AUTOMATED COUNT: 13.9 % (ref 11.9–14.6)
GLOBULIN SER CALC-MCNC: 3.8 G/DL (ref 2.8–4.5)
GLUCOSE BLD STRIP.AUTO-MCNC: 159 MG/DL (ref 65–100)
GLUCOSE BLD STRIP.AUTO-MCNC: 202 MG/DL (ref 65–100)
GLUCOSE BLD STRIP.AUTO-MCNC: 214 MG/DL (ref 65–100)
GLUCOSE SERPL-MCNC: 193 MG/DL (ref 65–100)
GLUCOSE SERPL-MCNC: 254 MG/DL (ref 65–100)
HCT VFR BLD AUTO: 31 % (ref 41.1–50.3)
HCT VFR BLD AUTO: 35.5 % (ref 41.1–50.3)
HGB BLD-MCNC: 10 G/DL (ref 13.6–17.2)
HGB BLD-MCNC: 11.4 G/DL (ref 13.6–17.2)
IMM GRANULOCYTES # BLD AUTO: 0.1 K/UL (ref 0–0.5)
IMM GRANULOCYTES # BLD AUTO: 0.1 K/UL (ref 0–0.5)
IMM GRANULOCYTES NFR BLD AUTO: 1 % (ref 0–5)
IMM GRANULOCYTES NFR BLD AUTO: 1 % (ref 0–5)
INR PPP: 1.2
LACTATE SERPL-SCNC: 1.8 MMOL/L (ref 0.4–2)
LYMPHOCYTES # BLD: 0.4 K/UL (ref 0.5–4.6)
LYMPHOCYTES # BLD: 0.4 K/UL (ref 0.5–4.6)
LYMPHOCYTES NFR BLD: 4 % (ref 13–44)
LYMPHOCYTES NFR BLD: 4 % (ref 13–44)
MAGNESIUM SERPL-MCNC: 2.5 MG/DL (ref 1.8–2.4)
MAGNESIUM SERPL-MCNC: 2.6 MG/DL (ref 1.8–2.4)
MCH RBC QN AUTO: 30.6 PG (ref 26.1–32.9)
MCH RBC QN AUTO: 31 PG (ref 26.1–32.9)
MCHC RBC AUTO-ENTMCNC: 32.1 G/DL (ref 31.4–35)
MCHC RBC AUTO-ENTMCNC: 32.3 G/DL (ref 31.4–35)
MCV RBC AUTO: 95.2 FL (ref 82–102)
MCV RBC AUTO: 96 FL (ref 82–102)
MONOCYTES # BLD: 0.8 K/UL (ref 0.1–1.3)
MONOCYTES # BLD: 0.8 K/UL (ref 0.1–1.3)
MONOCYTES NFR BLD: 8 % (ref 4–12)
MONOCYTES NFR BLD: 8 % (ref 4–12)
NEUTS SEG # BLD: 9.1 K/UL (ref 1.7–8.2)
NEUTS SEG # BLD: 9.3 K/UL (ref 1.7–8.2)
NEUTS SEG NFR BLD: 84 % (ref 43–78)
NEUTS SEG NFR BLD: 85 % (ref 43–78)
NRBC # BLD: 0 K/UL (ref 0–0.2)
NRBC # BLD: 0 K/UL (ref 0–0.2)
NT PRO BNP: ABNORMAL PG/ML
PLATELET # BLD AUTO: 202 K/UL (ref 150–450)
PLATELET # BLD AUTO: 232 K/UL (ref 150–450)
PMV BLD AUTO: 10.5 FL (ref 9.4–12.3)
PMV BLD AUTO: 10.7 FL (ref 9.4–12.3)
POTASSIUM SERPL-SCNC: 4.4 MMOL/L (ref 3.5–5.1)
POTASSIUM SERPL-SCNC: 4.5 MMOL/L (ref 3.5–5.1)
PROT SERPL-MCNC: 7 G/DL (ref 6.3–8.2)
PROTHROMBIN TIME: 16 SEC (ref 12.6–14.3)
RBC # BLD AUTO: 3.23 M/UL (ref 4.23–5.6)
RBC # BLD AUTO: 3.73 M/UL (ref 4.23–5.6)
SERVICE CMNT-IMP: ABNORMAL
SODIUM SERPL-SCNC: 138 MMOL/L (ref 133–143)
SODIUM SERPL-SCNC: 140 MMOL/L (ref 133–143)
TROPONIN I BLD-MCNC: 1.48 NG/ML (ref 0.02–0.05)
WBC # BLD AUTO: 10.6 K/UL (ref 4.3–11.1)
WBC # BLD AUTO: 10.7 K/UL (ref 4.3–11.1)

## 2023-01-29 PROCEDURE — 6370000000 HC RX 637 (ALT 250 FOR IP): Performed by: STUDENT IN AN ORGANIZED HEALTH CARE EDUCATION/TRAINING PROGRAM

## 2023-01-29 PROCEDURE — 83735 ASSAY OF MAGNESIUM: CPT

## 2023-01-29 PROCEDURE — 6370000000 HC RX 637 (ALT 250 FOR IP): Performed by: NURSE PRACTITIONER

## 2023-01-29 PROCEDURE — 94640 AIRWAY INHALATION TREATMENT: CPT

## 2023-01-29 PROCEDURE — G0378 HOSPITAL OBSERVATION PER HR: HCPCS

## 2023-01-29 PROCEDURE — 84484 ASSAY OF TROPONIN QUANT: CPT

## 2023-01-29 PROCEDURE — 82962 GLUCOSE BLOOD TEST: CPT

## 2023-01-29 PROCEDURE — 36415 COLL VENOUS BLD VENIPUNCTURE: CPT

## 2023-01-29 PROCEDURE — 93005 ELECTROCARDIOGRAM TRACING: CPT | Performed by: NURSE PRACTITIONER

## 2023-01-29 PROCEDURE — 96374 THER/PROPH/DIAG INJ IV PUSH: CPT

## 2023-01-29 PROCEDURE — 85025 COMPLETE CBC W/AUTO DIFF WBC: CPT

## 2023-01-29 PROCEDURE — 2500000003 HC RX 250 WO HCPCS: Performed by: NURSE PRACTITIONER

## 2023-01-29 PROCEDURE — 83605 ASSAY OF LACTIC ACID: CPT

## 2023-01-29 PROCEDURE — 71045 X-RAY EXAM CHEST 1 VIEW: CPT

## 2023-01-29 PROCEDURE — 2580000003 HC RX 258: Performed by: NURSE PRACTITIONER

## 2023-01-29 PROCEDURE — 94760 N-INVAS EAR/PLS OXIMETRY 1: CPT

## 2023-01-29 PROCEDURE — 80053 COMPREHEN METABOLIC PANEL: CPT

## 2023-01-29 PROCEDURE — 83880 ASSAY OF NATRIURETIC PEPTIDE: CPT

## 2023-01-29 PROCEDURE — 85610 PROTHROMBIN TIME: CPT

## 2023-01-29 PROCEDURE — 87040 BLOOD CULTURE FOR BACTERIA: CPT

## 2023-01-29 RX ORDER — METOPROLOL TARTRATE 5 MG/5ML
5 INJECTION INTRAVENOUS EVERY 6 HOURS PRN
Status: DISCONTINUED | OUTPATIENT
Start: 2023-01-29 | End: 2023-01-29 | Stop reason: HOSPADM

## 2023-01-29 RX ORDER — LEVOTHYROXINE SODIUM 88 UG/1
88 TABLET ORAL DAILY
Status: DISCONTINUED | OUTPATIENT
Start: 2023-01-29 | End: 2023-01-29 | Stop reason: HOSPADM

## 2023-01-29 RX ORDER — ISOSORBIDE MONONITRATE 60 MG/1
60 TABLET, EXTENDED RELEASE ORAL DAILY
Status: DISCONTINUED | OUTPATIENT
Start: 2023-01-29 | End: 2023-01-29 | Stop reason: HOSPADM

## 2023-01-29 RX ORDER — ALLOPURINOL 300 MG/1
150 TABLET ORAL DAILY
Status: DISCONTINUED | OUTPATIENT
Start: 2023-01-29 | End: 2023-01-29 | Stop reason: HOSPADM

## 2023-01-29 RX ORDER — DEXTROSE MONOHYDRATE 100 MG/ML
INJECTION, SOLUTION INTRAVENOUS CONTINUOUS PRN
Status: DISCONTINUED | OUTPATIENT
Start: 2023-01-29 | End: 2023-01-29 | Stop reason: HOSPADM

## 2023-01-29 RX ORDER — MAGNESIUM HYDROXIDE/ALUMINUM HYDROXICE/SIMETHICONE 120; 1200; 1200 MG/30ML; MG/30ML; MG/30ML
30 SUSPENSION ORAL EVERY 6 HOURS PRN
Status: DISCONTINUED | OUTPATIENT
Start: 2023-01-29 | End: 2023-01-29 | Stop reason: HOSPADM

## 2023-01-29 RX ORDER — INSULIN LISPRO 100 [IU]/ML
0-4 INJECTION, SOLUTION INTRAVENOUS; SUBCUTANEOUS
Status: DISCONTINUED | OUTPATIENT
Start: 2023-01-29 | End: 2023-01-29 | Stop reason: HOSPADM

## 2023-01-29 RX ORDER — CALCITRIOL 0.25 UG/1
0.25 CAPSULE, LIQUID FILLED ORAL DAILY
Status: DISCONTINUED | OUTPATIENT
Start: 2023-01-29 | End: 2023-01-29 | Stop reason: HOSPADM

## 2023-01-29 RX ORDER — HYDRALAZINE HYDROCHLORIDE 50 MG/1
100 TABLET, FILM COATED ORAL NIGHTLY
Status: DISCONTINUED | OUTPATIENT
Start: 2023-01-29 | End: 2023-01-29 | Stop reason: HOSPADM

## 2023-01-29 RX ORDER — ONDANSETRON 2 MG/ML
4 INJECTION INTRAMUSCULAR; INTRAVENOUS EVERY 6 HOURS PRN
Status: DISCONTINUED | OUTPATIENT
Start: 2023-01-29 | End: 2023-01-29 | Stop reason: HOSPADM

## 2023-01-29 RX ORDER — INSULIN GLARGINE 100 [IU]/ML
25 INJECTION, SOLUTION SUBCUTANEOUS 2 TIMES DAILY
Status: DISCONTINUED | OUTPATIENT
Start: 2023-01-29 | End: 2023-01-29 | Stop reason: HOSPADM

## 2023-01-29 RX ORDER — INSULIN LISPRO 100 [IU]/ML
0-4 INJECTION, SOLUTION INTRAVENOUS; SUBCUTANEOUS NIGHTLY
Status: DISCONTINUED | OUTPATIENT
Start: 2023-01-29 | End: 2023-01-29 | Stop reason: HOSPADM

## 2023-01-29 RX ORDER — POLYETHYLENE GLYCOL 3350 17 G/17G
17 POWDER, FOR SOLUTION ORAL DAILY PRN
Status: DISCONTINUED | OUTPATIENT
Start: 2023-01-29 | End: 2023-01-29 | Stop reason: HOSPADM

## 2023-01-29 RX ORDER — ALBUTEROL SULFATE 90 UG/1
2 AEROSOL, METERED RESPIRATORY (INHALATION) EVERY 6 HOURS PRN
Status: DISCONTINUED | OUTPATIENT
Start: 2023-01-29 | End: 2023-01-29 | Stop reason: HOSPADM

## 2023-01-29 RX ORDER — SODIUM CHLORIDE 0.9 % (FLUSH) 0.9 %
5-40 SYRINGE (ML) INJECTION EVERY 12 HOURS SCHEDULED
Status: DISCONTINUED | OUTPATIENT
Start: 2023-01-29 | End: 2023-01-29 | Stop reason: HOSPADM

## 2023-01-29 RX ORDER — TORSEMIDE 20 MG/1
20 TABLET ORAL
Status: DISCONTINUED | OUTPATIENT
Start: 2023-01-29 | End: 2023-01-29

## 2023-01-29 RX ORDER — ACETAMINOPHEN 325 MG/1
650 TABLET ORAL EVERY 6 HOURS PRN
Status: DISCONTINUED | OUTPATIENT
Start: 2023-01-29 | End: 2023-01-29 | Stop reason: HOSPADM

## 2023-01-29 RX ORDER — ASPIRIN 81 MG/1
81 TABLET ORAL DAILY
Status: DISCONTINUED | OUTPATIENT
Start: 2023-01-29 | End: 2023-01-29 | Stop reason: HOSPADM

## 2023-01-29 RX ORDER — HYDRALAZINE HYDROCHLORIDE 50 MG/1
50 TABLET, FILM COATED ORAL 2 TIMES DAILY
Status: DISCONTINUED | OUTPATIENT
Start: 2023-01-29 | End: 2023-01-29

## 2023-01-29 RX ORDER — NITROGLYCERIN 0.4 MG/1
0.4 TABLET SUBLINGUAL EVERY 5 MIN PRN
Status: DISCONTINUED | OUTPATIENT
Start: 2023-01-29 | End: 2023-01-29 | Stop reason: HOSPADM

## 2023-01-29 RX ORDER — IPRATROPIUM BROMIDE AND ALBUTEROL SULFATE 2.5; .5 MG/3ML; MG/3ML
1 SOLUTION RESPIRATORY (INHALATION) EVERY 6 HOURS PRN
Status: DISCONTINUED | OUTPATIENT
Start: 2023-01-29 | End: 2023-01-29 | Stop reason: HOSPADM

## 2023-01-29 RX ORDER — ONDANSETRON 4 MG/1
4 TABLET, ORALLY DISINTEGRATING ORAL EVERY 8 HOURS PRN
Status: DISCONTINUED | OUTPATIENT
Start: 2023-01-29 | End: 2023-01-29 | Stop reason: HOSPADM

## 2023-01-29 RX ORDER — SODIUM BICARBONATE 650 MG/1
650 TABLET ORAL 2 TIMES DAILY
Status: DISCONTINUED | OUTPATIENT
Start: 2023-01-29 | End: 2023-01-29 | Stop reason: HOSPADM

## 2023-01-29 RX ORDER — SODIUM CHLORIDE 0.9 % (FLUSH) 0.9 %
5-40 SYRINGE (ML) INJECTION PRN
Status: DISCONTINUED | OUTPATIENT
Start: 2023-01-29 | End: 2023-01-29 | Stop reason: HOSPADM

## 2023-01-29 RX ORDER — BUMETANIDE 0.25 MG/ML
1 INJECTION, SOLUTION INTRAMUSCULAR; INTRAVENOUS
Status: COMPLETED | OUTPATIENT
Start: 2023-01-29 | End: 2023-01-29

## 2023-01-29 RX ORDER — TORSEMIDE 20 MG/1
40 TABLET ORAL 2 TIMES DAILY
Status: DISCONTINUED | OUTPATIENT
Start: 2023-01-29 | End: 2023-01-29 | Stop reason: HOSPADM

## 2023-01-29 RX ORDER — ACETAMINOPHEN 650 MG/1
650 SUPPOSITORY RECTAL EVERY 6 HOURS PRN
Status: DISCONTINUED | OUTPATIENT
Start: 2023-01-29 | End: 2023-01-29 | Stop reason: HOSPADM

## 2023-01-29 RX ORDER — METOPROLOL SUCCINATE 100 MG/1
100 TABLET, EXTENDED RELEASE ORAL EVERY EVENING
Status: DISCONTINUED | OUTPATIENT
Start: 2023-01-29 | End: 2023-01-29 | Stop reason: HOSPADM

## 2023-01-29 RX ORDER — SODIUM CHLORIDE 9 MG/ML
INJECTION, SOLUTION INTRAVENOUS PRN
Status: DISCONTINUED | OUTPATIENT
Start: 2023-01-29 | End: 2023-01-29 | Stop reason: HOSPADM

## 2023-01-29 RX ORDER — CLOPIDOGREL BISULFATE 75 MG/1
75 TABLET ORAL DAILY
Status: DISCONTINUED | OUTPATIENT
Start: 2023-01-29 | End: 2023-01-29 | Stop reason: HOSPADM

## 2023-01-29 RX ADMIN — ISOSORBIDE MONONITRATE 60 MG: 60 TABLET, EXTENDED RELEASE ORAL at 09:14

## 2023-01-29 RX ADMIN — ALLOPURINOL 150 MG: 300 TABLET ORAL at 09:15

## 2023-01-29 RX ADMIN — SODIUM BICARBONATE 650 MG: 650 TABLET ORAL at 09:15

## 2023-01-29 RX ADMIN — MOMETASONE FUROATE AND FORMOTEROL FUMARATE DIHYDRATE 2 PUFF: 100; 5 AEROSOL RESPIRATORY (INHALATION) at 09:11

## 2023-01-29 RX ADMIN — BUMETANIDE 1 MG: 0.25 INJECTION INTRAMUSCULAR; INTRAVENOUS at 02:11

## 2023-01-29 RX ADMIN — ASPIRIN 81 MG: 81 TABLET ORAL at 09:14

## 2023-01-29 RX ADMIN — SODIUM CHLORIDE, PRESERVATIVE FREE 10 ML: 5 INJECTION INTRAVENOUS at 09:17

## 2023-01-29 RX ADMIN — CLOPIDOGREL BISULFATE 75 MG: 75 TABLET ORAL at 09:15

## 2023-01-29 RX ADMIN — INSULIN GLARGINE 25 UNITS: 100 INJECTION, SOLUTION SUBCUTANEOUS at 09:17

## 2023-01-29 RX ADMIN — LEVOTHYROXINE SODIUM 88 MCG: 0.09 TABLET ORAL at 05:52

## 2023-01-29 RX ADMIN — IPRATROPIUM BROMIDE AND ALBUTEROL SULFATE 1 AMPULE: .5; 3 SOLUTION RESPIRATORY (INHALATION) at 00:12

## 2023-01-29 RX ADMIN — CALCITRIOL 0.25 MCG: 0.25 CAPSULE ORAL at 09:15

## 2023-01-29 RX ADMIN — TORSEMIDE 20 MG: 20 TABLET ORAL at 01:09

## 2023-01-29 RX ADMIN — IPRATROPIUM BROMIDE AND ALBUTEROL SULFATE 1 AMPULE: .5; 3 SOLUTION RESPIRATORY (INHALATION) at 09:11

## 2023-01-29 RX ADMIN — NITROGLYCERIN 0.5 INCH: 20 OINTMENT TOPICAL at 00:00

## 2023-01-29 ASSESSMENT — PAIN SCALES - GENERAL
PAINLEVEL_OUTOF10: 0
PAINLEVEL_OUTOF10: 0

## 2023-01-29 NOTE — H&P
[facility] CARDIOLOGY   History & Physical                 Primary Cardiologist: Dr. Aman Mendoza    Primary Care Physician: Dr. Mariana Bean    Admitting Physician: Dr. Laura Benavides:     Patient is a 79 y.o.  male with PMHx of CAD, HTN, HLD, DM II, CKD IV, Hypothyroidism, AS, JORJE on CPAP, Asthma, Gout and Morbid Obesity who presented to the ED with c/o progressive SOB. He states that he was having CPs and underwent LHC on 1/27/23 receiving 1 stent. States he has not had any CP post-cath. He states that overnight he was given IVF for his CKD. He states this AM he had some mild SOB and SEARS moving around the room but this was not severe. He was discharged and daughter picked him up around 1100. He states he picked up his meds at the pharmacy and went home. Since that time he has felt progressively SOB and this is worsened by exertion. He state he has slightly more edema in his feet than his baseline. He reports normal urination but not more than his baseline. He states he took his Torsemide and Metoprolol. States he used some Albuterol treatments throughout the day with momentary relief but then the SOB and wheezing would return. He continued to have SOB and decided to return to the ED. In the ED: Pt with /73, HR 90s. Found to have CXR c/w pulm edema. Cr 3.4 which is at baseline. He was given NTG paste and an DuoNeb tx which improved his BP some and improved his SOB. However, due to recent PCI/stent and known CKD IV, Cardiology asked to evaluate.      Past Medical History:   Diagnosis Date    Acquired hypothyroidism 4/8/2016    Anemia 1/21/2015    Anemia in chronic kidney disease 4/8/2016    Asthma     Chronic kidney disease     stage 4 kidney disease- Dr. Sae Gonzalez    CKD (chronic kidney disease) 10/11/2012    Coronary atherosclerosis of native coronary vessel 2/10/2016    Diabetes mellitus type 2 in obese ([address] 10/11/2012    avg 150, symptoms of hypoglycemia 90    Diastolic heart failure (HCC)     Dyspnea Dyspnea 2/10/2016    Edema 10/11/2012    Elevated liver function tests 2015    Hypertension 10/11/2012    Hyponatremia 2015    Morbid obesity ([facility] [address]     BMI 43.54    JORJE (obstructive sleep apnea) 10/11/2012    cpap    Seborrheic dermatitis 2013    Seborrheic dermatitis       Past Surgical History:   Procedure Laterality Date    CARDIAC CATHETERIZATION  2016    3 stent    CATARACT REMOVAL Bilateral 2017    HERNIA REPAIR  1957      Allergies   Allergen Reactions    Amlodipine Shortness Of Breath    Iodine Shortness Of Breath    Metformin Shortness Of Breath    Ranolazine Other (See Comments)     Lips and tongue numbness    Spironolactone Other (See Comments)     Potassium level went really high  Increased potassium    Hydrocodone-Acetaminophen Other (See Comments)     Pt states that if he takes this  The medication makes him feel like he is going crazy    Omeprazole Other (See Comments)     Caused drastic drop in blood sugar     Social History     Tobacco Use    Smoking status: Former     Packs/day: 0.50     Years: 2.00     Pack years: 1.00     Types: Cigarettes     Quit date: 1962     Years since quittin.1    Smokeless tobacco: Never   Substance Use Topics    Alcohol use: No      FH:   Family History   Problem Relation Age of Onset    Cancer Father         lung    No Known Problems Sister     No Known Problems Brother     Hypertension Father     No Known Problems Sister     Hypertension Mother         Review of Systems  General: no acute weight change, no weakness, no fatigue, no fever or chills, no diaphoresis, no change in appetite or ADLs  Skin: no rashes, wounds, sores or other skin changes  HEENT: no headache, dizziness, lightheadedness, vision changes, hearing changes, sinus pressure/pain/congestion, bleeding gums or sore throat  Neck: no swollen glands, goiter, pain or stiffness  Respiratory: no cough, no congestion, no sputum, no hemoptysis, +dyspnea, +wheezing  Cardiovascular: + as per HPI, no palpitations, syncope, orthopnea, PND  Gastrointestinal: no increased reflux, no constipation, diarrhea, liver problems, GI bleeding, no abdominal pain or distension, no N/V  Urinary: no frequency, urgency, hematuria, burning/pain with urination, flank pain or difficulty urinating  Peripheral Vascular: no claudication, leg cramps, prior DVTs, +swelling of BLE, no color change, or swelling with redness or tenderness  Musculoskeletal: no new muscle or joint pain/stiffness, joint swelling, erythema of joints, or back pain  Psychiatric: no increased depression, anxiety or excessive stress  Neurological: no sensory or motor loss, seizures, syncope, tremors, numbness, tingling, no changes in mood, attention, or speech, no changes in orientation, memory, insight, or judgment. Hematologic: +anemia, no easy bruising or bleeding  Endocrine: +thyroid problems, no heat or cold intolerance, excessive sweating, polyuria, polydipsia, +diabetes. Objective:       BP (!) 144/77   Pulse 92   Temp 98.1 °F (36.7 °C) (Oral)   Resp 22   SpO2 97%     No intake/output data recorded. No intake/output data recorded.     Physical Exam:  General: well developed, well nourished, NAD, resting comfortably  HEENT: PERRLA, no abnormalities noted, sclera clear, EOMs intact  Neck: supple, trachea midline, unable to assess JVD with neck habitus  Heart: S1S2 with RRR, +murmur, no rubs or gallops  Lungs: soft expiratory wheezes bilat, diminished bases, normal effort on O2, habitus limits exam  Abd: soft, nontender, nondistended, +bowel sounds, morbidly obese  Ext: warm, +BLE edema non-pitting but taut, calves nontender, pulses 2+ bilaterally  Skin: warm and dry, intact to view  Psychiatric: appropriate mood and affect, cooperative  Neurologic: A&O X 3, moves all 4 equally, CNs intact      ECG: SR, 1st degree AVB, inferior T-wave inversions no longer present    ECHO:     Left Ventricle: Normal left ventricular systolic function with a visually estimated EF of 60 - 65%. Left ventricle size is normal. Mildly increased wall thickness. Normal wall motion. Abnormal diastolic function. Aortic Valve: Trileaflet valve. Moderate sclerosis of the aortic valve cusp. Moderate stenosis of the aortic valve. AV mean gradient is 25 mmHg. AV area by continuity VTI is 0.9 cm2. Mitral Valve: Mild regurgitation. Tricuspid Valve: The estimated RVSP is 57 mmHg. Left Atrium: Left atrium is severely dilated. Right Atrium: Right atrium is moderately dilated. Aorta: Dilated aortic root. Ao root diameter is 3.7 cm. Technical qualifiers: Technically difficult study due to patient's body habitus, color flow Doppler was performed and pulse wave and/or continuous wave Doppler was performed. Contrast used: Definity. CXR: Impression:  1.  Bilateral perihilar and basilar densities, suspicious for pulmonary edema    Data Review:      Recent Results (from the past 24 hour(s))   Basic Metabolic Panel w/ Reflex to MG    Collection Time: 01/28/23  3:25 AM   Result Value Ref Range    Sodium 141 133 - 143 mmol/L    Potassium 4.4 3.5 - 5.1 mmol/L    Chloride 112 (H) 101 - 110 mmol/L    CO2 18 (L) 21 - 32 mmol/L    Anion Gap 11 2 - 11 mmol/L    Glucose 181 (H) 65 - 100 mg/dL    BUN 56 (H) 8 - 23 MG/DL    Creatinine 3.10 (H) 0.8 - 1.5 MG/DL    Est, Glom Filt Rate 19 (L) >60 ml/min/1.73m2    Calcium 8.7 8.3 - 10.4 MG/DL   CBC    Collection Time: 01/28/23  3:25 AM   Result Value Ref Range    WBC 4.8 4.3 - 11.1 K/uL    RBC 2.98 (L) 4.23 - 5.6 M/uL    Hemoglobin 9.1 (L) 13.6 - 17.2 g/dL    Hematocrit 28.3 (L) 41.1 - 50.3 %    MCV 95.0 82 - 102 FL    MCH 30.5 26.1 - 32.9 PG    MCHC 32.2 31.4 - 35.0 g/dL    RDW 13.7 11.9 - 14.6 %    Platelets 434 661 - 363 K/uL    MPV 10.9 9.4 - 12.3 FL    nRBC 0.00 0.0 - 0.2 K/uL   POCT Glucose    Collection Time: 01/28/23  8:09 AM   Result Value Ref Range    POC Glucose 156 (H) 65 - 100 mg/dL    Performed by: Jhoan FERGUSON with Auto Differential    Collection Time: 01/29/23 12:00 AM   Result Value Ref Range    WBC 10.7 4.3 - 11.1 K/uL    RBC 3.73 (L) 4.23 - 5.6 M/uL    Hemoglobin 11.4 (L) 13.6 - 17.2 g/dL    Hematocrit 35.5 (L) 41.1 - 50.3 %    MCV 95.2 82 - 102 FL    MCH 30.6 26.1 - 32.9 PG    MCHC 32.1 31.4 - 35.0 g/dL    RDW 13.9 11.9 - 14.6 %    Platelets 867 935 - 443 K/uL    MPV 10.5 9.4 - 12.3 FL    nRBC 0.00 0.0 - 0.2 K/uL    Differential Type AUTOMATED      Seg Neutrophils 84 (H) 43 - 78 %    Lymphocytes 4 (L) 13 - 44 %    Monocytes 8 4.0 - 12.0 %    Eosinophils % 2 0.5 - 7.8 %    Basophils 1 0.0 - 2.0 %    Immature Granulocytes 1 0.0 - 5.0 %    Segs Absolute 9.1 (H) 1.7 - 8.2 K/UL    Absolute Lymph # 0.4 (L) 0.5 - 4.6 K/UL    Absolute Mono # 0.8 0.1 - 1.3 K/UL    Absolute Eos # 0.2 0.0 - 0.8 K/UL    Basophils Absolute 0.1 0.0 - 0.2 K/UL    Absolute Immature Granulocyte 0.1 0.0 - 0.5 K/UL   CMP    Collection Time: 01/29/23 12:00 AM   Result Value Ref Range    Sodium 140 133 - 143 mmol/L    Potassium 4.5 3.5 - 5.1 mmol/L    Chloride 109 101 - 110 mmol/L    CO2 21 21 - 32 mmol/L    Anion Gap 10 2 - 11 mmol/L    Glucose 254 (H) 65 - 100 mg/dL    BUN 61 (H) 8 - 23 MG/DL    Creatinine 3.40 (H) 0.8 - 1.5 MG/DL    Est, Glom Filt Rate 17 (L) >60 ml/min/1.73m2    Calcium 9.1 8.3 - 10.4 MG/DL    Total Bilirubin 0.4 0.2 - 1.1 MG/DL    ALT 17 12 - 65 U/L    AST 26 15 - 37 U/L    Alk Phosphatase 82 50 - 136 U/L    Total Protein 7.0 6.3 - 8.2 g/dL    Albumin 3.2 3.2 - 4.6 g/dL    Globulin 3.8 2.8 - 4.5 g/dL    Albumin/Globulin Ratio 0.8 0.4 - 1.6     Lactate, Sepsis    Collection Time: 01/29/23 12:00 AM   Result Value Ref Range    Lactic Acid, Sepsis 1.8 0.4 - 2.0 MMOL/L   Magnesium    Collection Time: 01/29/23 12:00 AM   Result Value Ref Range    Magnesium 2.5 (H) 1.8 - 2.4 mg/dL   Protime-INR    Collection Time: 01/29/23 12:00 AM   Result Value Ref Range    Protime 16.0 (H) 12.6 - 14.3 sec    INR 1.2     POCT troponin    Collection Time: 01/29/23 12:06 AM   Result Value Ref Range    POC Troponin I 1.48 (H) 0.02 - 0.05 ng/ml         Assessment/Plan:   Principal Problem:    Dyspnea -- Pt with progressive dyspnea throughout the day, likely multifactorial (known HFpEF, restrictive lung disease, CKD IV, chronic anemia, AS and Pulm HTN - RVSP 57 on ECHO 1/26, JORJE and morbid obesity).  Will admit to obs and give IV Bumex, monitor diuresis overnight, repeat CXR and EKG in AM, cont nebs and CPAP overnight as well, Cr baseline and feel likely dyspnea is due to I&O mismatch with need for IVF hydration for LHC due to underlying severe CKD    Active Problems:    Restrictive lung disease -- will cont PRN DuoNeb tx as Pt reports improved SOB with tx given in ED      Chronic heart failure with preserved ejection fraction (HFpEF) -- cont home BB and Imdur, no ACEI/ARB with CKD, allergic to MRA      Moderate aortic stenosis by prior echocardiogram -- noted      Chronic gout due to renal impairment without tophus -- cont home meds, caution with diuresis      Hypothyroidism due to Hashimoto's thyroiditis -- cont home Synthroid      Edema -- BLE edema chronic but slightly worse tonight per family -- will monitor with diuresis      Chronic kidney disease (CKD), stage IV (severe) -- Cr at baseline tonight, f/u AM labs post diuresis, daily weight and STRICT I&Os      JORJE (obstructive sleep apnea) -- cont home CPAP      Anemia in chronic kidney disease -- f/u daily labs      Obesity, morbid -- needs weight loss      Type 2 diabetes with nephropathy -- cont home insulin, monitor BS and use SSI PRN      Hypertension -- BP elevated on arrival, monitor with addition of NTG paste, PRN IV BB ordered, cont home meds as well, adjust PRN      CAD in native artery -- s/p PCI LCx 1/27 -- cont DAPT (ASA/Plavix), BB and statin, no ACEi/ARB due to CKD        MELLY Alejandra - CNP-C  1/29/2023  2:07 AM

## 2023-01-29 NOTE — CARE COORDINATION
Pt discharge was delayed until today for medical care. Pt discharged home today with family and no needs/supportive care orders recieved for CM at this time.

## 2023-01-29 NOTE — ED TRIAGE NOTES
Pt arrives to ER POV with reports of SOB. Pts daughter reports pt had a cardiac stent placed yesterday when pt also received fluids. Daughter reports pt has CHF and she believes the pt is in fluid overload. Pts oxygen saturation is 95% on 3LNC in triage and is resting in wheelchair with NAD.

## 2023-01-29 NOTE — DISCHARGE INSTRUCTIONS
Learning About Heart Failure  What is heart failure? Heart failure means that your heart muscle doesn't pump as much blood as your body needs. Failure doesn't mean that your heart has stopped. It means that your heart isn't pumping as well as it should. Because your heart cannot pump well, your body tries to make up for it. To do this:  Your body holds on to salt and water. This increases the amount of blood in your bloodstream.  Your heart beats faster. Your heart might get bigger. Your body has an amazing ability to make up for heart failure. It may do such a good job that you don't know you have a disease. But at some point, your heart and body will no longer be able to keep up. Then fluid starts to build up in your lungs and other parts of your body. This fluid buildup is called congestion. It's why some doctors call the disease congestive heart failure. What can you expect when you have heart failure? Heart failure is a lifelong (chronic) disease. Treatment may be able to slow the disease and help you feel better. But heart failure tends to get worse over time. Despite this, there are many steps you can take to feel better and stay healthy longer. Early on, your symptoms may not be too bad. As heart failure gets worse, symptoms typically get worse, and you may need to limit your activities. Heart failure can also get worse suddenly. If this happens, you need emergency care. Then, after treatment, your symptoms may go back to being stable (which means they stay the same) for a long time. Heart failure can lead to other health problems, such as heart rhythm problems. Over time, your treatment options may change, especially as your symptoms get worse. You may want to think about what kind of care you want at the end of your life. What are the symptoms? Symptoms of heart failure start to happen when your heart can't pump enough blood to the rest of your body.   In the early stages of heart failure, you may:  Feel tired easily. Be short of breath when you exert yourself. Feel like your heart is pounding or racing (palpitations). Feel weak or dizzy. As heart failure gets worse, fluid starts to build up in your lungs and other parts of your body. This may cause you to:  Feel short of breath even at rest.  Have swelling (edema), especially in your legs, ankles, and feet. Gain weight. This may happen over just a day or two, or more slowly. Cough or wheeze, especially when you lie down. Feel bloated or sick to your stomach. How is heart failure treated? Heart failure is treated with medicines, a healthy lifestyle, and the steps you take to check your symptoms. Treatment can slow the disease, help you feel better, and help keep you out of the hospital. Treatment may also help you live longer. María Black probably take several medicines. Have a heart-healthy lifestyle that includes limiting sodium, getting regular exercise, not smoking, and eating healthy foods. Watch for changes in your symptoms. Cardiac rehabilitation (rehab) can give you education and support that help you stay as healthy as possible. You may get a heart device such as a pacemaker or implantable cardioverter-defibrillator (ICD). You may choose palliative care to help improve your quality of life. As heart failure gets worse, you may have other options such as a ventricular assist device or a heart transplant. You can do advance care planning to decide what kind of care you want at the end of your life. How can you care for yourself? There are many steps you can take to feel better, stay active, and enjoy life. Take your medicine the right way. Avoid medicines that can make your symptoms worse. Check your weight and symptoms every day. Know what to do if your symptoms get worse. Limit sodium. This helps keep fluid from building up. It may help you feel better. Be active.   Exercise regularly, but don't exercise too hard.  Be heart-healthy. Eat healthy foods, stay at a healthy weight, limit or avoid alcohol, and don't smoke. Stay as healthy as possible. Manage other health problems such as diabetes and high blood pressure. Get recommended vaccines, including vaccines for COVID-19, the flu, and pneumonia. Get help for depression and anxiety, and manage stress. If you think you may have a problem with alcohol or drug use, talk to your doctor. Your doctor may also recommend that you limit the amount of fluids you drink. Follow-up care is a key part of your treatment and safety. Be sure to make and go to all appointments, and call your doctor if you are having problems. It's also a good idea to know your test results and keep a list of the medicines you take. Where can you learn more? Go to http://www.woods.com/ and enter W456 to learn more about \"Learning About Heart Failure. \"  Current as of: September 7, 2022               Content Version: 13.5  © 2006-2022 [employer]. Care instructions adapted under license by [facility] ([facility]). If you have questions about a medical condition or this instruction, always ask your healthcare professional. [place] [address] any warranty or liability for your use of this information.

## 2023-01-29 NOTE — PLAN OF CARE
Problem: Discharge Planning  Goal: Discharge to home or other facility with appropriate resources  1/29/2023 1135 by Delmer Acevedo RN  Outcome: Adequate for Discharge  1/29/2023 0358 by Juancarlos Mason RN  Outcome: Progressing  Flowsheets  Taken 1/29/2023 0306  Discharge to home or other facility with appropriate resources: Identify barriers to discharge with patient and caregiver  Taken 1/29/2023 0234  Discharge to home or other facility with appropriate resources: Identify barriers to discharge with patient and caregiver     Problem: Safety - Adult  Goal: Free from fall injury  Outcome: Adequate for Discharge  Flowsheets (Taken 1/29/2023 0230 by Juancarlos Mason RN)  Free From Fall Injury: Instruct family/caregiver on patient safety

## 2023-01-29 NOTE — DISCHARGE SUMMARY
[facility] Card[facility] Discharge Summary     Patient ID:  Olesya Ramirez  [id number]  64 y.o.  1940    Admit date: 1/28/2023    Discharge date:  1/29/23    Admitting Physician: Dora Aguilar MD     Discharge Physician: HEAVEN Grey/Dr. 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 tophus    Hypothyroidism due to Hashimoto's thyroiditis    PHU (iron deficiency anemia)    Chest pain    Obesity, morbid (HCC)    Type 2 diabetes with nephropathy (HCC)    Diastolic CHF, chronic (HCC)    Anemia in chronic kidney disease    Acquired hypothyroidism    Cough variant asthma    Dyspnea    CAD in native artery    SEARS (dyspnea on exertion)    Abnormal nuclear stress test    Elevated liver function tests    Hyponatremia    Anemia    PPD positive    Seborrheic dermatitis    Edema    Chronic kidney disease (CKD), stage IV (severe) (HCC)    JORJE (obstructive sleep apnea)    Hypertension    Diabetes mellitus type 2 in obese [facility])       Cardiology Procedures this admission:  None  Consults: None    Hospital Course: Patient presented to the emergency department of [facility] with complaints of progressive shortness of breath and lower extremity edema. Pt with [facility] 1-27-23 w hyration for CKD. The patient was started on IV Bumex for diuresis. He diuresed well and felt better. The morning of 1/29/23 patient was up feeling well without any complaints shortness of breath. LE edema was much improved. - 790 cc diuresis. Patient's labs were stable with creatinine of 3.3 (stable).  Patient was seen and examined by Dr. Anne Woo and determined stable and ready for discharge. Patient was instructed on the importance of medication compliance, low sodium diet, 2 liter per day fluid restriction and daily weights. For maximized medical therapy of congestive heart failure, patient will continue use of BB but no ACE/ARB due to CKD, on imdur/hydralazine. The patient will have follow up with [facility] Cardiology Dr Rina Oneil 2-21-23 at [address] [place] [address] office. DISPOSITION: The patient is being discharged home in stable condition on a low saturated fat, low cholesterol and low salt diet. The patient is instructed to advance activities as tolerated to the limit of fatigue or shortness of breath. The patient is informed to monitor daily weights and maintain a 2 liter per day fluid restriction. The patient is instructed to call the office for any shortness of breath, weight gain, or increased peripheral edema. Discharge Exam: /74   Pulse 79   Temp 98.4 °F (36.9 °C) (Axillary)   Resp 17   Ht 6' 1\" (1.854 m)   Wt (!) 324 lb (147 kg)   SpO2 98%   BMI 42.75 kg/m²   Patient has been seen by Dr. Harry Jesus: see his progress note for exam details.     Recent Results (from the past 24 hour(s))   CBC with Auto Differential    Collection Time: 01/29/23 12:00 AM   Result Value Ref Range    WBC 10.7 4.3 - 11.1 K/uL    RBC 3.73 (L) 4.23 - 5.6 M/uL    Hemoglobin 11.4 (L) 13.6 - 17.2 g/dL    Hematocrit 35.5 (L) 41.1 - 50.3 %    MCV 95.2 82 - 102 FL    MCH 30.6 26.1 - 32.9 PG    MCHC 32.1 31.4 - 35.0 g/dL    RDW 13.9 11.9 - 14.6 %    Platelets 147 886 - 855 K/uL    MPV 10.5 9.4 - 12.3 FL    nRBC 0.00 0.0 - 0.2 K/uL    Differential Type AUTOMATED      Seg Neutrophils 84 (H) 43 - 78 %    Lymphocytes 4 (L) 13 - 44 %    Monocytes 8 4.0 - 12.0 %    Eosinophils % 2 0.5 - 7.8 %    Basophils 1 0.0 - 2.0 %    Immature Granulocytes 1 0.0 - 5.0 %    Segs Absolute 9.1 (H) 1.7 - 8.2 K/UL    Absolute Lymph # 0.4 (L) 0.5 - 4.6 K/UL    Absolute Mono # 0.8 0.1 - 1.3 K/UL    Absolute Eos # 0.2 0.0 - 0.8 K/UL    Basophils Absolute 0.1 0.0 - 0.2 K/UL    Absolute Immature Granulocyte 0.1 0.0 - 0.5 K/UL   CMP    Collection Time: 01/29/23 12:00 AM   Result Value Ref Range    Sodium 140 133 - 143 mmol/L    Potassium 4.5 3.5 - 5.1 mmol/L    Chloride 109 101 - 110 mmol/L    CO2 21 21 - 32 mmol/L    Anion Gap 10 2 - 11 mmol/L    Glucose 254 (H) 65 - 100 mg/dL    BUN 61 (H) 8 - 23 MG/DL    Creatinine 3.40 (H) 0.8 - 1.5 MG/DL    Est, Glom Filt Rate 17 (L) >60 ml/min/1.73m2    Calcium 9.1 8.3 - 10.4 MG/DL    Total Bilirubin 0.4 0.2 - 1.1 MG/DL    ALT 17 12 - 65 U/L    AST 26 15 - 37 U/L    Alk Phosphatase 82 50 - 136 U/L    Total Protein 7.0 6.3 - 8.2 g/dL    Albumin 3.2 3.2 - 4.6 g/dL    Globulin 3.8 2.8 - 4.5 g/dL    Albumin/Globulin Ratio 0.8 0.4 - 1.6     Lactate, Sepsis    Collection Time: 01/29/23 12:00 AM   Result Value Ref Range    Lactic Acid, Sepsis 1.8 0.4 - 2.0 MMOL/L   Magnesium    Collection Time: 01/29/23 12:00 AM   Result Value Ref Range    Magnesium 2.5 (H) 1.8 - 2.4 mg/dL   Protime-INR    Collection Time: 01/29/23 12:00 AM   Result Value Ref Range    Protime 16.0 (H) 12.6 - 14.3 sec    INR 1.2     Brain Natriuretic Peptide    Collection Time: 01/29/23 12:00 AM   Result Value Ref Range    NT Pro-BNP 16,324 (H) <450 PG/ML   POCT troponin    Collection Time: 01/29/23 12:06 AM   Result Value Ref Range    POC Troponin I 1.48 (H) 0.02 - 0.05 ng/ml   POCT Glucose    Collection Time: 01/29/23  2:40 AM   Result Value Ref Range    POC Glucose 202 (H) 65 - 100 mg/dL    Performed by: RamasamyNishtaBSN    Basic Metabolic Panel    Collection Time: 01/29/23  4:18 AM   Result Value Ref Range    Sodium 138 133 - 143 mmol/L    Potassium 4.4 3.5 - 5.1 mmol/L    Chloride 110 101 - 110 mmol/L    CO2 19 (L) 21 - 32 mmol/L    Anion Gap 9 2 - 11 mmol/L    Glucose 193 (H) 65 - 100 mg/dL    BUN 59 (H) 8 - 23 MG/DL    Creatinine 3.30 (H) 0.8 - 1.5 MG/DL    Est, Glom Filt Rate 18 (L) >60 ml/min/1.73m2    Calcium 9.0 8.3 - 10.4 MG/DL   Magnesium    Collection Time: 01/29/23  4:18 AM   Result Value Ref Range    Magnesium 2.6 (H) 1.8 - 2.4 mg/dL   CBC with Auto Differential    Collection Time: 01/29/23  4:18 AM   Result Value Ref Range    WBC 10.6 4.3 - 11.1 K/uL    RBC 3.23 (L) 4.23 - 5.6 M/uL    Hemoglobin 10.0 (L) 13.6 - 17.2 g/dL    Hematocrit 31.0 (L) 41.1 - 50.3 %    MCV 96.0 82 - 102 FL    MCH 31.0 26.1 - 32.9 PG    MCHC 32.3 31.4 - 35.0 g/dL    RDW 13.9 11.9 - 14.6 %    Platelets 260 155 - 323 K/uL    MPV 10.7 9.4 - 12.3 FL    nRBC 0.00 0.0 - 0.2 K/uL    Differential Type AUTOMATED      Seg Neutrophils 85 (H) 43 - 78 %    Lymphocytes 4 (L) 13 - 44 %    Monocytes 8 4.0 - 12.0 %    Eosinophils % 1 0.5 - 7.8 %    Basophils 1 0.0 - 2.0 %    Immature Granulocytes 1 0.0 - 5.0 %    Segs Absolute 9.3 (H) 1.7 - 8.2 K/UL    Absolute Lymph # 0.4 (L) 0.5 - 4.6 K/UL    Absolute Mono # 0.8 0.1 - 1.3 K/UL    Absolute Eos # 0.1 0.0 - 0.8 K/UL    Basophils Absolute 0.1 0.0 - 0.2 K/UL    Absolute Immature Granulocyte 0.1 0.0 - 0.5 K/UL   EKG 12 lead    Collection Time: 01/29/23  8:09 AM   Result Value Ref Range    Ventricular Rate 78 BPM    Atrial Rate 78 BPM    P-R Interval 240 ms    QRS Duration 118 ms    Q-T Interval 424 ms    QTc Calculation (Bazett) 483 ms    P Axis 71 degrees    R Axis 6 degrees    T Axis 131 degrees    Diagnosis Sinus rhythm with 1st degree A-V block    POCT Glucose    Collection Time: 01/29/23  8:13 AM   Result Value Ref Range    POC Glucose 159 (H) 65 - 100 mg/dL    Performed by: Elijah      Serum creatinine: 3.3 mg/dL (H) 01/29/23 0418  Estimated creatinine clearance: 26 mL/min (A)      Patient Instructions:   Current Discharge Medication List        CONTINUE these medications which have NOT CHANGED    Details   clopidogrel (PLAVIX) 75 MG tablet Take 1 tablet by mouth daily  Qty: 90 tablet, Refills: 3      nitroGLYCERIN (NITROSTAT) 0.4 MG SL tablet Place 1 tablet under the tongue every 5 minutes as needed for Chest pain up to max of 3 total doses. If no relief after 1 dose, call 911. Qty: 25 tablet, Refills: 3      isosorbide mononitrate (IMDUR) 60 MG extended release tablet Take 1 tablet by mouth daily  Qty: 30 tablet, Refills: 5      hydrALAZINE (APRESOLINE) 50 MG tablet Take 1 tablet by mouth in the morning and at bedtime  Qty: 90 tablet, Refills: 3      metoprolol succinate (TOPROL XL) 100 MG extended release tablet Take 1 tablet by mouth every evening  Qty: 90 tablet, Refills: 3      CPAP Machine MISC by Does not apply route At HS      albuterol sulfate HFA (PROVENTIL;VENTOLIN;PROAIR) 108 (90 Base) MCG/ACT inhaler 2 puffs 4 times daily if needed for shortness of breath or wheezing. Patient will call when refills are needed  Qty: 1 each, Refills: 11      Fluticasone Furoate-Vilanterol (BREO ELLIPTA) 100-25 MCG/ACT AEPB 1 inhalation every morning, rinse mouth after use.   Qty: 1 each, Refills: 11      torsemide 40 MG TABS Take 40 mg by mouth in the morning and at bedtime  Qty: 60 tablet, Refills: 3      allopurinol (ZYLOPRIM) 300 MG tablet Take 150 mg by mouth daily      aspirin 81 MG EC tablet Take 81 mg by mouth      calcitRIOL (ROCALTROL) 0.25 MCG capsule Take 0.25 mcg by mouth daily      Dulaglutide (TRULICITY) 1.5 NS/5.7GG SOPN Inject 1.5 mg into the skin every 7 days On sundays      insulin glargine (LANTUS) 100 UNIT/ML injection vial Inject 25 Units into the skin 2 times daily      levothyroxine (SYNTHROID) 88 MCG tablet Take 88 mcg by mouth      sodium bicarbonate 650 MG tablet Take 650 mg by mouth 2 times daily           STOP taking these medications       losartan-hydroCHLOROthiazide (HYZAAR) 100-25 MG per tablet Comments:   Reason for Stopping:         nebivolol (BYSTOLIC) 10 MG tablet Comments:   Reason for Stopping:                 Signed:  Beatrice Barclay PA-C  1/29/2023  11:23 AM

## 2023-01-29 NOTE — PLAN OF CARE
Problem: Discharge Planning  Goal: Discharge to home or other facility with appropriate resources  1/29/2023 1144 by Rene Sin. Kayleigh Pradhan RN  Outcome: Adequate for Discharge  1/29/2023 1144 by Rene Sin. Kayleigh Pradhan RN  Outcome: Adequate for Discharge  1/29/2023 1135 by Raghav Barajas RN  Outcome: Adequate for Discharge  1/29/2023 0358 by Alton Whitehead RN  Outcome: Progressing  Flowsheets  Taken 1/29/2023 0306  Discharge to home or other facility with appropriate resources: Identify barriers to discharge with patient and caregiver  Taken 1/29/2023 0234  Discharge to home or other facility with appropriate resources: Identify barriers to discharge with patient and caregiver     Problem: Safety - Adult  Goal: Free from fall injury  1/29/2023 1144 by Rene Sin. Kayleigh Pradhan RN  Outcome: Adequate for Discharge  1/29/2023 1144 by Rene Sin.  Kayleigh Pradhan RN  Outcome: Adequate for Discharge  1/29/2023 1135 by Raghav Barajas RN  Outcome: Adequate for Discharge  Flowsheets (Taken 1/29/2023 0230 by Alton Whitehead RN)  Free From Fall Injury: Instruct family/caregiver on patient safety

## 2023-01-29 NOTE — PROGRESS NOTES
TRANSFER - IN REPORT:    Verbal report received from [address] [place] [address], RN on Kaden Vasquez  being received from ED for routine progression of patient care      Report consisted of patient's Situation, Background, Assessment and   Recommendations(SBAR). Information from the following report(s) Nurse Handoff Report was reviewed with the receiving nurse. Opportunity for questions and clarification was provided. Assessment completed upon patient's arrival to unit and care assumed. Dual skin assessment completed on admission with Addie Chowdhury RN. Sacrum & heels C/D/I.  Some generalized ecchymosis

## 2023-01-29 NOTE — PROGRESS NOTES
Pt has hydralazine 50 ordered BID, states he takes 100 nightly rather than 50 BID. New order placed for hydralazine 100 nightly and order for 50 BID discontinued per [facility] PA.

## 2023-01-29 NOTE — ED PROVIDER NOTES
[address] [facility],[address]  Emergency Department    HPI   Lavinia Estrada is a 79 y.o. male with a history of asthma, CKD, CAD, CHF who presents to the ED with complaint of shortness of breath. Patient was discharged from the hospital this morning after an admission for unstable angina. He underwent a left heart cath yesterday and had 99% stenosis of left circumflex with placement of a stent. He felt improved afterwards and this morning was stable with no shortness of breath or chest pain and was discharged home. He was given fluids for his elevated creatinine 3.1. It was recommended that he hold his diuretics at home given his GREGG. Since returning home he has noted increased swelling in his lower extremities in addition to shortness of breath. Is hard to elucidate what exactly brings on his shortness of breath whether it is positional or exertional as he says it is intermittent. Denies chest pain or chest heaviness. Family notes that he was hypothermic at home but no known fevers. Denies rhinorrhea, cough. ROS   Review of Systems   Respiratory:  Positive for shortness of breath and wheezing. Cardiovascular:  Positive for leg swelling. Negative for chest pain. All other systems reviewed and are negative.     History     Past Medical History:   Diagnosis Date    Acquired hypothyroidism 4/8/2016    Anemia 1/21/2015    Anemia in chronic kidney disease 4/8/2016    Asthma     Chronic kidney disease     stage 4 kidney disease- Dr. Kayce Bautista    CKD (chronic kidney disease) 10/11/2012    Coronary atherosclerosis of native coronary vessel 2/10/2016    Diabetes mellitus type 2 in obese ([address] 10/11/2012    avg 150, symptoms of hypoglycemia 90    Diastolic heart failure ([address]     Dyspnea     Dyspnea 2/10/2016    Edema 10/11/2012    Elevated liver function tests 8/20/2015    Hypertension 10/11/2012    Hyponatremia 1/21/2015    Morbid obesity ([address]     BMI 43.54    JORJE (obstructive sleep apnea) 10/11/2012 cpap    Seborrheic dermatitis 1/16/2013    Seborrheic dermatitis      Past Surgical History:   Procedure Laterality Date    CARDIAC CATHETERIZATION  2016    3 stent    CATARACT REMOVAL Bilateral 2017    HERNIA REPAIR  1957     Family History   Problem Relation Age of Onset    Cancer Father         lung    No Known Problems Sister     No Known Problems Brother     Hypertension Father     No Known Problems Sister     Hypertension Mother      Allergies   Allergen Reactions    Amlodipine Shortness Of Breath    Iodine Shortness Of Breath    Metformin Shortness Of Breath    Ranolazine Other (See Comments)     Lips and tongue numbness    Spironolactone Other (See Comments)     Potassium level went really high  Increased potassium    Hydrocodone-Acetaminophen Other (See Comments)     Pt states that if he takes this  The medication makes him feel like he is going crazy    Omeprazole Other (See Comments)     Caused drastic drop in blood sugar       Physical Exam     Vitals:    01/28/23 2300   BP: (!) 181/73   Pulse: 93   Resp: 25   SpO2: 96%     Nursing note and vitals reviewed. Constitutional: Well developed, NAD  HEENT: Atraumatic, conjugate gaze, EOM intact  Neck: Supple  Cardiovascular: No cyanosis, diaphoresis, or JVD appreciated. Normal S1/S2 with no murmurs noted. Respiratory: Effort normal. No respiratory distress. Lungs with mild diffuse wheezes  Gastrointestinal: Bowel sounds present. Non-distended. No guarding or rebound. Non-tender. MSK: No deformities appreciated. 1+ peripheral edema. Skin: Skin is warm and dry. No rash appreciated. Neuro: Alert and oriented, moves all four extremities. Psych: Pleasant and cooperative.     Procedures   Procedures    MDM     Labs Reviewed   CULTURE, BLOOD 1   CULTURE, BLOOD 1   CBC WITH AUTO DIFFERENTIAL   COMPREHENSIVE METABOLIC PANEL   LACTATE, SEPSIS   LACTATE, SEPSIS   TROPONIN   MAGNESIUM   PROTIME-INR   BRAIN NATRIURETIC PEPTIDE     Medications   furosemide (LASIX) injection 20 mg (has no administration in time range)   ipratropium-albuterol (DUONEB) nebulizer solution 1 ampule (has no administration in time range)     XR CHEST PORTABLE    (Results Pending)       Medical Decision Making  Amount and/or Complexity of Data Reviewed  Labs: ordered. Radiology: ordered. ECG/medicine tests: ordered. Risk  Prescription drug management. Decision regarding hospitalization. Complexity of Problem: 2 or more acute complicated illnesses (4)  The patients assessment required an independent historian: I spoke with a family member. I have conducted an independent ordering and review of Labs. I have conducted an independent ordering and review of EKG. I have conducted an independent ordering and review of X-rays. I have reviewed records from an external source: previous old EKG's reviewed. Evidence based risk calculation performed: HEART score. I discussed disposition with another provider. Patient was admitted I have communication with admitting physician. ED Course     ED Course as of 01/29/23 0211   Sat Jan 28, 2023   210 78-year-old male discharged from this hospital this morning after placement of a left circumflex stent returns with fluid overload and shortness of breath. Hypertensive, otherwise vitals normal limits; O2 96% on 2 L. Does appear volume overloaded and has some wheezes on exam.  Will obtain blood work, blood cultures, EKG, CXR and give diuretics and breathing treatment. We will hold on steroids given history of hyperglycemia [ER]   2359 EKG: Normal sinus rhythm, rate 91. No STEMI .nonspecific ST and T wave changes. ST depressions lateral leads. Time: 2355 [ER]   Sun Jan 29, 2023   0112 Troponin elevated; no recent for comparison. Kidney function appears somewhat at baseline. CXR does show pulmonary edema.   Sent picture of EKG to cardiology for review and they will plan for readmission to their service [ER]      ED Course User Index  [ER] Triston Morales Wiliam Cisneros MD     Voice dictation software was used during the making of this note. This software is not perfect and grammatical and other typographical errors may be present. This note has not been completely proofread for errors.      Suly Yeboah MD  01/29/23 0753 no

## 2023-01-29 NOTE — ED NOTES
TRANSFER - OUT REPORT:    Verbal report given to Andrea Archuleta RN on Jailyn Ramirez  being transferred to [address] [place] [address] for routine progression of patient care       Report consisted of patient's Situation, Background, Assessment and   Recommendations(SBAR). Information from the following report(s) Nurse Handoff Report, ED SBAR, Intake/Output, MAR, Recent Results, and Cardiac Rhythm sinus  was reviewed with the receiving nurse. [place] Assessment: No data recorded  Lines:   Peripheral IV 01/29/23 Right Antecubital (Active)        Opportunity for questions and clarification was provided.       Patient transported with:  O2 @ 2.5lpm and Registered Nurse          Daniel Kaminski RN  01/29/23 1688

## 2023-01-29 NOTE — PLAN OF CARE
Problem: Discharge Planning  Goal: Discharge to home or other facility with appropriate resources  1/29/2023 1145 by Vick Castillo RN  Outcome: Completed  1/29/2023 1144 by Vick Gordon RN  Outcome: Adequate for Discharge  1/29/2023 1144 by Vick Gordon RN  Outcome: Adequate for Discharge  1/29/2023 1135 by Bell Jessica RN  Outcome: Adequate for Discharge  1/29/2023 0358 by Ishan Madison RN  Outcome: Progressing  Flowsheets  Taken 1/29/2023 0306  Discharge to home or other facility with appropriate resources: Identify barriers to discharge with patient and caregiver  Taken 1/29/2023 0234  Discharge to home or other facility with appropriate resources: Identify barriers to discharge with patient and caregiver     Problem: Safety - Adult  Goal: Free from fall injury  1/29/2023 1145 by Vick Castillo RN  Outcome: Completed  1/29/2023 1144 by Vick Gordon RN  Outcome: Adequate for Discharge  1/29/2023 1144 by Vick Gordon RN  Outcome: Adequate for Discharge  1/29/2023 1135 by Bell Jessica RN  Outcome: Adequate for Discharge  Flowsheets (Taken 1/29/2023 0230 by Ishan Madison RN)  Free From Fall Injury: Instruct family/caregiver on patient safety

## 2023-01-29 NOTE — PLAN OF CARE
Problem: Discharge Planning  Goal: Discharge to home or other facility with appropriate resources  1/29/2023 1144 by Timmy Montelongo. Sherie Conde RN  Outcome: Adequate for Discharge  1/29/2023 1144 by Timmy Montelongo. Sherie Conde RN  Outcome: Adequate for Discharge  1/29/2023 1135 by Anabel Noble RN  Outcome: Adequate for Discharge  1/29/2023 0358 by Precious Orr RN  Outcome: Progressing  Flowsheets  Taken 1/29/2023 0306  Discharge to home or other facility with appropriate resources: Identify barriers to discharge with patient and caregiver  Taken 1/29/2023 0234  Discharge to home or other facility with appropriate resources: Identify barriers to discharge with patient and caregiver     Problem: Safety - Adult  Goal: Free from fall injury  1/29/2023 1144 by Timmy Montelongo. Sherie Conde RN  Outcome: Adequate for Discharge  1/29/2023 1144 by Timmy Montelongo.  Sherie Conde RN  Outcome: Adequate for Discharge  1/29/2023 1135 by Anabel Noble RN  Outcome: Adequate for Discharge  Flowsheets (Taken 1/29/2023 0230 by Precious Orr RN)  Free From Fall Injury: Instruct family/caregiver on patient safety Cheek-To-Nose Interpolation Flap Text: A decision was made to reconstruct the defect utilizing an interpolation axial flap and a staged reconstruction.  A telfa template was made of the defect.  This telfa template was then used to outline the Cheek-To-Nose Interpolation flap.  The donor area for the pedicle flap was then injected with anesthesia.  The flap was excised through the skin and subcutaneous tissue down to the layer of the underlying musculature.  The interpolation flap was carefully excised within this deep plane to maintain its blood supply.  The edges of the donor site were undermined.   The donor site was closed in a primary fashion.  The pedicle was then rotated into position and sutured.  Once the tube was sutured into place, adequate blood supply was confirmed with blanching and refill.  The pedicle was then wrapped with xeroform gauze and dressed appropriately with a telfa and gauze bandage to ensure continued blood supply and protect the attached pedicle.

## 2023-02-01 ENCOUNTER — TELEPHONE (OUTPATIENT)
Dept: PULMONOLOGY | Age: 83
End: 2023-02-01

## 2023-02-01 NOTE — TELEPHONE ENCOUNTER
At his last visit here, weight was down to 311, spirometry was normal/improved, was stable to improved symptomatically. Last weight in chart is 324. Reviewed hospital notes. Sounds like this may be more related to volume status, I would defer to nephology and cardiology. As instructed at last visit, Continue Breo 100, 1 inhalation every morning, rinse mouth after use. Can use Albuterol 2 puffs 4 times daily as needed for shortness of breath or wheezing. If he is not responding to albuterol inhaler, Ok to add albuterol in nebulizer 4 times daily for wheezing, increased shortness of breath, but would remind him that dyspnea is multifactorial in his case, and with recent issues, would consider it likely related to volume statues, weight gain.

## 2023-02-01 NOTE — TELEPHONE ENCOUNTER
Last seen: 12/2/22  Hx: cough variant asthma, RLD, JORJE, obese, CKD, CHF    Patient reporting recent heart cath & hospitalization; inpatient 1/28-1/29 for pulmonary edema. D/c w/ BB but no ACE/ARB, f/u w/ cardiology 2/21/23.  Attempted to reach patient, left vmail; able to reach patient on home phone; reporting extreme sob, confirms edema at feet & ankles; recounts having to have fluid bolus to protect kidneys from dye w/ cath, then readmitted for fluid overload; O2 sat is 97-98% but HR 60-95.   Confirms continues to take torsemide 40mg BID. Notes this worked great before hospitalization, would lose a pound a day, but hasn't been the same since d/c; asking if he should be seen for lung problem or heart problems or both.  Complaint w/ Breo, suggestion from RT @ hospital was to have nebulizer treatments w/ albuterol.    Has a nebulizer machine provided by the hospital but no instructions; describes as a small machine w/ a mask & mouthpiece, connects to O2 to nebulize medication, if albuterol nebs are recommended.     Advised to notify cardiology of situation as well. Has appt w/ nephrologist (out of house from 1:30-3pm) this afternoon.

## 2023-02-01 NOTE — TELEPHONE ENCOUNTER
Patient just had heart cath done last Saturday. He was released then readdmitted on Saturday night. He is home, patient says that the [place] is not helping him breath. He does use cpap at night. Says he cannot walk across the house without giving out of breathe.

## 2023-02-02 RX ORDER — ALBUTEROL SULFATE 2.5 MG/3ML
2.5 SOLUTION RESPIRATORY (INHALATION) 4 TIMES DAILY
Qty: 120 EACH | Refills: 1 | Status: SHIPPED | OUTPATIENT
Start: 2023-02-02

## 2023-02-02 NOTE — TELEPHONE ENCOUNTER
I have spoken with patient. He is aware of communication from Mrs Kiet Kennedy. He reports that he has contacted cardiology/nephrology. Thought is that he was given extra fluid from heart cath. He endorses using Breo. He reports that nephrology has increased his Torsemide to 40 mg BID for 4 days then return to his previous dose. He requests Albuterol for nebulization be sent to [facility]'s to use when his Albuterol MRI is not working. He reports that he already has nebulizer machine. Albuterol to [facility] as directed by Mrs Kiet Kennedy.

## 2023-02-03 LAB
BACTERIA SPEC CULT: NORMAL
BACTERIA SPEC CULT: NORMAL
SERVICE CMNT-IMP: NORMAL
SERVICE CMNT-IMP: NORMAL

## 2023-02-04 ENCOUNTER — TELEPHONE (OUTPATIENT)
Dept: CARDIOLOGY CLINIC | Age: 83
End: 2023-02-04

## 2023-02-04 NOTE — OP NOTE
Patient initiated phone call. Patient is calling to inquire about shortness of breath with activity. He has known cornea artery disease has had a history of aortic valve, disease, significant obesity, and lung disease. I have reviewed his chart for greater than 21 minutes and feel that increasing his beta blocker at this time is warranted. I have instructed him to take his metoprolol 100 mg PO twice a day and to contact the office on Monday. I have reviewed his recent echocardiogram which shows normal ejection fraction but at least moderate aortic stenosis. He has lost 5 pounds recently but its still greater than 310 pounds.     Time 3:03 PM to 3:30 PM

## 2023-02-06 ENCOUNTER — TELEPHONE (OUTPATIENT)
Dept: CARDIOLOGY CLINIC | Age: 83
End: 2023-02-06

## 2023-02-06 NOTE — TELEPHONE ENCOUNTER
Patient called stating that over the weekend he was experiencing an erratic HR trending higher that normal. Patient states there was no CP but did have SOB, trouble breathing. Patient states that Dr Danielle Lugo directed him to double his metoprolol.

## 2023-02-06 NOTE — TELEPHONE ENCOUNTER
Heart cath about a week ago was readmitted due to SOB following the cath. He has lost about 5 pounds of fluid. When he walks across the room HR goes from 70-85 and it feels like his heart is flying and he has trouble breathing. He spoke w/ over the weekend was told to increase Toprol to 100mg bid. He was told to call Luci Salgado today for advice. He said last night was very SOB and could barely breathe. HR is averaging around 70 now. His fu is not until 2/21 should he be seen sooner?

## 2023-02-08 ENCOUNTER — OFFICE VISIT (OUTPATIENT)
Dept: CARDIOLOGY CLINIC | Age: 83
End: 2023-02-08

## 2023-02-08 VITALS
SYSTOLIC BLOOD PRESSURE: 124 MMHG | OXYGEN SATURATION: 97 % | DIASTOLIC BLOOD PRESSURE: 74 MMHG | WEIGHT: 315 LBS | HEART RATE: 78 BPM | HEIGHT: 73 IN | BODY MASS INDEX: 41.75 KG/M2

## 2023-02-08 DIAGNOSIS — I50.32 CHRONIC HEART FAILURE WITH PRESERVED EJECTION FRACTION (HFPEF) (HCC): Chronic | ICD-10-CM

## 2023-02-08 DIAGNOSIS — I35.0 MODERATE AORTIC STENOSIS BY PRIOR ECHOCARDIOGRAM: Chronic | ICD-10-CM

## 2023-02-08 DIAGNOSIS — I25.10 CAD IN NATIVE ARTERY: Chronic | ICD-10-CM

## 2023-02-08 DIAGNOSIS — I50.32 DIASTOLIC CHF, CHRONIC (HCC): ICD-10-CM

## 2023-02-08 DIAGNOSIS — I49.9 IRREGULAR HEART RATE: Primary | ICD-10-CM

## 2023-02-08 DIAGNOSIS — Z09 HOSPITAL DISCHARGE FOLLOW-UP: ICD-10-CM

## 2023-02-08 DIAGNOSIS — I20.0 ACCELERATING ANGINA (HCC): ICD-10-CM

## 2023-02-08 NOTE — PROGRESS NOTES
[address], [address]  PHONE: [phone]     23    NAME:  Ellie Hendrickson  : 1940  MRN: [id number]       SUBJECTIVE:   Ellie Hendrickson is a 79 y.o. male seen for a follow up visit regarding the following:     Chief Complaint   Patient presents with    Follow-Up from Hospital    Shortness of Breath    Coronary Artery Disease    Hypertension    Congestive Heart Failure       HPI: Here for CAD (LAD PCI 12/15), JORJE on CPAP. He has CKD Stage III - IV. Echo 2022 and 2022: normal EF, mod AS, RVSP 51     2023 admission for HF.   2023: PCI of 99% Circ  Echo 2023:  EF 60%, mod AS, RVSP 57mmHg    No more CP like before admission. More tachy-palp now. On more toprol, increased to BID. On plavix and ASA. On torsemide,lost 5 pds. He has NYHA Class III sx now. Compliant with CPAP. But, SEARS worsening now, severe now. This is new and worsening now, since being home. Weak now. Patient denies recent history of orthopnea, PND, excessive dizziness and/or syncope. Stopped statin, does not trust them. Past Medical History, Past Surgical History, Family history, Social History, and Medications were all reviewed with the patient today and updated as necessary. Current Outpatient Medications   Medication Sig Dispense Refill    albuterol (PROVENTIL) (2.5 MG/3ML) 0.083% nebulizer solution Take 3 mLs by nebulization in the morning, at noon, in the evening, and at bedtime Bill to Medicare D Code: J45.991 120 each 1    clopidogrel (PLAVIX) 75 MG tablet Take 1 tablet by mouth daily 90 tablet 3    nitroGLYCERIN (NITROSTAT) 0.4 MG SL tablet Place 1 tablet under the tongue every 5 minutes as needed for Chest pain up to max of 3 total doses.  If no relief after 1 dose, call 911. 25 tablet 3    isosorbide mononitrate (IMDUR) 60 MG extended release tablet Take 1 tablet by mouth daily 30 tablet 5    hydrALAZINE (APRESOLINE) 50 MG tablet Take 1 tablet by mouth in the morning and at bedtime 90 tablet 3    metoprolol succinate (TOPROL XL) 100 MG extended release tablet Take 1 tablet by mouth every evening 90 tablet 3    CPAP Machine MISC by Does not apply route At HS      albuterol sulfate HFA (PROVENTIL;VENTOLIN;PROAIR) 108 (90 Base) MCG/ACT inhaler 2 puffs 4 times daily if needed for shortness of breath or wheezing. Patient will call when refills are needed 1 each 11    Fluticasone Furoate-Vilanterol (BREO ELLIPTA) 100-25 MCG/ACT AEPB 1 inhalation every morning, rinse mouth after use. 1 each 11    torsemide 40 MG TABS Take 40 mg by mouth in the morning and at bedtime 60 tablet 3    allopurinol (ZYLOPRIM) 300 MG tablet Take 150 mg by mouth daily      aspirin 81 MG EC tablet Take 81 mg by mouth      calcitRIOL (ROCALTROL) 0.25 MCG capsule Take 0.25 mcg by mouth daily      Dulaglutide (TRULICITY) 1.5 NJ/5.8KF SOPN Inject 1.5 mg into the skin every 7 days On sundays      insulin glargine (LANTUS) 100 UNIT/ML injection vial Inject 25 Units into the skin 2 times daily      levothyroxine (SYNTHROID) 88 MCG tablet Take 88 mcg by mouth      sodium bicarbonate 650 MG tablet Take 650 mg by mouth 2 times daily       No current facility-administered medications for this visit.         Allergies   Allergen Reactions    Amlodipine Shortness Of Breath    Iodine Shortness Of Breath    Metformin Shortness Of Breath    Ranolazine Other (See Comments)     Lips and tongue numbness    Spironolactone Other (See Comments)     Potassium level went really high  Increased potassium    Hydrocodone-Acetaminophen Other (See Comments)     Pt states that if he takes this  The medication makes him feel like he is going crazy    Omeprazole Other (See Comments)     Caused drastic drop in blood sugar     Patient Active Problem List    Diagnosis Date Noted    Accelerating angina ([facility] [address] 01/25/2023     Priority: High    Moderate aortic stenosis by prior echocardiogram 08/13/2022     Priority: Medium    Chronic heart failure with preserved ejection fraction (HFpEF) ([facility] [address] 08/03/2022     Priority: Medium    Volume overload 08/02/2022     Priority: Medium    Restrictive lung disease 06/03/2022     Priority: Medium    Chronic gout due to renal impairment without tophus 04/25/2022     Priority: Medium    Hypothyroidism due to Hashimoto's thyroiditis 10/20/2016     Priority: Medium     Last Assessment & Plan:   Formatting of this note might be different from the original.  Continue thyroid at 88 mcg daily, Recheck TFTs 6M.       PHU (iron deficiency anemia) 08/13/2022     TSAT 8% SFe 8 8/13/22 start IV FEx3 per hf affirm       Chest pain 05/28/2019    Obesity, morbid ([address] 04/18/2018    Type 2 diabetes with nephropathy ([facility] [address] 03/09/9036    Diastolic CHF, chronic ([facility] [address] 09/08/2017    Anemia in chronic kidney disease 04/08/2016    Acquired hypothyroidism 04/08/2016    Cough variant asthma 03/11/2016    Dyspnea 02/10/2016    CAD in native artery 02/10/2016     12/15: LAD PCI, small vessel dz        SEARS (dyspnea on exertion) 12/22/2015    Abnormal nuclear stress test 12/22/2015    Elevated liver function tests 08/20/2015    Hyponatremia 01/21/2015    Anemia 01/21/2015    PPD positive 10/28/2014    Seborrheic dermatitis 01/16/2013    Edema 10/11/2012     Edema on norvasc        Chronic kidney disease (CKD), stage IV (severe) ([facility] [address] 10/11/2012    JORJE (obstructive sleep apnea) 10/11/2012    Hypertension 10/11/2012    Diabetes mellitus type 2 in obese ([address] 10/11/2012      Past Surgical History:   Procedure Laterality Date    CARDIAC CATHETERIZATION  2016    3 stent    CARDIAC PROCEDURE N/A 1/27/2023    LEFT HEART CATH / CORONARY ANGIOGRAPHY performed by Lois Rich MD at [address] [facility] [place] C[facility]    CARDIAC PROCEDURE N/A 1/27/2023    Percutaneous coronary intervention performed by Lois Rich MD at [address] [facility] [place] [facility]    CATARACT REMOVAL Bilateral 2017    [facility]     Family History   Problem Relation Age of Onset    Cancer Father         lung    No Known Problems Sister     No Known Problems Brother     Hypertension Father     No Known Problems Sister     Hypertension Mother      Social History     Tobacco Use    Smoking status: Former     Packs/day: 0.50     Years: 2.00     Pack years: 1.00     Types: Cigarettes     Quit date: 1962     Years since quittin.1    Smokeless tobacco: Never   Substance Use Topics    Alcohol use: No         ROS:    No obvious pertinent positives on review of systems except for what was outlined in the HPI today. PHYSICAL EXAM:     /74   Pulse 78   Ht 6' 1\" (1.854 m)   Wt (!) 323 lb (146.5 kg)   SpO2 97%   BMI 42.61 kg/m²    General/Constitutional:   Alert and oriented x 3, no acute distress  HEENT:   normocephalic, atraumatic, moist mucous membranes  Neck:   No JVD or carotid bruits bilaterally  Cardiovascular:   regular rate and rhythm, no murmur/rub/gallop appreciated  Pulmonary:   clear to auscultation bilaterally, no respiratory distress  Abdomen:   soft, non-tender, non-distended  Ext:   No sig LE edema bilaterally  Skin:  warm and dry, no obvious rashes seen  Neuro:   no obvious sensory or motor deficits  Psychiatric:   normal mood and affect      EKG Today and independently reviewed by me: sinus rhythm, normal intervals and non-specific ST/T wave changes.       Lab Results   Component Value Date/Time     2023 04:18 AM    K 4.4 2023 04:18 AM     2023 04:18 AM    CO2 19 2023 04:18 AM    BUN 59 2023 04:18 AM    CREATININE 3.30 2023 04:18 AM    GLUCOSE 193 2023 04:18 AM    CALCIUM 9.0 2023 04:18 AM        Lab Results   Component Value Date    WBC 10.6 2023    HGB 10.0 (L) 2023    HCT 31.0 (L) 2023    MCV 96.0 2023     2023       Lab Results   Component Value Date    TSH 0.924 2021       Lab Results   Component Value Date    LABA1C 7.4 (H) 2023     Lab Results   Component Value Date     01/26/2023       No results found for: CHOL  No results found for: TRIG  No results found for: HDL  No results found for: LDLCHOLESTEROL, LDLCALC  No results found for: LABVLDL, VLDL  No results found for: CHOLHDLRATIO        I have Independently reviewed prior care notes, any ER records available, cardiac testing, labs and results with the patient and before seeing the patient today. Also independently reviewed outside records when available. ASSESSMENT:    Shyann Olmedo was seen today for follow-up from hospital, shortness of breath, coronary artery disease, hypertension and congestive heart failure. Diagnoses and all orders for this visit:    Irregular heart rate  -     EKG 12 Lead  -     EKG 12 Lead  -     Comprehensive Metabolic Panel; Future  -     CBC; Future  -     TSH; Future  -     D-Dimer, Quantitative; Future  -     Troponin; Future    Chronic heart failure with preserved ejection fraction (HFpEF) (HCC)    Diastolic CHF, chronic (HCC)    Moderate aortic stenosis by prior echocardiogram    CAD in native artery    Accelerating angina ([facility] [address]        PLAN:      1. CAD/DHF/SEARS:   s/p PCI, remain on DAPT. Diuresing on torsemide. BB doubled. Sinus today, pulse ox normal after walking. Needs about 10 pds of diuresis he feels. Torsemide doubled, continue doubling. More SEARS, angina? PE? Check labs with trop and D dimer. Will admit if needed. 2. HTN: meds adjusted, follow. No Ace or ARB due to GREGG. 3 HPL:  Stopped statin on his own, does not trust them. 4 CRI: stage IV, seeing renal.  More diuretics as needed. 5. JORJE: remain on CPAP. Patient has been instructed and agrees to call our office with any issues or other concerns related to their cardiac condition(s) and/or complaint(s). Return in about 1 month (around 3/8/2023).        ISABELL CONTRERAS, DO  2/8/2023

## 2023-02-09 ENCOUNTER — APPOINTMENT (OUTPATIENT)
Dept: NUCLEAR MEDICINE | Age: 83
End: 2023-02-09
Attending: INTERNAL MEDICINE
Payer: MEDICARE

## 2023-02-09 ENCOUNTER — TELEPHONE (OUTPATIENT)
Dept: CARDIOLOGY CLINIC | Age: 83
End: 2023-02-09

## 2023-02-09 ENCOUNTER — APPOINTMENT (OUTPATIENT)
Dept: ULTRASOUND IMAGING | Age: 83
End: 2023-02-09
Attending: INTERNAL MEDICINE
Payer: MEDICARE

## 2023-02-09 ENCOUNTER — HOSPITAL ENCOUNTER (INPATIENT)
Age: 83
LOS: 1 days | Discharge: HOME | End: 2023-02-11
Attending: INTERNAL MEDICINE | Admitting: INTERNAL MEDICINE
Payer: MEDICARE

## 2023-02-09 ENCOUNTER — APPOINTMENT (OUTPATIENT)
Dept: GENERAL RADIOLOGY | Age: 83
End: 2023-02-09
Attending: INTERNAL MEDICINE
Payer: MEDICARE

## 2023-02-09 DIAGNOSIS — R60.0 BILATERAL LOWER EXTREMITY EDEMA: Primary | ICD-10-CM

## 2023-02-09 LAB
25(OH)D3 SERPL-MCNC: 28.8 NG/ML (ref 30–100)
BASOPHILS # BLD: 0.1 K/UL (ref 0–0.2)
BASOPHILS NFR BLD: 1 % (ref 0–2)
DIFFERENTIAL METHOD BLD: ABNORMAL
EKG ATRIAL RATE: 66 BPM
EKG DIAGNOSIS: NORMAL
EKG P AXIS: 66 DEGREES
EKG P-R INTERVAL: 290 MS
EKG Q-T INTERVAL: 478 MS
EKG QRS DURATION: 144 MS
EKG QTC CALCULATION (BAZETT): 501 MS
EKG R AXIS: 14 DEGREES
EKG T AXIS: 128 DEGREES
EKG VENTRICULAR RATE: 66 BPM
EOSINOPHIL # BLD: 0.2 K/UL (ref 0–0.8)
EOSINOPHIL NFR BLD: 4 % (ref 0.5–7.8)
ERYTHROCYTE [DISTWIDTH] IN BLOOD BY AUTOMATED COUNT: 14.2 % (ref 11.9–14.6)
FERRITIN SERPL-MCNC: 47 NG/ML (ref 8–388)
GLUCOSE BLD STRIP.AUTO-MCNC: 117 MG/DL (ref 65–100)
GLUCOSE BLD STRIP.AUTO-MCNC: 166 MG/DL (ref 65–100)
GLUCOSE BLD STRIP.AUTO-MCNC: 203 MG/DL (ref 65–100)
HCT VFR BLD AUTO: 29.1 % (ref 41.1–50.3)
HGB BLD-MCNC: 9.3 G/DL (ref 13.6–17.2)
HGB RETIC QN AUTO: 33 PG (ref 29–35)
IMM GRANULOCYTES # BLD AUTO: 0.1 K/UL (ref 0–0.5)
IMM GRANULOCYTES NFR BLD AUTO: 1 % (ref 0–5)
IMM RETICS NFR: 17.2 % (ref 2.3–13.4)
IRON SERPL-MCNC: 41 UG/DL (ref 35–150)
LYMPHOCYTES # BLD: 0.4 K/UL (ref 0.5–4.6)
LYMPHOCYTES NFR BLD: 6 % (ref 13–44)
MCH RBC QN AUTO: 30.8 PG (ref 26.1–32.9)
MCHC RBC AUTO-ENTMCNC: 32 G/DL (ref 31.4–35)
MCV RBC AUTO: 96.4 FL (ref 82–102)
MONOCYTES # BLD: 0.5 K/UL (ref 0.1–1.3)
MONOCYTES NFR BLD: 7 % (ref 4–12)
NEUTS SEG # BLD: 5.3 K/UL (ref 1.7–8.2)
NEUTS SEG NFR BLD: 81 % (ref 43–78)
NRBC # BLD: 0 K/UL (ref 0–0.2)
NT PRO BNP: ABNORMAL PG/ML
PLATELET # BLD AUTO: 246 K/UL (ref 150–450)
PMV BLD AUTO: 10.5 FL (ref 9.4–12.3)
RBC # BLD AUTO: 3.02 M/UL (ref 4.23–5.6)
RETICS # AUTO: 0.07 M/UL (ref 0.03–0.1)
RETICS/RBC NFR AUTO: 2.3 % (ref 0.3–2)
SERVICE CMNT-IMP: ABNORMAL
TSH W FREE THYROID IF ABNORMAL: 1.58 UIU/ML (ref 0.36–3.74)
VIT B12 SERPL-MCNC: 319 PG/ML (ref 193–986)
WBC # BLD AUTO: 6.5 K/UL (ref 4.3–11.1)

## 2023-02-09 PROCEDURE — 78580 LUNG PERFUSION IMAGING: CPT

## 2023-02-09 PROCEDURE — A9540 TC99M MAA: HCPCS | Performed by: NURSE PRACTITIONER

## 2023-02-09 PROCEDURE — 82306 VITAMIN D 25 HYDROXY: CPT

## 2023-02-09 PROCEDURE — 5A09357 ASSISTANCE WITH RESPIRATORY VENTILATION, LESS THAN 24 CONSECUTIVE HOURS, CONTINUOUS POSITIVE AIRWAY PRESSURE: ICD-10-PCS | Performed by: PHYSICIAN ASSISTANT

## 2023-02-09 PROCEDURE — 93005 ELECTROCARDIOGRAM TRACING: CPT | Performed by: PHYSICIAN ASSISTANT

## 2023-02-09 PROCEDURE — 71045 X-RAY EXAM CHEST 1 VIEW: CPT

## 2023-02-09 PROCEDURE — 94760 N-INVAS EAR/PLS OXIMETRY 1: CPT

## 2023-02-09 PROCEDURE — 2580000003 HC RX 258: Performed by: PHYSICIAN ASSISTANT

## 2023-02-09 PROCEDURE — 83540 ASSAY OF IRON: CPT

## 2023-02-09 PROCEDURE — G0378 HOSPITAL OBSERVATION PER HR: HCPCS

## 2023-02-09 PROCEDURE — 6370000000 HC RX 637 (ALT 250 FOR IP): Performed by: NURSE PRACTITIONER

## 2023-02-09 PROCEDURE — 94640 AIRWAY INHALATION TREATMENT: CPT

## 2023-02-09 PROCEDURE — 36415 COLL VENOUS BLD VENIPUNCTURE: CPT

## 2023-02-09 PROCEDURE — 3430000000 HC RX DIAGNOSTIC RADIOPHARMACEUTICAL: Performed by: NURSE PRACTITIONER

## 2023-02-09 PROCEDURE — 6370000000 HC RX 637 (ALT 250 FOR IP): Performed by: PHYSICIAN ASSISTANT

## 2023-02-09 PROCEDURE — 99223 1ST HOSP IP/OBS HIGH 75: CPT | Performed by: INTERNAL MEDICINE

## 2023-02-09 PROCEDURE — 84443 ASSAY THYROID STIM HORMONE: CPT

## 2023-02-09 PROCEDURE — 82607 VITAMIN B-12: CPT

## 2023-02-09 PROCEDURE — 93970 EXTREMITY STUDY: CPT

## 2023-02-09 PROCEDURE — 83880 ASSAY OF NATRIURETIC PEPTIDE: CPT

## 2023-02-09 PROCEDURE — 85046 RETICYTE/HGB CONCENTRATE: CPT

## 2023-02-09 PROCEDURE — 2580000003 HC RX 258: Performed by: NURSE PRACTITIONER

## 2023-02-09 PROCEDURE — 82728 ASSAY OF FERRITIN: CPT

## 2023-02-09 PROCEDURE — 82962 GLUCOSE BLOOD TEST: CPT

## 2023-02-09 PROCEDURE — 85025 COMPLETE CBC W/AUTO DIFF WBC: CPT

## 2023-02-09 RX ORDER — INSULIN GLARGINE 100 [IU]/ML
30 INJECTION, SOLUTION SUBCUTANEOUS 2 TIMES DAILY
Status: DISCONTINUED | OUTPATIENT
Start: 2023-02-09 | End: 2023-02-11 | Stop reason: HOSPADM

## 2023-02-09 RX ORDER — INSULIN LISPRO 100 [IU]/ML
0-4 INJECTION, SOLUTION INTRAVENOUS; SUBCUTANEOUS NIGHTLY
Status: DISCONTINUED | OUTPATIENT
Start: 2023-02-09 | End: 2023-02-11 | Stop reason: HOSPADM

## 2023-02-09 RX ORDER — SODIUM BICARBONATE 650 MG/1
650 TABLET ORAL 2 TIMES DAILY
Status: DISCONTINUED | OUTPATIENT
Start: 2023-02-09 | End: 2023-02-11 | Stop reason: HOSPADM

## 2023-02-09 RX ORDER — NITROGLYCERIN 0.4 MG/1
0.4 TABLET SUBLINGUAL EVERY 5 MIN PRN
Status: DISCONTINUED | OUTPATIENT
Start: 2023-02-09 | End: 2023-02-11 | Stop reason: HOSPADM

## 2023-02-09 RX ORDER — CALCITRIOL 0.25 UG/1
0.25 CAPSULE, LIQUID FILLED ORAL DAILY
Status: DISCONTINUED | OUTPATIENT
Start: 2023-02-10 | End: 2023-02-11 | Stop reason: HOSPADM

## 2023-02-09 RX ORDER — ASPIRIN 81 MG/1
81 TABLET ORAL DAILY
Status: DISCONTINUED | OUTPATIENT
Start: 2023-02-09 | End: 2023-02-11 | Stop reason: HOSPADM

## 2023-02-09 RX ORDER — ALBUTEROL SULFATE 90 UG/1
2 AEROSOL, METERED RESPIRATORY (INHALATION) EVERY 6 HOURS PRN
Status: DISCONTINUED | OUTPATIENT
Start: 2023-02-09 | End: 2023-02-11 | Stop reason: HOSPADM

## 2023-02-09 RX ORDER — ONDANSETRON 4 MG/1
4 TABLET, ORALLY DISINTEGRATING ORAL EVERY 8 HOURS PRN
Status: DISCONTINUED | OUTPATIENT
Start: 2023-02-09 | End: 2023-02-11 | Stop reason: HOSPADM

## 2023-02-09 RX ORDER — INSULIN LISPRO 100 [IU]/ML
0-8 INJECTION, SOLUTION INTRAVENOUS; SUBCUTANEOUS
Status: DISCONTINUED | OUTPATIENT
Start: 2023-02-09 | End: 2023-02-11 | Stop reason: HOSPADM

## 2023-02-09 RX ORDER — TORSEMIDE 20 MG/1
40 TABLET ORAL 2 TIMES DAILY
Status: DISCONTINUED | OUTPATIENT
Start: 2023-02-09 | End: 2023-02-11 | Stop reason: HOSPADM

## 2023-02-09 RX ORDER — ALBUTEROL SULFATE 2.5 MG/3ML
2.5 SOLUTION RESPIRATORY (INHALATION) 4 TIMES DAILY
Status: DISCONTINUED | OUTPATIENT
Start: 2023-02-09 | End: 2023-02-10

## 2023-02-09 RX ORDER — SODIUM CHLORIDE 0.9 % (FLUSH) 0.9 %
5-40 SYRINGE (ML) INJECTION EVERY 12 HOURS SCHEDULED
Status: DISCONTINUED | OUTPATIENT
Start: 2023-02-09 | End: 2023-02-11 | Stop reason: HOSPADM

## 2023-02-09 RX ORDER — SODIUM CHLORIDE 9 MG/ML
INJECTION, SOLUTION INTRAVENOUS PRN
Status: DISCONTINUED | OUTPATIENT
Start: 2023-02-09 | End: 2023-02-11 | Stop reason: HOSPADM

## 2023-02-09 RX ORDER — ALBUTEROL SULFATE 2.5 MG/3ML
2.5 SOLUTION RESPIRATORY (INHALATION) 4 TIMES DAILY
Status: DISCONTINUED | OUTPATIENT
Start: 2023-02-09 | End: 2023-02-09

## 2023-02-09 RX ORDER — CLOPIDOGREL BISULFATE 75 MG/1
75 TABLET ORAL DAILY
Status: DISCONTINUED | OUTPATIENT
Start: 2023-02-10 | End: 2023-02-11 | Stop reason: HOSPADM

## 2023-02-09 RX ORDER — LEVOTHYROXINE SODIUM 88 UG/1
88 TABLET ORAL DAILY
Status: DISCONTINUED | OUTPATIENT
Start: 2023-02-10 | End: 2023-02-11 | Stop reason: HOSPADM

## 2023-02-09 RX ORDER — SODIUM CHLORIDE 0.9 % (FLUSH) 0.9 %
5-40 SYRINGE (ML) INJECTION PRN
Status: DISCONTINUED | OUTPATIENT
Start: 2023-02-09 | End: 2023-02-11 | Stop reason: HOSPADM

## 2023-02-09 RX ORDER — ISOSORBIDE MONONITRATE 60 MG/1
60 TABLET, EXTENDED RELEASE ORAL DAILY
Status: DISCONTINUED | OUTPATIENT
Start: 2023-02-10 | End: 2023-02-11 | Stop reason: HOSPADM

## 2023-02-09 RX ORDER — POLYETHYLENE GLYCOL 3350 17 G/17G
17 POWDER, FOR SOLUTION ORAL DAILY PRN
Status: DISCONTINUED | OUTPATIENT
Start: 2023-02-09 | End: 2023-02-11 | Stop reason: HOSPADM

## 2023-02-09 RX ORDER — IPRATROPIUM BROMIDE AND ALBUTEROL SULFATE 2.5; .5 MG/3ML; MG/3ML
1 SOLUTION RESPIRATORY (INHALATION)
Status: DISCONTINUED | OUTPATIENT
Start: 2023-02-09 | End: 2023-02-11 | Stop reason: HOSPADM

## 2023-02-09 RX ORDER — ALLOPURINOL 300 MG/1
150 TABLET ORAL DAILY
Status: DISCONTINUED | OUTPATIENT
Start: 2023-02-09 | End: 2023-02-11 | Stop reason: HOSPADM

## 2023-02-09 RX ORDER — HYDRALAZINE HYDROCHLORIDE 50 MG/1
50 TABLET, FILM COATED ORAL 2 TIMES DAILY
Status: DISCONTINUED | OUTPATIENT
Start: 2023-02-09 | End: 2023-02-11 | Stop reason: HOSPADM

## 2023-02-09 RX ORDER — SODIUM CHLORIDE 0.9 % (FLUSH) 0.9 %
20 SYRINGE (ML) INJECTION AS NEEDED
Status: DISCONTINUED | OUTPATIENT
Start: 2023-02-09 | End: 2023-02-11 | Stop reason: HOSPADM

## 2023-02-09 RX ORDER — ONDANSETRON 2 MG/ML
4 INJECTION INTRAMUSCULAR; INTRAVENOUS EVERY 6 HOURS PRN
Status: DISCONTINUED | OUTPATIENT
Start: 2023-02-09 | End: 2023-02-11 | Stop reason: HOSPADM

## 2023-02-09 RX ORDER — METOPROLOL SUCCINATE 100 MG/1
100 TABLET, EXTENDED RELEASE ORAL 2 TIMES DAILY
Status: DISCONTINUED | OUTPATIENT
Start: 2023-02-09 | End: 2023-02-11 | Stop reason: HOSPADM

## 2023-02-09 RX ADMIN — SODIUM CHLORIDE, PRESERVATIVE FREE 5 ML: 5 INJECTION INTRAVENOUS at 20:13

## 2023-02-09 RX ADMIN — KIT FOR THE PREPARATION OF TECHNETIUM TC 99M ALBUMIN AGGREGATED 6.5 MILLICURIE: 2.5 INJECTION, POWDER, FOR SOLUTION INTRAVENOUS at 17:00

## 2023-02-09 RX ADMIN — METOPROLOL SUCCINATE 100 MG: 100 TABLET, EXTENDED RELEASE ORAL at 20:13

## 2023-02-09 RX ADMIN — SODIUM CHLORIDE, PRESERVATIVE FREE 20 ML: 5 INJECTION INTRAVENOUS at 17:00

## 2023-02-09 RX ADMIN — ALLOPURINOL 150 MG: 300 TABLET ORAL at 20:12

## 2023-02-09 RX ADMIN — IPRATROPIUM BROMIDE AND ALBUTEROL SULFATE 1 AMPULE: 2.5; .5 SOLUTION RESPIRATORY (INHALATION) at 20:41

## 2023-02-09 RX ADMIN — TORSEMIDE 40 MG: 20 TABLET ORAL at 20:13

## 2023-02-09 RX ADMIN — ASPIRIN 81 MG: 81 TABLET ORAL at 21:20

## 2023-02-09 RX ADMIN — INSULIN GLARGINE 30 UNITS: 100 INJECTION, SOLUTION SUBCUTANEOUS at 20:18

## 2023-02-09 RX ADMIN — HYDRALAZINE HYDROCHLORIDE 50 MG: 50 TABLET, FILM COATED ORAL at 20:13

## 2023-02-09 RX ADMIN — SODIUM BICARBONATE 650 MG TABLET 650 MG: at 20:13

## 2023-02-09 ASSESSMENT — PAIN SCALES - GENERAL
PAINLEVEL_OUTOF10: 0
PAINLEVEL_OUTOF10: 0

## 2023-02-09 NOTE — CARE COORDINATION
Pt presented to the Office of [facility] for f/u from hospitalization; discharged on 1/28/23. SEARS now severe since being home and week feeling. Has a PMHx of obesity, CKD, DM2, asthma, acquired hypothyroidism, anemia, HTN, JORJE, and elevated LFTs. Pt admitted for accelerated angina. Pt lives with his spouse. Indep at baseline. Family supportive. On RA. Denies DME. PCP confirmed. Insurance verified and able to afford home meds. No discharge needs identified at this time but will remain available. 02/09/23 1226   Service Assessment   Patient Orientation Alert and Oriented   Cognition Alert   History Provided By Patient   Primary Caregiver Self   Support Systems Spouse/Significant Other;Children;Family Members;[employer] [employer];Friends/Neighbors   PCP Verified by CM Yes  (Adrian)   Prior Functional Level Independent in ADLs/IADLs   Current Functional Level Independent in ADLs/IADLs   Can patient return to prior living arrangement Yes   Ability to make needs known: Good   Family able to assist with home care needs: Yes   Would you like for me to discuss the discharge plan with any other family members/significant others, and if so, who? Yes   Financial Resources Medicare   Community Resources None   Social/Functional History   Lives With Spouse   Type of [address] One level   ADL Assistance Independent   Ambulation Assistance Independent   Active  Yes   Mode of Transportation Car   Occupation Retired   Discharge Planning   Type of [address] Prior To Admission None   Potential Assistance Needed N/A   DME Ordered? No   Potential Assistance Purchasing Medications No   Type of Home Care Services None   Patient expects to be discharged to: [facility] [address] Discharge   Transition of Care Consult (CM Consult) Discharge [address] Discharge None   [facility] Information Provided?  No Mode of Transport at Discharge Other (see comment)  (Family)   Confirm Follow Up Transport Family

## 2023-02-09 NOTE — TELEPHONE ENCOUNTER
----- Message from Lg Hickey DO sent at 2/9/2023  8:39 AM EST -----  Please call him this AM, SEARS better or worse? Need to be admitted directly? ?  Still waiting on some labs to come back.

## 2023-02-09 NOTE — CONSULTS
History and Physical Initial Visit NOTE           2/9/2023    [facility]                        Date of Admission:  2/9/2023    The patient's chart is reviewed and the patient is discussed with the staff. Subjective:     Patient is a 79 y.o.  male seen and evaluated at the request of Starlin Skiff, NP with cardiology for increasing SOB, asthma. Pt was admitted for CP, worsening SOB and near syncope with exertion. Had cardiac stent placed last week and says he got increased fluid due to contrast.  He has had a 15 pound wt gain since his procedure. Between his cardiologist and his nephrologist, his torsemide was increased to 40mg TID. He states he was loosing about a pound or 2 a week. Today, he reports his SOB has been progressive over the last few months, but after his procedure last week, it has been much worse. He got a nebulizer and albuterol prescribed from our office (known to us for asthma and JORJE on cpap), but had not been able to start it yet. He denies fever, chills, sick contacts. No wheezing, cough or sputum production. He mentions he is tachycardic with exertion and gets very light headed, near passing out. Review of Systems: Comprehensive ROS negative except in HPI    Current Outpatient Medications   Medication Instructions    albuterol (PROVENTIL) 2.5 mg, Nebulization, 4 times daily, Bill to Medicare D Code: J45.991    albuterol sulfate HFA (PROVENTIL;VENTOLIN;PROAIR) 108 (90 Base) MCG/ACT inhaler 2 puffs 4 times daily if needed for shortness of breath or wheezing.   Patient will call when refills are needed    allopurinol (ZYLOPRIM) 150 mg, Oral, DAILY    aspirin 81 mg, Oral    calcitRIOL (ROCALTROL) 0.25 mcg, Oral, DAILY    clopidogrel (PLAVIX) 75 mg, Oral, DAILY    CPAP Machine MISC Does not apply, At HS    Fluticasone Furoate-Vilanterol (BREO ELLIPTA) 100-25 MCG/ACT AEPB 1 inhalation every morning, rinse mouth after use.    hydrALAZINE (APRESOLINE) 50 mg, Oral, 2 times daily    insulin glargine (LANTUS) 25 Units, SubCUTAneous, 2 TIMES DAILY    isosorbide mononitrate (IMDUR) 60 mg, Oral, DAILY    levothyroxine (SYNTHROID) 88 mcg, Oral    metoprolol succinate (TOPROL XL) 100 mg, Oral, EVERY EVENING    nitroGLYCERIN (NITROSTAT) 0.4 mg, SubLINGual, EVERY 5 MIN PRN, up to max of 3 total doses. If no relief after 1 dose, call 911.     sodium bicarbonate 650 mg, Oral, 2 TIMES DAILY    Torsemide 40 mg, Oral, 2 times daily    Trulicity 1.5 mg, SubCUTAneous, EVERY 7 DAYS, On sundays      Past Medical History:   Diagnosis Date    Acquired hypothyroidism 4/8/2016    Anemia 1/21/2015    Anemia in chronic kidney disease 4/8/2016    Asthma     Chronic kidney disease     stage 4 kidney disease- Dr. Tana Beltran    CKD (chronic kidney disease) 10/11/2012    Coronary atherosclerosis of native coronary vessel 2/10/2016    Diabetes mellitus type 2 in obese ([facility] [address] 10/11/2012    avg 150, symptoms of hypoglycemia 90    Diastolic heart failure ([facility] [address]     Dyspnea     Dyspnea 2/10/2016    Edema 10/11/2012    Elevated liver function tests 8/20/2015    Hypertension 10/11/2012    Hyponatremia 1/21/2015    Morbid obesity ([facility] [address]     BMI 43.54    JORJE (obstructive sleep apnea) 10/11/2012    cpap    Seborrheic dermatitis 1/16/2013    Seborrheic dermatitis      Past Surgical History:   Procedure Laterality Date    CARDIAC CATHETERIZATION  2016    3 stent    CARDIAC PROCEDURE N/A 1/27/2023    LEFT HEART CATH / CORONARY ANGIOGRAPHY performed by Frandy Berkowitz MD at [address] [place] [address] CATH LAB    CARDIAC PROCEDURE N/A 1/27/2023    Percutaneous coronary intervention performed by Frandy Berkowitz MD at [address] [place] [address] CATH LAB    CATARACT REMOVAL Bilateral 2017    [facility]     Social History     Socioeconomic History    Marital status:      Spouse name: Not on file    Number of children: Not on file    Years of education: Not on file    Highest education level: Not on file   Occupational History    Not on file   Tobacco Use    Smoking status: Former     Packs/day: 0.50     Years: 2.00     Pack years: 1.00     Types: Cigarettes     Quit date: 1962     Years since quittin.1    Smokeless tobacco: Never   Substance and Sexual Activity    Alcohol use: No    Drug use: No    Sexual activity: Not on file   Other Topics Concern    Not on file   Social History Narrative    Not on file     Social Determinants of Health     Financial Resource Strain: Not on file   Food Insecurity: Not on file   Transportation Needs: Not on file   Physical Activity: Not on file   Stress: Not on file   Social Connections: Not on file   Intimate Partner Violence: Not on file   Housing Stability: Not on file     Family History   Problem Relation Age of Onset    Cancer Father         lung    No Known Problems Sister     No Known Problems Brother     Hypertension Father     No Known Problems Sister     Hypertension Mother      Allergies   Allergen Reactions    Amlodipine Shortness Of Breath    Iodine Shortness Of Breath    Metformin Shortness Of Breath    Ranolazine Other (See Comments)     Lips and tongue numbness    Spironolactone Other (See Comments)     Potassium level went really high  Increased potassium    Hydrocodone-Acetaminophen Other (See Comments)     Pt states that if he takes this  The medication makes him feel like he is going crazy    Omeprazole Other (See Comments)     Caused drastic drop in blood sugar     Objective:   Blood pressure 127/68, pulse 66, temperature 98.3 °F (36.8 °C), resp. rate 18, height 6' 1\" (1.854 m), weight (!) 318 lb (144.2 kg), SpO2 98 %. No intake or output data in the 24 hours ending 23 1354  PHYSICAL EXAM   Constitutional:  the patient is well developed and in no acute distress  EENMT:  Sclera clear, pupils equal, oral mucosa moist  Respiratory: symmetric chest rise. Diminished throughout on RA. Does have difficulty completing sentence due to SOB. Cardiovascular:  RRR without M,G,R.  There is 2+ lower extremity edema. Gastrointestinal: soft and non-tender; with positive bowel sounds. Musculoskeletal: warm without cyanosis. Normal muscle tone. Skin:  no jaundice or rashes, no wounds   Neurologic: symmetric strength, fluent speech  Psychiatric:  calm, appropriate, oriented x 4    Imaging: I performed an independent interpretation of the patient's images. CXR:  pending    Recent Labs     02/08/23  1012   WBC 7.8   HGB 8.8*   HCT 28.4*          Lab Results   Component Value Date/Time     01/29/2023 04:18 AM    K 4.4 01/29/2023 04:18 AM     01/29/2023 04:18 AM    CO2 19 01/29/2023 04:18 AM    BUN 59 01/29/2023 04:18 AM    CREATININE 3.30 01/29/2023 04:18 AM    GLUCOSE 193 01/29/2023 04:18 AM    CALCIUM 9.0 01/29/2023 04:18 AM      Lab Results   Component Value Date     07/02/2021       ECHO: 01/25/23    TRANSTHORACIC ECHOCARDIOGRAM (TTE) COMPLETE (CONTRAST/BUBBLE/3D PRN) 01/26/2023 10:37 AM, 01/26/2023 12:00 AM (Final)    Interpretation Summary    Left Ventricle: Normal left ventricular systolic function with a visually estimated EF of 60 - 65%. Left ventricle size is normal. Mildly increased wall thickness. Normal wall motion. Abnormal diastolic function. Aortic Valve: Trileaflet valve. Moderate sclerosis of the aortic valve cusp. Moderate stenosis of the aortic valve. AV mean gradient is 25 mmHg. AV area by continuity VTI is 0.9 cm2. Mitral Valve: Mild regurgitation. Tricuspid Valve: The estimated RVSP is 57 mmHg. Left Atrium: Left atrium is severely dilated. Right Atrium: Right atrium is moderately dilated. Aorta: Dilated aortic root. Ao root diameter is 3.7 cm. Technical qualifiers: Technically difficult study due to patient's body habitus, color flow Doppler was performed and pulse wave and/or continuous wave Doppler was performed. Contrast used: Definity.     Signed by: Jeffery Pina MD on 1/26/2023 10:37 AM, Signed by: Unknown Provider Result on 1/26/2023 12:00 AM    MICRO: No results for input(s): CULTURE in the last 72 hours. Assessment and Plan:  (Medical Decision Making)   Principal Problem:    Dyspnea  Plan: not wheezing, so I don't believe he is having asthma exacerbation  --elevated DDImer;  CXR to eval pulm edema and will do VQ scan and lower extremity dopplers to r/o blood clots  --add duonebs, per patient wishes. --continue breo  --clinically, pt looks volume overloaded. May benefit more from IV diuresis. Will wait for lab results    Active Problems:    Restrictive lung disease  Plan: due to CKD, obesity, JORJE      Chronic kidney disease (CKD), stage IV (severe) ([facility] [address]  Plan: last creatinine 3.3;  pending repeat. Anemia in chronic kidney disease  Plan: hgb 8.8; monitor. Obesity, morbid (HCC)    JORJE on cpap  --pt has CPAP here, so will see if respiratory can set up for him    Hypertension  Plan: controlled    CAD in native artery  Plan: recent cardiac stenting on plavix and asa      Full Code    Thank you very much for this referral.  We appreciate the opportunity to participate in this patient's care. Will follow along with above stated plan. In this split/shared evaluation I performed performed a medically appropriate history and exam, counseled and educated the patient and/or family member, ordered medications, tests or procedures, documented information in EMR, and coordinated care. which accounted for 18 clinical time.      Dyan Blanco, APRN - CNP

## 2023-02-09 NOTE — TELEPHONE ENCOUNTER
he is not doing better. He is still very SOB and weak. He can barely get out of bed. He would like a direct admit. Should I call for a bed?

## 2023-02-09 NOTE — PROGRESS NOTES
Skin assessment completed with Cristiana Trujillo. Overall skin dry, fragile and intact with many scattered bruises and scars. Sacrum and heels intact. Encouraged repositioning.

## 2023-02-09 NOTE — H&P
[facility] CARDIOLOGY History &Physical                 Primary Cardiologist: Dr Chey Olvera    Primary Care Physician: Shahriar Phipps MD    Admitting Physician: Dr Galindo Manual:     Patient is a 79 y.o. male who presents with SOB. He has a h/o obesity, CKD, diabetes mellitus type 2, asthma, acquired hypothyroidism, anemia, hypertension, JORJE on CPAP, and elevated LFTs. H/O CAD w [facility] 5-2019 w mod stable CAD, [facility] 1-27-23 w PCI to pLcx. Admit 1-28-23 for brief diuresis. Saw Dr Chey Olvera 2-8 not having more CP but worsening SOB despite torsemide and 5 lb weight loss. Pt SOB at rest, very weak, can barely get out of bed. Heart races w SOB. H/O asthma, SOB better w albuterol temporarily. So weak he feels dizzy, stumbled but no falls or syncope. LE edema better. No F/C, sore throat, exposure to anyone w URI. No recent travel, no h/o PE or DVT. Pt being admitted for further management. Hgb 8.8. D-dimer 1.5. Other labs pending. Past cardiac eval:   CAD (LAD PCI 12/15)   [facility] 5/2019: stable mod CAD, BNP was 13   Echo 8/2020: normal EF, mild AI and MR. Echo 4/2022 and 8/2022: normal EF, mod AS, RVSP 51  Echo 1-26-23 EF 60-65%, severe LAE, mod AS, BRIDGETT . 9, mild MR, RVSP 57 mmHg      Past Medical History:   Diagnosis Date    Acquired hypothyroidism 4/8/2016    Anemia 1/21/2015    Anemia in chronic kidney disease 4/8/2016    Asthma     Chronic kidney disease     stage 4 kidney disease- Dr. Omari Theodore    CKD (chronic kidney disease) 10/11/2012    Coronary atherosclerosis of native coronary vessel 2/10/2016    Diabetes mellitus type 2 in obese ([address] 10/11/2012    avg 150, symptoms of hypoglycemia 90    Diastolic heart failure ([facility] [address]     Dyspnea     Dyspnea 2/10/2016    Edema 10/11/2012    Elevated liver function tests 8/20/2015    Hypertension 10/11/2012    Hyponatremia 1/21/2015    Morbid obesity ([facility] [address]     BMI 43.54    JORJE (obstructive sleep apnea) 10/11/2012    cpap    Seborrheic dermatitis 1/16/2013    Seborrheic dermatitis       Past Surgical History:   Procedure Laterality Date    CARDIAC CATHETERIZATION  2016    3 stent    CARDIAC PROCEDURE N/A 2023    LEFT HEART CATH / CORONARY ANGIOGRAPHY performed by Roselyn Ramos MD at [facility] CARDIAC CATH LAB    CARDIAC PROCEDURE N/A 2023    Percutaneous coronary intervention performed by Roselyn Ramos MD at [facility] CARDIAC CATH LAB    CATARACT REMOVAL Bilateral 2017    HERNIA REPAIR        Allergies   Allergen Reactions    Amlodipine Shortness Of Breath    Iodine Shortness Of Breath    Metformin Shortness Of Breath    Ranolazine Other (See Comments)     Lips and tongue numbness    Spironolactone Other (See Comments)     Potassium level went really high  Increased potassium    Hydrocodone-Acetaminophen Other (See Comments)     Pt states that if he takes this  The medication makes him feel like he is going crazy    Omeprazole Other (See Comments)     Caused drastic drop in blood sugar     Social History     Tobacco Use    Smoking status: Former     Packs/day: 0.50     Years: 2.00     Pack years: 1.00     Types: Cigarettes     Quit date: 1962     Years since quittin.1    Smokeless tobacco: Never   Substance Use Topics    Alcohol use: No      FH:   Family History   Problem Relation Age of Onset    Cancer Father         lung    No Known Problems Sister     No Known Problems Brother     Hypertension Father     No Known Problems Sister     Hypertension Mother         Review of Systems  General: +  weight loss, + weakness, fever or chills  Skin: no rashes, lumps, or other skin changes  HEENT: no headache, + dizziness  Neck: no swollen glands, goiter, pain or stiffness  Respiratory: no cough, sputum, hemoptysis, + dyspnea, rare wheezing  Cardiovascular: + as per HPI  Gastrointestinal: no reflux, constipation, diarrhea, liver problems, GI bleeding  Urinary: no frequency, urgency , hematuria, burning/pain with urination, recent flank pain, polyuria, nocturia, or difficulty urinating  Peripheral Vascular: no claudication, leg cramps, prior DVTs, swelling of calves, legs, or feet, color change, or swelling with redness or tenderness  Musculoskeletal: no muscle or joint pain/stiffness, joint swelling, erythema of joints, or back pain  Psychiatric: no depression or excessive stress  Neurological: no sensory or motor loss, seizures, syncope, tremors, numbness, tingling, no changes in mood, attention, or speech, no changes in orientation, memory, insight, or judgment. Hematologic: + anemia  Endocrine: +  thyroid problems, no heat or cold intolerance, excessive sweating, polyuria, polydipsia, + diabetes. Objective:       /68   Pulse 66   Temp 98.3 °F (36.8 °C)   Resp 18   SpO2 98%     No intake/output data recorded. No intake/output data recorded. Physical Exam:  General: Well Developed, Well Nourished, No Acute Distress  Head: normocephalic atraumatic   ENT: pupils equal and round, no abnormalities noted  Neck: supple, no JVD, no carotid bruits  Heart: S1S2 with RRR without murmurs   Lungs: Clear throughout auscultation bilaterally without adventitious sounds  Abd: soft, nontender, nondistended, with good bowel sounds  Ext: warm, trace B edema  Skin: warm and dry  Psychiatric: Normal mood and affect, A&O x 3  Neurologic: Normal muscle tone    Data Review:    No results found for this or any previous visit (from the past 24 hour(s)).   Serum creatinine: 3.3 mg/dL (H) 01/29/23 0418  Estimated creatinine clearance: 26 mL/min (A)    Assessment/Plan:   Dyspnea- unclear etiology, elevated d-dimer, s/p good diuresis, recent PCI, slight decrease in hgb  - admit  - check labs  - CT chest PE protocol  - Cont ASA, plavix, albuterol, breo  - may need pulmonary consult    Restrictive lung disease  - as above      Chronic kidney disease (CKD), stage IV (severe) (HCC)  - check labs      Anemia in chronic kidney disease  - iron studies    Obesity, morbid (HCC)      Type 2 diabetes with nephropathy ([facility] [address]  - lantus insulin, sliding scale       Hypertension  - toprol, hyralazine       CAD in native artery- [facility] 1-27-23 w PCI to pLcx  - ASA, plavix, imdur, toprol, side effects w all statins        Julieth Abdi PA-C  2/9/2023  11:29 AM    [facility] CARDIOLOGY     2/9/2023     12:47 PM    I have personally seen and examined Ghada Mullins . am  2/9/2023 12:47 PM    Admit Date: 2/9/2023    Admit Diagnosis: heart failure  dyspnea on exertion      Subjective:    Patient patient referred for direct admission with persistent shortness of breath heart racing weak dizzy stumbling    Objective:    /68   Pulse 66   Temp 98.3 °F (36.8 °C)   Resp 18   Ht 6' 1\" (1.854 m)   Wt (!) 318 lb (144.2 kg)   SpO2 98%   BMI 41.96 kg/m²     ROS:  reviewed and verified  Physical Exam:    Physical Examination: General appearance - alert, well appearing, and in no distress  Chest/CV - clear to auscultation, no wheezes, rales or rhonchi, symmetric air entry  Heart - normal rate, regular rhythm, normal S1, S2, no murmurs, rubs, clicks or gallops  Abdomen/GI - soft, nontender, nondistended, no masses or organomegaly  Extremities - peripheral pulses normal, no pedal edema, no clubbing or cyanosis      No current facility-administered medications for this encounter.        Data Review:   @LABRCNT(Na,K,BUN,CREA,WBC,HGB,HCT,PLT,INR,TRP,TCHOL*,Triglyceride*,LDL*,LDLCPOC HDL*,HDL])@    TELEMETRY: Normal sinus rhythm    Assessment/Plan:     Dyspnea- unclear etiology, elevated d-dimer, s/p good diuresis, recent PCI, slight decrease in hgb  - admit  - check labs- vit D, iron studies, pBNP   - CT chest PE protocol if cr ok   - Cont ASA, plavix, albuterol, breo  - pulmonary consult     Restrictive lung disease  - as above      Chronic kidney disease (CKD), stage IV (severe) (HCC)  - check labs      Anemia in chronic kidney disease  - iron studies     Obesity, morbid (HCC)      Type 2 diabetes with nephropathy ([facility] [address]  - lantus insulin, sliding scale       Hypertension  - toprol, hyralazine       CAD in native artery- [facility] 1-27-23 w PCI to pLcx  - ASA, plavix, imdur, toprol, side effects w all statins    Marion Rios MD    I have reviewed this note in its entirety and edited where appropriate. I have performed an independent exam . All portions of the E/M have been edited and reviewed prior to signing note.       Marion Rios MD

## 2023-02-09 NOTE — TELEPHONE ENCOUNTER
I spoke w/[facility] supervisor got bed number 311. I notified pt.of bed number. He will head to [facility] .  was also notified.

## 2023-02-10 PROBLEM — E87.79 VOLUME OVERLOAD STATE OF HEART: Status: ACTIVE | Noted: 2023-02-10

## 2023-02-10 LAB
ANION GAP SERPL CALC-SCNC: 11 MMOL/L (ref 2–11)
BUN SERPL-MCNC: 90 MG/DL (ref 8–23)
CALCIUM SERPL-MCNC: 8.8 MG/DL (ref 8.3–10.4)
CHLORIDE SERPL-SCNC: 107 MMOL/L (ref 101–110)
CO2 SERPL-SCNC: 21 MMOL/L (ref 21–32)
CREAT SERPL-MCNC: 3.8 MG/DL (ref 0.8–1.5)
GLUCOSE BLD STRIP.AUTO-MCNC: 139 MG/DL (ref 65–100)
GLUCOSE BLD STRIP.AUTO-MCNC: 182 MG/DL (ref 65–100)
GLUCOSE BLD STRIP.AUTO-MCNC: 204 MG/DL (ref 65–100)
GLUCOSE BLD STRIP.AUTO-MCNC: 264 MG/DL (ref 65–100)
GLUCOSE BLD STRIP.AUTO-MCNC: 93 MG/DL (ref 65–100)
GLUCOSE SERPL-MCNC: 89 MG/DL (ref 65–100)
POTASSIUM SERPL-SCNC: 3.7 MMOL/L (ref 3.5–5.1)
SERVICE CMNT-IMP: ABNORMAL
SERVICE CMNT-IMP: NORMAL
SODIUM SERPL-SCNC: 139 MMOL/L (ref 133–143)

## 2023-02-10 PROCEDURE — 6370000000 HC RX 637 (ALT 250 FOR IP): Performed by: NURSE PRACTITIONER

## 2023-02-10 PROCEDURE — 2580000003 HC RX 258: Performed by: PHYSICIAN ASSISTANT

## 2023-02-10 PROCEDURE — 76937 US GUIDE VASCULAR ACCESS: CPT

## 2023-02-10 PROCEDURE — G0378 HOSPITAL OBSERVATION PER HR: HCPCS

## 2023-02-10 PROCEDURE — 80048 BASIC METABOLIC PNL TOTAL CA: CPT

## 2023-02-10 PROCEDURE — 6370000000 HC RX 637 (ALT 250 FOR IP): Performed by: PHYSICIAN ASSISTANT

## 2023-02-10 PROCEDURE — 99232 SBSQ HOSP IP/OBS MODERATE 35: CPT | Performed by: INTERNAL MEDICINE

## 2023-02-10 PROCEDURE — 82962 GLUCOSE BLOOD TEST: CPT

## 2023-02-10 PROCEDURE — 94760 N-INVAS EAR/PLS OXIMETRY 1: CPT

## 2023-02-10 PROCEDURE — 36415 COLL VENOUS BLD VENIPUNCTURE: CPT

## 2023-02-10 PROCEDURE — 94640 AIRWAY INHALATION TREATMENT: CPT

## 2023-02-10 RX ORDER — DEXTROSE MONOHYDRATE 100 MG/ML
INJECTION, SOLUTION INTRAVENOUS CONTINUOUS PRN
Status: DISCONTINUED | OUTPATIENT
Start: 2023-02-10 | End: 2023-02-11 | Stop reason: HOSPADM

## 2023-02-10 RX ORDER — ALBUTEROL SULFATE 2.5 MG/3ML
2.5 SOLUTION RESPIRATORY (INHALATION) EVERY 4 HOURS PRN
Status: DISCONTINUED | OUTPATIENT
Start: 2023-02-10 | End: 2023-02-11 | Stop reason: HOSPADM

## 2023-02-10 RX ORDER — FLUTICASONE FUROATE AND VILANTEROL 100; 25 UG/1; UG/1
1 POWDER RESPIRATORY (INHALATION) DAILY
Status: DISCONTINUED | OUTPATIENT
Start: 2023-02-10 | End: 2023-02-11 | Stop reason: HOSPADM

## 2023-02-10 RX ADMIN — ISOSORBIDE MONONITRATE 60 MG: 60 TABLET, EXTENDED RELEASE ORAL at 09:06

## 2023-02-10 RX ADMIN — INSULIN GLARGINE 30 UNITS: 100 INJECTION, SOLUTION SUBCUTANEOUS at 09:06

## 2023-02-10 RX ADMIN — FLUTICASONE FUROATE AND VILANTEROL 1 INHALATION: 100; 25 POWDER RESPIRATORY (INHALATION) at 09:09

## 2023-02-10 RX ADMIN — METOPROLOL SUCCINATE 100 MG: 100 TABLET, EXTENDED RELEASE ORAL at 09:06

## 2023-02-10 RX ADMIN — ASPIRIN 81 MG: 81 TABLET ORAL at 20:53

## 2023-02-10 RX ADMIN — ALLOPURINOL 150 MG: 300 TABLET ORAL at 20:54

## 2023-02-10 RX ADMIN — TORSEMIDE 40 MG: 20 TABLET ORAL at 09:06

## 2023-02-10 RX ADMIN — SODIUM CHLORIDE, PRESERVATIVE FREE 10 ML: 5 INJECTION INTRAVENOUS at 20:54

## 2023-02-10 RX ADMIN — CLOPIDOGREL BISULFATE 75 MG: 75 TABLET ORAL at 09:06

## 2023-02-10 RX ADMIN — SODIUM CHLORIDE, PRESERVATIVE FREE 10 ML: 5 INJECTION INTRAVENOUS at 09:07

## 2023-02-10 RX ADMIN — SODIUM BICARBONATE 650 MG TABLET 650 MG: at 09:06

## 2023-02-10 RX ADMIN — INSULIN GLARGINE 30 UNITS: 100 INJECTION, SOLUTION SUBCUTANEOUS at 20:52

## 2023-02-10 RX ADMIN — INSULIN LISPRO 4 UNITS: 100 INJECTION, SOLUTION INTRAVENOUS; SUBCUTANEOUS at 17:57

## 2023-02-10 RX ADMIN — IPRATROPIUM BROMIDE AND ALBUTEROL SULFATE 1 AMPULE: 2.5; .5 SOLUTION RESPIRATORY (INHALATION) at 19:56

## 2023-02-10 RX ADMIN — SODIUM BICARBONATE 650 MG TABLET 650 MG: at 20:54

## 2023-02-10 RX ADMIN — HYDRALAZINE HYDROCHLORIDE 50 MG: 50 TABLET, FILM COATED ORAL at 20:54

## 2023-02-10 RX ADMIN — LEVOTHYROXINE SODIUM 88 MCG: 0.09 TABLET ORAL at 05:56

## 2023-02-10 RX ADMIN — TORSEMIDE 40 MG: 20 TABLET ORAL at 20:53

## 2023-02-10 RX ADMIN — CALCITRIOL 0.25 MCG: 0.25 CAPSULE ORAL at 09:06

## 2023-02-10 RX ADMIN — IPRATROPIUM BROMIDE AND ALBUTEROL SULFATE 1 AMPULE: 2.5; .5 SOLUTION RESPIRATORY (INHALATION) at 13:46

## 2023-02-10 RX ADMIN — METOPROLOL SUCCINATE 100 MG: 100 TABLET, EXTENDED RELEASE ORAL at 20:54

## 2023-02-10 ASSESSMENT — PAIN SCALES - GENERAL
PAINLEVEL_OUTOF10: 0

## 2023-02-10 NOTE — MANAGEMENT PLAN
Pt had questions about his care and when he will go home. He was informed about his increased kidney function and his labs will be monitored overnight. If able he would like to go home and manage with oral medications.   Will default to his attending in the AM.     MELLY Breaux CNP  02/10/23  5:55 PM

## 2023-02-10 NOTE — PROGRESS NOTES
am  2/10/2023 6:21 AM    Admit Date: 2/9/2023    Admit Diagnosis: Dyspnea [R06.00]      Subjective:    Patient : Patient readmitted after recent cath with continued shortness of breath dyspnea on exertion with really currently no significant changes in this    Objective:    /75   Pulse 67   Temp 97.5 °F (36.4 °C) (Oral)   Resp 22   Ht 6' 1\" (1.854 m)   Wt (!) 317 lb 1.6 oz (143.8 kg)   SpO2 96%   BMI 41.84 kg/m²     ROS:  General ROS: negative for - chills  Hematological and Lymphatic ROS: negative for - blood clots or jaundice  Respiratory ROS: positive for - shortness of breath  Cardiovascular ROS: positive for - dyspnea on exertion  Gastrointestinal ROS: no abdominal pain, change in bowel habits, or black or bloody stools  Neurological ROS: no TIA or stroke symptoms    Physical Exam:      Physical Examination: General appearance - Appearance: oriented to person, place, and time and chronically ill appearing.    Neck/lymph - supple, no significant adenopathy  Chest/CV - clear to auscultation, no wheezes, rales or rhonchi, symmetric air entry  Heart - normal rate, regular rhythm, normal S1, S2, no murmurs, rubs, clicks or gallops  Abdomen/GI - soft, nontender, nondistended, no masses or organomegaly   Musculoskeletal - no joint tenderness, deformity or swelling  Extremities - peripheral pulses normal, no pedal edema, no clubbing or cyanosis  Skin - normal coloration and turgor, no rashes, no suspicious skin lesions noted    Current Facility-Administered Medications   Medication Dose Route Frequency    Fluticasone Furoate-Vilanterol 100-25 MCG/ACT AEPB 1 inhalation- pt supplied (Patient Supplied)  1 inhalation Mouth/Throat Daily    albuterol sulfate HFA (PROVENTIL;VENTOLIN;PROAIR) 108 (90 Base) MCG/ACT inhaler 2 puff  2 puff Inhalation Q6H PRN    allopurinol (ZYLOPRIM) tablet 150 mg  150 mg Oral Daily    aspirin EC tablet 81 mg  81 mg Oral Daily    calcitRIOL (ROCALTROL) capsule 0.25 mcg  0.25 mcg Oral Daily    clopidogrel (PLAVIX) tablet 75 mg  75 mg Oral Daily    [START ON 2/12/2023] dulaglutide (TRULICITY) SC injection 1.5 mg (Patient Supplied)  1.5 mg SubCUTAneous Q7 Days    hydrALAZINE (APRESOLINE) tablet 50 mg  50 mg Oral BID    insulin glargine (LANTUS) injection vial 30 Units  30 Units SubCUTAneous BID    insulin lispro (HUMALOG) injection vial 0-8 Units  0-8 Units SubCUTAneous TID WC    insulin lispro (HUMALOG) injection vial 0-4 Units  0-4 Units SubCUTAneous Nightly    isosorbide mononitrate (IMDUR) extended release tablet 60 mg  60 mg Oral Daily    levothyroxine (SYNTHROID) tablet 88 mcg  88 mcg Oral Daily    metoprolol succinate (TOPROL XL) extended release tablet 100 mg  100 mg Oral BID    nitroGLYCERIN (NITROSTAT) SL tablet 0.4 mg  0.4 mg SubLINGual Q5 Min PRN    sodium bicarbonate tablet 650 mg  650 mg Oral BID    torsemide (DEMADEX) tablet 40 mg  40 mg Oral BID    sodium chloride flush 0.9 % injection 5-40 mL  5-40 mL IntraVENous 2 times per day    sodium chloride flush 0.9 % injection 5-40 mL  5-40 mL IntraVENous PRN    0.9 % sodium chloride infusion   IntraVENous PRN    ondansetron (ZOFRAN-ODT) disintegrating tablet 4 mg  4 mg Oral Q8H PRN    Or    ondansetron (ZOFRAN) injection 4 mg  4 mg IntraVENous Q6H PRN    polyethylene glycol (GLYCOLAX) packet 17 g  17 g Oral Daily PRN    albuterol (PROVENTIL) nebulizer solution 2.5 mg  2.5 mg Nebulization 4x daily    ipratropium-albuterol (DUONEB) nebulizer solution 1 ampule  1 ampule Inhalation Q6H WA    sodium chloride flush 0.9 % injection 20 mL  20 mL IntraVENous PRN       Data Review: data included in this note has been independently reviewed by the author     TELEMETRY: Normal sinus rhythm    Assessment/Plan:     Patient Active Problem List   Diagnosis    Edema    Chronic kidney disease (CKD), stage IV (severe) (HCC)    JORJE (obstructive sleep apnea)    Dyspnea    Cough variant asthma    PPD positive    Hyponatremia    Chest pain    Anemia in chronic kidney disease    Obesity, morbid ([facility] [address]    SEARS (dyspnea on exertion)    Abnormal nuclear stress test    Type 2 diabetes with nephropathy (HCC)    Hypertension    Seborrheic dermatitis    Elevated liver function tests    Anemia    Diastolic CHF, chronic (HCC)    Diabetes mellitus type 2 in obese (HCC)    CAD in native artery    Acquired hypothyroidism    Restrictive lung disease    Volume overload    Chronic heart failure with preserved ejection fraction (HFpEF) (HCC)    Moderate aortic stenosis by prior echocardiogram    Chronic gout due to renal impairment without tophus    Hypothyroidism due to Hashimoto's thyroiditis    PHU (iron deficiency anemia)    Accelerating angina (HCC)    Bilateral lower extremity edema     PLAN  Dyspnea- unclear etiology, elevated d-dimer, s/p good diuresis, recent PCI, slight decrease in hgb  - admit  - check labs- vit D, iron studies, pBNP   - CT chest PE protocol if cr ok   - Cont ASA, plavix, albuterol, breo  - pulmonary consult     Restrictive lung disease  - as above      Chronic kidney disease (CKD), stage IV (severe) ([facility])  - check labs      Anemia in chronic kidney disease  - iron studies     Obesity, morbid ([facility] [address]      Type 2 diabetes with nephropathy (HCC)  - lantus insulin, sliding scale       Hypertension  - toprol, hyralazine       CAD in native artery- [facility] 1-27-23 w PCI to pLcx  - ASA, plavix, imdur, toprol, side effects w all statins    Stage IV kidney disease creatinine is at 3.8 EGFR 15 creatinine clearance 22. Continue to follow renal function closely    VQ scan seems to indicate a very low probability of pulmonary emboli vascular duplex is being done.     I suspect that his shortness of breath is really multifactorial from chronic restrictive lung disease age morbid obesity iron deficiency anemia pulmonary hypertension and that there is not one factor that is going to all of a sudden make him not short of breath    Lynette Paulson MD

## 2023-02-10 NOTE — PROGRESS NOTES
Omar Noriega  Admission Date: 2/9/2023         Daily Progress Note: 2/10/2023    The patient's chart is reviewed and the patient is discussed with the staff. Background: Patient is a 79 y.o.  male seen and evaluated at the request of Toña Morrison NP with cardiology for increasing SOB, asthma. Pt was admitted for CP, worsening SOB and near syncope with exertion. Had cardiac stent placed last week and says he got increased fluid due to contrast.  He has had a 15 pound wt gain since his procedure. Between his cardiologist and his nephrologist, his torsemide was increased to 40mg TID. He states he was loosing about a pound or 2 a week. Today, he reports his SOB has been progressive over the last few months, but after his procedure last week, it has been much worse. He got a nebulizer and albuterol prescribed from our office (known to us for asthma and JORJE on cpap), but had not been able to start it yet. He denies fever, chills, sick contacts. No wheezing, cough or sputum production. He mentions he is tachycardic with exertion and gets very light headed, near passing out. Subjective:     States he is feeling better with addition of nebulizer. He is still having SEARS. Seen ambulating in room. Remains on room air.     Current Facility-Administered Medications   Medication Dose Route Frequency    Fluticasone Furoate-Vilanterol 100-25 MCG/ACT AEPB 1 inhalation- pt supplied (Patient Supplied)  1 inhalation Mouth/Throat Daily    albuterol (PROVENTIL) nebulizer solution 2.5 mg  2.5 mg Nebulization Q4H PRN    albuterol sulfate HFA (PROVENTIL;VENTOLIN;PROAIR) 108 (90 Base) MCG/ACT inhaler 2 puff  2 puff Inhalation Q6H PRN    allopurinol (ZYLOPRIM) tablet 150 mg  150 mg Oral Daily    aspirin EC tablet 81 mg  81 mg Oral Daily    calcitRIOL (ROCALTROL) capsule 0.25 mcg  0.25 mcg Oral Daily    clopidogrel (PLAVIX) tablet 75 mg  75 mg Oral Daily    [START ON 2/12/2023] dulaglutide (TRULICITY) SC injection 1.5 mg (Patient Supplied)  1.5 mg SubCUTAneous Q7 Days    hydrALAZINE (APRESOLINE) tablet 50 mg  50 mg Oral BID    insulin glargine (LANTUS) injection vial 30 Units  30 Units SubCUTAneous BID    insulin lispro (HUMALOG) injection vial 0-8 Units  0-8 Units SubCUTAneous TID WC    insulin lispro (HUMALOG) injection vial 0-4 Units  0-4 Units SubCUTAneous Nightly    isosorbide mononitrate (IMDUR) extended release tablet 60 mg  60 mg Oral Daily    levothyroxine (SYNTHROID) tablet 88 mcg  88 mcg Oral Daily    metoprolol succinate (TOPROL XL) extended release tablet 100 mg  100 mg Oral BID    nitroGLYCERIN (NITROSTAT) SL tablet 0.4 mg  0.4 mg SubLINGual Q5 Min PRN    sodium bicarbonate tablet 650 mg  650 mg Oral BID    torsemide (DEMADEX) tablet 40 mg  40 mg Oral BID    sodium chloride flush 0.9 % injection 5-40 mL  5-40 mL IntraVENous 2 times per day    sodium chloride flush 0.9 % injection 5-40 mL  5-40 mL IntraVENous PRN    0.9 % sodium chloride infusion   IntraVENous PRN    ondansetron (ZOFRAN-ODT) disintegrating tablet 4 mg  4 mg Oral Q8H PRN    Or    ondansetron (ZOFRAN) injection 4 mg  4 mg IntraVENous Q6H PRN    polyethylene glycol (GLYCOLAX) packet 17 g  17 g Oral Daily PRN    ipratropium-albuterol (DUONEB) nebulizer solution 1 ampule  1 ampule Inhalation Q6H WA    sodium chloride flush 0.9 % injection 20 mL  20 mL IntraVENous PRN     Review of Systems:   +SEARS  Constitutional: negative for fever, chills, sweats  Cardiovascular: negative for chest pain, palpitations, syncope, edema  Gastrointestinal:  negative for dysphagia, reflux, vomiting, diarrhea, abdominal pain, or melena  Neurologic:  negative for focal weakness, numbness, headache    Objective:   Blood pressure (!) 142/67, pulse 62, temperature 98 °F (36.7 °C), temperature source Oral, resp. rate 20, height 6' 1\" (1.854 m), weight (!) 317 lb 1.6 oz (143.8 kg), SpO2 95 %.    Intake/Output Summary (Last 24 hours) at 2/10/2023 1138  Last data filed at 2/10/2023 0853  Gross per 24 hour   Intake 700 ml   Output 1300 ml   Net -600 ml     Physical Exam:   Constitutional:  the patient is well developed and in no acute distress  EENMT:  Sclera clear, pupils equal, oral mucosa moist  Respiratory: symmetric chest rise. Diminished throughout on room air  Cardiovascular:  RRR without M,G,R. There is 2+ lower extremity edema. Gastrointestinal: soft and non-tender; with positive bowel sounds. Musculoskeletal: warm without cyanosis. Normal muscle tone. Skin:  no jaundice or rashes, no wounds   Neurologic: symmetric strength, fluent speech  Psychiatric:  calm, appropriate, oriented x 4    Imaging: I performed an independent interpretation of the patient's images. CXR:       LAB:  Recent Labs     02/08/23  1012 02/09/23  1544   WBC 7.8 6.5   HGB 8.8* 9.3*   HCT 28.4* 29.1*    246     Recent Labs     02/10/23  0400      K 3.7      CO2 21   BUN 90*   CREATININE 3.80*     Recent Labs     02/09/23  1417   NTPROBNP 16,122*     Recent Labs     02/10/23  0400   GLUCOSE 89      Microbiology:   No results for input(s): CULTURE in the last 72 hours. ECHO: 01/25/23    TRANSTHORACIC ECHOCARDIOGRAM (TTE) COMPLETE (CONTRAST/BUBBLE/3D PRN) 01/26/2023 10:37 AM, 01/26/2023 12:00 AM (Final)    Interpretation Summary    Left Ventricle: Normal left ventricular systolic function with a visually estimated EF of 60 - 65%. Left ventricle size is normal. Mildly increased wall thickness. Normal wall motion. Abnormal diastolic function. Aortic Valve: Trileaflet valve. Moderate sclerosis of the aortic valve cusp. Moderate stenosis of the aortic valve. AV mean gradient is 25 mmHg. AV area by continuity VTI is 0.9 cm2. Mitral Valve: Mild regurgitation. Tricuspid Valve: The estimated RVSP is 57 mmHg. Left Atrium: Left atrium is severely dilated. Right Atrium: Right atrium is moderately dilated. Aorta: Dilated aortic root. Ao root diameter is 3.7 cm. Technical qualifiers: Technically difficult study due to patient's body habitus, color flow Doppler was performed and pulse wave and/or continuous wave Doppler was performed. Contrast used: Definity. Signed by: Ricardo Kim MD on 1/26/2023 10:37 AM, Signed by: Unknown Provider Result on 1/26/2023 12:00 AM    Assessment and Plan:  (Medical Decision Making)   Principal Problem:    Dyspnea  Plan: v/q scan low probability of PE, duplex negative for VTE, likely due to vol. Overload- on torsemide 40mg BID? If should change to IV diuretics - ntBNP 16K  - patient would like to continue duonebs as OP; continue breo  Active Problems:    Restrictive lung disease  Plan: due to CKD, obesity and JORJE    Chronic kidney disease (CKD), stage IV (severe) (HCC)  Plan: elevated at 3.8    Anemia in chronic kidney disease  Plan: stable yesterday at 9.3; monitor    Obesity, morbid ([address]  Plan: adds to complexity of care    JORJE  Plan: on CPAP, having issues with CPAP shutting off. Turned smartstart of to see if this helps. More than 50% of the time documented was spent in face-to-face contact with the patient and in the care of the patient on the floor/unit where the patient is located. In this split/shared evaluation I performed performed a medically appropriate history and exam, counseled and educated the patient and/or family member, documented information in EMR, and coordinated care. which accounted for 13 minutes of clinical time. MELLY Escobar - CNP    In this split/shared evaluation I performed reviewed the patients's H&P, available images, labs, cultures. , discussed case in detail with NPP, performed a medically appropriate history and exam, counseled and educated the patient and/or family member, ordered and/or reviewed medications, tests or procedures, documented information in EMR, independently interpreted images, and coordinated care. which accounted for 25 minutes clinical time. Impression: 79year old with SOB likely due to volume overload and pulmonary vascular congestion, he is followed for asthma by us but this seems to be non exacerbated. Continue diuresis- already feeling better. Agree with NP Assessment and plan as written.     Will see next on monday, continue diuresis        Robert Helton MD

## 2023-02-11 VITALS
RESPIRATION RATE: 16 BRPM | TEMPERATURE: 97.5 F | OXYGEN SATURATION: 98 % | SYSTOLIC BLOOD PRESSURE: 126 MMHG | HEART RATE: 76 BPM | HEIGHT: 73 IN | DIASTOLIC BLOOD PRESSURE: 64 MMHG | BODY MASS INDEX: 41.75 KG/M2 | WEIGHT: 315 LBS

## 2023-02-11 LAB
ANION GAP SERPL CALC-SCNC: 7 MMOL/L (ref 2–11)
BUN SERPL-MCNC: 95 MG/DL (ref 8–23)
CALCIUM SERPL-MCNC: 8.8 MG/DL (ref 8.3–10.4)
CHLORIDE SERPL-SCNC: 106 MMOL/L (ref 101–110)
CO2 SERPL-SCNC: 23 MMOL/L (ref 21–32)
CREAT SERPL-MCNC: 3.7 MG/DL (ref 0.8–1.5)
GLUCOSE BLD STRIP.AUTO-MCNC: 144 MG/DL (ref 65–100)
GLUCOSE BLD STRIP.AUTO-MCNC: 186 MG/DL (ref 65–100)
GLUCOSE BLD STRIP.AUTO-MCNC: 209 MG/DL (ref 65–100)
GLUCOSE SERPL-MCNC: 214 MG/DL (ref 65–100)
POTASSIUM SERPL-SCNC: 3.8 MMOL/L (ref 3.5–5.1)
SERVICE CMNT-IMP: ABNORMAL
SODIUM SERPL-SCNC: 136 MMOL/L (ref 133–143)

## 2023-02-11 PROCEDURE — 99238 HOSP IP/OBS DSCHRG MGMT 30/<: CPT | Performed by: INTERNAL MEDICINE

## 2023-02-11 PROCEDURE — 1100000003 HC PRIVATE W/ TELEMETRY

## 2023-02-11 PROCEDURE — 6370000000 HC RX 637 (ALT 250 FOR IP): Performed by: NURSE PRACTITIONER

## 2023-02-11 PROCEDURE — 6370000000 HC RX 637 (ALT 250 FOR IP): Performed by: PHYSICIAN ASSISTANT

## 2023-02-11 PROCEDURE — 94669 MECHANICAL CHEST WALL OSCILL: CPT

## 2023-02-11 PROCEDURE — 80048 BASIC METABOLIC PNL TOTAL CA: CPT

## 2023-02-11 PROCEDURE — 2580000003 HC RX 258: Performed by: PHYSICIAN ASSISTANT

## 2023-02-11 PROCEDURE — 82962 GLUCOSE BLOOD TEST: CPT

## 2023-02-11 PROCEDURE — 94640 AIRWAY INHALATION TREATMENT: CPT

## 2023-02-11 PROCEDURE — 36415 COLL VENOUS BLD VENIPUNCTURE: CPT

## 2023-02-11 RX ORDER — METOPROLOL SUCCINATE 100 MG/1
100 TABLET, EXTENDED RELEASE ORAL 2 TIMES DAILY
Qty: 60 TABLET | Refills: 3 | Status: ON HOLD | OUTPATIENT
Start: 2023-02-11 | End: 2023-02-17 | Stop reason: HOSPADM

## 2023-02-11 RX ADMIN — METOPROLOL SUCCINATE 100 MG: 100 TABLET, EXTENDED RELEASE ORAL at 09:09

## 2023-02-11 RX ADMIN — TORSEMIDE 40 MG: 20 TABLET ORAL at 09:09

## 2023-02-11 RX ADMIN — HYDRALAZINE HYDROCHLORIDE 50 MG: 50 TABLET, FILM COATED ORAL at 09:09

## 2023-02-11 RX ADMIN — INSULIN GLARGINE 30 UNITS: 100 INJECTION, SOLUTION SUBCUTANEOUS at 09:08

## 2023-02-11 RX ADMIN — CALCITRIOL 0.25 MCG: 0.25 CAPSULE ORAL at 09:09

## 2023-02-11 RX ADMIN — CLOPIDOGREL BISULFATE 75 MG: 75 TABLET ORAL at 09:09

## 2023-02-11 RX ADMIN — SODIUM BICARBONATE 650 MG TABLET 650 MG: at 09:09

## 2023-02-11 RX ADMIN — FLUTICASONE FUROATE AND VILANTEROL 1 INHALATION: 100; 25 POWDER RESPIRATORY (INHALATION) at 09:08

## 2023-02-11 RX ADMIN — ISOSORBIDE MONONITRATE 60 MG: 60 TABLET, EXTENDED RELEASE ORAL at 09:09

## 2023-02-11 RX ADMIN — IPRATROPIUM BROMIDE AND ALBUTEROL SULFATE 1 AMPULE: 2.5; .5 SOLUTION RESPIRATORY (INHALATION) at 08:35

## 2023-02-11 RX ADMIN — SODIUM CHLORIDE, PRESERVATIVE FREE 10 ML: 5 INJECTION INTRAVENOUS at 09:11

## 2023-02-11 RX ADMIN — LEVOTHYROXINE SODIUM 88 MCG: 0.09 TABLET ORAL at 06:10

## 2023-02-11 ASSESSMENT — PAIN SCALES - GENERAL
PAINLEVEL_OUTOF10: 0

## 2023-02-11 NOTE — DISCHARGE SUMMARY
[facility] Cardiology Discharge Summary     Patient ID:  Pelon Domínguez  [id number]  52 y.o.  1940    Admit date: 2/9/2023    Discharge date and time:  2/11/2023    Admitting Physician: Yaa Griffiths MD     Discharge Physician: Dr. Libertad Monaco    Admission Diagnoses: Dyspnea [R06.00]    Discharge Diagnoses:   Patient Active Problem List    Diagnosis    Volume overload    Bilateral lower extremity edema    Chronic heart failure with preserved ejection fraction (HFpEF) (HCC)    Volume overload    Type 2 diabetes with nephropathy (HCC)    Anemia in chronic kidney disease    CAD in native artery    SEARS (dyspnea on exertion)    Edema    Chronic kidney disease (CKD), stage IV (severe) (HCC)    JORJE (obstructive sleep apnea)    Hypertension    Diabetes mellitus type 2 in obese [facility])     Cardiology Procedures this admission:   none  Consults: pulmonary/intensive care    Hospital Course: Patient is a 79 y.o. male who presents with SOB. He has a h/o obesity, CKD, diabetes mellitus type 2, asthma, acquired hypothyroidism, anemia, hypertension, JORJE on CPAP, and elevated LFTs. H/O CAD w [facility] 5-2019 w mod stable CAD, [facility] 1-27-23 w PCI to pLcx. Admit 1-28-23 for brief diuresis. Saw Dr Gustavo Rizzo 2-8 not having more CP but worsening SOB despite torsemide and 5 lb weight loss. Pt SOB at rest, very weak, can barely get out of bed. Heart races w SOB. H/O asthma, SOB better w albuterol temporarily. So weak he feels dizzy, stumbled but no falls or syncope. LE edema better. No F/C, sore throat, exposure to anyone w URI. No recent travel, no h/o PE or DVT. Pt being admitted for further management. NM lung scan with several small nonsegmental perfusion defects which by perfusion alone cannot be categorized. No moderate or large size perfusion defects to suggest pulmonary emboli. Pulmonary consulted for SOB and asthma. Was given a nebulizer and albuterol prescribed from pulmonary office but hadn't started it yet.  Recommended starting wily and breo. [id number] [address]ia for patient to be discharged home by pulmonary NP. The morning of discharge Pt was up feeling well without any complaints of CP, SOB, palpitations or LE swelling. Pt's labs were stable. Pt was seen and examined by Dr. Britt Ndiaye and determined stable and ready for discharge. Pt was instructed on the importance of weighing self daily. If Pt gains more than 2 lbs overnight or 5 lbs in one week, Pt is instructed to call [facility]iology at [phone]. The patient has been scheduled for follow up with [facility] Cardiology -- Dr. Briana Kingsley. Patient has follow up with nephrology on Wednesday for labs. DISPOSITION: The patient is being discharged home in stable condition on a low saturated fat, low cholesterol and low salt diet. Pt is instructed to follow a fluid restricted diet with no more than 2 liters per day. Pt is instructed to advance activities as tolerated limited to fatigue or shortness of breath. Pt is instructed to call office or return to ER for immediate evaluation of any shortness of breath or chest pain. Discharge Exam: /64   Pulse 76   Temp 97.5 °F (36.4 °C) (Oral)   Resp 16   Ht 6' 1\" (1.854 m)   Wt (!) 319 lb 8 oz (144.9 kg)   SpO2 98%   BMI 42.15 kg/m²    Pt has been seen by Britt Ndiaye: see his progress note for exam details.     Recent Results (from the past 24 hour(s))   POCT Glucose    Collection Time: 02/10/23  5:08 PM   Result Value Ref Range    POC Glucose 264 (H) 65 - 100 mg/dL    Performed by: Jefferson    POCT Glucose    Collection Time: 02/10/23  8:40 PM   Result Value Ref Range    POC Glucose 204 (H) 65 - 100 mg/dL    Performed by: Abad    Basic Metabolic Panel    Collection Time: 02/11/23  4:47 AM   Result Value Ref Range    Sodium 136 133 - 143 mmol/L    Potassium 3.8 3.5 - 5.1 mmol/L    Chloride 106 101 - 110 mmol/L    CO2 23 21 - 32 mmol/L    Anion Gap 7 2 - 11 mmol/L    Glucose 214 (H) 65 - 100 mg/dL    BUN 95 (H) 8 - 23 MG/DL Creatinine 3.70 (H) 0.8 - 1.5 MG/DL    Est, Glom Filt Rate 16 (L) >60 ml/min/1.73m2    Calcium 8.8 8.3 - 10.4 MG/DL   POCT Glucose    Collection Time: 02/11/23  4:53 AM   Result Value Ref Range    POC Glucose 209 (H) 65 - 100 mg/dL    Performed by: Abad    POCT Glucose    Collection Time: 02/11/23  7:26 AM   Result Value Ref Range    POC Glucose 144 (H) 65 - 100 mg/dL    Performed by: Dilia      Patient Instructions:   Current Discharge Medication List        CONTINUE these medications which have CHANGED    Details   metoprolol succinate (TOPROL XL) 100 MG extended release tablet Take 1 tablet by mouth 2 times daily  Qty: 60 tablet, Refills: 3      Torsemide 40 MG TABS Take 80 mg by mouth every morning AND 40 mg every evening. Qty: 90 tablet, Refills: 3           CONTINUE these medications which have NOT CHANGED    Details   albuterol (PROVENTIL) (2.5 MG/3ML) 0.083% nebulizer solution Take 3 mLs by nebulization in the morning, at noon, in the evening, and at bedtime Bill to Medicare D Code: J45.991  Qty: 120 each, Refills: 1      clopidogrel (PLAVIX) 75 MG tablet Take 1 tablet by mouth daily  Qty: 90 tablet, Refills: 3      nitroGLYCERIN (NITROSTAT) 0.4 MG SL tablet Place 1 tablet under the tongue every 5 minutes as needed for Chest pain up to max of 3 total doses. If no relief after 1 dose, call 911. Qty: 25 tablet, Refills: 3      isosorbide mononitrate (IMDUR) 60 MG extended release tablet Take 1 tablet by mouth daily  Qty: 30 tablet, Refills: 5      hydrALAZINE (APRESOLINE) 50 MG tablet Take 1 tablet by mouth in the morning and at bedtime  Qty: 90 tablet, Refills: 3      CPAP Machine MISC by Does not apply route At HS      albuterol sulfate HFA (PROVENTIL;VENTOLIN;PROAIR) 108 (90 Base) MCG/ACT inhaler 2 puffs 4 times daily if needed for shortness of breath or wheezing.   Patient will call when refills are needed  Qty: 1 each, Refills: 11      Fluticasone Furoate-Vilanterol (BREO ELLIPTA) 100-25 MCG/ACT AEPB 1 inhalation every morning, rinse mouth after use. Qty: 1 each, Refills: 11      allopurinol (ZYLOPRIM) 300 MG tablet Take 150 mg by mouth daily      aspirin 81 MG EC tablet Take 81 mg by mouth      calcitRIOL (ROCALTROL) 0.25 MCG capsule Take 0.25 mcg by mouth daily      Dulaglutide (TRULICITY) 1.5 RM/5.7SQ SOPN Inject 1.5 mg into the skin every 7 days On sundays      insulin glargine (LANTUS) 100 UNIT/ML injection vial Inject 25 Units into the skin 2 times daily      levothyroxine (SYNTHROID) 88 MCG tablet Take 88 mcg by mouth      sodium bicarbonate 650 MG tablet Take 650 mg by mouth 2 times daily           STOP taking these medications       losartan-hydroCHLOROthiazide (HYZAAR) 100-25 MG per tablet Comments:   Reason for Stopping:         nebivolol (BYSTOLIC) 10 MG tablet Comments:   Reason for Stopping:                   Signed:  HEAVEN Noriega  2/11/2023  11:51 AM       Patient seen and examined by me. Agree with above note by physician extender. Key findings are:  No CP or SEARS  CV- RRR without murmur  Lungs- Clear bilaterally  Ext- no edema    Plan: CHF- improved.  Home on increased demadex- bmp with renal Weds

## 2023-02-11 NOTE — CARE COORDINATION
Discharge order is in. Pt is discharging home today in stable condition. No discharge needs were identified. Tx goals have been met.       02/11/23 [id number] Juany Bhatt Discharge   Transition of Care Consult (CM Consult) Discharge Julia 1690 Discharge None    Resource Information Provided? No   Mode of Transport at Discharge Other (see comment)  (Family)   Confirm Follow Up Transport Family   Condition of Participation: Discharge Planning   The Patient and/or Patient Representative was provided with a Choice of Provider? Patient   The Patient and/Or Patient Representative agree with the Discharge Plan? Yes   Freedom of Choice list was provided with basic dialogue that supports the patient's individualized plan of care/goals, treatment preferences, and shares the quality data associated with the providers?   Yes

## 2023-02-13 ENCOUNTER — TELEPHONE (OUTPATIENT)
Dept: CARDIOLOGY CLINIC | Age: 83
End: 2023-02-13

## 2023-02-13 NOTE — TELEPHONE ENCOUNTER
I called and informed home health nurse of MD response and she v/u. I then called pt.wife told her MD response and she v/u.

## 2023-02-13 NOTE — TELEPHONE ENCOUNTER
Pt.was recently hospitalized -. Home health calls today reports he is still very . he can not even walk a few steps w/out SOB. Torsemide was increased to 80mg in am 40mg in pm.O2 sats are good 97%,he has 1+ pitting edema BLE's,lungs were clear but he is very SOB. He has not lost any wt.since Torsemide was increased. He saw nephrology was told he was not ready for dialysis per pt.

## 2023-02-13 NOTE — TELEPHONE ENCOUNTER
Caity Cortes, he needs to head back to [facility] if worsening, we need to figure out why he is struggling so much, Thanks

## 2023-02-14 ENCOUNTER — HOSPITAL ENCOUNTER (INPATIENT)
Age: 83
LOS: 3 days | Discharge: HOME | DRG: 286 | End: 2023-02-17
Attending: EMERGENCY MEDICINE | Admitting: HOSPITALIST
Payer: MEDICARE

## 2023-02-14 ENCOUNTER — APPOINTMENT (OUTPATIENT)
Dept: GENERAL RADIOLOGY | Age: 83
DRG: 286 | End: 2023-02-14
Payer: MEDICARE

## 2023-02-14 DIAGNOSIS — R06.02 SHORTNESS OF BREATH: ICD-10-CM

## 2023-02-14 DIAGNOSIS — E87.79 VOLUME OVERLOAD STATE OF HEART: Primary | ICD-10-CM

## 2023-02-14 DIAGNOSIS — I50.9 HEART FAILURE (HCC): ICD-10-CM

## 2023-02-14 DIAGNOSIS — N18.9 CHRONIC KIDNEY DISEASE, UNSPECIFIED CKD STAGE: ICD-10-CM

## 2023-02-14 PROBLEM — R68.89 DECREASED EXERCISE TOLERANCE: Status: ACTIVE | Noted: 2023-02-14

## 2023-02-14 PROBLEM — R53.1 GENERALIZED WEAKNESS: Status: ACTIVE | Noted: 2023-02-14

## 2023-02-14 PROBLEM — J81.0 ACUTE PULMONARY EDEMA (HCC): Status: ACTIVE | Noted: 2023-02-14

## 2023-02-14 LAB
ALBUMIN SERPL-MCNC: 2.9 G/DL (ref 3.2–4.6)
ALBUMIN/GLOB SERPL: 0.8 (ref 0.4–1.6)
ALP SERPL-CCNC: 69 U/L (ref 50–136)
ALT SERPL-CCNC: 12 U/L (ref 12–65)
ANION GAP SERPL CALC-SCNC: 10 MMOL/L (ref 2–11)
AST SERPL-CCNC: 17 U/L (ref 15–37)
BILIRUB SERPL-MCNC: 0.4 MG/DL (ref 0.2–1.1)
BUN SERPL-MCNC: 91 MG/DL (ref 8–23)
CALCIUM SERPL-MCNC: 9.4 MG/DL (ref 8.3–10.4)
CHLORIDE SERPL-SCNC: 108 MMOL/L (ref 101–110)
CO2 SERPL-SCNC: 23 MMOL/L (ref 21–32)
CREAT SERPL-MCNC: 3.7 MG/DL (ref 0.8–1.5)
ERYTHROCYTE [DISTWIDTH] IN BLOOD BY AUTOMATED COUNT: 14.8 % (ref 11.9–14.6)
GLOBULIN SER CALC-MCNC: 3.6 G/DL (ref 2.8–4.5)
GLUCOSE BLD STRIP.AUTO-MCNC: 149 MG/DL (ref 65–100)
GLUCOSE BLD STRIP.AUTO-MCNC: 151 MG/DL (ref 65–100)
GLUCOSE SERPL-MCNC: 214 MG/DL (ref 65–100)
HCT VFR BLD AUTO: 29.3 % (ref 41.1–50.3)
HGB BLD-MCNC: 9.1 G/DL (ref 13.6–17.2)
MAGNESIUM SERPL-MCNC: 2.6 MG/DL (ref 1.8–2.4)
MCH RBC QN AUTO: 30.1 PG (ref 26.1–32.9)
MCHC RBC AUTO-ENTMCNC: 31.1 G/DL (ref 31.4–35)
MCV RBC AUTO: 97 FL (ref 82–102)
NRBC # BLD: 0 K/UL (ref 0–0.2)
NT PRO BNP: ABNORMAL PG/ML
PLATELET # BLD AUTO: 236 K/UL (ref 150–450)
PMV BLD AUTO: 10.4 FL (ref 9.4–12.3)
POTASSIUM SERPL-SCNC: 4.8 MMOL/L (ref 3.5–5.1)
PROCALCITONIN SERPL-MCNC: 0.18 NG/ML (ref 0–0.49)
PROT SERPL-MCNC: 6.5 G/DL (ref 6.3–8.2)
RBC # BLD AUTO: 3.02 M/UL (ref 4.23–5.6)
SARS-COV-2 RDRP RESP QL NAA+PROBE: NOT DETECTED
SERVICE CMNT-IMP: ABNORMAL
SERVICE CMNT-IMP: ABNORMAL
SODIUM SERPL-SCNC: 141 MMOL/L (ref 133–143)
SOURCE: NORMAL
TROPONIN I SERPL HS-MCNC: 67.7 PG/ML (ref 0–14)
TROPONIN I SERPL HS-MCNC: 94.7 PG/ML (ref 0–14)
WBC # BLD AUTO: 6.3 K/UL (ref 4.3–11.1)

## 2023-02-14 PROCEDURE — 97530 THERAPEUTIC ACTIVITIES: CPT

## 2023-02-14 PROCEDURE — 83735 ASSAY OF MAGNESIUM: CPT

## 2023-02-14 PROCEDURE — 97162 PT EVAL MOD COMPLEX 30 MIN: CPT

## 2023-02-14 PROCEDURE — 87635 SARS-COV-2 COVID-19 AMP PRB: CPT

## 2023-02-14 PROCEDURE — 80053 COMPREHEN METABOLIC PANEL: CPT

## 2023-02-14 PROCEDURE — 1100000003 HC PRIVATE W/ TELEMETRY

## 2023-02-14 PROCEDURE — 84145 PROCALCITONIN (PCT): CPT

## 2023-02-14 PROCEDURE — 97165 OT EVAL LOW COMPLEX 30 MIN: CPT

## 2023-02-14 PROCEDURE — 6370000000 HC RX 637 (ALT 250 FOR IP): Performed by: HOSPITALIST

## 2023-02-14 PROCEDURE — 99285 EMERGENCY DEPT VISIT HI MDM: CPT | Performed by: EMERGENCY MEDICINE

## 2023-02-14 PROCEDURE — 83880 ASSAY OF NATRIURETIC PEPTIDE: CPT

## 2023-02-14 PROCEDURE — 2500000003 HC RX 250 WO HCPCS: Performed by: EMERGENCY MEDICINE

## 2023-02-14 PROCEDURE — 94640 AIRWAY INHALATION TREATMENT: CPT

## 2023-02-14 PROCEDURE — 94760 N-INVAS EAR/PLS OXIMETRY 1: CPT

## 2023-02-14 PROCEDURE — 94762 N-INVAS EAR/PLS OXIMTRY CONT: CPT | Performed by: EMERGENCY MEDICINE

## 2023-02-14 PROCEDURE — 71045 X-RAY EXAM CHEST 1 VIEW: CPT

## 2023-02-14 PROCEDURE — 6360000002 HC RX W HCPCS: Performed by: HOSPITALIST

## 2023-02-14 PROCEDURE — 2500000003 HC RX 250 WO HCPCS: Performed by: HOSPITALIST

## 2023-02-14 PROCEDURE — 94640 AIRWAY INHALATION TREATMENT: CPT | Performed by: EMERGENCY MEDICINE

## 2023-02-14 PROCEDURE — 85027 COMPLETE CBC AUTOMATED: CPT

## 2023-02-14 PROCEDURE — 6370000000 HC RX 637 (ALT 250 FOR IP): Performed by: EMERGENCY MEDICINE

## 2023-02-14 PROCEDURE — 84484 ASSAY OF TROPONIN QUANT: CPT

## 2023-02-14 PROCEDURE — 96374 THER/PROPH/DIAG INJ IV PUSH: CPT | Performed by: EMERGENCY MEDICINE

## 2023-02-14 PROCEDURE — 2580000003 HC RX 258: Performed by: HOSPITALIST

## 2023-02-14 PROCEDURE — 82962 GLUCOSE BLOOD TEST: CPT

## 2023-02-14 PROCEDURE — 97535 SELF CARE MNGMENT TRAINING: CPT

## 2023-02-14 RX ORDER — SODIUM CHLORIDE 0.9 % (FLUSH) 0.9 %
5-40 SYRINGE (ML) INJECTION EVERY 12 HOURS SCHEDULED
Status: DISCONTINUED | OUTPATIENT
Start: 2023-02-14 | End: 2023-02-17 | Stop reason: HOSPADM

## 2023-02-14 RX ORDER — SODIUM BICARBONATE 650 MG/1
650 TABLET ORAL 2 TIMES DAILY
Status: DISCONTINUED | OUTPATIENT
Start: 2023-02-14 | End: 2023-02-17 | Stop reason: HOSPADM

## 2023-02-14 RX ORDER — ISOSORBIDE MONONITRATE 30 MG/1
60 TABLET, EXTENDED RELEASE ORAL DAILY
Status: DISCONTINUED | OUTPATIENT
Start: 2023-02-14 | End: 2023-02-16

## 2023-02-14 RX ORDER — INSULIN LISPRO 100 [IU]/ML
0-4 INJECTION, SOLUTION INTRAVENOUS; SUBCUTANEOUS NIGHTLY
Status: DISCONTINUED | OUTPATIENT
Start: 2023-02-14 | End: 2023-02-17 | Stop reason: HOSPADM

## 2023-02-14 RX ORDER — HYDRALAZINE HYDROCHLORIDE 50 MG/1
50 TABLET, FILM COATED ORAL 2 TIMES DAILY
Status: DISCONTINUED | OUTPATIENT
Start: 2023-02-14 | End: 2023-02-16

## 2023-02-14 RX ORDER — MAGNESIUM SULFATE IN WATER 40 MG/ML
2000 INJECTION, SOLUTION INTRAVENOUS PRN
Status: DISCONTINUED | OUTPATIENT
Start: 2023-02-14 | End: 2023-02-17 | Stop reason: HOSPADM

## 2023-02-14 RX ORDER — ONDANSETRON 2 MG/ML
4 INJECTION INTRAMUSCULAR; INTRAVENOUS EVERY 6 HOURS PRN
Status: DISCONTINUED | OUTPATIENT
Start: 2023-02-14 | End: 2023-02-17 | Stop reason: HOSPADM

## 2023-02-14 RX ORDER — INSULIN LISPRO 100 [IU]/ML
0-4 INJECTION, SOLUTION INTRAVENOUS; SUBCUTANEOUS
Status: DISCONTINUED | OUTPATIENT
Start: 2023-02-14 | End: 2023-02-17 | Stop reason: HOSPADM

## 2023-02-14 RX ORDER — ONDANSETRON 4 MG/1
4 TABLET, ORALLY DISINTEGRATING ORAL EVERY 8 HOURS PRN
Status: DISCONTINUED | OUTPATIENT
Start: 2023-02-14 | End: 2023-02-17 | Stop reason: HOSPADM

## 2023-02-14 RX ORDER — ACETAMINOPHEN 650 MG/1
650 SUPPOSITORY RECTAL EVERY 6 HOURS PRN
Status: DISCONTINUED | OUTPATIENT
Start: 2023-02-14 | End: 2023-02-17 | Stop reason: HOSPADM

## 2023-02-14 RX ORDER — ALLOPURINOL 300 MG/1
150 TABLET ORAL DAILY
Status: DISCONTINUED | OUTPATIENT
Start: 2023-02-14 | End: 2023-02-17 | Stop reason: HOSPADM

## 2023-02-14 RX ORDER — HEPARIN SODIUM 5000 [USP'U]/ML
5000 INJECTION, SOLUTION INTRAVENOUS; SUBCUTANEOUS EVERY 8 HOURS SCHEDULED
Status: DISCONTINUED | OUTPATIENT
Start: 2023-02-14 | End: 2023-02-17 | Stop reason: HOSPADM

## 2023-02-14 RX ORDER — POLYETHYLENE GLYCOL 3350 17 G/17G
17 POWDER, FOR SOLUTION ORAL DAILY PRN
Status: DISCONTINUED | OUTPATIENT
Start: 2023-02-14 | End: 2023-02-17 | Stop reason: HOSPADM

## 2023-02-14 RX ORDER — BUDESONIDE 0.5 MG/2ML
0.5 INHALANT ORAL 2 TIMES DAILY
Status: DISCONTINUED | OUTPATIENT
Start: 2023-02-14 | End: 2023-02-17 | Stop reason: HOSPADM

## 2023-02-14 RX ORDER — SODIUM CHLORIDE 0.9 % (FLUSH) 0.9 %
5-40 SYRINGE (ML) INJECTION PRN
Status: DISCONTINUED | OUTPATIENT
Start: 2023-02-14 | End: 2023-02-17 | Stop reason: HOSPADM

## 2023-02-14 RX ORDER — MAGNESIUM HYDROXIDE/ALUMINUM HYDROXICE/SIMETHICONE 120; 1200; 1200 MG/30ML; MG/30ML; MG/30ML
30 SUSPENSION ORAL EVERY 6 HOURS PRN
Status: DISCONTINUED | OUTPATIENT
Start: 2023-02-14 | End: 2023-02-17 | Stop reason: HOSPADM

## 2023-02-14 RX ORDER — IPRATROPIUM BROMIDE AND ALBUTEROL SULFATE 2.5; .5 MG/3ML; MG/3ML
1 SOLUTION RESPIRATORY (INHALATION) ONCE
Status: COMPLETED | OUTPATIENT
Start: 2023-02-14 | End: 2023-02-14

## 2023-02-14 RX ORDER — NITROGLYCERIN 0.4 MG/1
0.4 TABLET SUBLINGUAL EVERY 5 MIN PRN
Status: DISCONTINUED | OUTPATIENT
Start: 2023-02-14 | End: 2023-02-17 | Stop reason: HOSPADM

## 2023-02-14 RX ORDER — LEVOTHYROXINE SODIUM 88 UG/1
88 TABLET ORAL
Status: DISCONTINUED | OUTPATIENT
Start: 2023-02-15 | End: 2023-02-17 | Stop reason: HOSPADM

## 2023-02-14 RX ORDER — DEXTROSE MONOHYDRATE 100 MG/ML
INJECTION, SOLUTION INTRAVENOUS CONTINUOUS PRN
Status: DISCONTINUED | OUTPATIENT
Start: 2023-02-14 | End: 2023-02-17 | Stop reason: HOSPADM

## 2023-02-14 RX ORDER — BUMETANIDE 0.25 MG/ML
0.5 INJECTION, SOLUTION INTRAMUSCULAR; INTRAVENOUS 2 TIMES DAILY
Status: DISCONTINUED | OUTPATIENT
Start: 2023-02-14 | End: 2023-02-16

## 2023-02-14 RX ORDER — SODIUM CHLORIDE 9 MG/ML
INJECTION, SOLUTION INTRAVENOUS PRN
Status: DISCONTINUED | OUTPATIENT
Start: 2023-02-14 | End: 2023-02-17 | Stop reason: HOSPADM

## 2023-02-14 RX ORDER — POTASSIUM CHLORIDE 20 MEQ/1
40 TABLET, EXTENDED RELEASE ORAL PRN
Status: DISCONTINUED | OUTPATIENT
Start: 2023-02-14 | End: 2023-02-17 | Stop reason: HOSPADM

## 2023-02-14 RX ORDER — INSULIN GLARGINE 100 [IU]/ML
25 INJECTION, SOLUTION SUBCUTANEOUS 2 TIMES DAILY
Status: DISCONTINUED | OUTPATIENT
Start: 2023-02-14 | End: 2023-02-17 | Stop reason: HOSPADM

## 2023-02-14 RX ORDER — CALCITRIOL 0.25 UG/1
0.25 CAPSULE, LIQUID FILLED ORAL DAILY
Status: DISCONTINUED | OUTPATIENT
Start: 2023-02-14 | End: 2023-02-17 | Stop reason: HOSPADM

## 2023-02-14 RX ORDER — CLOPIDOGREL BISULFATE 75 MG/1
75 TABLET ORAL DAILY
Status: DISCONTINUED | OUTPATIENT
Start: 2023-02-14 | End: 2023-02-17 | Stop reason: HOSPADM

## 2023-02-14 RX ORDER — TAMSULOSIN HYDROCHLORIDE 0.4 MG/1
0.4 CAPSULE ORAL DAILY
Status: DISCONTINUED | OUTPATIENT
Start: 2023-02-14 | End: 2023-02-15

## 2023-02-14 RX ORDER — METOPROLOL SUCCINATE 100 MG/1
100 TABLET, EXTENDED RELEASE ORAL 2 TIMES DAILY
Status: DISCONTINUED | OUTPATIENT
Start: 2023-02-14 | End: 2023-02-17

## 2023-02-14 RX ORDER — POTASSIUM CHLORIDE 7.45 MG/ML
10 INJECTION INTRAVENOUS PRN
Status: DISCONTINUED | OUTPATIENT
Start: 2023-02-14 | End: 2023-02-17 | Stop reason: HOSPADM

## 2023-02-14 RX ORDER — ASPIRIN 81 MG/1
81 TABLET ORAL DAILY
Status: DISCONTINUED | OUTPATIENT
Start: 2023-02-14 | End: 2023-02-17 | Stop reason: HOSPADM

## 2023-02-14 RX ORDER — BUMETANIDE 0.25 MG/ML
1 INJECTION, SOLUTION INTRAMUSCULAR; INTRAVENOUS ONCE
Status: COMPLETED | OUTPATIENT
Start: 2023-02-14 | End: 2023-02-14

## 2023-02-14 RX ORDER — ACETAMINOPHEN 325 MG/1
650 TABLET ORAL EVERY 6 HOURS PRN
Status: DISCONTINUED | OUTPATIENT
Start: 2023-02-14 | End: 2023-02-17 | Stop reason: HOSPADM

## 2023-02-14 RX ORDER — LEVALBUTEROL INHALATION SOLUTION 0.63 MG/3ML
0.63 SOLUTION RESPIRATORY (INHALATION)
Status: DISCONTINUED | OUTPATIENT
Start: 2023-02-14 | End: 2023-02-17 | Stop reason: HOSPADM

## 2023-02-14 RX ADMIN — IPRATROPIUM BROMIDE AND ALBUTEROL SULFATE 1 AMPULE: .5; 3 SOLUTION RESPIRATORY (INHALATION) at 10:22

## 2023-02-14 RX ADMIN — HYDRALAZINE HYDROCHLORIDE 50 MG: 50 TABLET, FILM COATED ORAL at 21:59

## 2023-02-14 RX ADMIN — SODIUM CHLORIDE, PRESERVATIVE FREE 10 ML: 5 INJECTION INTRAVENOUS at 15:31

## 2023-02-14 RX ADMIN — SODIUM BICARBONATE 650 MG: 650 TABLET ORAL at 12:49

## 2023-02-14 RX ADMIN — BUMETANIDE 1 MG: 0.25 INJECTION INTRAMUSCULAR; INTRAVENOUS at 10:54

## 2023-02-14 RX ADMIN — BUMETANIDE 0.5 MG: 0.25 INJECTION INTRAMUSCULAR; INTRAVENOUS at 21:59

## 2023-02-14 RX ADMIN — SODIUM BICARBONATE 650 MG: 650 TABLET ORAL at 21:59

## 2023-02-14 RX ADMIN — ALLOPURINOL 150 MG: 100 TABLET ORAL at 12:48

## 2023-02-14 RX ADMIN — BUDESONIDE 500 MCG: 0.5 INHALANT RESPIRATORY (INHALATION) at 20:00

## 2023-02-14 RX ADMIN — SODIUM CHLORIDE, PRESERVATIVE FREE 10 ML: 5 INJECTION INTRAVENOUS at 22:00

## 2023-02-14 RX ADMIN — INSULIN GLARGINE 25 UNITS: 100 INJECTION, SOLUTION SUBCUTANEOUS at 21:55

## 2023-02-14 RX ADMIN — METOPROLOL SUCCINATE 100 MG: 100 TABLET, EXTENDED RELEASE ORAL at 22:00

## 2023-02-14 RX ADMIN — ASPIRIN 81 MG: 81 TABLET ORAL at 12:47

## 2023-02-14 RX ADMIN — LEVALBUTEROL HYDROCHLORIDE 0.63 MG: 0.63 SOLUTION RESPIRATORY (INHALATION) at 20:01

## 2023-02-14 RX ADMIN — HEPARIN SODIUM 5000 UNITS: 5000 INJECTION INTRAVENOUS; SUBCUTANEOUS at 15:32

## 2023-02-14 RX ADMIN — HEPARIN SODIUM 5000 UNITS: 5000 INJECTION INTRAVENOUS; SUBCUTANEOUS at 22:00

## 2023-02-14 RX ADMIN — TAMSULOSIN HYDROCHLORIDE 0.4 MG: 0.4 CAPSULE ORAL at 12:48

## 2023-02-14 ASSESSMENT — PAIN - FUNCTIONAL ASSESSMENT: PAIN_FUNCTIONAL_ASSESSMENT: 0-10

## 2023-02-14 ASSESSMENT — PAIN SCALES - GENERAL: PAINLEVEL_OUTOF10: 0

## 2023-02-14 NOTE — H&P
[facility] Cardiology Initial Cardiac Evaluation      Date of  Admission: 2/14/2023  9:27 AM     Primary Care Physician: Ching Sewell MD  Primary Cardiologist: Dr Turner Borges  Referring Physician: Dr Raffy Gaitan  Supervising Physician: Dr Amaris Cast    CC/Reason for evaluation: recently discharged on 2/12/2023, underlying history of CAD, chronic diastolic heart failure, pulmonary hypertension with possible cor pulmonale     HPI:  Jose Aviles is a 79 y.o. male with prior history of CAD s/p PCI pLCx 1/27/23, HFpEF, HTN, HLD, DM II, CKD IV, hypothyroidism, AS, JORJE on CPAP, asthma, Gout and morbid Obesity who presented to the ED with SOB and 3lb weight gain since Sunday. Patient with multiple recent admissions. First, patient was a direct admit from office 1/25/23 for accelerated angina. Patient has CKD IV with baseline Cr in low 3's therefore nephrology was consulted for nephropathy risk assessment and recommendations. Patient was given IV hydration with 100cc/hr of NS overnight prior to [facility]. Underwent cardiac catheterization by Dr. Tari Green on 1/27/2023. Patient was found to have a 99 stenosis of the pLCx that was stented with a 2.5 x 12mm Bola KAMLESH with 10% residual stenosis. ECHO showed preserved LV systolic function and abnormal diastolic function. Creatinine remained stable and patient was discharged home with instruction to have labs and follow up the following week with nephrology to assess for MAGGIE. Second admission was the following day, patient presented to [facility] ED on 1/28 with complaint of progressive SOB and lower extremity edema. The patient was started on IV Bumex for diuresis. He diuresed ~790 cc and felt better. Creatine remained stable ~3.3. The following morning on 1/29 was discharged home. The patient was seen by Dr Turner Borges on 2/8/2023 for hospital follow up. Patient reported more SEARS and weight gain. Had lab work done that showed Hgb 8.8 and D-dimer of 1.5.  Was directly admitted on 2/8 for dyspnea. NM lung scan showed several small nonsegmental perfusion defects which by perfusion alone cannot be categorized. No moderate or large size perfusion defects to suggest pulmonary emboli. Pulmonary consulted and recommenced nebulizer and breo. On 2/12 felt much better and determined stable for discharge home. Was instructed to increase torsemide to 80 mg in the morning and continued 40 mg in the evening. Patient states since going home he did as instructed but continued to gain weight and become SOB with minimal exertion. In ED, 124/68 with HR 76. Labs showed WBC 6.3, H/H 9.1/29.3, Ptl 236, Na 141, K 4.8, BUN/Cr 91/3.70, albumin 2.9, Mg 2.6, pBNP 40101, procal 0.18, hs trop 67.7. Rapid COVID negative. CXR showed bilateral interstitial opacities, similar to slightly increased from prior, reflecting pulmonary edema and small bilateral pleural effusions. Was given IV Bumex 1 mg and Duo Neb in ED. Admitted to medicine team for volume overload and SEARS. Cardiology consulted for further evaluation.       Past Medical History:   Diagnosis Date    Acquired hypothyroidism 4/8/2016    Anemia 1/21/2015    Anemia in chronic kidney disease 4/8/2016    Asthma     Chronic kidney disease     stage 4 kidney disease- Dr. Jeffrey Son    CKD (chronic kidney disease) 10/11/2012    Coronary atherosclerosis of native coronary vessel 2/10/2016    Diabetes mellitus type 2 in obese ([facility] 10/11/2012    avg 150, symptoms of hypoglycemia 90    Diastolic heart failure ([address]     Dyspnea     Dyspnea 2/10/2016    Edema 10/11/2012    Elevated liver function tests 8/20/2015    Hypertension 10/11/2012    Hyponatremia 1/21/2015    Morbid obesity ([facility] [address]     BMI 43.54    JORJE (obstructive sleep apnea) 10/11/2012    cpap    Seborrheic dermatitis 1/16/2013    Seborrheic dermatitis       Past Surgical History:   Procedure Laterality Date    CARDIAC CATHETERIZATION  2016    3 stent    CARDIAC PROCEDURE N/A 1/27/2023    LEFT HEART CATH / CORONARY ANGIOGRAPHY performed by Mp Byrnes MD at [facility] CARDIAC CATH LAB    CARDIAC PROCEDURE N/A 2023    Percutaneous coronary intervention performed by Mp Byrnes MD at [facility] CARDIAC CATH LAB    CATARACT REMOVAL Bilateral 2017    HERNIA REPAIR  1957       Allergies   Allergen Reactions    Amlodipine Shortness Of Breath    Iodine Shortness Of Breath    Metformin Shortness Of Breath    Ranolazine Other (See Comments)     Lips and tongue numbness    Spironolactone Other (See Comments)     Potassium level went really high  Increased potassium    Hydrocodone-Acetaminophen Other (See Comments)     Pt states that if he takes this  The medication makes him feel like he is going crazy    Omeprazole Other (See Comments)     Caused drastic drop in blood sugar      Social History     Socioeconomic History    Marital status:      Spouse name: Not on file    Number of children: Not on file    Years of education: Not on file    Highest education level: Not on file   Occupational History    Not on file   Tobacco Use    Smoking status: Former     Packs/day: 0.50     Years: 2.00     Pack years: 1.00     Types: Cigarettes     Quit date: 1962     Years since quittin.1    Smokeless tobacco: Never   Substance and Sexual Activity    Alcohol use: No    Drug use: No    Sexual activity: Not on file   Other Topics Concern    Not on file   Social History Narrative    Not on file     Social Determinants of Health     Financial Resource Strain: Not on file   Food Insecurity: Not on file   Transportation Needs: Not on file   Physical Activity: Not on file   Stress: Not on file   Social Connections: Not on file   Intimate Partner Violence: Not on file   Housing Stability: Not on file     Social History       Tobacco History       Smoking Status  Former Quit Date  1962 Smoking Frequency  0.50 packs/day for 2.00 years (1.00 pk-yrs) Smoking Tobacco Type  Cigarettes quit in 1962      Smokeless Tobacco Use  Never Alcohol History       Alcohol Use Status  No              Drug Use       Drug Use Status  No              Sexual Activity       Sexually Active  Not Asked                    Family History   Problem Relation Age of Onset    Cancer Father         lung    No Known Problems Sister     No Known Problems Brother     Hypertension Father     No Known Problems Sister     Hypertension Mother         Current Facility-Administered Medications   Medication Dose Route Frequency    aspirin EC tablet 81 mg  81 mg Oral Daily    calcitRIOL (ROCALTROL) capsule 0.25 mcg  0.25 mcg Oral Daily    clopidogrel (PLAVIX) tablet 75 mg  75 mg Oral Daily    hydrALAZINE (APRESOLINE) tablet 50 mg  50 mg Oral BID    isosorbide mononitrate (IMDUR) extended release tablet 60 mg  60 mg Oral Daily    [START ON 2/15/2023] levothyroxine (SYNTHROID) tablet 88 mcg  88 mcg Oral QAM AC    metoprolol succinate (TOPROL XL) extended release tablet 100 mg  100 mg Oral BID    nitroGLYCERIN (NITROSTAT) SL tablet 0.4 mg  0.4 mg SubLINGual Q5 Min PRN    sodium bicarbonate tablet 650 mg  650 mg Oral BID    allopurinol (ZYLOPRIM) tablet 150 mg  150 mg Oral Daily    sodium chloride flush 0.9 % injection 5-40 mL  5-40 mL IntraVENous 2 times per day    sodium chloride flush 0.9 % injection 5-40 mL  5-40 mL IntraVENous PRN    0.9 % sodium chloride infusion   IntraVENous PRN    ondansetron (ZOFRAN-ODT) disintegrating tablet 4 mg  4 mg Oral Q8H PRN    Or    ondansetron (ZOFRAN) injection 4 mg  4 mg IntraVENous Q6H PRN    polyethylene glycol (GLYCOLAX) packet 17 g  17 g Oral Daily PRN    acetaminophen (TYLENOL) tablet 650 mg  650 mg Oral Q6H PRN    Or    acetaminophen (TYLENOL) suppository 650 mg  650 mg Rectal Q6H PRN    magnesium sulfate 2000 mg in 50 mL IVPB premix  2,000 mg IntraVENous PRN    potassium chloride (KLOR-CON M) extended release tablet 40 mEq  40 mEq Oral PRN    Or    potassium bicarb-citric acid (EFFER-K) effervescent tablet 40 mEq  40 mEq Oral PRN Or    potassium chloride 10 mEq/100 mL IVPB (Peripheral Line)  10 mEq IntraVENous PRN    aluminum & magnesium hydroxide-simethicone (MAALOX) 200-200-20 MG/5ML suspension 30 mL  30 mL Oral Q6H PRN    heparin (porcine) injection 5,000 Units  5,000 Units SubCUTAneous 3 times per day    glucose chewable tablet 16 g  4 tablet Oral PRN    dextrose bolus 10% 125 mL  125 mL IntraVENous PRN    Or    dextrose bolus 10% 250 mL  250 mL IntraVENous PRN    glucagon (rDNA) injection 1 mg  1 mg SubCUTAneous PRN    dextrose 10 % infusion   IntraVENous Continuous PRN    insulin glargine (LANTUS) injection vial 25 Units  25 Units SubCUTAneous BID    insulin lispro (HUMALOG) injection vial 0-4 Units  0-4 Units SubCUTAneous TID WC    insulin lispro (HUMALOG) injection vial 0-4 Units  0-4 Units SubCUTAneous Nightly    bumetanide (BUMEX) injection 0.5 mg  0.5 mg IntraVENous BID    levalbuterol (XOPENEX) nebulization 0.63 mg  0.63 mg Nebulization Q6H WA    budesonide (PULMICORT) nebulizer suspension 500 mcg  0.5 mg Nebulization BID    tamsulosin (FLOMAX) capsule 0.4 mg  0.4 mg Oral Daily     Current Outpatient Medications   Medication Sig    metoprolol succinate (TOPROL XL) 100 MG extended release tablet Take 1 tablet by mouth 2 times daily    Torsemide 40 MG TABS Take 80 mg by mouth every morning AND 40 mg every evening. albuterol (PROVENTIL) (2.5 MG/3ML) 0.083% nebulizer solution Take 3 mLs by nebulization in the morning, at noon, in the evening, and at bedtime Bill to Medicare D Code: J45.991    clopidogrel (PLAVIX) 75 MG tablet Take 1 tablet by mouth daily    nitroGLYCERIN (NITROSTAT) 0.4 MG SL tablet Place 1 tablet under the tongue every 5 minutes as needed for Chest pain up to max of 3 total doses. If no relief after 1 dose, call 911.     isosorbide mononitrate (IMDUR) 60 MG extended release tablet Take 1 tablet by mouth daily    hydrALAZINE (APRESOLINE) 50 MG tablet Take 1 tablet by mouth in the morning and at bedtime    CPAP Machine MISC by Does not apply route At HS    albuterol sulfate HFA (PROVENTIL;VENTOLIN;PROAIR) 108 (90 Base) MCG/ACT inhaler 2 puffs 4 times daily if needed for shortness of breath or wheezing. Patient will call when refills are needed    Fluticasone Furoate-Vilanterol (BREO ELLIPTA) 100-25 MCG/ACT AEPB 1 inhalation every morning, rinse mouth after use. allopurinol (ZYLOPRIM) 300 MG tablet Take 150 mg by mouth daily    aspirin 81 MG EC tablet Take 81 mg by mouth    calcitRIOL (ROCALTROL) 0.25 MCG capsule Take 0.25 mcg by mouth daily    Dulaglutide (TRULICITY) 1.5 TP/2.0FZ SOPN Inject 1.5 mg into the skin every 7 days On sundays    insulin glargine (LANTUS) 100 UNIT/ML injection vial Inject 25 Units into the skin 2 times daily    levothyroxine (SYNTHROID) 88 MCG tablet Take 88 mcg by mouth    sodium bicarbonate 650 MG tablet Take 650 mg by mouth 2 times daily       Review of Symptoms:    General: Positive for weight gain. No weakness, fever or chills  Skin: no rashes, lumps, or other skin changes  HEENT: no headache, dizziness, lightheadedness, vision changes, hearing changes, tinnitus, vertigo, sinus pressure/pain, bleeding gums, sore throat, or hoarseness  Neck: no swollen glands, goiter, pain or stiffness  Respiratory: Positive for dyspnea. No cough, sputum, hemoptysis,  wheezing  Cardiovascular: + as per HPI  Gastrointestinal: no GERD, constipation, diarrhea, liver problems, or h/o GI bleed  Urinary: no frequency, urgency , hematuria, burning/pain with urination, recent flank pain, polyuria, nocturia, or difficulty urinating  Peripheral Vascular: Positive for lower extremity edema.  No claudication, leg cramps, prior DVTs  Musculoskeletal: no muscle or joint pain/stiffness, joint swelling, erythema of joints, or back pain  Psychiatric: no depression or excessive stress  Neurological: no sensory or motor loss, seizures, syncope, tremors, numbness, no dementia  Hematologic: no anemia, easy bruising or bleeding  Endocrine: Positive for diabetes. No thyroid problems       Subjective:     Physical Exam:    Vitals:    02/14/23 1030 02/14/23 1045 02/14/23 1100 02/14/23 1115   BP:   137/77    Pulse: 71 73 71 71   Resp: 16 18 19 18   Temp:       TempSrc:       SpO2: 98% 98% 96% 97%   Weight:       Height:         General: Well Developed, Well Nourished, No Acute Distress  HEENT: pupils equal and round, no abnormalities noted  Neck: supple, no JVD, no carotid bruits  Heart: S1S2 with RRR without murmurs or gallops  Lungs: Diminished   Abd: soft, nontender, nondistended, with good bowel sounds  Ext: warm, + edema, calves supple/nontender, pulses 2+ bilaterally  Skin: warm and dry  Psychiatric: Normal mood and affect  Neurologic: Alert and oriented X 3    Cardiographics    Echocardiogram: 01/25/23    TRANSTHORACIC ECHOCARDIOGRAM (TTE) COMPLETE (CONTRAST/BUBBLE/3D PRN) 01/26/2023 10:37 AM, 01/26/2023 12:00 AM (Final)    Interpretation Summary    Left Ventricle: Normal left ventricular systolic function with a visually estimated EF of 60 - 65%. Left ventricle size is normal. Mildly increased wall thickness. Normal wall motion. Abnormal diastolic function. Aortic Valve: Trileaflet valve. Moderate sclerosis of the aortic valve cusp. Moderate stenosis of the aortic valve. AV mean gradient is 25 mmHg. AV area by continuity VTI is 0.9 cm2. Mitral Valve: Mild regurgitation. Tricuspid Valve: The estimated RVSP is 57 mmHg. Left Atrium: Left atrium is severely dilated. Right Atrium: Right atrium is moderately dilated. Aorta: Dilated aortic root. Ao root diameter is 3.7 cm. Technical qualifiers: Technically difficult study due to patient's body habitus, color flow Doppler was performed and pulse wave and/or continuous wave Doppler was performed. Contrast used: Definity.     Signed by: China Anglin MD on 1/26/2023 10:37 AM, Signed by: Unknown Provider Result on 1/26/2023 12:00 AM    Cath: 01/25/23    CARDIAC PROCEDURE 01/27/2023  2:30 PM (Final)    Conclusion    Left heart cath selective coronary angiography left ventriculogram with stenting of the culprit proximal circumflex performed via right radial access    Technical difficulty of this case due to patient's BMI difficulty imaging with fluoroscopy and x-ray in the anatomic position of the arteries as well as severe calcification. There was also severe dye restrictions due to his creatinine clearance of 16    1. Diffuse mild to moderate calcified coronary artery disease in the LAD and right coronary artery    2. Severe 99% stenosis in the proximal circumflex    3. Stenting of the proximal circumflex with a 2.5x12 Bola drug-eluting stent postdilated with a 3.0x15 noncompliant balloon    4. Severely elevated LVEDP of 45 mmHg    Acute indications: ACS > 24 hrs. NYHA class: III. Angina type: typical.  Patient took anti-anginal meds within the past 2 weeks (beta blockers). Valvular disease: aortic valve. PCI Indication(s): NSTE - ACS. AUC Score = 7. Signed by: Esperanza Gray MD on 1/27/2023  2:30 PM    Labs:     Recent Labs     02/14/23  0929 02/09/23  1544 02/08/23  1012   WBC 6.3 6.5 7.8   HGB 9.1* 9.3* 8.8*   HCT 29.3* 29.1* 28.4*   MCV 97.0 96.4 98.3    246 273     Lab Results   Component Value Date    WBC 6.3 02/14/2023    HGB 9.1 (L) 02/14/2023    HCT 29.3 (L) 02/14/2023     02/14/2023    ALT 12 02/14/2023    AST 17 02/14/2023     02/14/2023    K 4.8 02/14/2023     02/14/2023    CREATININE 3.70 (H) 02/14/2023    BUN 91 (H) 02/14/2023    CO2 23 02/14/2023    TSH 0.924 07/02/2021    INR 1.2 01/29/2023    LABA1C 7.4 (H) 01/26/2023        Recent Labs     02/08/23  1012 01/29/23  0006   DDIMER 1.53*  --    TROPONINI  --  1.48*     Pt has been seen and examined by Dr. Porfirio Nelson. He agrees with the following assessment and plan.      Assessment/Plan:       Principal Problem:    Volume overload  - IV bumex BID   - strict I&O's  - daily weights   - consult nephrology     Active Problems:    Restrictive lung disease  - per primary team          Hypothyroidism due to Hashimoto's thyroiditis  - per primary team       Generalized weakness  - per primary team       Decreased exercise tolerance  - per primary team        Chronic kidney disease (CKD), stage IV (severe) ([facility]  - consult nephrology        JORJE (obstructive sleep apnea)  - per primary team        Obesity, morbid ([facility]  - encourage weight reduction       SEARS (dyspnea on exertion)  - multifactorial       Type 2 diabetes with nephropathy ([facility]  - per primary team        Hypertension  - monitor and titrate medications as needed       CAD in native artery  - recent PCI  - continue DAPT    Thank you for requesting cardiac evaluation and allowing us to participate in the care of this patient. We will continue to follow along with you.       Mine Quintero PA-C  Supervising Physician: Dr Nikole Brandon

## 2023-02-14 NOTE — ED NOTES
TRANSFER - OUT REPORT:    Verbal report given to [address] [place] [address] on [employer]  being transferred to 8th floor for routine progression of patient care       Report consisted of patient's Situation, Background, Assessment and   Recommendations(SBAR). Information from the following report(s) Nurse Handoff Report was reviewed with the receiving nurse. Royal Assessment: No data recorded  Lines:   Peripheral IV 02/14/23 Right Forearm (Active)   Site Assessment Clean, dry & intact 02/14/23 0928   Line Status Blood return noted; Flushed 02/14/23 0928   Phlebitis Assessment No symptoms 02/14/23 0928   Infiltration Assessment 0 02/14/23 5968        Opportunity for questions and clarification was provided.       Patient transported with:  Mehul Ocampo RN  02/14/23 1364

## 2023-02-14 NOTE — H&P
Hospitalist History and Physical   Admit Date:  2023  9:27 AM   Name:  Tracey Cooper   Age:  79 y.o. Sex:  male  :  1940   MRN:  [id number]   Room:  ER15/15    Presenting Complaint: Shortness of Breath     Reason(s) for Admission: Acute pulmonary edema ([facility] [address] [J81.0]     History of Present Illness:   Tracey Cooper is a 79 y.o. male with medical history of chronic CHF with preserved ejection fraction, CAD status post recent heart cath on 2023 requiring KAMLESH in proximal circumflex (severely elevated LVEDP of 45 mmHg), restrictive lung disease, morbid obesity with BMI of 42.0, HTN, JORJE, DM2, CKD stage IV, hypothyroidism presented to the ER with chief complaints of dyspnea on exertion and at rest along with generalized weakness and difficulty in carrying out ADLs. Patient was recently discharged from [facility] cardiology service on 2023 after being managed for dyspnea and volume overload, patient was admitted on 2023. Patient reports of generalized weakness, extreme shortness of breath with minimal exertion which is affecting his ability to carry out ADLs. Patient denies any chest pain, fever, night sweats, cough, URI, abdominal pain, diarrhea, urinary symptoms. He does report of progressively worsening shortness of breath which was initially with exertion and now present at rest as well. Patient denies any PND. Patient stated that he can only walk 10 to 15 feet before he gets short of breath. He does not report of noticing any hypoxia at home. He does report of increased in his weight over the past 2 to 3 days, reports of lower extremity swelling. Denies any congestion, rhinorrhea. ER work-up remarkable for creatinine 3.7/BUN 91 (at baseline), BNP 95471, troponin 94.7, albumin 2.9, A1c 7.4 vitamin D 28.8, hemoglobin 9.1.   Chest x-ray with bilateral interstitial opacities similar to slightly increased from prior suggestive of pulmonary edema, persistent patchy left basilar opacity likely reflecting atelectasis, suspected small bilateral pleural effusions. EKG with NSR and first-degree AV block. Assessment & Plan:     Principal Problem:    Acute pulmonary edema (HCC)    SEARS (dyspnea on exertion)    Generalized weakness    Volume overload    Decreased exercise tolerance  Plan: 2/14-  Patient is volume overloaded on clinical exam with 1-2+ pitting edema, needs to optimize fluid status. Normally takes torsemide at home, for now will be continuing Bumex 0.5 mg IV twice daily with close monitoring of renal function. Given Bumex iv 1 mg x 1 in ER. Patient does not want a Garcia catheter. Needs to monitor intake and output. Recent echo from 1/26 showed EF 60 to 80% with diastolic dysfunction, moderate aortic valve stenosis with AV mean gradient of 25 mmHg, RVSP of 57 mmHg suggestive of pulmonary hypertension. PT OT consulted for generalized weakness and decreased exercise tolerance. On echo patient does have elevated RVSP of 57 mmHg suggestive of pulmonary hypertension which combined with elevated LVEDP likely contributing to exertional dyspnea and decreased exercise tolerance. Cardiology to consider if patient needs RHC. Active Problems:    Restrictive lung disease    Asthma  Plan: 2/14-  Patient follows up with [place] [facility]. Currently does not appear to be in asthma exacerbation. Does have a questionable COPD. Uses albuterol and Trelegy inhalers at home. For now we will continue Xopenex and Pulmicort nebs scheduled. Incentive spirometer ordered. Chronic heart failure with preserved ejection fraction (HFpEF) (HCC)    CAD in native artery  Plan: 2/14-  Continue GDMT  Continue Bumex 0.5 mg IV twice daily with close monitoring of renal function. Toprol- mg p.o. twice daily  Hydralazine 50 mg p.o. twice daily  Imdur 60 mg p.o. daily  Continue aspirin Plavix for underlying CAD and KAMLESH for recent proximal circumflex.   Protonix 40 mg daily for GI prophylaxis. Consulting cardiology      Chronic gout due to renal impairment without tophus  Plan: 2/14-  Continue allopurinol 150 mg p.o. daily. Close monitoring for gout flareup given ongoing diuresis. Hypothyroidism due to Hashimoto's thyroiditis  Plan: 2/14-  Continue Synthroid 88 mcg p.o. daily before breakfast.      Chronic kidney disease (CKD), stage IV (severe) ([address]  Plan: 2/14-  Renal function is at baseline, creatinine 3.7, BUN 91. Close monitoring of renal function with ongoing diuretics. May need to consult nephrology should renal function get worse. JORJE (obstructive sleep apnea)  Plan: 2/14-  Patient advised to use home CPAP at bedtime. Obesity, morbid ([address]    Plan: 2/14-  BMI of 42.0, encouraged to lose weight. Increases medical complexity and all cause mortality. Type 2 diabetes with nephropathy ([address]  Plan: 2/14-  POC glucose QA CHS. Continue Lantus 25 units subcu twice daily along with sliding scale. Hypertension  Plan: 2/14-  Blood pressure is optimally controlled with Toprol-XL, Imdur and hydralazine. Continue monitor BP while inpatient, titrate medications accordingly. PT/OT evals and PPD needed/ordered? Yes    Diet: ADULT DIET; Regular; 3 carb choices (45 gm/meal);  No Added Salt (3-4 gm); 1500 ml  VTE prophylaxis:Heparin  Code status: Full Code    Hospital Problems:  Principal Problem:    Acute pulmonary edema (HCC)  Active Problems:    Restrictive lung disease    Volume overload    Chronic heart failure with preserved ejection fraction (HFpEF) (HCC)    Chronic gout due to renal impairment without tophus    Hypothyroidism due to Hashimoto's thyroiditis    Generalized weakness    Decreased exercise tolerance    Chronic kidney disease (CKD), stage IV (severe) (HCC)    JORJE (obstructive sleep apnea)    Obesity, morbid (HCC)    SEARS (dyspnea on exertion)    Type 2 diabetes with nephropathy ([address]    Hypertension    CAD in native artery  Resolved Problems: * No resolved hospital problems.  *       Past History:     Past Medical History:   Diagnosis Date    Acquired hypothyroidism 2016    Anemia 2015    Anemia in chronic kidney disease 2016    Asthma     Chronic kidney disease     stage 4 kidney disease- Dr. Neisha Ragland    CKD (chronic kidney disease) 10/11/2012    Coronary atherosclerosis of native coronary vessel 2/10/2016    Diabetes mellitus type 2 in obese ([address] 10/11/2012    avg 150, symptoms of hypoglycemia 90    Diastolic heart failure ([facility] [address]     Dyspnea     Dyspnea 2/10/2016    Edema 10/11/2012    Elevated liver function tests 2015    Hypertension 10/11/2012    Hyponatremia 2015    Morbid obesity ([facility] [address]     BMI 43.54    JORJE (obstructive sleep apnea) 10/11/2012    cpap    Seborrheic dermatitis 2013    Seborrheic dermatitis        Past Surgical History:   Procedure Laterality Date    CARDIAC CATHETERIZATION      3 stent    CARDIAC PROCEDURE N/A 2023    LEFT HEART CATH / CORONARY ANGIOGRAPHY performed by Rama Espinal MD at [address] [place] [address] CATH LAB    CARDIAC PROCEDURE N/A 2023    Percutaneous coronary intervention performed by Rama Espinal MD at [address] [place] [address] CATH LAB    CATARACT REMOVAL Bilateral     [facility]        Social History     Tobacco Use    Smoking status: Former     Packs/day: 0.50     Years: 2.00     Pack years: 1.00     Types: Cigarettes     Quit date: 1962     Years since quittin.1    Smokeless tobacco: Never   Substance Use Topics    Alcohol use: No      Social History     Substance and Sexual Activity   Drug Use No       Family History   Problem Relation Age of Onset    Cancer Father         lung    No Known Problems Sister     No Known Problems Brother     Hypertension Father     No Known Problems Sister     Hypertension Mother         Immunization History   Administered Date(s) Administered    COVID-19, PFIZER PURPLE top, DILUTE for use, (age 15 y+), 30mcg/0.3mL 2021, 02/15/2021, 09/28/2021, 05/11/2022    Influenza Trivalent 09/30/2010, 09/27/2017    Influenza Virus Vaccine 09/01/2012, 11/09/2013, 09/22/2014, 09/01/2017    Influenza, High Dose (Fluzone 65 yrs and older) 11/30/2015, 09/01/2016, 09/01/2019, 09/04/2020, 09/01/2021    Influenza, Triv, inactivated, subunit, adjuvanted, IM (Fluad 65 yrs and older) 09/10/2018    Pneumococcal Conjugate 13-valent ([id number]) 11/18/2015    Pneumococcal Vaccine 09/01/2012    Td vaccine (adult) 09/01/2012     Allergies   Allergen Reactions    Amlodipine Shortness Of Breath    Iodine Shortness Of Breath    Metformin Shortness Of Breath    Ranolazine Other (See Comments)     Lips and tongue numbness    Spironolactone Other (See Comments)     Potassium level went really high  Increased potassium    Hydrocodone-Acetaminophen Other (See Comments)     Pt states that if he takes this  The medication makes him feel like he is going crazy    Omeprazole Other (See Comments)     Caused drastic drop in blood sugar     Prior to Admit Medications:  Current Outpatient Medications   Medication Instructions    albuterol (PROVENTIL) 2.5 mg, Nebulization, 4 times daily, Bill bird PORTILLO Code: J45.991    albuterol sulfate HFA (PROVENTIL;VENTOLIN;PROAIR) 108 (90 Base) MCG/ACT inhaler 2 puffs 4 times daily if needed for shortness of breath or wheezing.   Patient will call when refills are needed    allopurinol (ZYLOPRIM) 150 mg, Oral, DAILY    aspirin 81 mg, Oral    calcitRIOL (ROCALTROL) 0.25 mcg, Oral, DAILY    clopidogrel (PLAVIX) 75 mg, Oral, DAILY    CPAP Machine MISC Does not apply, At HS    Fluticasone Furoate-Vilanterol (BREO ELLIPTA) 100-25 MCG/ACT AEPB 1 inhalation every morning, rinse mouth after use.    hydrALAZINE (APRESOLINE) 50 mg, Oral, 2 times daily    insulin glargine (LANTUS) 25 Units, SubCUTAneous, 2 TIMES DAILY    isosorbide mononitrate (IMDUR) 60 mg, Oral, DAILY    levothyroxine (SYNTHROID) 88 mcg, Oral    metoprolol succinate (TOPROL XL) 100 mg, Oral, 2 TIMES DAILY    nitroGLYCERIN (NITROSTAT) 0.4 mg, SubLINGual, EVERY 5 MIN PRN, up to max of 3 total doses. If no relief after 1 dose, call 911. sodium bicarbonate 650 mg, Oral, 2 TIMES DAILY    Torsemide 40 MG TABS Take 80 mg by mouth every morning AND 40 mg every evening. Trulicity 1.5 mg, SubCUTAneous, EVERY 7 DAYS, On sundays         Objective:   Patient Vitals for the past 24 hrs:   Temp Pulse Resp BP SpO2   02/14/23 1100 -- 71 19 137/77 96 %   02/14/23 1045 -- 73 18 -- 98 %   02/14/23 1030 -- 71 16 -- 98 %   02/14/23 1025 -- 72 18 -- 98 %   02/14/23 1015 -- 71 21 -- 97 %   02/14/23 1005 -- 78 21 -- 96 %   02/14/23 1000 -- -- -- 125/75 91 %   02/14/23 0917 97.3 °F (36.3 °C) 76 20 124/68 99 %       Oxygen Therapy  SpO2: 96 %  Pulse via Oximetry: 71 beats per minute  O2 Device: None (Room air)    Estimated body mass index is 42.09 kg/m² as calculated from the following:    Height as of this encounter: 6' 1\" (1.854 m). Weight as of this encounter: 319 lb (144.7 kg). No intake or output data in the 24 hours ending 02/14/23 1114      Physical Exam:    Blood pressure 137/77, pulse 71, temperature 97.3 °F (36.3 °C), temperature source Oral, resp. rate 19, height 6' 1\" (1.854 m), weight (!) 319 lb (144.7 kg), SpO2 96 %. General:    Alert, awake, obese, slight conversational dyspnea noted, on room air  Head:  Normocephalic, atraumatic  Eyes:  Sclerae appear normal.  Pupils equally round. ENT:  Nares appear normal.  Moist oral mucosa  Neck:  No restricted ROM. Trachea midline   CV:   RRR. No m/r/g. No jugular venous distension. 1-2+ pitting edema in bilateral lower extremity  Lungs:   Distant breath sounds, breath sounds clear, no wheezing rhonchi or rales. Abdomen:   Soft, nontender, nondistended. Extremities: No cyanosis or clubbing. 1-2+ pitting edema in bilateral lower extremity  Skin:     No rashes and normal coloration. Warm and dry.     Neuro:  GCS 15, cranial is intact, no motor or sensory deficit  Psych:  AOx3, mood and affect appropriate.       I have personally reviewed labs and tests:  Recent Labs:  Recent Results (from the past 24 hour(s))   CBC    Collection Time: 02/14/23  9:29 AM   Result Value Ref Range    WBC 6.3 4.3 - 11.1 K/uL    RBC 3.02 (L) 4.23 - 5.6 M/uL    Hemoglobin 9.1 (L) 13.6 - 17.2 g/dL    Hematocrit 29.3 (L) 41.1 - 50.3 %    MCV 97.0 82 - 102 FL    MCH 30.1 26.1 - 32.9 PG    MCHC 31.1 (L) 31.4 - 35.0 g/dL    RDW 14.8 (H) 11.9 - 14.6 %    Platelets 096 011 - 260 K/uL    MPV 10.4 9.4 - 12.3 FL    nRBC 0.00 0.0 - 0.2 K/uL   Comprehensive Metabolic Panel    Collection Time: 02/14/23  9:29 AM   Result Value Ref Range    Sodium 141 133 - 143 mmol/L    Potassium 4.8 3.5 - 5.1 mmol/L    Chloride 108 101 - 110 mmol/L    CO2 23 21 - 32 mmol/L    Anion Gap 10 2 - 11 mmol/L    Glucose 214 (H) 65 - 100 mg/dL    BUN 91 (H) 8 - 23 MG/DL    Creatinine 3.70 (H) 0.8 - 1.5 MG/DL    Est, Glom Filt Rate 16 (L) >60 ml/min/1.73m2    Calcium 9.4 8.3 - 10.4 MG/DL    Total Bilirubin 0.4 0.2 - 1.1 MG/DL    ALT 12 12 - 65 U/L    AST 17 15 - 37 U/L    Alk Phosphatase 69 50 - 136 U/L    Total Protein 6.5 6.3 - 8.2 g/dL    Albumin 2.9 (L) 3.2 - 4.6 g/dL    Globulin 3.6 2.8 - 4.5 g/dL    Albumin/Globulin Ratio 0.8 0.4 - 1.6     Troponin    Collection Time: 02/14/23  9:29 AM   Result Value Ref Range    Troponin, High Sensitivity 94.7 (H) 0 - 14 pg/mL   Magnesium    Collection Time: 02/14/23  9:29 AM   Result Value Ref Range    Magnesium 2.6 (H) 1.8 - 2.4 mg/dL   Brain Natriuretic Peptide    Collection Time: 02/14/23  9:29 AM   Result Value Ref Range    NT Pro-BNP 15,525 (H) <450 PG/ML   COVID-19, Rapid    Collection Time: 02/14/23 10:16 AM    Specimen: Nasopharyngeal   Result Value Ref Range    Source NASAL      SARS-CoV-2, Rapid Not detected NOTD         I have personally reviewed imaging studies:  XR CHEST PORTABLE    Result Date: 2/14/2023  EXAMINATION: XR CHEST PORTABLE 2/14/2023 9:36 AM ACCESSION NUMBER: [id number] COMPARISON: Chest 2/9/2023 and earlier. INDICATION: Dyspnea TECHNIQUE: A single view of the chest was obtained. FINDINGS: Bilateral interstitial opacities, similar to slightly increased from prior. Persistent patchy left basilar opacity. Suspected small bilateral pleural effusions. No pneumothorax. Stable enlarged cardiomediastinal silhouette. -Bilateral interstitial opacities, similar to slightly increased from prior, likely reflecting pulmonary edema. -Persistent patchy left basilar opacity, likely reflecting atelectasis, though infection is also a consideration in appropriate clinical context. -Suspected small bilateral pleural effusions. Echocardiogram:  01/25/23    TRANSTHORACIC ECHOCARDIOGRAM (TTE) COMPLETE (CONTRAST/BUBBLE/3D PRN) 01/26/2023 10:37 AM, 01/26/2023 12:00 AM (Final)    Interpretation Summary    Left Ventricle: Normal left ventricular systolic function with a visually estimated EF of 60 - 65%. Left ventricle size is normal. Mildly increased wall thickness. Normal wall motion. Abnormal diastolic function. Aortic Valve: Trileaflet valve. Moderate sclerosis of the aortic valve cusp. Moderate stenosis of the aortic valve. AV mean gradient is 25 mmHg. AV area by continuity VTI is 0.9 cm2. Mitral Valve: Mild regurgitation. Tricuspid Valve: The estimated RVSP is 57 mmHg. Left Atrium: Left atrium is severely dilated. Right Atrium: Right atrium is moderately dilated. Aorta: Dilated aortic root. Ao root diameter is 3.7 cm. Technical qualifiers: Technically difficult study due to patient's body habitus, color flow Doppler was performed and pulse wave and/or continuous wave Doppler was performed. Contrast used: Definity.     Signed by: Kristine Meadows MD on 1/26/2023 10:37 AM, Signed by: Unknown Provider Result on 1/26/2023 12:00 AM        Orders Placed This Encounter   Medications    ipratropium-albuterol (DUONEB) nebulizer solution 1 ampule     Order Specific Question:   Initiate RT Bronchodilator Protocol     Answer:   Yes - ED Protocol    bumetanide (BUMEX) injection 1 mg    aspirin EC tablet 81 mg    calcitRIOL (ROCALTROL) capsule 0.25 mcg    clopidogrel (PLAVIX) tablet 75 mg    DISCONTD: mometasone-formoterol (DULERA) 200-5 MCG/ACT inhaler 2 puff    hydrALAZINE (APRESOLINE) tablet 50 mg    isosorbide mononitrate (IMDUR) extended release tablet 60 mg    levothyroxine (SYNTHROID) tablet 88 mcg    metoprolol succinate (TOPROL XL) extended release tablet 100 mg    nitroGLYCERIN (NITROSTAT) SL tablet 0.4 mg    sodium bicarbonate tablet 650 mg    allopurinol (ZYLOPRIM) tablet 150 mg    sodium chloride flush 0.9 % injection 5-40 mL    sodium chloride flush 0.9 % injection 5-40 mL    0.9 % sodium chloride infusion    OR Linked Order Group     ondansetron (ZOFRAN-ODT) disintegrating tablet 4 mg     ondansetron (ZOFRAN) injection 4 mg    polyethylene glycol (GLYCOLAX) packet 17 g    OR Linked Order Group     acetaminophen (TYLENOL) tablet 650 mg     acetaminophen (TYLENOL) suppository 650 mg    magnesium sulfate 2000 mg in 50 mL IVPB premix    OR Linked Order Group     potassium chloride (KLOR-CON M) extended release tablet 40 mEq     potassium bicarb-citric acid (EFFER-K) effervescent tablet 40 mEq     potassium chloride 10 mEq/100 mL IVPB (Peripheral Line)    aluminum & magnesium hydroxide-simethicone (MAALOX) 200-200-20 MG/5ML suspension 30 mL    heparin (porcine) injection 5,000 Units    glucose chewable tablet 16 g    OR Linked Order Group     dextrose bolus 10% 125 mL     dextrose bolus 10% 250 mL    glucagon (rDNA) injection 1 mg    dextrose 10 % infusion    insulin glargine (LANTUS) injection vial 25 Units    insulin lispro (HUMALOG) injection vial 0-4 Units    insulin lispro (HUMALOG) injection vial 0-4 Units    bumetanide (BUMEX) injection 0.5 mg    levalbuterol (XOPENEX) nebulization 0.63 mg     Do not substitute with albuterol    budesonide (PULMICORT) nebulizer suspension 500 mcg         Signed:  Antonette Davidson MD    Part of this note may have been written by using a voice dictation software. The note has been proof read but may still contain some grammatical/other typographical errors.

## 2023-02-14 NOTE — PROGRESS NOTES
ACUTE PHYSICAL THERAPY GOALS:   (Developed with and agreed upon by patient and/or caregiver.)  Pt will perform supine to/from sit with mod I in 7 treatment days. Pt will perform sit to/from stand with mod I and LRAD in 7 treatment days. Pt will ambulate 150 ft with mod I and LRAD in 7 treatment days. Pt will negotiate 3 stairs with mod I and LRAD in 7 treatment days. Pt will be independent with HEP in 7 days. PHYSICAL THERAPY Initial Assessment, Daily Note, and PM  (Link to Caseload Tracking: PT Visit Days : 1  Acknowledge Orders  Time In/Out  PT Charge Capture  Rehab Caseload Tracker    Irvin Oscar is a 79 y.o. male   PRIMARY DIAGNOSIS: Acute pulmonary edema (HCC)  Shortness of breath [R06.02]  Acute pulmonary edema (HCC) [J81.0]  Chronic kidney disease, unspecified CKD stage [N18.9]  Volume overload state of heart [E87.79]       Reason for Referral: Generalized Muscle Weakness (M62.81)  Other lack of cordination (R27.8)  Difficulty in walking, Not elsewhere classified (R26.2)  Other abnormalities of gait and mobility (R26.89)  Inpatient: Payor: MEDICARE / Plan: MEDICARE PART A AND B / Product Type: *No Product type* /     ASSESSMENT:     REHAB RECOMMENDATIONS:   Recommendation to date pending progress:  Setting:  Home Health Therapy    Equipment:     Rollator (large/heavy duty)     ASSESSMENT:  Mr. Ayaka Dennis is a 79 y.o. male with hx of CAD, CHF, and restrictive lung disease admitted with pulmonary edema and volume overload. Upon PT evaluation, pt exhibits gross weakness, impaired balance, and reduced cardiorespiratory reserve resulting in limited independence with functional mobility. At baseline, pt uses a walking stick for mobility and denies history of falls. Pt is now requiring CGA and is limited to 20' ambulation due to SOB. Able to increase to 30' ambulation with rollator with reduced unsteadiness. Pt will require HHPT at discharge.   He will also benefit from rollator to address poor cardiorespiratory reserve and improve his independence in his community. Pt will continue to benefit from skilled PT to address above impairments and maximize functional independence prior to discharge. .     [address] [facility] [address] [address] AM-PAC 6 Clicks Basic Mobility Inpatient Short Form  AM-PAC Basic Mobility - Inpatient   How much help is needed turning from your back to your side while in a flat bed without using bedrails?: A Little  How much help is needed moving from lying on your back to sitting on the side of a flat bed without using bedrails?: A Little  How much help is needed moving to and from a bed to a chair?: A Little  How much help is needed standing up from a chair using your arms?: A Little  How much help is needed walking in hospital room?: A Little  How much help is needed climbing 3-5 steps with a railing?: A Lot  AM-PAC Inpatient Mobility Raw Score : 17  AM-PAC Inpatient T-Scale Score : 42.13  Mobility Inpatient CMS 0-100% Score: 50.57  Mobility Inpatient CMS G-Code Modifier : CK    SUBJECTIVE:   Mr. Kathy Allen states, \"I just get so short of breath when I walk. \"     Social/Functional Lives With: Spouse (son lives close and is over daily to assist as needed)  Type of Home: [address] [place] [address],[address]: One level (no steps)  Bathroom Shower/Tub: Walk-in shower  Home Equipment: Cyndi Dodson  Has the patient had two or more falls in the past year or any fall with injury in the past year?: No  ADL Assistance: [facility]:  (assist available as needed)  Additional Comments: does not use AD, furniture walks    OBJECTIVE:     PAIN: Gennett Pérez / O2: Robi Smalls / Yifan Banker / DRAINS:   Pre Treatment:   Pain Assessment: None - Denies Pain      Post Treatment: none Vitals   Vitals  Heart Rate: 81    Oxygen  O2 Therapy: Room air  Heart Rate: 81  SpO2: 97 %   IV and Telemetry     RESTRICTIONS/PRECAUTIONS:                    GROSS EVALUATION: Intact Impaired (Comments):   AROM [x]     PROM [x]    Strength []  BLEs grossly 4-/5   Balance []  Wide STACY, reduced step length, moderate lateral sway, reaching out for UE support, suggesting increased fall risk. Posture [] N/A   Sensation [x]     Coordination [x]      Tone [x]     Edema [] Noted to distal BLEs. Activity Tolerance []  Limited to 20' ambulation due to SOB. Vitals stable.    []      COGNITION/  PERCEPTION: Intact Impaired (Comments):   Orientation [x]     Vision [x]     Hearing [x]     Cognition  [x]       MOBILITY: I Mod I S SBA CGA Min Mod Max Total  NT x2 Comments:   Bed Mobility    Rolling [] [] [] [] [] [] [] [] [] [x] []    Supine to Sit [] [] [] [] [] [] [] [] [] [x] []    Scooting [] [] [x] [] [] [] [] [] [] [] []    Sit to Supine [] [] [] [] [] [] [] [] [] [x] []    Transfers    Sit to Stand [] [] [] [] [x] [] [] [] [] [] []    Bed to Chair [] [] [] [] [x] [] [] [] [] [] []    Stand to Sit [] [] [] [] [x] [] [] [] [] [] []     [] [] [] [] [] [] [] [] [] [] []    I=Independent, Mod I=Modified Independent, S=Supervision, SBA=Standby Assistance, CGA=Contact Guard Assistance,   Min=Minimal Assistance, Mod=Moderate Assistance, Max=Maximal Assistance, Total=Total Assistance, NT=Not Tested    GAIT: I Mod I S SBA CGA Min Mod Max Total  NT x2 Comments:   Level of Assistance [] [] [] [] [x] [] [] [] [] [] [] X 20' without AD, x 30' with rollator. Gait pattern and stability improved with rollator.    Distance 20, 30 feet    DME Rollator and None    Gait Quality Decreased step clearance, Decreased step length, Decreased stance, Path deviations , Shuffling , Trunk sway increased, and Wide base of support    Weightbearing Status      Stairs      I=Independent, Mod I=Modified Independent, S=Supervision, SBA=Standby Assistance, CGA=Contact Guard Assistance,   Min=Minimal Assistance, Mod=Moderate Assistance, Max=Maximal Assistance, Total=Total Assistance, NT=Not Tested    PLAN:   FREQUENCY AND DURATION: 3 times/week for duration of hospital stay or until stated goals are met, whichever comes first.    THERAPY PROGNOSIS: Good    PROBLEM LIST:   (Skilled intervention is medically necessary to address:)  Decreased ADL/Functional Activities  Decreased Activity Tolerance  Decreased Balance  Decreased Gait Ability  Decreased Safety Awareness  Decreased Strength  Decreased Transfer Abilities INTERVENTIONS PLANNED:   (Benefits and precautions of physical therapy have been discussed with the patient.)  Therapeutic Activity  Therapeutic Exercise/HEP  Neuromuscular Re-education  Gait Training  Education       TREATMENT:   EVALUATION: MODERATE COMPLEXITY: (Untimed Charge)    TREATMENT:   Therapeutic Activity (23 Minutes): Therapeutic activity included Scooting, Transfer Training, Ambulation on level ground, Sitting balance , Standing balance, and education to improve functional Activity tolerance, Balance, Coordination, Mobility, and Strength. TREATMENT GRID:  N/A    AFTER TREATMENT PRECAUTIONS: Bed/Chair Locked, Call light within reach, Chair, Heels floated, Needs within reach, RN notified, and Visitors at bedside    INTERDISCIPLINARY COLLABORATION:  RN/ PCT and PT/ PTA    EDUCATION: Education Given To: Patient; Family  Education Provided: Role of Therapy;Plan of Care;Home Exercise Program;Fall Prevention Strategies; Equipment; Energy Conservation;Transfer Training  Education Method: Demonstration;Verbal  Barriers to Learning: None  Education Outcome: Verbalized understanding;Demonstrated understanding    TIME IN/OUT:  Time In: 1507  Time Out: [address]  Minutes: [address] PT

## 2023-02-14 NOTE — PROGRESS NOTES
TRANSFER - IN REPORT:    Verbal report received from NORMA Beal(name) on [employer]  being received from ER(unit) for routine progression of patient care      Report consisted of patients Situation, Background, Assessment and   Recommendations(SBAR). Information from the following report(s) Nurse Handoff Report, Index, MAR, Recent Results, and Event Log was reviewed with the receiving nurse. Opportunity for questions and clarification was provided. Report given to Patti Barbosa RN, who will assume primary care of patient on arrival to floor.

## 2023-02-14 NOTE — Clinical Note
TRANSFER - OUT REPORT:     Verbal report given to: cpru . Report consisted of patient's Situation, Background, Assessment and   Recommendations(SBAR). Opportunity for questions and clarification was provided. Patient transported with a Registered Nurse. Patient transported to: recovery.

## 2023-02-14 NOTE — PROGRESS NOTES
ACUTE OCCUPATIONAL THERAPY GOALS:   (Developed with and agreed upon by patient and/or caregiver.)  1. Patient will complete lower body bathing and dressing with MIN A and adaptive equipment as needed. 2.Patient will complete upper body bathing and dressing with MOD I and adaptive equipment as needed. 3. Patient will complete toileting with MOD I.   4. Patient will tolerate 30 minutes of OT treatment with 1-2 rest breaks to increase activity tolerance for ADLs. 5. Patient will complete functional transfers with MOD I and adaptive equipment as needed. 6. Patient will complete simple grooming task standing at the sink with MOD I. Timeframe: 7 visits      OCCUPATIONAL THERAPY Initial Assessment and Daily Note       OT Visit Days: 1  Acknowledge Orders  Time  OT Charge Capture  Rehab Caseload Tracker      Minh Rviero is a 79 y.o. male   PRIMARY DIAGNOSIS: Acute pulmonary edema (HCC)  Shortness of breath [R06.02]  Acute pulmonary edema (HCC) [J81.0]  Chronic kidney disease, unspecified CKD stage [N18.9]  Volume overload state of heart [E87.79]       Reason for Referral: Generalized Muscle Weakness (M62.81)  Other lack of cordination (R27.8)  Difficulty in walking, Not elsewhere classified (R26.2)  Inpatient: Payor: MEDICARE / Plan: MEDICARE PART A AND B / Product Type: *No Product type* /     ASSESSMENT:     REHAB RECOMMENDATIONS:   Recommendation to date pending progress:  Setting:  Home Health Therapy    Equipment:    To Be Determined     ASSESSMENT:  Mr. Sonia Solares presented to the hospital with generalized weakness and extreme dyspnea on exertion. At baseline, pt is mod I for his bADLs (besides LB dressing). Reported he furniture walks around the house. Family lives close by and is available to assist as needed. Today, pt was received supine in bed in ED. Completed bed mobility, functional transfers, ambulation with RW, and grooming tasks standing at the sink with overall CGA. ModA for LB dressing. Pt did require seated rest breaks throughout session due to SOB. Education provided on energy conservation strategies at home. Pt requesting to trial a rollator. Recommend pt return home with continued family support and [facility] therapy services. Alfie Wan currently demonstrates overall deficits in strength, balance, activity tolerance, and ADL performance. Patient would benefit from skilled OT services at this time in order to address functional deficits and OT goals stated above. [address] [facility] [address] [address] [facility] 6 Clicks Daily Activity Inpatient Short Form:    AM-PAC Daily Activity - Inpatient   How much help is needed for putting on and taking off regular lower body clothing?: A Lot  How much help is needed for bathing (which includes washing, rinsing, drying)?: A Little  How much help is needed for toileting (which includes using toilet, bedpan, or urinal)?: A Little  How much help is needed for putting on and taking off regular upper body clothing?: A Little  How much help is needed for taking care of personal grooming?: A Little  How much help for eating meals?: None  AM-[facility] Inpatient Daily Activity Raw Score: 18  AM-PAC Inpatient ADL T-Scale Score : 38.66  ADL Inpatient CMS 0-100% Score: 46.65  ADL Inpatient CMS G-Code Modifier : CK      SUBJECTIVE:     Mr. Diaz Portal states, \"I want one of those walkers that is up high for me to rest my arms on. \"     Social/Functional Lives With: Spouse (son lives close and is over daily to assist as needed)  Type of Home: House  Home Layout: One level (no steps)  Bathroom Shower/Tub: Walk-in shower  ADL Assistance: Independent  Homemaking Assistance:  (assist available as needed)  Additional Comments: does not use AD, furniture walks    OBJECTIVE:     Gwen Prakash / Jun [facility] / Brigette Fey: NA    RESTRICTIONS/PRECAUTIONS:       PAIN: VITALS / O2:   Pre Treatment:   Pain Assessment: None - Denies Pain      Post Treatment: no change        Vitals          Oxygen   Room air          GROSS EVALUATION: INTACT IMPAIRED   (See Comments)   UE AROM [x] []   UE PROM [x] []   Strength []  Generalized weakness, functional for bADLs     Posture / Balance []  Fair+ sitting and standing    Sensation [x]     Coordination [x]       Tone [x]       Edema [x]    Activity Tolerance []  Poor due to severe SOB, frequent seated rest breaks required throughout session     Hand Dominance R [] L []      COGNITION/  PERCEPTION: INTACT IMPAIRED   (See Comments)   Orientation [x]     Vision [x]     Hearing [x]     Cognition  []  Decreased safety awareness    Perception []       MOBILITY: I Mod I S SBA CGA Min Mod Max Total  NT x2 Comments:   Bed Mobility    Rolling [] [] [] [] [] [] [] [] [] [x] []    Supine to Sit [] [] [] [] [x] [] [] [] [] [] []    Scooting [] [] [] [] [x] [] [] [] [] [] []    Sit to Supine [] [] [] [] [] [] [] [] [] [x] [] Left in wheelchair with transport staff   Transfers    Sit to Stand [] [] [] [] [x] [] [] [] [] [] []    Bed to Chair [] [] [] [] [x] [] [] [] [] [] [] RW   Stand to Sit [] [] [] [] [x] [] [] [] [] [] []    Tub/Shower [] [] [] [] [] [] [] [] [] [x] []     Toilet [] [] [] [] [] [] [] [] [] [x] []      [] [] [] [] [] [] [] [] [] [] []    I=Independent, Mod I=Modified Independent, S=Supervision/Setup, SBA=Standby Assistance, CGA=Contact Guard Assistance, Min=Minimal Assistance, Mod=Moderate Assistance, Max=Maximal Assistance, Total=Total Assistance, NT=Not Tested    ACTIVITIES OF DAILY LIVING: I Mod I S SBA CGA Min Mod Max Total NT Comments   BASIC ADLs:              Upper Body Bathing  [] [] [] [] [] [] [] [] [] [x]    Lower Body Bathing [] [] [] [] [] [] [] [] [] [x]    Toileting [] [] [] [] [] [] [] [] [] [x]    Upper Body Dressing [] [] [] [] [] [] [] [] [] [x]    Lower Body Dressing [] [] [] [] [] [] [x] [] [] [] Socks and shoes    Feeding [] [] [] [] [] [] [] [] [] [x]    Grooming [] [] [] [] [x] [] [] [] [] [] Washed hands/face and brushed teeth standing at the sink   Personal Device Care [] [] [] [] [] [] [] [] [] [x]    Functional Mobility [] [] [] [] [x] [] [] [] [] [] RW   I=Independent, Mod I=Modified Independent, S=Supervision/Setup, SBA=Standby Assistance, CGA=Contact Guard Assistance, Min=Minimal Assistance, Mod=Moderate Assistance, Max=Maximal Assistance, Total=Total Assistance, NT=Not Tested    PLAN:   [address] [place] [address] of Care: 3 times/week for duration of hospital stay or until stated goals are met, whichever comes first.    PROBLEM LIST:   (Skilled intervention is medically necessary to address:)  Decreased ADL/Functional Activities  Decreased Activity Tolerance  Decreased Balance  Decreased Safety Awareness  Decreased Strength  Decreased Transfer Abilities   INTERVENTIONS PLANNED:  (Benefits and precautions of occupational therapy have been discussed with the patient.)  Self Care Training  Therapeutic Activity  Therapeutic Exercise/HEP  Neuromuscular Re-education  Manual Therapy  Education         TREATMENT:     EVALUATION: LOW COMPLEXITY: (Untimed Charge)    TREATMENT:   Therapeutic Activity (10 Minutes): Patient participated in therapeutic activities including bed mobility training, functional transfer training, functional mobility of household distances, sitting tolerance activity, and standing tolerance activity with minimal verbal cues in order to increase safety awareness and increase activity tolerance. Self Care (10 minutes): Patient participated in lower body dressing and grooming ADLs in unsupported sitting and standing with minimal verbal cueing to increase activity tolerance and increase safety awareness. Patient also participated in functional mobility, functional transfer, and energy conservation training to increase independence, decrease assistance required, increase activity tolerance, and increase safety awareness.      TREATMENT GRID:  N/A    AFTER TREATMENT PRECAUTIONS:  pt left in wheelchair with transport staff--all needs met    INTERDISCIPLINARY COLLABORATION:  RN/ PCT and OT/ ALONZO    EDUCATION:  Education Given To: Patient; Family  Education Provided: Role of Therapy;Plan of Care; Fall Prevention Strategies  Education Outcome: Verbalized understanding;Demonstrated understanding    TOTAL TREATMENT DURATION AND TIME:  Time In: 1415  Time Out: [address]  Minutes: [address], OT

## 2023-02-14 NOTE — ED PROVIDER NOTES
Emergency Department Provider Note                   PCP:                Niles Reese MD               Age: 79 y.o. Sex: male     Final diagnosis/impression:  1. Volume overload state of heart    2. Shortness of breath    3. Chronic kidney disease, unspecified CKD stage         Disposition: Admit to hospitalist    MDM/Clinical Course:  Patient seen by myself at the [address] [place] [address] emergency department. History was collected from the patient. In consideration of this patient problem this represents an acute, potentially life-threatening presentation to the emergency department for shortness of breath symptoms and inability to completely daily activities secondary to severe dyspnea. Patient had signs symptoms and clinical history most consistent with increasing dyspnea symptoms as described. Factors complicating medical decision making or management more complex include patient with multiple medical comorbidities and unclear nature of dyspnea. Work-up considered but not performed includes V/Q scanning as patient with relatively reassuring VQ scan within the past 2 weeks and DVT study unremarkable for acute DVT performed within the last week. My independent analysis/interpretation of laboratory work-up here shows CBC with hemoglobin at baseline, CMP shows continued renal dysfunction/chronic kidney disease with creatinine at 3.7, elevated BUN at 91, likely representing previously azotemia, some uremia there, no acidosis or anion gap noted. NT proBNP is 15,525 which is essentially near equivalent to NT proBNP measured on February 9. Radiology shows bilateral interstitial opacities likely reflecting pulmonary edema. Do not suspect patient has pneumonia given lack of productive cough and other pneumonia symptoms. While under my care, patient received 1 mg IV Bumex, also received a DuoNeb treatment.   Overall feel patient needs hospitalization as O2 testing shows 91% with only standing, suspect patient would have hypoxia with any significant exertion/attempts to mobilize as has noted heavier breathing even at rest.  Patient with continued shortness of breath symptoms, signs/symptoms of volume overload requiring admission for further evaluation and management and to help prevent poor outcomes associated with hypoxia or other developing emergency associated with volume overload. Patient would likely benefit from inpatient repeat echocardiogram which has not been performed from January and further discussion on whether or not patient is developing tolerance to diuretics versus in need of dialysis given severe kidney disease. In considertion of patient clinical presentation, laboratory/radiologic findings, diagnoses/conditions previously discussed and clinical course as previously discussed, I did feel it appropriate to admit the patient for further evaluation, observation and management. I communicated with the hospitalist in detail about the patient and they agreed to see and admit the patient. Patient/family verbalized understanding and agreement with this course of action and plan in shared medical decision made. Medical Decision Making  Problems Addressed:  Chronic kidney disease, unspecified CKD stage: chronic illness or injury  Shortness of breath: acute illness or injury that poses a threat to life or bodily functions  Volume overload state of heart: acute illness or injury that poses a threat to life or bodily functions    Amount and/or Complexity of Data Reviewed  External Data Reviewed: notes. Details: Reviewed 01/23 echocardiogram detailing preserved ejection fraction. Labs: ordered. Decision-making details documented in ED Course. Radiology: ordered. Decision-making details documented in ED Course. ECG/medicine tests: ordered and independent interpretation performed. Decision-making details documented in ED Course. Risk  Prescription drug management.   Drug therapy requiring intensive monitoring for toxicity. Decision regarding hospitalization. Risk Details: IV Bumex given requiring monitoring especially given patient renal dysfunction. Orders Placed This Encounter   Procedures    COVID-19, Rapid    XR CHEST PORTABLE    CBC    Comprehensive Metabolic Panel    Troponin    Magnesium    Brain Natriuretic Peptide    Procalcitonin    Pulse Oximetry    Ambulate patient    EKG 12 Lead    Saline lock IV        ED Meds Given:  Medications   bumetanide (BUMEX) injection 1 mg (has no administration in time range)   ipratropium-albuterol (DUONEB) nebulizer solution 1 ampule (1 ampule Inhalation Given 2/14/23 1022)     New Prescriptions    No medications on file        HPI: Tracey Cooper is a 79 y.o. male with past medical history of CAD, asthma, CKD, diabetes, hypertension presenting for evaluation of dyspnea symptoms. Patient discharged roughly 2 days ago after being admitted for suspected CHF exacerbation, dyspnea symptoms. Patient is ongoing/worsening dyspnea symptoms. Notes he has gained about 3 pounds in the last week. No chest pain or pressure symptoms. No fever, chills, productive cough, no history of recent fall or trauma. States he is compliant with nebulizer treatments and medications, uses CPAP at night. Patient/family denies any other evaluation for today's acute complaint. Patient/family denies any other aggravating or alleviating factors. Patient/family denies any other symptoms. ROS:   All review of systems negative except as noted above in the history of the present illness.     Past Medical/ Family/ Social History:     Medical history:   Past Medical History:   Diagnosis Date    Acquired hypothyroidism 4/8/2016    Anemia 1/21/2015    Anemia in chronic kidney disease 4/8/2016    Asthma     Chronic kidney disease     stage 4 kidney disease- Dr. Margret Ortega    CKD (chronic kidney disease) 10/11/2012    Coronary atherosclerosis of native coronary vessel 2/10/2016    Diabetes mellitus type 2 in obese ([facility] 10/11/2012    avg 150, symptoms of hypoglycemia 90    Diastolic heart failure (HCC)     Dyspnea     Dyspnea 2/10/2016    Edema 10/11/2012    Elevated liver function tests 2015    Hypertension 10/11/2012    Hyponatremia 2015    Morbid obesity ([facility]     BMI 43.54    JORJE (obstructive sleep apnea) 10/11/2012    cpap    Seborrheic dermatitis 2013    Seborrheic dermatitis        Surgical history:   Past Surgical History:   Procedure Laterality Date    CARDIAC CATHETERIZATION  2016    3 stent    CARDIAC PROCEDURE N/A 2023    LEFT HEART CATH / CORONARY ANGIOGRAPHY performed by Mp Byrnes MD at [address] [place] [address] CATH LAB    CARDIAC PROCEDURE N/A 2023    Percutaneous coronary intervention performed by Mp Byrnes MD at [address] [place] [address] CATH LAB    CATARACT REMOVAL Bilateral     [facility]       Family history:   Family History   Problem Relation Age of Onset    Cancer Father         lung    No Known Problems Sister     No Known Problems Brother     Hypertension Father     No Known Problems Sister     Hypertension Mother        Social history:   Social History     Socioeconomic History    Marital status:      Spouse name: None    Number of children: None    Years of education: None    Highest education level: None   Tobacco Use    Smoking status: Former     Packs/day: 0.50     Years: 2.00     Pack years: 1.00     Types: Cigarettes     Quit date: 1962     Years since quittin.1    Smokeless tobacco: Never   Substance and Sexual Activity    Alcohol use: No    Drug use: No        Medications:   Previous Medications    ALBUTEROL (PROVENTIL) (2.5 MG/3ML) 0.083% NEBULIZER SOLUTION    Take 3 mLs by nebulization in the morning, at noon, in the evening, and at bedtime Bill to Medicare D Code: J45.991    ALBUTEROL SULFATE HFA (PROVENTIL;VENTOLIN;PROAIR) 108 (90 BASE) MCG/ACT INHALER    2 puffs 4 times daily if needed for shortness of breath or wheezing. Patient will call when refills are needed    ALLOPURINOL (ZYLOPRIM) 300 MG TABLET    Take 150 mg by mouth daily    ASPIRIN 81 MG EC TABLET    Take 81 mg by mouth    CALCITRIOL (ROCALTROL) 0.25 MCG CAPSULE    Take 0.25 mcg by mouth daily    CLOPIDOGREL (PLAVIX) 75 MG TABLET    Take 1 tablet by mouth daily    CPAP MACHINE MISC    by Does not apply route At HS    DULAGLUTIDE (TRULICITY) 1.5 RW/0.3JX SOPN    Inject 1.5 mg into the skin every 7 days On sundays    FLUTICASONE FUROATE-VILANTEROL (BREO ELLIPTA) 100-25 MCG/ACT AEPB    1 inhalation every morning, rinse mouth after use. HYDRALAZINE (APRESOLINE) 50 MG TABLET    Take 1 tablet by mouth in the morning and at bedtime    INSULIN GLARGINE (LANTUS) 100 UNIT/ML INJECTION VIAL    Inject 25 Units into the skin 2 times daily    ISOSORBIDE MONONITRATE (IMDUR) 60 MG EXTENDED RELEASE TABLET    Take 1 tablet by mouth daily    LEVOTHYROXINE (SYNTHROID) 88 MCG TABLET    Take 88 mcg by mouth    METOPROLOL SUCCINATE (TOPROL XL) 100 MG EXTENDED RELEASE TABLET    Take 1 tablet by mouth 2 times daily    NITROGLYCERIN (NITROSTAT) 0.4 MG SL TABLET    Place 1 tablet under the tongue every 5 minutes as needed for Chest pain up to max of 3 total doses. If no relief after 1 dose, call 911. SODIUM BICARBONATE 650 MG TABLET    Take 650 mg by mouth 2 times daily    TORSEMIDE 40 MG TABS    Take 80 mg by mouth every morning AND 40 mg every evening. Allergies:    Allergies   Allergen Reactions    Amlodipine Shortness Of Breath    Iodine Shortness Of Breath    Metformin Shortness Of Breath    Ranolazine Other (See Comments)     Lips and tongue numbness    Spironolactone Other (See Comments)     Potassium level went really high  Increased potassium    Hydrocodone-Acetaminophen Other (See Comments)     Pt states that if he takes this  The medication makes him feel like he is going crazy    Omeprazole Other (See Comments)     Caused drastic drop in blood sugar       Physical Exam Vitals signs reviewed. Patient Vitals for the past 4 hrs:   Temp Pulse Resp BP SpO2   02/14/23 1025 -- 72 18 -- 98 %   02/14/23 1015 -- 71 21 -- 97 %   02/14/23 1005 -- 78 21 -- 96 %   02/14/23 1000 -- -- -- 125/75 91 %   02/14/23 0917 97.3 °F (36.3 °C) 76 20 124/68 99 %       General: Alert and oriented ×4, no acute distress   Eyes: Anicteric, conjunctiva pink, PERRLA, EOMI  ENT: No nasal discharge, no gross nasal congestion present  Pulmonary: Clear to auscultation bilaterally with symmetric chest rise, no increased work of breathing, no accessory muscle use, prolonged end expiratory phase. Body habitus makes it difficult to appreciate clear lung sounds. Cardiovascular: Regular rate and rhythm, no rub or gallop appreciated on my exam  GI: Abdomen is soft, nontender, nondistended  Musculoskeletal: No obvious joint deformity or joint effusion, normal joint range of motion  Neuro: Cranial nerves II through VII grossly intact, strength and sensation is grossly intact in the upper and lower extremities bilaterally  Skin: Skin is warm and dry    Procedures    ED EKG Interpretation  EKG was interpreted in the absence of a cardiologist.  Interpretation:  EKG read on 2/14/2023 at 926  Normal sinus rhythm, rate of 77, no ST elevation MI, nonspecific T wave changes present in the lateral precordial leads, QTc is 491 and is borderline prolonged, QRS is 136 and is representative of a nonspecific intraventricular conduction delay. Morphology largely unchanged from previous on 2/9/2023    Results for orders placed or performed during the hospital encounter of 02/14/23   XR CHEST PORTABLE    Narrative    EXAMINATION: XR CHEST PORTABLE 2/14/2023 9:36 AM    ACCESSION NUMBER: [id number]    COMPARISON: Chest 2/9/2023 and earlier. INDICATION: Dyspnea     TECHNIQUE: A single view of the chest was obtained. FINDINGS:   Bilateral interstitial opacities, similar to slightly increased from prior.   Persistent patchy left basilar opacity. Suspected small bilateral pleural effusions. No pneumothorax. Stable enlarged cardiomediastinal silhouette. Impression    -Bilateral interstitial opacities, similar to slightly increased from prior,  likely reflecting pulmonary edema.    -Persistent patchy left basilar opacity, likely reflecting atelectasis, though  infection is also a consideration in appropriate clinical context.    -Suspected small bilateral pleural effusions.        CBC   Result Value Ref Range    WBC 6.3 4.3 - 11.1 K/uL    RBC 3.02 (L) 4.23 - 5.6 M/uL    Hemoglobin 9.1 (L) 13.6 - 17.2 g/dL    Hematocrit 29.3 (L) 41.1 - 50.3 %    MCV 97.0 82 - 102 FL    MCH 30.1 26.1 - 32.9 PG    MCHC 31.1 (L) 31.4 - 35.0 g/dL    RDW 14.8 (H) 11.9 - 14.6 %    Platelets 995 252 - 490 K/uL    MPV 10.4 9.4 - 12.3 FL    nRBC 0.00 0.0 - 0.2 K/uL   Comprehensive Metabolic Panel   Result Value Ref Range    Sodium 141 133 - 143 mmol/L    Potassium 4.8 3.5 - 5.1 mmol/L    Chloride 108 101 - 110 mmol/L    CO2 23 21 - 32 mmol/L    Anion Gap 10 2 - 11 mmol/L    Glucose 214 (H) 65 - 100 mg/dL    BUN 91 (H) 8 - 23 MG/DL    Creatinine 3.70 (H) 0.8 - 1.5 MG/DL    Est, Glom Filt Rate 16 (L) >60 ml/min/1.73m2    Calcium 9.4 8.3 - 10.4 MG/DL    Total Bilirubin 0.4 0.2 - 1.1 MG/DL    ALT 12 12 - 65 U/L    AST 17 15 - 37 U/L    Alk Phosphatase 69 50 - 136 U/L    Total Protein 6.5 6.3 - 8.2 g/dL    Albumin 2.9 (L) 3.2 - 4.6 g/dL    Globulin 3.6 2.8 - 4.5 g/dL    Albumin/Globulin Ratio 0.8 0.4 - 1.6     Troponin   Result Value Ref Range    Troponin, High Sensitivity 94.7 (H) 0 - 14 pg/mL   Magnesium   Result Value Ref Range    Magnesium 2.6 (H) 1.8 - 2.4 mg/dL   Brain Natriuretic Peptide   Result Value Ref Range    NT Pro-BNP 15,525 (H) <450 PG/ML      XR CHEST PORTABLE   Final Result      -Bilateral interstitial opacities, similar to slightly increased from prior,   likely reflecting pulmonary edema.      -Persistent patchy left basilar opacity, likely reflecting atelectasis, though   infection is also a consideration in appropriate clinical context.      -Suspected small bilateral pleural effusions. Voice dictation software was used during the making of this note. This software is not perfect and grammatical and other typographical errors may be present. This note has not been completely proofread for errors.

## 2023-02-14 NOTE — ED TRIAGE NOTES
Pt reports dyspnea worse w exertion xfew weeks worsened since had recent cardiac stent procedure 2wks ago (-)cp, cough  Torsemide was increased last Saturday.   A&Ox4

## 2023-02-15 ENCOUNTER — APPOINTMENT (OUTPATIENT)
Dept: GENERAL RADIOLOGY | Age: 83
DRG: 286 | End: 2023-02-15
Payer: MEDICARE

## 2023-02-15 PROBLEM — M79.672 LEFT FOOT PAIN: Status: ACTIVE | Noted: 2023-02-15

## 2023-02-15 LAB
ALBUMIN SERPL-MCNC: 3 G/DL (ref 3.2–4.6)
ALBUMIN/GLOB SERPL: 0.9 (ref 0.4–1.6)
ALP SERPL-CCNC: 65 U/L (ref 50–136)
ALT SERPL-CCNC: 20 U/L (ref 12–65)
ANION GAP SERPL CALC-SCNC: 11 MMOL/L (ref 2–11)
AST SERPL-CCNC: 20 U/L (ref 15–37)
BASOPHILS # BLD: 0.1 K/UL (ref 0–0.2)
BASOPHILS NFR BLD: 1 % (ref 0–2)
BILIRUB SERPL-MCNC: 0.5 MG/DL (ref 0.2–1.1)
BUN SERPL-MCNC: 91 MG/DL (ref 8–23)
CALCIUM SERPL-MCNC: 9.2 MG/DL (ref 8.3–10.4)
CHLORIDE SERPL-SCNC: 106 MMOL/L (ref 101–110)
CO2 SERPL-SCNC: 23 MMOL/L (ref 21–32)
CREAT SERPL-MCNC: 3.8 MG/DL (ref 0.8–1.5)
DIFFERENTIAL METHOD BLD: ABNORMAL
EOSINOPHIL # BLD: 0.2 K/UL (ref 0–0.8)
EOSINOPHIL NFR BLD: 3 % (ref 0.5–7.8)
ERYTHROCYTE [DISTWIDTH] IN BLOOD BY AUTOMATED COUNT: 14.6 % (ref 11.9–14.6)
FERRITIN SERPL-MCNC: 49 NG/ML (ref 8–388)
FOLATE SERPL-MCNC: 16.7 NG/ML (ref 3.1–17.5)
GLOBULIN SER CALC-MCNC: 3.3 G/DL (ref 2.8–4.5)
GLUCOSE BLD STRIP.AUTO-MCNC: 149 MG/DL (ref 65–100)
GLUCOSE BLD STRIP.AUTO-MCNC: 188 MG/DL (ref 65–100)
GLUCOSE BLD STRIP.AUTO-MCNC: 226 MG/DL (ref 65–100)
GLUCOSE BLD STRIP.AUTO-MCNC: 239 MG/DL (ref 65–100)
GLUCOSE BLD STRIP.AUTO-MCNC: 91 MG/DL (ref 65–100)
GLUCOSE SERPL-MCNC: 174 MG/DL (ref 65–100)
HCT VFR BLD AUTO: 27 % (ref 41.1–50.3)
HGB BLD-MCNC: 8.7 G/DL (ref 13.6–17.2)
IMM GRANULOCYTES # BLD AUTO: 0 K/UL (ref 0–0.5)
IMM GRANULOCYTES NFR BLD AUTO: 1 % (ref 0–5)
IRON SATN MFR SERPL: 14 %
IRON SERPL-MCNC: 37 UG/DL (ref 35–150)
LYMPHOCYTES # BLD: 0.4 K/UL (ref 0.5–4.6)
LYMPHOCYTES NFR BLD: 6 % (ref 13–44)
MCH RBC QN AUTO: 31 PG (ref 26.1–32.9)
MCHC RBC AUTO-ENTMCNC: 32.2 G/DL (ref 31.4–35)
MCV RBC AUTO: 96.1 FL (ref 82–102)
MONOCYTES # BLD: 0.5 K/UL (ref 0.1–1.3)
MONOCYTES NFR BLD: 8 % (ref 4–12)
NEUTS SEG # BLD: 5.2 K/UL (ref 1.7–8.2)
NEUTS SEG NFR BLD: 81 % (ref 43–78)
NRBC # BLD: 0 K/UL (ref 0–0.2)
PLATELET # BLD AUTO: 226 K/UL (ref 150–450)
PMV BLD AUTO: 11.2 FL (ref 9.4–12.3)
POTASSIUM SERPL-SCNC: 4.1 MMOL/L (ref 3.5–5.1)
PROT SERPL-MCNC: 6.3 G/DL (ref 6.3–8.2)
RBC # BLD AUTO: 2.81 M/UL (ref 4.23–5.6)
SERVICE CMNT-IMP: ABNORMAL
SERVICE CMNT-IMP: NORMAL
SODIUM SERPL-SCNC: 140 MMOL/L (ref 133–143)
TIBC SERPL-MCNC: 269 UG/DL (ref 250–450)
VIT B12 SERPL-MCNC: 260 PG/ML (ref 193–986)
WBC # BLD AUTO: 6.3 K/UL (ref 4.3–11.1)

## 2023-02-15 PROCEDURE — 6360000002 HC RX W HCPCS: Performed by: HOSPITALIST

## 2023-02-15 PROCEDURE — 85025 COMPLETE CBC W/AUTO DIFF WBC: CPT

## 2023-02-15 PROCEDURE — 1100000003 HC PRIVATE W/ TELEMETRY

## 2023-02-15 PROCEDURE — 83540 ASSAY OF IRON: CPT

## 2023-02-15 PROCEDURE — 36415 COLL VENOUS BLD VENIPUNCTURE: CPT

## 2023-02-15 PROCEDURE — 6370000000 HC RX 637 (ALT 250 FOR IP): Performed by: HOSPITALIST

## 2023-02-15 PROCEDURE — 2580000003 HC RX 258: Performed by: HOSPITALIST

## 2023-02-15 PROCEDURE — 80053 COMPREHEN METABOLIC PANEL: CPT

## 2023-02-15 PROCEDURE — 99232 SBSQ HOSP IP/OBS MODERATE 35: CPT | Performed by: INTERNAL MEDICINE

## 2023-02-15 PROCEDURE — 97110 THERAPEUTIC EXERCISES: CPT

## 2023-02-15 PROCEDURE — 2500000003 HC RX 250 WO HCPCS: Performed by: HOSPITALIST

## 2023-02-15 PROCEDURE — 82962 GLUCOSE BLOOD TEST: CPT

## 2023-02-15 PROCEDURE — 82746 ASSAY OF FOLIC ACID SERUM: CPT

## 2023-02-15 PROCEDURE — 94760 N-INVAS EAR/PLS OXIMETRY 1: CPT

## 2023-02-15 PROCEDURE — 82728 ASSAY OF FERRITIN: CPT

## 2023-02-15 PROCEDURE — 6370000000 HC RX 637 (ALT 250 FOR IP): Performed by: INTERNAL MEDICINE

## 2023-02-15 PROCEDURE — 94640 AIRWAY INHALATION TREATMENT: CPT

## 2023-02-15 PROCEDURE — 82607 VITAMIN B-12: CPT

## 2023-02-15 RX ORDER — TAMSULOSIN HYDROCHLORIDE 0.4 MG/1
0.4 CAPSULE ORAL
Status: DISCONTINUED | OUTPATIENT
Start: 2023-02-15 | End: 2023-02-15

## 2023-02-15 RX ADMIN — BUDESONIDE 500 MCG: 0.5 INHALANT RESPIRATORY (INHALATION) at 20:26

## 2023-02-15 RX ADMIN — BUMETANIDE 0.5 MG: 0.25 INJECTION INTRAMUSCULAR; INTRAVENOUS at 09:00

## 2023-02-15 RX ADMIN — ASPIRIN 81 MG: 81 TABLET ORAL at 09:05

## 2023-02-15 RX ADMIN — HEPARIN SODIUM 5000 UNITS: 5000 INJECTION INTRAVENOUS; SUBCUTANEOUS at 13:49

## 2023-02-15 RX ADMIN — SODIUM CHLORIDE, PRESERVATIVE FREE 10 ML: 5 INJECTION INTRAVENOUS at 09:05

## 2023-02-15 RX ADMIN — HYDRALAZINE HYDROCHLORIDE 50 MG: 50 TABLET, FILM COATED ORAL at 09:02

## 2023-02-15 RX ADMIN — METOPROLOL SUCCINATE 100 MG: 100 TABLET, EXTENDED RELEASE ORAL at 09:03

## 2023-02-15 RX ADMIN — INSULIN LISPRO 1 UNITS: 100 INJECTION, SOLUTION INTRAVENOUS; SUBCUTANEOUS at 12:16

## 2023-02-15 RX ADMIN — INSULIN GLARGINE 25 UNITS: 100 INJECTION, SOLUTION SUBCUTANEOUS at 08:58

## 2023-02-15 RX ADMIN — DILTIAZEM HYDROCHLORIDE 30 MG: 30 TABLET, FILM COATED ORAL at 11:03

## 2023-02-15 RX ADMIN — ALLOPURINOL 150 MG: 100 TABLET ORAL at 09:01

## 2023-02-15 RX ADMIN — BUDESONIDE 500 MCG: 0.5 INHALANT RESPIRATORY (INHALATION) at 07:38

## 2023-02-15 RX ADMIN — METOPROLOL SUCCINATE 100 MG: 100 TABLET, EXTENDED RELEASE ORAL at 21:27

## 2023-02-15 RX ADMIN — SODIUM BICARBONATE 650 MG: 650 TABLET ORAL at 21:26

## 2023-02-15 RX ADMIN — HEPARIN SODIUM 5000 UNITS: 5000 INJECTION INTRAVENOUS; SUBCUTANEOUS at 21:26

## 2023-02-15 RX ADMIN — LEVALBUTEROL HYDROCHLORIDE 0.63 MG: 0.63 SOLUTION RESPIRATORY (INHALATION) at 14:10

## 2023-02-15 RX ADMIN — BUMETANIDE 0.5 MG: 0.25 INJECTION INTRAMUSCULAR; INTRAVENOUS at 21:26

## 2023-02-15 RX ADMIN — ISOSORBIDE MONONITRATE 60 MG: 60 TABLET, EXTENDED RELEASE ORAL at 09:03

## 2023-02-15 RX ADMIN — HYDRALAZINE HYDROCHLORIDE 50 MG: 50 TABLET, FILM COATED ORAL at 21:28

## 2023-02-15 RX ADMIN — SODIUM BICARBONATE 650 MG: 650 TABLET ORAL at 09:04

## 2023-02-15 RX ADMIN — INSULIN GLARGINE 25 UNITS: 100 INJECTION, SOLUTION SUBCUTANEOUS at 21:25

## 2023-02-15 RX ADMIN — SODIUM CHLORIDE, PRESERVATIVE FREE 10 ML: 5 INJECTION INTRAVENOUS at 21:28

## 2023-02-15 RX ADMIN — DILTIAZEM HYDROCHLORIDE 30 MG: 30 TABLET, FILM COATED ORAL at 23:46

## 2023-02-15 RX ADMIN — LEVALBUTEROL HYDROCHLORIDE 0.63 MG: 0.63 SOLUTION RESPIRATORY (INHALATION) at 20:26

## 2023-02-15 RX ADMIN — LEVOTHYROXINE SODIUM 88 MCG: 0.09 TABLET ORAL at 06:21

## 2023-02-15 RX ADMIN — CLOPIDOGREL BISULFATE 75 MG: 75 TABLET ORAL at 09:01

## 2023-02-15 RX ADMIN — HEPARIN SODIUM 5000 UNITS: 5000 INJECTION INTRAVENOUS; SUBCUTANEOUS at 06:21

## 2023-02-15 RX ADMIN — CALCITRIOL 0.25 MCG: 0.25 CAPSULE ORAL at 09:03

## 2023-02-15 RX ADMIN — DILTIAZEM HYDROCHLORIDE 30 MG: 30 TABLET, FILM COATED ORAL at 18:10

## 2023-02-15 RX ADMIN — LEVALBUTEROL HYDROCHLORIDE 0.63 MG: 0.63 SOLUTION RESPIRATORY (INHALATION) at 07:38

## 2023-02-15 NOTE — PROGRESS NOTES
Physical Therapy Note:    Attempted to see patient this AM for physical therapy treatment  session. Patient refused treatment this morning as he said he's not feeling well and that he can't breathe. His oxygen saturation was 98% with HR 74 bpm at rest. He said he could try again another time. Will follow and re-attempt as schedule permits/patient available.      Thank you,    Yadi Espinal, PT     Rehab Caseload Tracker

## 2023-02-15 NOTE — PROGRESS NOTES
ACUTE PHYSICAL THERAPY GOALS:   (Developed with and agreed upon by patient and/or caregiver.)    Pt will perform supine to/from sit with mod I in 7 treatment days. Pt will perform sit to/from stand with mod I and LRAD in 7 treatment days. Pt will ambulate 150 ft with mod I and LRAD in 7 treatment days. Pt will negotiate 3 stairs with mod I and LRAD in 7 treatment days. Pt will be independent with HEP in 7 days. PHYSICAL THERAPY: Daily Note PM   (Link to Caseload Tracking: PT Visit Days : 2  Time In/Out PT Charge Capture  Rehab Caseload Tracker  Orders    Gregorio Vergara is a 79 y.o. male   PRIMARY DIAGNOSIS: Acute pulmonary edema (HCC)  Shortness of breath [R06.02]  Acute pulmonary edema (HCC) [J81.0]  Chronic kidney disease, unspecified CKD stage [N18.9]  Volume overload state of heart [E87.79]       Inpatient: Payor: MEDICARE / Plan: MEDICARE PART A AND B / Product Type: *No Product type* /     ASSESSMENT:     REHAB RECOMMENDATIONS:   Recommendation to date pending progress:  Setting:  Home Health Therapy    Equipment:    To Be Determined     ASSESSMENT:  Mr. Rubia Angeles presents in bedside chair. During ambulation, he reports dizziness. Sitting BP is taken at 92/55, then he stands again and BP is 85/42. RN notified. Upon entering, Pnt is agreeable to PT treatment. he reports no pain  at rest. Pnt performed Sit > stand with CGA using RW. Gait x 25 ft with CGA, cues for step length. Gait is noted to be slowed and shuffled. Stand > sit with CGA, followed by seated therex in lieu of more ambulation due to BP, then positioning for comfort. At end of session pt up in bedside chair with all needs within reach, alarm activated for safety, RN notified. Overall, slower progress today as pnt was limited by hypotension. Pnt continues to present as functioning below his baseline, with deficits in mobility including transfers, gait, balance, and activity tolerance.  Pt will continue to benefit from skilled therapy services to address stated deficits to promote return to highest level of function, independence, and safety. Will continue to follow.        SUBJECTIVE:   Mr. Ahsan Villanueva states, \"I enjoyed working with you, Maki\"     Social/Functional Lives With: Spouse (son lives close and is over daily to assist as needed)  Type of Home: [address] [place], [place] [address] [facility],[address]: One level (no steps)  Bathroom Shower/Tub: Walk-in shower  Home Equipment: [email or web address]  Has the patient had two or more falls in the past year or any fall with injury in the past year?: No  ADL Assistance: [address]:  (assist available as needed)  Additional Comments: does not use AD, furniture walks  OBJECTIVE:     PAIN: Gaylyn Nam / O2: PRECAUTION / Jose Hartley / Solange Rosales:   Pre Treatment: 0         Post Treatment: 0 Vitals   Vitals  BP: (!) 92/55  MAP (Calculated): 67  Patient Position: Sitting  Blood Pressure Standin/42    Oxygen    None    RESTRICTIONS/PRECAUTIONS:        MOBILITY: I Mod I S SBA CGA Min Mod Max Total  NT x2 Comments:   Bed Mobility    Rolling [] [] [] [] [] [] [] [] [] [x] []    Supine to Sit [] [] [] [] [] [] [] [] [] [x] []    Scooting [] [] [] [] [] [] [] [] [] [x] []    Sit to Supine [] [] [] [] [] [] [] [] [] [x] []    Transfers    Sit to Stand [] [] [] [] [x] [] [] [] [] [] []    Bed to Chair [] [] [] [] [] [] [] [] [] [x] []    Stand to Sit [] [] [] [] [x] [] [] [] [] [] []     [] [] [] [] [] [] [] [] [] [] []    I=Independent, Mod I=Modified Independent, S=Supervision, SBA=Standby Assistance, CGA=Contact Guard Assistance,   Min=Minimal Assistance, Mod=Moderate Assistance, Max=Maximal Assistance, Total=Total Assistance, NT=Not Tested    BALANCE: Good Fair+ Fair Fair- Poor NT Comments   Sitting Static [x] [] [] [] [] []    Sitting Dynamic [x] [] [] [] [] []              Standing Static [] [x] [] [] [] []    Standing Dynamic [] [x] [] [] [] []      GAIT: I Mod I S SBA CGA Min Mod Max Total  NT x2 Comments:   Level of Assistance [] [] [] [] [x] [] [] [] [] [] []    Distance   25 feet    DME Rolling Walker    Gait Quality Decreased debbie , Decreased step clearance, Decreased step length, and Decreased stance    Weightbearing Status      Stairs      I=Independent, Mod I=Modified Independent, S=Supervision, SBA=Standby Assistance, CGA=Contact Guard Assistance,   Min=Minimal Assistance, Mod=Moderate Assistance, Max=Maximal Assistance, Total=Total Assistance, NT=Not Tested    PLAN:   FREQUENCY AND DURATION: 3 times/week for duration of hospital stay or until stated goals are met, whichever comes first.    TREATMENT:   TREATMENT:   Therapeutic Exercise (25 Minutes): Therapeutic exercises noted below to improve functional activity tolerance.      TREATMENT GRID:   Date:  2/15 Date:   Date:     Activity/Exercise Parameters Parameters Parameters   Level Ground Ambulation 25'     Weighted Heel Raises x10     Seated Marches 2 X10                                  AFTER TREATMENT PRECAUTIONS: Chair, Needs within reach, and RN notified    INTERDISCIPLINARY COLLABORATION:  RN/ PCT and PT/ PTA    EDUCATION:        TIME IN/OUT:  Time In: 1434  Time Out: 1055 [facility]  Minutes: 1740 [facility],

## 2023-02-15 NOTE — CONSULTS
Nephrology consult    Admission Date:  2/14/2023    Admission Diagnosis  Shortness of breath [R06.02]  Acute pulmonary edema (HCC) [J81.0]  Chronic kidney disease, unspecified CKD stage [N18.9]  Volume overload state of heart [E87.79]    We are asked by Dr. Zenaida Matias to see patient for GREGG on CKD with fluid overload    History of Present Illness:    Hank Silva is a 79 y.o. male with prior history of CAD s/p PCI pLCx 7/74/76, diastolic HFpEF, HTN, HLD, DM II, CKD IV, hypothyroidism, AS, JORJE on CPAP, asthma, Gout and morbid Obesity who presented to the ED with SOB and 3lb weight gain since Sunday. In ED, 124/68 with HR 76. Labs showed WBC 6.3, H/H 9.1/29.3, Ptl 236, Na 141, K 4.8, BUN/Cr 91/3.70, albumin 2.9, Mg 2.6, pBNP 42892, procal 0.18, hs trop 67.7. CXR showed bilateral interstitial opacities, similar to slightly increased from prior, reflecting pulmonary edema and small bilateral pleural effusions. Was given IV Bumex 1 mg and Duo Neb in ED. He was recently discharged after admission for suspected CHF exacerbation. On labs today Cr 3.80 which is his BL and GFR 15. VSS. Saturation 96% on RA. . He recently had a heart cath end of January. Prior to [facility] he underwent aggressive IV hydration due to known CKD IV/V. Since heart cath he has been in and out of hospital with fluid overload and shortness of breath. Sitting up in bedside chair. Frustrated at how short of breath he becomes with very little exertion. Even up to brush his teeth is challenging and all the while his oxygen saturation remains wnl.       Past Medical History:   Diagnosis Date    Acquired hypothyroidism 4/8/2016    Anemia 1/21/2015    Anemia in chronic kidney disease 4/8/2016    Asthma     Chronic kidney disease     stage 4 kidney disease- Dr. Alberto Pisano    CKD (chronic kidney disease) 10/11/2012    Coronary atherosclerosis of native coronary vessel 2/10/2016    Diabetes mellitus type 2 in obese ([address] 10/11/2012    avg 150, symptoms of hypoglycemia 90    Diastolic heart failure (HCC)     Dyspnea     Dyspnea 2/10/2016    Edema 10/11/2012    Elevated liver function tests 8/20/2015    Hypertension 10/11/2012    Hyponatremia 1/21/2015    Morbid obesity ([facility]     BMI 43.54    JORJE (obstructive sleep apnea) 10/11/2012    cpap    Seborrheic dermatitis 1/16/2013    Seborrheic dermatitis       Past Surgical History:   Procedure Laterality Date    CARDIAC CATHETERIZATION  2016    3 stent    CARDIAC PROCEDURE N/A 1/27/2023    LEFT HEART CATH / CORONARY ANGIOGRAPHY performed by Yokasta Burgess MD at [address] [place] [address] CATH LAB    CARDIAC PROCEDURE N/A 1/27/2023    Percutaneous coronary intervention performed by Yokasta Burgess MD at [address] [place] [address] CATH LAB    CATARACT REMOVAL Bilateral 2017    HERNIA REPAIR  1957      Current Facility-Administered Medications   Medication Dose Route Frequency    aspirin EC tablet 81 mg  81 mg Oral Daily    calcitRIOL (ROCALTROL) capsule 0.25 mcg  0.25 mcg Oral Daily    clopidogrel (PLAVIX) tablet 75 mg  75 mg Oral Daily    hydrALAZINE (APRESOLINE) tablet 50 mg  50 mg Oral BID    isosorbide mononitrate (IMDUR) extended release tablet 60 mg  60 mg Oral Daily    levothyroxine (SYNTHROID) tablet 88 mcg  88 mcg Oral QAM AC    metoprolol succinate (TOPROL XL) extended release tablet 100 mg  100 mg Oral BID    nitroGLYCERIN (NITROSTAT) SL tablet 0.4 mg  0.4 mg SubLINGual Q5 Min PRN    sodium bicarbonate tablet 650 mg  650 mg Oral BID    allopurinol (ZYLOPRIM) tablet 150 mg  150 mg Oral Daily    sodium chloride flush 0.9 % injection 5-40 mL  5-40 mL IntraVENous 2 times per day    sodium chloride flush 0.9 % injection 5-40 mL  5-40 mL IntraVENous PRN    0.9 % sodium chloride infusion   IntraVENous PRN    ondansetron (ZOFRAN-ODT) disintegrating tablet 4 mg  4 mg Oral Q8H PRN    Or    ondansetron (ZOFRAN) injection 4 mg  4 mg IntraVENous Q6H PRN    polyethylene glycol (GLYCOLAX) packet 17 g  17 g Oral Daily PRN    acetaminophen (TYLENOL) tablet 650 mg  650 mg Oral Q6H PRN    Or    acetaminophen (TYLENOL) suppository 650 mg  650 mg Rectal Q6H PRN    magnesium sulfate 2000 mg in 50 mL IVPB premix  2,000 mg IntraVENous PRN    potassium chloride (KLOR-CON M) extended release tablet 40 mEq  40 mEq Oral PRN    Or    potassium bicarb-citric acid (EFFER-K) effervescent tablet 40 mEq  40 mEq Oral PRN    Or    potassium chloride 10 mEq/100 mL IVPB (Peripheral Line)  10 mEq IntraVENous PRN    aluminum & magnesium hydroxide-simethicone (MAALOX) 200-200-20 MG/5ML suspension 30 mL  30 mL Oral Q6H PRN    heparin (porcine) injection 5,000 Units  5,000 Units SubCUTAneous 3 times per day    glucose chewable tablet 16 g  4 tablet Oral PRN    dextrose bolus 10% 125 mL  125 mL IntraVENous PRN    Or    dextrose bolus 10% 250 mL  250 mL IntraVENous PRN    glucagon (rDNA) injection 1 mg  1 mg SubCUTAneous PRN    dextrose 10 % infusion   IntraVENous Continuous PRN    insulin glargine (LANTUS) injection vial 25 Units  25 Units SubCUTAneous BID    insulin lispro (HUMALOG) injection vial 0-4 Units  0-4 Units SubCUTAneous TID WC    insulin lispro (HUMALOG) injection vial 0-4 Units  0-4 Units SubCUTAneous Nightly    bumetanide (BUMEX) injection 0.5 mg  0.5 mg IntraVENous BID    levalbuterol (XOPENEX) nebulization 0.63 mg  0.63 mg Nebulization Q6H WA    budesonide (PULMICORT) nebulizer suspension 500 mcg  0.5 mg Nebulization BID    tamsulosin (FLOMAX) capsule 0.4 mg  0.4 mg Oral Daily     Allergies   Allergen Reactions    Amlodipine Shortness Of Breath    Iodine Shortness Of Breath    Metformin Shortness Of Breath    Ranolazine Other (See Comments)     Lips and tongue numbness    Spironolactone Other (See Comments)     Potassium level went really high  Increased potassium    Hydrocodone-Acetaminophen Other (See Comments)     Pt states that if he takes this  The medication makes him feel like he is going crazy    Omeprazole Other (See Comments)     Caused drastic drop in blood sugar      Social History     Tobacco Use    Smoking status: Former     Packs/day: 0.50     Years: 2.00     Pack years: 1.00     Types: Cigarettes     Quit date: 1962     Years since quittin.1    Smokeless tobacco: Never   Substance Use Topics    Alcohol use: No      Family History   Problem Relation Age of Onset    Cancer Father         lung    No Known Problems Sister     No Known Problems Brother     Hypertension Father     No Known Problems Sister     Hypertension Mother         Review of Systems  Gen - no fever, no chills, appetite okay  HEENT - no sore throat, no decreased vision, no hearing loss  Neck - no neck mass  CV - no chest pain, no palpitation, no orthopnea  Lung - +SOB  Abd - no tenderness, no nausea/vomiting, no bloody stool  Ext - BLE edema 2+  Musculoskeletal - no joint pain, no back pain  Neurologic - no headaches, no dizziness, no seizures  Psychiatric - no anxiety, no depression  Skin - no rashes, no pupura  Genitourinary - no decreased urine output, no hematuria, no foamy urine    Objective:     Vitals:    23 2037 23 2208 02/15/23 0452 02/15/23 0716   BP: 111/70 111/68 (!) 100/49 113/61   Pulse: 66 72 73 68   Resp: 20 22 20 18   Temp: 97.9 °F (36.6 °C) 97.5 °F (36.4 °C) 98.6 °F (37 °C) 97.7 °F (36.5 °C)   TempSrc: Oral   Oral   SpO2: 100% 95% 97% 96%   Weight:  (!) 323 lb 3.1 oz (146.6 kg)     Height:           Intake/Output Summary (Last 24 hours) at 2/15/2023 6051  Last data filed at 2/15/2023 0350  Gross per 24 hour   Intake --   Output 700 ml   Net -700 ml       Physical Exam  GEN :in no distress, alert and oriented  HEENT: anicteric sclerae, Mucous membranes are moist.  Neck - supple without JVD  CV - regular rate and rhythm, no murmur, no rub  Lung - clear bilaterally, lungs expand symmetrically  Abd - soft, nontender, bowel sounds present  Ext - no clubbing, no cyanosis, +BLE edema 2+  Neurologic - nonfocal  Genitourinary - bladder nonpalpable  Skin - no rashes, no purpura  Psychiatric: Normal mood and affect. Data Review:   Recent Labs     02/14/23  0929 02/15/23  0515   WBC 6.3 6.3   HGB 9.1* 8.7*   HCT 29.3* 27.0*    226     Recent Labs     02/14/23  0929 02/15/23  0515    140   K 4.8 4.1    106   CO2 23 23   BUN 91* 91*   CREATININE 3.70* 3.80*   MG 2.6*  --      No results for input(s): PH, PCO2, PO2, PCO2 in the last 72 hours.     Problem List:     Patient Active Problem List    Diagnosis Date Noted    Accelerating angina ([facility] 01/25/2023    Acute pulmonary edema ([facility] 02/14/2023    Generalized weakness 02/14/2023    Decreased exercise tolerance 02/14/2023    Volume overload state of heart 02/10/2023    Bilateral lower extremity edema 02/09/2023    Moderate aortic stenosis by prior echocardiogram 08/13/2022    Chronic heart failure with preserved ejection fraction (HFpEF) ([facility] 08/03/2022    Volume overload 08/02/2022    Restrictive lung disease 06/03/2022    Chronic gout due to renal impairment without tophus 04/25/2022    Hypothyroidism due to Hashimoto's thyroiditis 10/20/2016    PHU (iron deficiency anemia) 08/13/2022    Chest pain 05/28/2019    Obesity, morbid ([address] 04/18/2018    Type 2 diabetes with nephropathy ([facility] 90/34/5950    Diastolic CHF, chronic ([facility] 09/08/2017    Anemia in chronic kidney disease 04/08/2016    Acquired hypothyroidism 04/08/2016    Cough variant asthma 03/11/2016    Dyspnea 02/10/2016    CAD in native artery 02/10/2016    SEARS (dyspnea on exertion) 12/22/2015    Abnormal nuclear stress test 12/22/2015    Elevated liver function tests 08/20/2015    Hyponatremia 01/21/2015    Anemia 01/21/2015    PPD positive 10/28/2014    Seborrheic dermatitis 01/16/2013    Edema 10/11/2012    Chronic kidney disease (CKD), stage IV (severe) ([address] 10/11/2012    JORJE (obstructive sleep apnea) 10/11/2012    Hypertension 10/11/2012    Diabetes mellitus type 2 in obese ([facility] 10/11/2012       Impression:    Plan:   CKD IV  -Cr currently at baseline-continue to monitor labs daily on Bumex    HFpEF/CAD  -cardiology following-adding short acting dihydropyridine calcium channel blocker to see if this may help with some of the pulmonary vascular resistance and shortness of breath.      DM  -SSI    History of PHU  -repeat labs      [facility] [address] Nephrology

## 2023-02-15 NOTE — PROGRESS NOTES
Patient requested standing scale and stated that no one had gotten his weight since he had been here. Weight recorded on chart and order placed for daily weight.

## 2023-02-15 NOTE — PROGRESS NOTES
am  2/15/2023 7:24 AM    Admit Date: 2/14/2023    Admit Diagnosis: Shortness of breath [R06.02]  Acute pulmonary edema (HCC) [J81.0]  Chronic kidney disease, unspecified CKD stage [N18.9]  Volume overload state of heart [E87.79]      Subjective:    Patient : Patient continues to struggle with shortness of breath. We have really made no significant inroads in mitigating his symptoms or clinical findings. Objective:    /61   Pulse 68   Temp 97.7 °F (36.5 °C) (Oral)   Resp 18   Ht 6' 1\" (1.854 m)   Wt (!) 323 lb 3.1 oz (146.6 kg)   SpO2 96%   BMI 42.64 kg/m²     ROS:  General ROS: negative for - chills  Hematological and Lymphatic ROS: negative for - blood clots or jaundice  Respiratory ROS: positive for - shortness of breath  Cardiovascular ROS: positive for - dyspnea on exertion  Gastrointestinal ROS: no abdominal pain, change in bowel habits, or black or bloody stools  Neurological ROS: no TIA or stroke symptoms    Physical Exam:      Physical Examination: General appearance - Appearance: oriented to person, place, and time and chronically ill appearing.    Neck/lymph - supple, no significant adenopathy  Chest/CV - clear to auscultation, no wheezes, rales or rhonchi, symmetric air entry  Heart - normal rate and regular rhythm  Abdomen/GI - soft, nontender, nondistended, no masses or organomegaly   Musculoskeletal - no joint tenderness, deformity or swelling  Extremities - peripheral pulses normal, no pedal edema, no clubbing or cyanosis  Skin - normal coloration and turgor, no rashes, no suspicious skin lesions noted    Current Facility-Administered Medications   Medication Dose Route Frequency    aspirin EC tablet 81 mg  81 mg Oral Daily    calcitRIOL (ROCALTROL) capsule 0.25 mcg  0.25 mcg Oral Daily    clopidogrel (PLAVIX) tablet 75 mg  75 mg Oral Daily    hydrALAZINE (APRESOLINE) tablet 50 mg  50 mg Oral BID    isosorbide mononitrate (IMDUR) extended release tablet 60 mg  60 mg Oral Daily levothyroxine (SYNTHROID) tablet 88 mcg  88 mcg Oral QAM AC    metoprolol succinate (TOPROL XL) extended release tablet 100 mg  100 mg Oral BID    nitroGLYCERIN (NITROSTAT) SL tablet 0.4 mg  0.4 mg SubLINGual Q5 Min PRN    sodium bicarbonate tablet 650 mg  650 mg Oral BID    allopurinol (ZYLOPRIM) tablet 150 mg  150 mg Oral Daily    sodium chloride flush 0.9 % injection 5-40 mL  5-40 mL IntraVENous 2 times per day    sodium chloride flush 0.9 % injection 5-40 mL  5-40 mL IntraVENous PRN    0.9 % sodium chloride infusion   IntraVENous PRN    ondansetron (ZOFRAN-ODT) disintegrating tablet 4 mg  4 mg Oral Q8H PRN    Or    ondansetron (ZOFRAN) injection 4 mg  4 mg IntraVENous Q6H PRN    polyethylene glycol (GLYCOLAX) packet 17 g  17 g Oral Daily PRN    acetaminophen (TYLENOL) tablet 650 mg  650 mg Oral Q6H PRN    Or    acetaminophen (TYLENOL) suppository 650 mg  650 mg Rectal Q6H PRN    magnesium sulfate 2000 mg in 50 mL IVPB premix  2,000 mg IntraVENous PRN    potassium chloride (KLOR-CON M) extended release tablet 40 mEq  40 mEq Oral PRN    Or    potassium bicarb-citric acid (EFFER-K) effervescent tablet 40 mEq  40 mEq Oral PRN    Or    potassium chloride 10 mEq/100 mL IVPB (Peripheral Line)  10 mEq IntraVENous PRN    aluminum & magnesium hydroxide-simethicone (MAALOX) 200-200-20 MG/5ML suspension 30 mL  30 mL Oral Q6H PRN    heparin (porcine) injection 5,000 Units  5,000 Units SubCUTAneous 3 times per day    glucose chewable tablet 16 g  4 tablet Oral PRN    dextrose bolus 10% 125 mL  125 mL IntraVENous PRN    Or    dextrose bolus 10% 250 mL  250 mL IntraVENous PRN    glucagon (rDNA) injection 1 mg  1 mg SubCUTAneous PRN    dextrose 10 % infusion   IntraVENous Continuous PRN    insulin glargine (LANTUS) injection vial 25 Units  25 Units SubCUTAneous BID    insulin lispro (HUMALOG) injection vial 0-4 Units  0-4 Units SubCUTAneous TID WC    insulin lispro (HUMALOG) injection vial 0-4 Units  0-4 Units SubCUTAneous Nightly    bumetanide (BUMEX) injection 0.5 mg  0.5 mg IntraVENous BID    levalbuterol (XOPENEX) nebulization 0.63 mg  0.63 mg Nebulization Q6H WA    budesonide (PULMICORT) nebulizer suspension 500 mcg  0.5 mg Nebulization BID    tamsulosin (FLOMAX) capsule 0.4 mg  0.4 mg Oral Daily       Data Review: data included in this note has been independently reviewed by the author     TELEMETRY: Sinus rhythm    Assessment/Plan:     Patient Active Problem List   Diagnosis    Edema    Chronic kidney disease (CKD), stage IV (severe) ([facility])    JORJE (obstructive sleep apnea)    Dyspnea    Cough variant asthma    PPD positive    Hyponatremia    Chest pain    Anemia in chronic kidney disease    Obesity, morbid ([address]    SEARS (dyspnea on exertion)    Abnormal nuclear stress test    Type 2 diabetes with nephropathy ([facility])    Hypertension    Seborrheic dermatitis    Elevated liver function tests    Anemia    Diastolic CHF, chronic ([facility])    Diabetes mellitus type 2 in obese ([facility])    CAD in native artery    Acquired hypothyroidism    Restrictive lung disease    Volume overload    Chronic heart failure with preserved ejection fraction (HFpEF) ([facility])    Moderate aortic stenosis by prior echocardiogram    Chronic gout due to renal impairment without tophus    Hypothyroidism due to Hashimoto's thyroiditis    PHU (iron deficiency anemia)    Accelerating angina ([facility])    Bilateral lower extremity edema    Volume overload state of heart    Acute pulmonary edema ([facility])    Generalized weakness    Decreased exercise tolerance     PLAN  A/P  1) HFpEF -may be a contributing factor to his fluid overload likely it is most Ben related to his known chronic kidney disease. I agree with ongoing IV diuresis with Bumex and daily basic metabolic panels would have nephrology see him as well. 2) CHD stage V -Daily BMP while getting Lasix.   Consult nephrology  3) CAD -continue statin, plavix/aspirin     Patient also has really severe elevation of right ventricular systolic pressure and by default would have significant venous hypertension. These are playing a significant role in his lower extremity edema and shortness of breath. Previous echoes show right ventricular systolic pressures to be at least 60 mmHg.   With his stage IV kidney disease marked obesity BMI of 43 and RSVP of at least 60 diuresis is going to be minimally successful at mitigating his edema and his overall prognosis is poor mechanical wrappings may help alleviate some of his edema he also has significant HFpEF and again the combination of all of these factors on top of stage IV kidney disease are going to make management unlikely to be successful    Recommend palliative care and or at least aggressive home health for this very complex combination of issues that are extremely difficult to treat    We will add short acting dihydropyridine calcium channel blocker to see if this may help with some of the pulmonary vascular resistance and shortness of breath        Roula Santos MD

## 2023-02-15 NOTE — PROGRESS NOTES
Hospitalist Progress Note   Admit Date:  2023  9:27 AM   Name:  Thais Paniagua   Age:  79 y.o. Sex:  male  :  1940   MRN:  [id number]   Room:  [facility]    Presenting Complaint: Shortness of Breath     Reason(s) for Admission: Shortness of breath [R06.02]  Acute pulmonary edema ([address] [J81.0]  Chronic kidney disease, unspecified CKD stage [N18.9]  Volume overload state of heart [E87.79]     Hospital Course:         Thais Paniagua is a 79 y.o. male with medical history of chronic CHF with preserved ejection fraction, CAD status post recent heart cath on 2023 requiring KAMLESH in proximal circumflex (severely elevated LVEDP of 45 mmHg), restrictive lung disease, morbid obesity with BMI of 42.0, HTN, JORJE, DM2, CKD stage IV, hypothyroidism presented to the ER with chief complaints of dyspnea on exertion and at rest along with generalized weakness and difficulty in carrying out ADLs. Patient was recently discharged from [facility] cardiology service on 2023 after being managed for dyspnea and volume overload, patient was admitted on 2023. Patient reports of generalized weakness, extreme shortness of breath with minimal exertion which is affecting his ability to carry out ADLs. Patient denies any chest pain, fever, night sweats, cough, URI, abdominal pain, diarrhea, urinary symptoms. He does report of progressively worsening shortness of breath which was initially with exertion and now present at rest as well. Patient denies any PND. Patient stated that he can only walk 10 to 15 feet before he gets short of breath. He does not report of noticing any hypoxia at home. He does report of increased in his weight over the past 2 to 3 days, reports of lower extremity swelling. Denies any congestion, rhinorrhea. ER work-up remarkable for creatinine 3.7/BUN 91 (at baseline), BNP 46584, troponin 94.7, albumin 2.9, A1c 7.4 vitamin D 28.8, hemoglobin 9.1.   Chest x-ray with bilateral interstitial opacities similar to slightly increased from prior suggestive of pulmonary edema, persistent patchy left basilar opacity likely reflecting atelectasis, suspected small bilateral pleural effusions. EKG with NSR and first-degree AV block. Subjective & 24hr Events (02/15/23): Patient with extreme fatigue with minimal exertion. Has evidence of pulmonary hypertension. He is convinced he experienced significant weight gain following hydration for recent cardiac catheterization and worsened dyspnea with minimal exertion. O2 sats okay but he does have morbid obesity BMI 42, pulmonary hypertension and chronic diastolic congestive heart failure. He is minimally improved since admission with diuresis. He has a complaint of left foot pain between fourth and fifth metatarsal location consistent with possible Woody's neuroma but does have a history of hyperuricemia on allopurinol. Explained to him attempts at assist with exertional energy with continued diuresis for now and cardiology has recommended short acting calcium channel blocker to assist hopefully with decreased pulmonary hypertension. He denies chest pain with exertion chest pressure or diaphoresis. Pulmonary hypertension with PA systolic pressure 57 reported. His goal is home with spouse. See above recent discharge after admission for congestive heart failure-returned with extreme fatigue minimal exertion    Addendum--orthostasis noted when trying to perform physical therapy per patient. New short acting calcium channel blocker started regarding hopeful some symptomatic relief with pulmonary hypertension. Also started on Flomax due to ipratropium bronchodilator--going to stop Flomax. Patient wishes to discuss new medication with cardiology. He requested a list of his medications and ask nursing if they can provide for him because he wants to discuss meds when all of the physicians around with him tomorrow.   Researching how to print med list for patient. Assessment & Plan:      Principal Problem:    Acute pulmonary edema (HCC)    SEARS (dyspnea on exertion)    Generalized weakness    Volume overload    Decreased exercise tolerance  Plan: 2/14-  Patient is volume overloaded on clinical exam with 1-2+ pitting edema, needs to optimize fluid status. Normally takes torsemide at home, for now will be continuing Bumex 0.5 mg IV twice daily with close monitoring of renal function. Given Bumex iv 1 mg x 1 in ER. Patient does not want a Garcia catheter. Needs to monitor intake and output. Recent echo from 1/26 showed EF 60 to 96% with diastolic dysfunction, moderate aortic valve stenosis with AV mean gradient of 25 mmHg, RVSP of 57 mmHg suggestive of pulmonary hypertension. PT OT consulted for generalized weakness and decreased exercise tolerance  2/15--- continue Bumex IV attempts at diuresis-monitor ASHKAN closely. Monitor electrolytes. On echo patient does have elevated RVSP of 57 mmHg suggestive of pulmonary hypertension which combined with elevated LVEDP likely contributing to exertional dyspnea and decreased exercise tolerance. 2/15--trial of short-term calcium channel blockers-continue diuresis and observe. Left foot pain-x-ray-check uric acid level. Active Problems:    Restrictive lung disease    Asthma  Plan: 2/15--no obvious asthma exacerbation. Pulmonary hypertension and restrictive lung disease-morbid obesity contributing to comorbidities/morbidity with obstructive sleep apnea. .       Chronic heart failure with preserved ejection fraction (HFpEF) ([address]    CAD in native artery  Plan: 2/14-  Continue GDMT  Continue Bumex 0.5 mg IV twice daily with close monitoring of renal function. Toprol- mg p.o. twice daily  Hydralazine 50 mg p.o. twice daily  Imdur 60 mg p.o. daily  Continue aspirin Plavix for underlying CAD and KAMLESH for recent proximal circumflex. Protonix 40 mg daily for GI prophylaxis.   2/15--cardiology recommendations and follow-up noted. Discussed palliative care with patient's symptom management-she is interested in continue diuresis for now. He believes recent difficulties are secondary to hydration prior to and around time of cardiac catheterization to prevent renal contrast-induced nephropathy. Not clear that this is the etiology of his current complaints. Chronic gout due to renal impairment without tophus  Plan: 2/14-  Continue allopurinol 150 mg p.o. daily. Close monitoring for gout flareup given ongoing diuresis. 2/15--- x-ray left foot and check uric acid level. Goal less than 7. Location of symptom and characterization of symptoms consistent with possible Woody's neuroma? Hypothyroidism due to Hashimoto's thyroiditis  Plan: 2/14-  Continue Synthroid 88 mcg p.o. daily before breakfast.2/15-this is unchanged. Chronic kidney disease (CKD), stage IV (severe) ([facility] [facility]  Plan: 2/14-  Renal function is at baseline, creatinine 3.7, BUN 91. Close monitoring of renal function with ongoing diuretics. May need to consult nephrology should renal function get worse. 2/15--monitor closely with diuresis. BUN and creatinine 91 and 3.8 today. JORJE (obstructive sleep apnea)  Plan: 2/14-  Patient advised to use home CPAP at bedtime. 2/15-continue same       Obesity, morbid ([facility]    Plan: 2/14-  BMI of 42.0, encouraged to lose weight. Increases medical complexity and all cause mortality. Type 2 diabetes with nephropathy ([facility]  Plan: 2/14-  POC glucose QA CHS. Continue Lantus 25 units subcu twice daily along with sliding scale. 2/15-continue same for now-monitor. Hypertension  Plan: 2/14-  Blood pressure is optimally controlled with Toprol-XL, Imdur and hydralazine. Continue monitor BP while inpatient, titrate medications accordingly. 2/15--monitor-new addition CCB. PT/OT evals and PPD needed/ordered? Yes     Diet: ADULT DIET; Regular; 3 carb choices (45 gm/meal);  No Added Salt (3-4 gm); 1500 ml  VTE prophylaxis:Heparin  Code status: Full Code            Hospital Problems:  Principal Problem:    Acute pulmonary edema (HCC)  Active Problems:    Restrictive lung disease    Volume overload    Chronic heart failure with preserved ejection fraction (HFpEF) (HCC)    Chronic gout due to renal impairment without tophus    Hypothyroidism due to Hashimoto's thyroiditis    Generalized weakness    Decreased exercise tolerance    Chronic kidney disease (CKD), stage IV (severe) (HCC)    JORJE (obstructive sleep apnea)    Obesity, morbid (HCC)    SEARS (dyspnea on exertion)    Type 2 diabetes with nephropathy ([facility]    Hypertension    CAD in native artery  Resolved Problems:    * No resolved hospital problems. *      Objective:   Patient Vitals for the past 24 hrs:   Temp Pulse Resp BP SpO2   02/15/23 1110 98.4 °F (36.9 °C) 68 20 (!) 103/56 98 %   02/15/23 0716 97.7 °F (36.5 °C) 68 18 113/61 96 %   02/15/23 0452 98.6 °F (37 °C) 73 20 (!) 100/49 97 %   02/14/23 2208 97.5 °F (36.4 °C) 72 22 111/68 95 %   02/14/23 2037 97.9 °F (36.6 °C) 66 20 111/70 100 %   02/14/23 2000 -- 65 17 -- 97 %   02/14/23 1655 97.5 °F (36.4 °C) 64 20 110/66 98 %   02/14/23 1546 -- 81 -- -- 97 %       Oxygen Therapy  SpO2: 98 %  Pulse via Oximetry: 71 beats per minute  Pulse Oximeter Device Mode: Intermittent  Pulse Oximeter Device Location: Right, Finger  O2 Device: None (Room air)  O2 Flow Rate (L/min): 0 L/min    Estimated body mass index is 42.52 kg/m² as calculated from the following:    Height as of this encounter: 6' 1\" (1.854 m). Weight as of this encounter: 322 lb 4.8 oz (146.2 kg). Intake/Output Summary (Last 24 hours) at 2/15/2023 1254  Last data filed at 2/15/2023 0900  Gross per 24 hour   Intake 250 ml   Output 1050 ml   Net -800 ml         Physical Exam:     Blood pressure (!) 103/56, pulse 68, temperature 98.4 °F (36.9 °C), temperature source Oral, resp.  rate 20, height 6' 1\" (1.854 m), weight (!) 322 lb 4.8 oz (146.2 kg), SpO2 98 %. General:    Alert oriented x3 no new complaints. No significant improvement presenting symptoms  Head:  Normocephalic, atraumatic  Eyes:  Sclerae appear normal.  Pupils equally round. ENT:  Nares appear normal.  Moist oral mucosa  Neck:  No restricted ROM. Trachea midline   CV:   Rate controlled and fairly regular at time of exam without JVD or HJR. Lungs:   Decreased breath sounds bilateral.  No active wheeze at time of exam.  Symmetric excursion of the chest wall. Abdomen:   Soft, nontender, nondistended. Extremities: No cyanosis or clubbing. No unilateral lower extremity edema  Neuro:  CN II-XII grossly intact. No focal motor weakness grossly.   No tremor      I have personally reviewed labs and tests:  Recent Labs:  Recent Results (from the past 48 hour(s))   EKG 12 lead    Collection Time: 02/14/23  9:24 AM   Result Value Ref Range    Ventricular Rate 77 BPM    Atrial Rate 77 BPM    P-R Interval 256 ms    QRS Duration 136 ms    Q-T Interval 434 ms    QTc Calculation (Bazett) 491 ms    P Axis 83 degrees    R Axis 24 degrees    T Axis 137 degrees    Diagnosis Sinus rhythm with 1st degree A-V block    CBC    Collection Time: 02/14/23  9:29 AM   Result Value Ref Range    WBC 6.3 4.3 - 11.1 K/uL    RBC 3.02 (L) 4.23 - 5.6 M/uL    Hemoglobin 9.1 (L) 13.6 - 17.2 g/dL    Hematocrit 29.3 (L) 41.1 - 50.3 %    MCV 97.0 82 - 102 FL    MCH 30.1 26.1 - 32.9 PG    MCHC 31.1 (L) 31.4 - 35.0 g/dL    RDW 14.8 (H) 11.9 - 14.6 %    Platelets 257 110 - 628 K/uL    MPV 10.4 9.4 - 12.3 FL    nRBC 0.00 0.0 - 0.2 K/uL   Comprehensive Metabolic Panel    Collection Time: 02/14/23  9:29 AM   Result Value Ref Range    Sodium 141 133 - 143 mmol/L    Potassium 4.8 3.5 - 5.1 mmol/L    Chloride 108 101 - 110 mmol/L    CO2 23 21 - 32 mmol/L    Anion Gap 10 2 - 11 mmol/L    Glucose 214 (H) 65 - 100 mg/dL    BUN 91 (H) 8 - 23 MG/DL    Creatinine 3.70 (H) 0.8 - 1.5 MG/DL    Est, Glom Filt Rate 16 (L) >60 ml/min/1.73m2    Calcium 9.4 8.3 - 10.4 MG/DL    Total Bilirubin 0.4 0.2 - 1.1 MG/DL    ALT 12 12 - 65 U/L    AST 17 15 - 37 U/L    Alk Phosphatase 69 50 - 136 U/L    Total Protein 6.5 6.3 - 8.2 g/dL    Albumin 2.9 (L) 3.2 - 4.6 g/dL    Globulin 3.6 2.8 - 4.5 g/dL    Albumin/Globulin Ratio 0.8 0.4 - 1.6     Troponin    Collection Time: 02/14/23  9:29 AM   Result Value Ref Range    Troponin, High Sensitivity 94.7 (H) 0 - 14 pg/mL   Magnesium    Collection Time: 02/14/23  9:29 AM   Result Value Ref Range    Magnesium 2.6 (H) 1.8 - 2.4 mg/dL   Brain Natriuretic Peptide    Collection Time: 02/14/23  9:29 AM   Result Value Ref Range    NT Pro-BNP 15,525 (H) <450 PG/ML   Procalcitonin    Collection Time: 02/14/23  9:29 AM   Result Value Ref Range    Procalcitonin 0.18 0.00 - 0.49 ng/mL   COVID-19, Rapid    Collection Time: 02/14/23 10:16 AM    Specimen: Nasopharyngeal   Result Value Ref Range    Source NASAL      SARS-CoV-2, Rapid Not detected NOTD     Troponin    Collection Time: 02/14/23 11:04 AM   Result Value Ref Range    Troponin, High Sensitivity 67.7 (H) 0 - 14 pg/mL   POCT Glucose    Collection Time: 02/14/23  3:56 PM   Result Value Ref Range    POC Glucose 149 (H) 65 - 100 mg/dL    Performed by: Rosi    POCT Glucose    Collection Time: 02/14/23  8:49 PM   Result Value Ref Range    POC Glucose 151 (H) 65 - 100 mg/dL    Performed by:  Theodore    POCT Glucose    Collection Time: 02/15/23  3:50 AM   Result Value Ref Range    POC Glucose 91 65 - 100 mg/dL    Performed by: [facility]    Comprehensive Metabolic Panel w/ Reflex to MG    Collection Time: 02/15/23  5:15 AM   Result Value Ref Range    Sodium 140 133 - 143 mmol/L    Potassium 4.1 3.5 - 5.1 mmol/L    Chloride 106 101 - 110 mmol/L    CO2 23 21 - 32 mmol/L    Anion Gap 11 2 - 11 mmol/L    Glucose 174 (H) 65 - 100 mg/dL    BUN 91 (H) 8 - 23 MG/DL    Creatinine 3.80 (H) 0.8 - 1.5 MG/DL    Est, Glom Filt Rate 15 (L) >60 ml/min/1.73m2 Calcium 9.2 8.3 - 10.4 MG/DL    Total Bilirubin 0.5 0.2 - 1.1 MG/DL    ALT 20 12 - 65 U/L    AST 20 15 - 37 U/L    Alk Phosphatase 65 50 - 136 U/L    Total Protein 6.3 6.3 - 8.2 g/dL    Albumin 3.0 (L) 3.2 - 4.6 g/dL    Globulin 3.3 2.8 - 4.5 g/dL    Albumin/Globulin Ratio 0.9 0.4 - 1.6     CBC with Auto Differential    Collection Time: 02/15/23  5:15 AM   Result Value Ref Range    WBC 6.3 4.3 - 11.1 K/uL    RBC 2.81 (L) 4.23 - 5.6 M/uL    Hemoglobin 8.7 (L) 13.6 - 17.2 g/dL    Hematocrit 27.0 (L) 41.1 - 50.3 %    MCV 96.1 82 - 102 FL    MCH 31.0 26.1 - 32.9 PG    MCHC 32.2 31.4 - 35.0 g/dL    RDW 14.6 11.9 - 14.6 %    Platelets 907 582 - 319 K/uL    MPV 11.2 9.4 - 12.3 FL    nRBC 0.00 0.0 - 0.2 K/uL    Differential Type AUTOMATED      Seg Neutrophils 81 (H) 43 - 78 %    Lymphocytes 6 (L) 13 - 44 %    Monocytes 8 4.0 - 12.0 %    Eosinophils % 3 0.5 - 7.8 %    Basophils 1 0.0 - 2.0 %    Immature Granulocytes 1 0.0 - 5.0 %    Segs Absolute 5.2 1.7 - 8.2 K/UL    Absolute Lymph # 0.4 (L) 0.5 - 4.6 K/UL    Absolute Mono # 0.5 0.1 - 1.3 K/UL    Absolute Eos # 0.2 0.0 - 0.8 K/UL    Basophils Absolute 0.1 0.0 - 0.2 K/UL    Absolute Immature Granulocyte 0.0 0.0 - 0.5 K/UL   Ferritin    Collection Time: 02/15/23  5:15 AM   Result Value Ref Range    Ferritin 49 8 - 388 NG/ML   Transferrin Saturation    Collection Time: 02/15/23  5:15 AM   Result Value Ref Range    Iron 37 35 - 150 ug/dL    TIBC 269 250 - 450 ug/dL    TRANSFERRIN SATURATION 14 (L) >20 %   Vitamin B12    Collection Time: 02/15/23  5:15 AM   Result Value Ref Range    Vitamin B-12 260 193 - 986 pg/mL   Folate    Collection Time: 02/15/23  5:15 AM   Result Value Ref Range    Folate 16.7 3.1 - 17.5 ng/mL   POCT Glucose    Collection Time: 02/15/23  7:17 AM   Result Value Ref Range    POC Glucose 149 (H) 65 - 100 mg/dL    Performed by: Felicita    POCT Glucose    Collection Time: 02/15/23 11:56 AM   Result Value Ref Range    POC Glucose 239 (H) 65 - 100 mg/dL    Performed by: Felicita        I have personally reviewed imaging studies:  Other Studies:  XR CHEST PORTABLE   Final Result      -Bilateral interstitial opacities, similar to slightly increased from prior,   likely reflecting pulmonary edema.      -Persistent patchy left basilar opacity, likely reflecting atelectasis, though   infection is also a consideration in appropriate clinical context.      -Suspected small bilateral pleural effusions.             XR FOOT LEFT (2 VIEWS)    (Results Pending)       Current Meds:  Current Facility-Administered Medications   Medication Dose Route Frequency    dilTIAZem (CARDIZEM) tablet 30 mg  30 mg Oral 4 times per day    tamsulosin (FLOMAX) capsule 0.4 mg  0.4 mg Oral Dinner    aspirin EC tablet 81 mg  81 mg Oral Daily    calcitRIOL (ROCALTROL) capsule 0.25 mcg  0.25 mcg Oral Daily    clopidogrel (PLAVIX) tablet 75 mg  75 mg Oral Daily    hydrALAZINE (APRESOLINE) tablet 50 mg  50 mg Oral BID    isosorbide mononitrate (IMDUR) extended release tablet 60 mg  60 mg Oral Daily    levothyroxine (SYNTHROID) tablet 88 mcg  88 mcg Oral QAM AC    metoprolol succinate (TOPROL XL) extended release tablet 100 mg  100 mg Oral BID    nitroGLYCERIN (NITROSTAT) SL tablet 0.4 mg  0.4 mg SubLINGual Q5 Min PRN    sodium bicarbonate tablet 650 mg  650 mg Oral BID    allopurinol (ZYLOPRIM) tablet 150 mg  150 mg Oral Daily    sodium chloride flush 0.9 % injection 5-40 mL  5-40 mL IntraVENous 2 times per day    sodium chloride flush 0.9 % injection 5-40 mL  5-40 mL IntraVENous PRN    0.9 % sodium chloride infusion   IntraVENous PRN    ondansetron (ZOFRAN-ODT) disintegrating tablet 4 mg  4 mg Oral Q8H PRN    Or    ondansetron (ZOFRAN) injection 4 mg  4 mg IntraVENous Q6H PRN    polyethylene glycol (GLYCOLAX) packet 17 g  17 g Oral Daily PRN    acetaminophen (TYLENOL) tablet 650 mg  650 mg Oral Q6H PRN    Or    acetaminophen (TYLENOL) suppository 650 mg  650 mg Rectal Q6H PRN    magnesium sulfate 2000 mg in 50 mL IVPB premix  2,000 mg IntraVENous PRN    potassium chloride (KLOR-CON M) extended release tablet 40 mEq  40 mEq Oral PRN    Or    potassium bicarb-citric acid (EFFER-K) effervescent tablet 40 mEq  40 mEq Oral PRN    Or    potassium chloride 10 mEq/100 mL IVPB (Peripheral Line)  10 mEq IntraVENous PRN    aluminum & magnesium hydroxide-simethicone (MAALOX) 200-200-20 MG/5ML suspension 30 mL  30 mL Oral Q6H PRN    heparin (porcine) injection 5,000 Units  5,000 Units SubCUTAneous 3 times per day    glucose chewable tablet 16 g  4 tablet Oral PRN    dextrose bolus 10% 125 mL  125 mL IntraVENous PRN    Or    dextrose bolus 10% 250 mL  250 mL IntraVENous PRN    glucagon (rDNA) injection 1 mg  1 mg SubCUTAneous PRN    dextrose 10 % infusion   IntraVENous Continuous PRN    insulin glargine (LANTUS) injection vial 25 Units  25 Units SubCUTAneous BID    insulin lispro (HUMALOG) injection vial 0-4 Units  0-4 Units SubCUTAneous TID WC    insulin lispro (HUMALOG) injection vial 0-4 Units  0-4 Units SubCUTAneous Nightly    bumetanide (BUMEX) injection 0.5 mg  0.5 mg IntraVENous BID    levalbuterol (XOPENEX) nebulization 0.63 mg  0.63 mg Nebulization Q6H WA    budesonide (PULMICORT) nebulizer suspension 500 mcg  0.5 mg Nebulization BID       Signed:  India Mclaughlin DO    Part of this note may have been written by using a voice dictation software. The note has been proof read but may still contain some grammatical/other typographical errors.

## 2023-02-15 NOTE — CARE COORDINATION
RNCM met with patient in room 817 to discuss discharge planning.    Patient lives with spouse in one level home with level entry. Patient uses a cane for ambulation. Patient is independent with ADLs at baseline. Patient's children transports patient to appointments. Patient uses a CPAP at night. Demographics and PCP verified. CM following.       02/15/23 1142   Service Assessment   Patient Orientation Alert and Oriented   Cognition Alert   History Provided By Patient   Primary Caregiver Self   Support Systems Spouse/Significant Other;Children   Patient's Healthcare Decision Maker is: Legal Next of Kin   PCP Verified by CM Yes   Last Visit to PCP Within last 3 months   Prior Functional Level Independent in ADLs/IADLs   Current Functional Level Independent in ADLs/IADLs   Can patient return to prior living arrangement Yes   Ability to make needs known: Good   Family able to assist with home care needs: Yes   Would you like for me to discuss the discharge plan with any other family members/significant others, and if so, who? Yes  (Spouse - Ericksontri)   Financial Resources Medicare   Community Resources None   Social/Functional History   Lives With Spouse   Type of Home House   Home Layout One level   Home Equipment Cane   Receives Help From Family   ADL Assistance Independent   Active  Yes  (Children transport to appointments)   Occupation Retired   Discharge Planning   Type of Residence House   Living Arrangements Spouse/Significant Other   Current Services Prior To Admission C-pap;Durable Medical Equipment   Current DME Prior to Arrival Cane   Potential Assistance Needed Durable Medical Equipment;Home Care   Potential DME Needed Walker   DME Ordered? No   Potential Assistance Purchasing Medications No   Type of Home Care Services None   Patient expects to be discharged to: House   One/Two Story Residence One story

## 2023-02-16 ENCOUNTER — APPOINTMENT (OUTPATIENT)
Dept: ULTRASOUND IMAGING | Age: 83
DRG: 286 | End: 2023-02-16
Payer: MEDICARE

## 2023-02-16 ENCOUNTER — APPOINTMENT (OUTPATIENT)
Dept: GENERAL RADIOLOGY | Age: 83
DRG: 286 | End: 2023-02-16
Payer: MEDICARE

## 2023-02-16 PROBLEM — E79.0 HYPERURICEMIA: Status: ACTIVE | Noted: 2023-02-16

## 2023-02-16 LAB
ALBUMIN SERPL-MCNC: 3.2 G/DL (ref 3.2–4.6)
ALBUMIN/GLOB SERPL: 0.9 (ref 0.4–1.6)
ALP SERPL-CCNC: 70 U/L (ref 50–136)
ALT SERPL-CCNC: 15 U/L (ref 12–65)
ANION GAP SERPL CALC-SCNC: 7 MMOL/L (ref 2–11)
AST SERPL-CCNC: 29 U/L (ref 15–37)
BASOPHILS # BLD: 0.1 K/UL (ref 0–0.2)
BASOPHILS NFR BLD: 1 % (ref 0–2)
BILIRUB SERPL-MCNC: 0.6 MG/DL (ref 0.2–1.1)
BUN SERPL-MCNC: 97 MG/DL (ref 8–23)
CALCIUM SERPL-MCNC: 9.7 MG/DL (ref 8.3–10.4)
CHLORIDE SERPL-SCNC: 106 MMOL/L (ref 101–110)
CO2 SERPL-SCNC: 25 MMOL/L (ref 21–32)
CREAT SERPL-MCNC: 4.2 MG/DL (ref 0.8–1.5)
DIFFERENTIAL METHOD BLD: ABNORMAL
EOSINOPHIL # BLD: 0.2 K/UL (ref 0–0.8)
EOSINOPHIL NFR BLD: 3 % (ref 0.5–7.8)
ERYTHROCYTE [DISTWIDTH] IN BLOOD BY AUTOMATED COUNT: 14.6 % (ref 11.9–14.6)
GLOBULIN SER CALC-MCNC: 3.6 G/DL (ref 2.8–4.5)
GLUCOSE BLD STRIP.AUTO-MCNC: 109 MG/DL (ref 65–100)
GLUCOSE BLD STRIP.AUTO-MCNC: 158 MG/DL (ref 65–100)
GLUCOSE BLD STRIP.AUTO-MCNC: 214 MG/DL (ref 65–100)
GLUCOSE BLD STRIP.AUTO-MCNC: 301 MG/DL (ref 65–100)
GLUCOSE SERPL-MCNC: 102 MG/DL (ref 65–100)
HCT VFR BLD AUTO: 27.4 % (ref 41.1–50.3)
HGB BLD-MCNC: 8.9 G/DL (ref 13.6–17.2)
IMM GRANULOCYTES # BLD AUTO: 0 K/UL (ref 0–0.5)
IMM GRANULOCYTES NFR BLD AUTO: 1 % (ref 0–5)
IRON SATN MFR SERPL: 14 %
IRON SERPL-MCNC: 41 UG/DL (ref 35–150)
LYMPHOCYTES # BLD: 0.6 K/UL (ref 0.5–4.6)
LYMPHOCYTES NFR BLD: 9 % (ref 13–44)
MAGNESIUM SERPL-MCNC: 3 MG/DL (ref 1.8–2.4)
MCH RBC QN AUTO: 31.1 PG (ref 26.1–32.9)
MCHC RBC AUTO-ENTMCNC: 32.5 G/DL (ref 31.4–35)
MCV RBC AUTO: 95.8 FL (ref 82–102)
MONOCYTES # BLD: 0.6 K/UL (ref 0.1–1.3)
MONOCYTES NFR BLD: 10 % (ref 4–12)
NEUTS SEG # BLD: 4.7 K/UL (ref 1.7–8.2)
NEUTS SEG NFR BLD: 76 % (ref 43–78)
NRBC # BLD: 0 K/UL (ref 0–0.2)
PHOSPHATE SERPL-MCNC: 6.3 MG/DL (ref 2.3–3.7)
PLATELET # BLD AUTO: 278 K/UL (ref 150–450)
PMV BLD AUTO: 11.7 FL (ref 9.4–12.3)
POTASSIUM SERPL-SCNC: 4.7 MMOL/L (ref 3.5–5.1)
PROT SERPL-MCNC: 6.8 G/DL (ref 6.3–8.2)
RBC # BLD AUTO: 2.86 M/UL (ref 4.23–5.6)
SERVICE CMNT-IMP: ABNORMAL
SODIUM SERPL-SCNC: 138 MMOL/L (ref 133–143)
TIBC SERPL-MCNC: 289 UG/DL (ref 250–450)
TRANSFERRIN SERPL-MCNC: 242 MG/DL (ref 202–364)
URATE SERPL-MCNC: 8.5 MG/DL (ref 2.6–6)
WBC # BLD AUTO: 6.2 K/UL (ref 4.3–11.1)

## 2023-02-16 PROCEDURE — 6360000002 HC RX W HCPCS: Performed by: HOSPITALIST

## 2023-02-16 PROCEDURE — 82962 GLUCOSE BLOOD TEST: CPT

## 2023-02-16 PROCEDURE — 2580000003 HC RX 258: Performed by: HOSPITALIST

## 2023-02-16 PROCEDURE — 83540 ASSAY OF IRON: CPT

## 2023-02-16 PROCEDURE — 85025 COMPLETE CBC W/AUTO DIFF WBC: CPT

## 2023-02-16 PROCEDURE — 83921 ORGANIC ACID SINGLE QUANT: CPT

## 2023-02-16 PROCEDURE — 83735 ASSAY OF MAGNESIUM: CPT

## 2023-02-16 PROCEDURE — 80053 COMPREHEN METABOLIC PANEL: CPT

## 2023-02-16 PROCEDURE — 6370000000 HC RX 637 (ALT 250 FOR IP): Performed by: INTERNAL MEDICINE

## 2023-02-16 PROCEDURE — 76770 US EXAM ABDO BACK WALL COMP: CPT

## 2023-02-16 PROCEDURE — 1100000003 HC PRIVATE W/ TELEMETRY

## 2023-02-16 PROCEDURE — 6370000000 HC RX 637 (ALT 250 FOR IP): Performed by: HOSPITALIST

## 2023-02-16 PROCEDURE — 2500000003 HC RX 250 WO HCPCS: Performed by: HOSPITALIST

## 2023-02-16 PROCEDURE — 36415 COLL VENOUS BLD VENIPUNCTURE: CPT

## 2023-02-16 PROCEDURE — 94640 AIRWAY INHALATION TREATMENT: CPT

## 2023-02-16 PROCEDURE — 84550 ASSAY OF BLOOD/URIC ACID: CPT

## 2023-02-16 PROCEDURE — 84100 ASSAY OF PHOSPHORUS: CPT

## 2023-02-16 PROCEDURE — 2500000003 HC RX 250 WO HCPCS: Performed by: INTERNAL MEDICINE

## 2023-02-16 PROCEDURE — 73630 X-RAY EXAM OF FOOT: CPT

## 2023-02-16 RX ORDER — BUMETANIDE 0.25 MG/ML
0.5 INJECTION, SOLUTION INTRAMUSCULAR; INTRAVENOUS ONCE
Status: DISCONTINUED | OUTPATIENT
Start: 2023-02-16 | End: 2023-02-16

## 2023-02-16 RX ORDER — DIPHENHYDRAMINE HCL 25 MG
25 CAPSULE ORAL EVERY 6 HOURS PRN
Status: DISCONTINUED | OUTPATIENT
Start: 2023-02-16 | End: 2023-02-17 | Stop reason: HOSPADM

## 2023-02-16 RX ORDER — BUMETANIDE 0.25 MG/ML
1 INJECTION, SOLUTION INTRAMUSCULAR; INTRAVENOUS 2 TIMES DAILY
Status: DISCONTINUED | OUTPATIENT
Start: 2023-02-16 | End: 2023-02-17

## 2023-02-16 RX ADMIN — BUDESONIDE 500 MCG: 0.5 INHALANT RESPIRATORY (INHALATION) at 20:04

## 2023-02-16 RX ADMIN — LEVALBUTEROL HYDROCHLORIDE 0.63 MG: 0.63 SOLUTION RESPIRATORY (INHALATION) at 08:25

## 2023-02-16 RX ADMIN — SODIUM CHLORIDE, PRESERVATIVE FREE 10 ML: 5 INJECTION INTRAVENOUS at 20:59

## 2023-02-16 RX ADMIN — LEVALBUTEROL HYDROCHLORIDE 0.63 MG: 0.63 SOLUTION RESPIRATORY (INHALATION) at 13:26

## 2023-02-16 RX ADMIN — CALCITRIOL 0.25 MCG: 0.25 CAPSULE ORAL at 10:01

## 2023-02-16 RX ADMIN — INSULIN GLARGINE 25 UNITS: 100 INJECTION, SOLUTION SUBCUTANEOUS at 09:45

## 2023-02-16 RX ADMIN — SODIUM CHLORIDE, PRESERVATIVE FREE 10 ML: 5 INJECTION INTRAVENOUS at 10:48

## 2023-02-16 RX ADMIN — ASPIRIN 81 MG: 81 TABLET ORAL at 10:01

## 2023-02-16 RX ADMIN — BUMETANIDE 0.5 MG: 0.25 INJECTION INTRAMUSCULAR; INTRAVENOUS at 09:56

## 2023-02-16 RX ADMIN — INSULIN LISPRO 3 UNITS: 100 INJECTION, SOLUTION INTRAVENOUS; SUBCUTANEOUS at 17:31

## 2023-02-16 RX ADMIN — HEPARIN SODIUM 5000 UNITS: 5000 INJECTION INTRAVENOUS; SUBCUTANEOUS at 06:16

## 2023-02-16 RX ADMIN — ISOSORBIDE MONONITRATE 60 MG: 60 TABLET, EXTENDED RELEASE ORAL at 10:01

## 2023-02-16 RX ADMIN — DILTIAZEM HYDROCHLORIDE 30 MG: 30 TABLET, FILM COATED ORAL at 06:16

## 2023-02-16 RX ADMIN — INSULIN GLARGINE 25 UNITS: 100 INJECTION, SOLUTION SUBCUTANEOUS at 20:57

## 2023-02-16 RX ADMIN — CLOPIDOGREL BISULFATE 75 MG: 75 TABLET ORAL at 10:01

## 2023-02-16 RX ADMIN — LEVALBUTEROL HYDROCHLORIDE 0.63 MG: 0.63 SOLUTION RESPIRATORY (INHALATION) at 20:04

## 2023-02-16 RX ADMIN — SODIUM BICARBONATE 650 MG: 650 TABLET ORAL at 10:01

## 2023-02-16 RX ADMIN — LEVOTHYROXINE SODIUM 88 MCG: 0.09 TABLET ORAL at 06:16

## 2023-02-16 RX ADMIN — HEPARIN SODIUM 5000 UNITS: 5000 INJECTION INTRAVENOUS; SUBCUTANEOUS at 14:24

## 2023-02-16 RX ADMIN — ALLOPURINOL 150 MG: 100 TABLET ORAL at 09:59

## 2023-02-16 RX ADMIN — SODIUM BICARBONATE 650 MG: 650 TABLET ORAL at 20:58

## 2023-02-16 RX ADMIN — DIPHENHYDRAMINE HYDROCHLORIDE 25 MG: 25 CAPSULE ORAL at 01:21

## 2023-02-16 RX ADMIN — BUDESONIDE 500 MCG: 0.5 INHALANT RESPIRATORY (INHALATION) at 08:25

## 2023-02-16 RX ADMIN — BUMETANIDE 1 MG: 0.25 INJECTION INTRAMUSCULAR; INTRAVENOUS at 20:59

## 2023-02-16 RX ADMIN — HEPARIN SODIUM 5000 UNITS: 5000 INJECTION INTRAVENOUS; SUBCUTANEOUS at 20:58

## 2023-02-16 ASSESSMENT — PAIN SCALES - GENERAL
PAINLEVEL_OUTOF10: 0

## 2023-02-16 NOTE — PROGRESS NOTES
Aicha Porter  Admission Date: 2/14/2023         [facility] Nephrology Progress Note: 2/16/2023    Follow-up for: GREGG/ CKD IV    The patient's chart is reviewed and the patient is discussed with the staff.     Subjective:   Pt seen and examined in room sitting up in chair, + LE edema significant, good uop    ROS:  Gen - no fever, no chills, appetite unchanged  CV - no chest pain, no palpitation  Lung - + exertional shortness of breath, no cough  Abd - no tenderness, no nausea/vomiting, no diarrhea  Ext - + edema    Current Facility-Administered Medications   Medication Dose Route Frequency    diphenhydrAMINE (BENADRYL) capsule 25 mg  25 mg Oral Q6H PRN    [Held by provider] dilTIAZem (CARDIZEM) tablet 30 mg  30 mg Oral 4 times per day    aspirin EC tablet 81 mg  81 mg Oral Daily    calcitRIOL (ROCALTROL) capsule 0.25 mcg  0.25 mcg Oral Daily    clopidogrel (PLAVIX) tablet 75 mg  75 mg Oral Daily    [Held by provider] hydrALAZINE (APRESOLINE) tablet 50 mg  50 mg Oral BID    isosorbide mononitrate (IMDUR) extended release tablet 60 mg  60 mg Oral Daily    levothyroxine (SYNTHROID) tablet 88 mcg  88 mcg Oral QAM AC    [Held by provider] metoprolol succinate (TOPROL XL) extended release tablet 100 mg  100 mg Oral BID    nitroGLYCERIN (NITROSTAT) SL tablet 0.4 mg  0.4 mg SubLINGual Q5 Min PRN    sodium bicarbonate tablet 650 mg  650 mg Oral BID    allopurinol (ZYLOPRIM) tablet 150 mg  150 mg Oral Daily    sodium chloride flush 0.9 % injection 5-40 mL  5-40 mL IntraVENous 2 times per day    sodium chloride flush 0.9 % injection 5-40 mL  5-40 mL IntraVENous PRN    0.9 % sodium chloride infusion   IntraVENous PRN    ondansetron (ZOFRAN-ODT) disintegrating tablet 4 mg  4 mg Oral Q8H PRN    Or    ondansetron (ZOFRAN) injection 4 mg  4 mg IntraVENous Q6H PRN    polyethylene glycol (GLYCOLAX) packet 17 g  17 g Oral Daily PRN    acetaminophen (TYLENOL) tablet 650 mg  650 mg Oral Q6H PRN    Or acetaminophen (TYLENOL) suppository 650 mg  650 mg Rectal Q6H PRN    magnesium sulfate 2000 mg in 50 mL IVPB premix  2,000 mg IntraVENous PRN    potassium chloride (KLOR-CON M) extended release tablet 40 mEq  40 mEq Oral PRN    Or    potassium bicarb-citric acid (EFFER-K) effervescent tablet 40 mEq  40 mEq Oral PRN    Or    potassium chloride 10 mEq/100 mL IVPB (Peripheral Line)  10 mEq IntraVENous PRN    aluminum & magnesium hydroxide-simethicone (MAALOX) 200-200-20 MG/5ML suspension 30 mL  30 mL Oral Q6H PRN    heparin (porcine) injection 5,000 Units  5,000 Units SubCUTAneous 3 times per day    glucose chewable tablet 16 g  4 tablet Oral PRN    dextrose bolus 10% 125 mL  125 mL IntraVENous PRN    Or    dextrose bolus 10% 250 mL  250 mL IntraVENous PRN    glucagon (rDNA) injection 1 mg  1 mg SubCUTAneous PRN    dextrose 10 % infusion   IntraVENous Continuous PRN    insulin glargine (LANTUS) injection vial 25 Units  25 Units SubCUTAneous BID    insulin lispro (HUMALOG) injection vial 0-4 Units  0-4 Units SubCUTAneous TID WC    insulin lispro (HUMALOG) injection vial 0-4 Units  0-4 Units SubCUTAneous Nightly    bumetanide (BUMEX) injection 0.5 mg  0.5 mg IntraVENous BID    levalbuterol (XOPENEX) nebulization 0.63 mg  0.63 mg Nebulization Q6H WA    budesonide (PULMICORT) nebulizer suspension 500 mcg  0.5 mg Nebulization BID         Objective:     Vitals:    02/15/23 2216 02/16/23 0438 02/16/23 0600 02/16/23 0731   BP: 112/72 (!) 105/58  (!) 92/54   Pulse: 64 56  57   Resp: 22 18  21   Temp: 97.3 °F (36.3 °C) 97.3 °F (36.3 °C)  97.5 °F (36.4 °C)   TempSrc: Oral   Axillary   SpO2: 98% 99%  100%   Weight:   (!) 322 lb 12.8 oz (146.4 kg)    Height:         Intake and Output:   02/14 1901 - 02/16 0700  In: 740 [P.O.:730; I.V.:10]  Out: 1800 [Urine:1800]  No intake/output data recorded.     Physical Exam:   Constitutional:  the patient is well developed and in no acute distress, alert and oriented   HEENT:  Sclera clear, pupils equal, oral mucosa moist  Lungs: clear low lung volumes bilaterally   Cardiovascular:  Regular rate, S1, S2, no Rub   Abd/GI: soft and non-tender; with positive bowel sounds. Ext: warm without cyanosis. There is 3+ lower leg edema. Skin:  no jaundice or rashes  Neuro: no gross neuro deficits   Psychiatric: Calm. LAB  Recent Labs     02/14/23  0929 02/15/23  0515 02/16/23  0457   WBC 6.3 6.3 6.2   HGB 9.1* 8.7* 8.9*   HCT 29.3* 27.0* 27.4*    226 278     Recent Labs     02/14/23  0929 02/15/23  0515 02/16/23  0457    140 138   K 4.8 4.1 4.7    106 106   CO2 23 23 25   BUN 91* 91* 97*   CREATININE 3.70* 3.80* 4.20*   MG 2.6*  --  3.0*     No results for input(s): PH, PCO2, PO2, HCO3 in the last 72 hours. Assessment/Plan:  (Medical Decision Making)   GREGG/CKD IV Cr up from baseline in the setting of diuresis, noted hypotension episodes   -continue bumex with significant LE edema, hold antihypertensives   -baseline Cr in the mid to upper 3s.   Check renal US, UA, P/C ratio, daily labs      HFpEF/CAD  -cardiology following     DM  -SSI     History of PHU    Edema- diuretic as above, daily weights    Anemia check Fe studies          MELLY Esteves - [address] [facility] [place]

## 2023-02-16 NOTE — NURSE NAVIGATOR
Patient does not wish for CHF education at this time. He and family asked to call my number with any questions. Will follow.

## 2023-02-16 NOTE — PROGRESS NOTES
[facility] CARDIOLOGY PROGRESS NOTE           2/16/2023 2:35 PM    Admit Date: 2/14/2023         Subjective: Started on diltiazem overnight for pulmonary htn, BP became low and was stopped by primary team.  Remains very SOB, CR is worse. ROS:  Cardiovascular:  As noted above    Objective:      Vitals:    02/16/23 0438 02/16/23 0600 02/16/23 0731 02/16/23 1320   BP: (!) 105/58  (!) 92/54 118/65   Pulse: 56  57 62   Resp: 18  21 20   Temp: 97.3 °F (36.3 °C)  97.5 °F (36.4 °C) 97.7 °F (36.5 °C)   TempSrc:   Axillary Oral   SpO2: 99%  100% 98%   Weight:  (!) 322 lb 12.8 oz (146.4 kg)     Height:             Physical Exam:  General: Well Developed, Well Nourished, No Acute Distress, Alert & Oriented x 3, Appropriate mood  Neck: supple, no JVD  Heart: S1S2 with RRR without murmurs or gallops  Lungs: Clear throughout auscultation bilaterally without adventitious sounds  Abd: soft, nontender, nondistended, with good bowel sounds  Ext: no edema bilaterally  Skin: warm and dry    Wt Readings from Last 10 Encounters:   02/16/23 (!) 322 lb 12.8 oz (146.4 kg)   02/11/23 (!) 319 lb 8 oz (144.9 kg)   02/08/23 (!) 323 lb (146.5 kg)   01/29/23 (!) 324 lb (147 kg)   01/28/23 (!) 322 lb 11.2 oz (146.4 kg)   01/24/23 (!) 315 lb (142.9 kg)   12/02/22 (!) 311 lb (141.1 kg)   09/19/22 (!) 320 lb (145.2 kg)   08/19/22 (!) 322 lb (146.1 kg)   08/15/22 (!) 327 lb 1.6 oz (148.4 kg)         Data Review:   Recent Labs     02/14/23  0929 02/15/23  0515 02/16/23  0457    140 138   K 4.8 4.1 4.7   MG 2.6*  --  3.0*   BUN 91* 91* 97*   WBC 6.3 6.3 6.2   HGB 9.1* 8.7* 8.9*   HCT 29.3* 27.0* 27.4*    226 278       No results for input(s): TNIPOC in the last 72 hours.     Invalid input(s): TROIQ        Assessment/Plan:     Principal Problem:    Acute pulmonary edema (HCC)  Active Problems:    Restrictive lung disease    Volume overload    Chronic [facility] with preserved ejection fraction ([facility]) [facility] Chronic gout due to renal impairment without tophus    Hypothyroidism due to Hashimoto's thyroiditis    Generalized weakness    Decreased exercise tolerance    Left foot pain    Chronic kidney disease (CKD), stage IV (severe) (HCC)    JORJE (obstructive sleep apnea)    Obesity, morbid (HCC)    SEARS (dyspnea on exertion)    Type 2 diabetes with nephropathy ([address]    Hypertension    CAD in native artery  Resolved Problems:    * No resolved hospital problems. *    A/P  1) HFpEF -very limited data recorded in terms of I's and O's it is difficult to say whether he has had any meaningful diuresis throughout his entire hospital stay. His weight is increased compared to discharge wt dated 2/11/23. He has been getting regular IV Bumex his creatinine is worse. He has not had any meaningful improvement in his symptoms. At this point I feel the next appropriate step would be to perform a right heart catheterization to better assess his hemodynamics. We will make him n.p.o. after midnight with plans for right heart cath tomorrow. We will continue diuretics with IV Bumex and daily BMPs. We will stop all of his antihypertensive medications with the exception of long-acting beta-blockers. The next agent that should be mineralcorticoid agonist.  2) Pulmonary HTN - check RHC tomorrow will stop diltiazem and consider alternative agents like sildenafil if appropriate would defer to pulmonary for recommendations. 3) CKD - nephrology saw today, repeat BMP in the morning. Discussed risk of cardiac catheterization with patient in detail. The risk of a major complication (death, MI or major embolization) during or after cardiac catheterization is below 1%. Risk of mortality is under 0.1%. Local complications at the site of catheter insertion were discussed.   This includes but not limited to:  acute thrombosis, distal embolization, dissection, poorly controlled bleeding, hematoma, retroperitoneal hemorrhage, pseudoaneurysm or AV fistula formation. Other potential serious complication were discussed including risk of cardiac arrhythmias, allergic reactions, atheroemboli and acute kidney injury.     Torres Broussard MD  2/16/2023 2:35 PM

## 2023-02-16 NOTE — PROGRESS NOTES
Hospitalist Progress Note   Admit Date:  2023  9:27 AM   Name:  Elodia Paul   Age:  79 y.o. Sex:  male  :  1940   MRN:  [id number]   Room:  [facility]    Presenting Complaint: Shortness of Breath     Reason(s) for Admission: Shortness of breath [R06.02]  Acute pulmonary edema ([address] [J81.0]  Chronic kidney disease, unspecified CKD stage [N18.9]  Volume overload state of heart [E87.79]     Hospital Course:         Elodia Paul is a 79 y.o. male with medical history of chronic CHF with preserved ejection fraction, CAD status post recent heart cath on 2023 requiring KAMLESH in proximal circumflex (severely elevated LVEDP of 45 mmHg), restrictive lung disease, morbid obesity with BMI of 42.0, HTN, JORJE, DM2, CKD stage IV, hypothyroidism presented to the ER with chief complaints of dyspnea on exertion and at rest along with generalized weakness and difficulty in carrying out ADLs. Patient was recently discharged from [place] cardiology service on 2023 after being managed for dyspnea and volume overload, patient was admitted on 2023. Patient reports of generalized weakness, extreme shortness of breath with minimal exertion which is affecting his ability to carry out ADLs. Patient denies any chest pain, fever, night sweats, cough, URI, abdominal pain, diarrhea, urinary symptoms. He does report of progressively worsening shortness of breath which was initially with exertion and now present at rest as well. Patient denies any PND. Patient stated that he can only walk 10 to 15 feet before he gets short of breath. He does not report of noticing any hypoxia at home. He does report of increased in his weight over the past 2 to 3 days, reports of lower extremity swelling. Denies any congestion, rhinorrhea. ER work-up remarkable for creatinine 3.7/BUN 91 (at baseline), BNP 30497, troponin 94.7, albumin 2.9, A1c 7.4 vitamin D 28.8, hemoglobin 9.1.   Chest x-ray with bilateral interstitial opacities similar to slightly increased from prior suggestive of pulmonary edema, persistent patchy left basilar opacity likely reflecting atelectasis, suspected small bilateral pleural effusions. EKG with NSR and first-degree AV block. Subjective & 24hr Events (02/16/23): All BP meds held with exception of isosorbide long-acting this morning due to hypotension. Addition of short acting calcium channel blocker was added by cardiology yesterday regarding pulmonary hypertension. Daily weights essentially unchanged. Cardiology plans right heart cath tomorrow to evaluate pulmonary hypertension. Patient with extreme fatigue on exertion significant weight gain last hospital stay. Possibly ineffective diuresis thus far this hospital stay. Assessment & Plan:      Principal Problem:    Acute pulmonary edema (HCC)    SEARS (dyspnea on exertion)    Generalized weakness    Volume overload    Decreased exercise tolerance  Plan: 2/14-  Patient is volume overloaded on clinical exam with 1-2+ pitting edema, needs to optimize fluid status. Normally takes torsemide at home, for now will be continuing Bumex 0.5 mg IV twice daily with close monitoring of renal function. Given Bumex iv 1 mg x 1 in ER. Patient does not want a Garcia catheter. Needs to monitor intake and output. Recent echo from 1/26 showed EF 60 to 20% with diastolic dysfunction, moderate aortic valve stenosis with AV mean gradient of 25 mmHg, RVSP of 57 mmHg suggestive of pulmonary hypertension. PT OT consulted for generalized weakness and decreased exercise tolerance  2/15--- continue Bumex IV attempts at diuresis-monitor ASHKAN closely. Monitor electrolytes. 2/16--try increase Bumex. Monitor. Avoid aggressive overdiuresis regarding planned cardiac cath/right heart cath in a.m.      On echo patient does have elevated RVSP of 57 mmHg suggestive of pulmonary hypertension which combined with elevated LVEDP likely contributing to exertional dyspnea and decreased exercise tolerance. 2/15--trial of short-term calcium channel blockers-continue diuresis and observe. 2/16--- intolerant due to hypotension. See cardiology plan regarding right heart cath. Left foot pain-x-ray-check uric acid level. 2/16--- uric acid level not at goal at 8.5 but may have steroids or short-term colchicine if acute gout as risk-benefit of titration of allopurinol given his current creatinine clearance and ongoing diuresis at present time. He did not bring up foot pain today on eval.-Monitor. Active Problems:    Restrictive lung disease    Asthma  Plan: 2/15--no obvious asthma exacerbation. Pulmonary hypertension and restrictive lung disease-morbid obesity contributing to comorbidities/morbidity with obstructive sleep apnea. .  2/16--noted-no active wheeze. Monitor       Chronic heart failure with preserved ejection fraction (HFpEF) ([facility]    CAD in native artery  Plan: 2/14-  Continue GDMT  Continue Bumex 0.5 mg IV twice daily with close monitoring of renal function. Toprol- mg p.o. twice daily  Hydralazine 50 mg p.o. twice daily  Imdur 60 mg p.o. daily  Continue aspirin Plavix for underlying CAD and KAMLESH for recent proximal circumflex. Protonix 40 mg daily for GI prophylaxis. 2/15--cardiology recommendations and follow-up noted. Discussed palliative care with patient's symptom management-she is interested in continue diuresis for now. He believes recent difficulties are secondary to hydration prior to and around time of cardiac catheterization to prevent renal contrast-induced nephropathy. Not clear that this is the etiology of his current complaints  2/16-see plan increased IV Bumex monitor close. .       Chronic gout due to renal impairment without tophus  Plan: 2/14-  Continue allopurinol 150 mg p.o. daily. Close monitoring for gout flareup given ongoing diuresis. 2/15--- x-ray left foot and check uric acid level. Goal less than 7.   Location of symptom and characterization of symptoms consistent with possible Woody's neuroma? 2/16--risk/benefit of titration of allopurinol given his creatinine clearance. Monitor for obvious acute gout--x-ray left foot pending. Hypothyroidism due to Hashimoto's thyroiditis  Plan: 2/14-  Continue Synthroid 88 mcg p.o. daily before breakfast.2/15-this is unchanged. Chronic kidney disease (CKD), stage IV (severe) ([facility]  Plan: 2/14-  Renal function is at baseline, creatinine 3.7, BUN 91. Close monitoring of renal function with ongoing diuretics. May need to consult nephrology should renal function get worse. 2/15--monitor closely with diuresis. BUN and creatinine 91 and 3.8 today. 2/16--appreciate nephrology follow-up and evaluation. No documented iron deficiency-B12 folate pending--daughter requested consider Epogen-requested she discuss with nephrology as not clear if current creatinine clearance appropriate. And I recommend monitor hemoglobin if we are able to achieve effective diuresis may improve       JORJE (obstructive sleep apnea)  Plan: 2/14-  Patient advised to use home CPAP at bedtime. 2/15-continue same       Obesity, morbid ([facility]    Plan: 2/14-  BMI of 42.0, encouraged to lose weight. Increases medical complexity and all cause mortality. Type 2 diabetes with nephropathy ([facility]  Plan: 2/14-  POC glucose QA CHS. Continue Lantus 25 units subcu twice daily along with sliding scale. 2/15-continue same for now-monitor. Hypertension  Plan: 2/14-  Blood pressure is optimally controlled with Toprol-XL, Imdur and hydralazine. Continue monitor BP while inpatient, titrate medications accordingly. 2/15--monitor-new addition CCB.  2/16--all BP meds held today exception of Imdur. Monitor with improved BP. Try increased diuresis and observe avoid aggressive overdiuresis with planned right heart cath           PT/OT evals and PPD needed/ordered? Yes     Diet: ADULT DIET;  Regular; 3 carb choices (45 gm/meal); No Added Salt (3-4 gm); 1500 ml  VTE prophylaxis:Heparin  Code status: Full Code            Hospital Problems:  Principal Problem:    Acute pulmonary edema (HCC)  Active Problems:    Restrictive lung disease    Volume overload    Chronic heart failure with preserved ejection fraction (HFpEF) (HCC)    Chronic gout due to renal impairment without tophus    Hypothyroidism due to Hashimoto's thyroiditis    Generalized weakness    Decreased exercise tolerance    Left foot pain    Chronic kidney disease (CKD), stage IV (severe) (HCC)    JORJE (obstructive sleep apnea)    Obesity, morbid (HCC)    SEARS (dyspnea on exertion)    Type 2 diabetes with nephropathy ([facility]    Hypertension    CAD in native artery  Resolved Problems:    * No resolved hospital problems. *      Objective:   Patient Vitals for the past 24 hrs:   Temp Pulse Resp BP SpO2   02/16/23 1320 97.7 °F (36.5 °C) 62 20 118/65 98 %   02/16/23 0731 97.5 °F (36.4 °C) 57 21 (!) 92/54 100 %   02/16/23 0438 97.3 °F (36.3 °C) 56 18 (!) 105/58 99 %   02/15/23 2216 97.3 °F (36.3 °C) 64 22 112/72 98 %   02/15/23 2026 -- 87 18 -- 97 %   02/15/23 2001 97.3 °F (36.3 °C) 71 18 124/64 98 %         Oxygen Therapy  SpO2: 98 %  Pulse via Oximetry: 71 beats per minute  Pulse Oximeter Device Mode: Intermittent  Pulse Oximeter Device Location: Right, Finger  O2 Device: None (Room air)  O2 Flow Rate (L/min): 0 L/min    Estimated body mass index is 42.59 kg/m² as calculated from the following:    Height as of this encounter: 6' 1\" (1.854 m). Weight as of this encounter: 322 lb 12.8 oz (146.4 kg). Intake/Output Summary (Last 24 hours) at 2/16/2023 1548  Last data filed at 2/16/2023 1500  Gross per 24 hour   Intake 610 ml   Output 1650 ml   Net -1040 ml           Physical Exam:     Blood pressure 118/65, pulse 62, temperature 97.7 °F (36.5 °C), temperature source Oral, resp. rate 20, height 6' 1\" (1.854 m), weight (!) 322 lb 12.8 oz (146.4 kg), SpO2 98 %.     General-alert and oriented x3, cooperative and calm  Eyes-conjunctive are clear pupils equal round react to light extraocular muscles intact  Heart-no gross S3 gallop. No gross JVD or HJR. Regular and rate controlled. Lungs-clear auscultation palpation and percussion, symmetric excursion of the chest wall. No wheezing    Abdomen-soft, nontender, no gross percussible organomegaly. No gross distention. Bowel sounds present all 4 quadrants    Extremities-no significant unilateral edema, moves all extremities symmetrically. Neurologic-cranial nerves II through XII grossly intact, no focal motor deficits grossly. No tremor    Psychiatric-no suicidal ideations or homicidal ideations. Denies depressed thoughts. I have personally reviewed labs and tests:  Recent Labs:  Recent Results (from the past 48 hour(s))   POCT Glucose    Collection Time: 02/14/23  3:56 PM   Result Value Ref Range    POC Glucose 149 (H) 65 - 100 mg/dL    Performed by: Rosi    POCT Glucose    Collection Time: 02/14/23  8:49 PM   Result Value Ref Range    POC Glucose 151 (H) 65 - 100 mg/dL    Performed by:  ConchisARELI    POCT Glucose    Collection Time: 02/15/23  3:50 AM   Result Value Ref Range    POC Glucose 91 65 - 100 mg/dL    Performed by: [facility]    Comprehensive Metabolic Panel w/ Reflex to MG    Collection Time: 02/15/23  5:15 AM   Result Value Ref Range    Sodium 140 133 - 143 mmol/L    Potassium 4.1 3.5 - 5.1 mmol/L    Chloride 106 101 - 110 mmol/L    CO2 23 21 - 32 mmol/L    Anion Gap 11 2 - 11 mmol/L    Glucose 174 (H) 65 - 100 mg/dL    BUN 91 (H) 8 - 23 MG/DL    Creatinine 3.80 (H) 0.8 - 1.5 MG/DL    Est, Glom Filt Rate 15 (L) >60 ml/min/1.73m2    Calcium 9.2 8.3 - 10.4 MG/DL    Total Bilirubin 0.5 0.2 - 1.1 MG/DL    ALT 20 12 - 65 U/L    AST 20 15 - 37 U/L    Alk Phosphatase 65 50 - 136 U/L    Total Protein 6.3 6.3 - 8.2 g/dL    Albumin 3.0 (L) 3.2 - 4.6 g/dL    Globulin 3.3 2.8 - 4.5 g/dL    Albumin/Globulin Ratio 0.9 0.4 - 1.6     CBC with Auto Differential    Collection Time: 02/15/23  5:15 AM   Result Value Ref Range    WBC 6.3 4.3 - 11.1 K/uL    RBC 2.81 (L) 4.23 - 5.6 M/uL    Hemoglobin 8.7 (L) 13.6 - 17.2 g/dL    Hematocrit 27.0 (L) 41.1 - 50.3 %    MCV 96.1 82 - 102 FL    MCH 31.0 26.1 - 32.9 PG    MCHC 32.2 31.4 - 35.0 g/dL    RDW 14.6 11.9 - 14.6 %    Platelets 978 730 - 095 K/uL    MPV 11.2 9.4 - 12.3 FL    nRBC 0.00 0.0 - 0.2 K/uL    Differential Type AUTOMATED      Seg Neutrophils 81 (H) 43 - 78 %    Lymphocytes 6 (L) 13 - 44 %    Monocytes 8 4.0 - 12.0 %    Eosinophils % 3 0.5 - 7.8 %    Basophils 1 0.0 - 2.0 %    Immature Granulocytes 1 0.0 - 5.0 %    Segs Absolute 5.2 1.7 - 8.2 K/UL    Absolute Lymph # 0.4 (L) 0.5 - 4.6 K/UL    Absolute Mono # 0.5 0.1 - 1.3 K/UL    Absolute Eos # 0.2 0.0 - 0.8 K/UL    Basophils Absolute 0.1 0.0 - 0.2 K/UL    Absolute Immature Granulocyte 0.0 0.0 - 0.5 K/UL   Ferritin    Collection Time: 02/15/23  5:15 AM   Result Value Ref Range    Ferritin 49 8 - 388 NG/ML   Transferrin Saturation    Collection Time: 02/15/23  5:15 AM   Result Value Ref Range    Iron 37 35 - 150 ug/dL    TIBC 269 250 - 450 ug/dL    TRANSFERRIN SATURATION 14 (L) >20 %   Vitamin B12    Collection Time: 02/15/23  5:15 AM   Result Value Ref Range    Vitamin B-12 260 193 - 986 pg/mL   Folate    Collection Time: 02/15/23  5:15 AM   Result Value Ref Range    Folate 16.7 3.1 - 17.5 ng/mL   POCT Glucose    Collection Time: 02/15/23  7:17 AM   Result Value Ref Range    POC Glucose 149 (H) 65 - 100 mg/dL    Performed by: [facility]    POCT Glucose    Collection Time: 02/15/23 11:56 AM   Result Value Ref Range    POC Glucose 239 (H) 65 - 100 mg/dL    Performed by: [facility]    POCT Glucose    Collection Time: 02/15/23  5:02 PM   Result Value Ref Range    POC Glucose 188 (H) 65 - 100 mg/dL    Performed by: Ebenezer Patel    POCT Glucose    Collection Time: 02/15/23  9:07 PM   Result Value Ref Range    POC Glucose 226 (H) 65 - 100 mg/dL    Performed by:  SamplesEmilyT    Comprehensive Metabolic Panel w/ Reflex to MG    Collection Time: 02/16/23  4:57 AM   Result Value Ref Range    Sodium 138 133 - 143 mmol/L    Potassium 4.7 3.5 - 5.1 mmol/L    Chloride 106 101 - 110 mmol/L    CO2 25 21 - 32 mmol/L    Anion Gap 7 2 - 11 mmol/L    Glucose 102 (H) 65 - 100 mg/dL    BUN 97 (H) 8 - 23 MG/DL    Creatinine 4.20 (H) 0.8 - 1.5 MG/DL    Est, Glom Filt Rate 13 (L) >60 ml/min/1.73m2    Calcium 9.7 8.3 - 10.4 MG/DL    Total Bilirubin 0.6 0.2 - 1.1 MG/DL    ALT 15 12 - 65 U/L    AST 29 15 - 37 U/L    Alk Phosphatase 70 50 - 136 U/L    Total Protein 6.8 6.3 - 8.2 g/dL    Albumin 3.2 3.2 - 4.6 g/dL    Globulin 3.6 2.8 - 4.5 g/dL    Albumin/Globulin Ratio 0.9 0.4 - 1.6     CBC with Auto Differential    Collection Time: 02/16/23  4:57 AM   Result Value Ref Range    WBC 6.2 4.3 - 11.1 K/uL    RBC 2.86 (L) 4.23 - 5.6 M/uL    Hemoglobin 8.9 (L) 13.6 - 17.2 g/dL    Hematocrit 27.4 (L) 41.1 - 50.3 %    MCV 95.8 82 - 102 FL    MCH 31.1 26.1 - 32.9 PG    MCHC 32.5 31.4 - 35.0 g/dL    RDW 14.6 11.9 - 14.6 %    Platelets 678 087 - 381 K/uL    MPV 11.7 9.4 - 12.3 FL    nRBC 0.00 0.0 - 0.2 K/uL    Differential Type AUTOMATED      Seg Neutrophils 76 43 - 78 %    Lymphocytes 9 (L) 13 - 44 %    Monocytes 10 4.0 - 12.0 %    Eosinophils % 3 0.5 - 7.8 %    Basophils 1 0.0 - 2.0 %    Immature Granulocytes 1 0.0 - 5.0 %    Segs Absolute 4.7 1.7 - 8.2 K/UL    Absolute Lymph # 0.6 0.5 - 4.6 K/UL    Absolute Mono # 0.6 0.1 - 1.3 K/UL    Absolute Eos # 0.2 0.0 - 0.8 K/UL    Basophils Absolute 0.1 0.0 - 0.2 K/UL    Absolute Immature Granulocyte 0.0 0.0 - 0.5 K/UL   Uric Acid    Collection Time: 02/16/23  4:57 AM   Result Value Ref Range    Uric Acid 8.5 (H) 2.6 - 6.0 MG/DL   Magnesium    Collection Time: 02/16/23  4:57 AM   Result Value Ref Range    Magnesium 3.0 (H) 1.8 - 2.4 mg/dL   POCT Glucose    Collection Time: 02/16/23  7:50 AM Result Value Ref Range    POC Glucose 109 (H) 65 - 100 mg/dL    Performed by: Eliane    Transferrin Saturation    Collection Time: 02/16/23 10:57 AM   Result Value Ref Range    Iron 41 35 - 150 ug/dL    TIBC 289 250 - 450 ug/dL    TRANSFERRIN SATURATION 14 (L) >20 %   Phosphorus    Collection Time: 02/16/23 10:57 AM   Result Value Ref Range    Phosphorus 6.3 (H) 2.3 - 3.7 MG/DL   Transferrin    Collection Time: 02/16/23 10:57 AM   Result Value Ref Range    Transferrin 242 202 - 364 mg/dL   POCT Glucose    Collection Time: 02/16/23  1:20 PM   Result Value Ref Range    POC Glucose 158 (H) 65 - 100 mg/dL    Performed by: SCOUT Stone 38        I have personally reviewed imaging studies:  Other Studies:  XR CHEST PORTABLE   Final Result      -Bilateral interstitial opacities, similar to slightly increased from prior,   likely reflecting pulmonary edema.      -Persistent patchy left basilar opacity, likely reflecting atelectasis, though   infection is also a consideration in appropriate clinical context.      -Suspected small bilateral pleural effusions.             US RETROPERITONEAL COMPLETE    (Results Pending)       Current Meds:  Current Facility-Administered Medications   Medication Dose Route Frequency    diphenhydrAMINE (BENADRYL) capsule 25 mg  25 mg Oral Q6H PRN    bumetanide (BUMEX) injection 1 mg  1 mg IntraVENous BID    aspirin EC tablet 81 mg  81 mg Oral Daily    calcitRIOL (ROCALTROL) capsule 0.25 mcg  0.25 mcg Oral Daily    clopidogrel (PLAVIX) tablet 75 mg  75 mg Oral Daily    levothyroxine (SYNTHROID) tablet 88 mcg  88 mcg Oral QAM AC    [Held by provider] metoprolol succinate (TOPROL XL) extended release tablet 100 mg  100 mg Oral BID    nitroGLYCERIN (NITROSTAT) SL tablet 0.4 mg  0.4 mg SubLINGual Q5 Min PRN    sodium bicarbonate tablet 650 mg  650 mg Oral BID    allopurinol (ZYLOPRIM) tablet 150 mg  150 mg Oral Daily    sodium chloride flush 0.9 % injection 5-40 mL  5-40 mL IntraVENous 2 times per day    sodium chloride flush 0.9 % injection 5-40 mL  5-40 mL IntraVENous PRN    0.9 % sodium chloride infusion   IntraVENous PRN    ondansetron (ZOFRAN-ODT) disintegrating tablet 4 mg  4 mg Oral Q8H PRN    Or    ondansetron (ZOFRAN) injection 4 mg  4 mg IntraVENous Q6H PRN    polyethylene glycol (GLYCOLAX) packet 17 g  17 g Oral Daily PRN    acetaminophen (TYLENOL) tablet 650 mg  650 mg Oral Q6H PRN    Or    acetaminophen (TYLENOL) suppository 650 mg  650 mg Rectal Q6H PRN    magnesium sulfate 2000 mg in 50 mL IVPB premix  2,000 mg IntraVENous PRN    potassium chloride (KLOR-CON M) extended release tablet 40 mEq  40 mEq Oral PRN    Or    potassium bicarb-citric acid (EFFER-K) effervescent tablet 40 mEq  40 mEq Oral PRN    Or    potassium chloride 10 mEq/100 mL IVPB (Peripheral Line)  10 mEq IntraVENous PRN    aluminum & magnesium hydroxide-simethicone (MAALOX) 200-200-20 MG/5ML suspension 30 mL  30 mL Oral Q6H PRN    heparin (porcine) injection 5,000 Units  5,000 Units SubCUTAneous 3 times per day    glucose chewable tablet 16 g  4 tablet Oral PRN    dextrose bolus 10% 125 mL  125 mL IntraVENous PRN    Or    dextrose bolus 10% 250 mL  250 mL IntraVENous PRN    glucagon (rDNA) injection 1 mg  1 mg SubCUTAneous PRN    dextrose 10 % infusion   IntraVENous Continuous PRN    insulin glargine (LANTUS) injection vial 25 Units  25 Units SubCUTAneous BID    insulin lispro (HUMALOG) injection vial 0-4 Units  0-4 Units SubCUTAneous TID WC    insulin lispro (HUMALOG) injection vial 0-4 Units  0-4 Units SubCUTAneous Nightly    levalbuterol (XOPENEX) nebulization 0.63 mg  0.63 mg Nebulization Q6H WA    budesonide (PULMICORT) nebulizer suspension 500 mcg  0.5 mg Nebulization BID       Signed:  Melina Ramírez DO    Part of this note may have been written by using a voice dictation software. The note has been proof read but may still contain some grammatical/other typographical errors.

## 2023-02-17 VITALS
SYSTOLIC BLOOD PRESSURE: 126 MMHG | OXYGEN SATURATION: 97 % | DIASTOLIC BLOOD PRESSURE: 79 MMHG | BODY MASS INDEX: 41.75 KG/M2 | RESPIRATION RATE: 16 BRPM | WEIGHT: 315 LBS | HEIGHT: 73 IN | TEMPERATURE: 97.3 F | HEART RATE: 70 BPM

## 2023-02-17 LAB
ALBUMIN SERPL-MCNC: 3 G/DL (ref 3.2–4.6)
ALBUMIN/GLOB SERPL: 0.9 (ref 0.4–1.6)
ALP SERPL-CCNC: 65 U/L (ref 50–136)
ALT SERPL-CCNC: 15 U/L (ref 12–65)
ANION GAP SERPL CALC-SCNC: 10 MMOL/L (ref 2–11)
APPEARANCE UR: CLEAR
AST SERPL-CCNC: 22 U/L (ref 15–37)
BACTERIA URNS QL MICRO: NEGATIVE /HPF
BASOPHILS # BLD: 0.1 K/UL (ref 0–0.2)
BASOPHILS NFR BLD: 1 % (ref 0–2)
BILIRUB SERPL-MCNC: 0.5 MG/DL (ref 0.2–1.1)
BILIRUB UR QL: NEGATIVE
BUN SERPL-MCNC: 101 MG/DL (ref 8–23)
CALCIUM SERPL-MCNC: 9.5 MG/DL (ref 8.3–10.4)
CASTS URNS QL MICRO: ABNORMAL /LPF
CHLORIDE SERPL-SCNC: 105 MMOL/L (ref 101–110)
CO2 SERPL-SCNC: 22 MMOL/L (ref 21–32)
COLOR UR: ABNORMAL
CREAT SERPL-MCNC: 4.2 MG/DL (ref 0.8–1.5)
CREAT UR-MCNC: 63 MG/DL
DIFFERENTIAL METHOD BLD: ABNORMAL
ECHO BSA: 2.73 M2
EOSINOPHIL # BLD: 0.2 K/UL (ref 0–0.8)
EOSINOPHIL NFR BLD: 3 % (ref 0.5–7.8)
EPI CELLS #/AREA URNS HPF: ABNORMAL /HPF
ERYTHROCYTE [DISTWIDTH] IN BLOOD BY AUTOMATED COUNT: 14.6 % (ref 11.9–14.6)
GLOBULIN SER CALC-MCNC: 3.5 G/DL (ref 2.8–4.5)
GLUCOSE BLD STRIP.AUTO-MCNC: 147 MG/DL (ref 65–100)
GLUCOSE BLD STRIP.AUTO-MCNC: 155 MG/DL (ref 65–100)
GLUCOSE BLD STRIP.AUTO-MCNC: 169 MG/DL (ref 65–100)
GLUCOSE SERPL-MCNC: 147 MG/DL (ref 65–100)
GLUCOSE UR STRIP.AUTO-MCNC: 100 MG/DL
HCT VFR BLD AUTO: 26.7 % (ref 41.1–50.3)
HGB BLD-MCNC: 8.7 G/DL (ref 13.6–17.2)
HGB UR QL STRIP: NEGATIVE
IMM GRANULOCYTES # BLD AUTO: 0 K/UL (ref 0–0.5)
IMM GRANULOCYTES NFR BLD AUTO: 0 % (ref 0–5)
KETONES UR QL STRIP.AUTO: NEGATIVE MG/DL
LEUKOCYTE ESTERASE UR QL STRIP.AUTO: NEGATIVE
LYMPHOCYTES # BLD: 0.4 K/UL (ref 0.5–4.6)
LYMPHOCYTES NFR BLD: 8 % (ref 13–44)
MAGNESIUM SERPL-MCNC: 3 MG/DL (ref 1.8–2.4)
MCH RBC QN AUTO: 31.1 PG (ref 26.1–32.9)
MCHC RBC AUTO-ENTMCNC: 32.6 G/DL (ref 31.4–35)
MCV RBC AUTO: 95.4 FL (ref 82–102)
MONOCYTES # BLD: 0.6 K/UL (ref 0.1–1.3)
MONOCYTES NFR BLD: 11 % (ref 4–12)
NEUTS SEG # BLD: 4.5 K/UL (ref 1.7–8.2)
NEUTS SEG NFR BLD: 77 % (ref 43–78)
NITRITE UR QL STRIP.AUTO: NEGATIVE
NRBC # BLD: 0 K/UL (ref 0–0.2)
PH UR STRIP: 5.5 (ref 5–9)
PLATELET # BLD AUTO: 228 K/UL (ref 150–450)
PMV BLD AUTO: 11.6 FL (ref 9.4–12.3)
POTASSIUM SERPL-SCNC: 4.7 MMOL/L (ref 3.5–5.1)
PROT SERPL-MCNC: 6.5 G/DL (ref 6.3–8.2)
PROT UR STRIP-MCNC: 30 MG/DL
PROT UR-MCNC: 34 MG/DL
PROT/CREAT UR-RTO: 0.5
RBC # BLD AUTO: 2.8 M/UL (ref 4.23–5.6)
RBC #/AREA URNS HPF: ABNORMAL /HPF
SERVICE CMNT-IMP: ABNORMAL
SODIUM SERPL-SCNC: 137 MMOL/L (ref 133–143)
SP GR UR REFRACTOMETRY: 1.01 (ref 1–1.02)
UROBILINOGEN UR QL STRIP.AUTO: 0.2 EU/DL (ref 0.2–1)
WBC # BLD AUTO: 5.7 K/UL (ref 4.3–11.1)
WBC URNS QL MICRO: ABNORMAL /HPF

## 2023-02-17 PROCEDURE — 6370000000 HC RX 637 (ALT 250 FOR IP): Performed by: HOSPITALIST

## 2023-02-17 PROCEDURE — C1769 GUIDE WIRE: HCPCS | Performed by: INTERNAL MEDICINE

## 2023-02-17 PROCEDURE — 4A023N6 MEASUREMENT OF CARDIAC SAMPLING AND PRESSURE, RIGHT HEART, PERCUTANEOUS APPROACH: ICD-10-PCS | Performed by: INTERNAL MEDICINE

## 2023-02-17 PROCEDURE — 36415 COLL VENOUS BLD VENIPUNCTURE: CPT

## 2023-02-17 PROCEDURE — 93451 RIGHT HEART CATH: CPT | Performed by: INTERNAL MEDICINE

## 2023-02-17 PROCEDURE — 2500000003 HC RX 250 WO HCPCS: Performed by: INTERNAL MEDICINE

## 2023-02-17 PROCEDURE — 2709999900 HC NON-CHARGEABLE SUPPLY: Performed by: INTERNAL MEDICINE

## 2023-02-17 PROCEDURE — 82962 GLUCOSE BLOOD TEST: CPT

## 2023-02-17 PROCEDURE — 6360000002 HC RX W HCPCS: Performed by: HOSPITALIST

## 2023-02-17 PROCEDURE — 94640 AIRWAY INHALATION TREATMENT: CPT

## 2023-02-17 PROCEDURE — 99232 SBSQ HOSP IP/OBS MODERATE 35: CPT | Performed by: INTERNAL MEDICINE

## 2023-02-17 PROCEDURE — C1894 INTRO/SHEATH, NON-LASER: HCPCS | Performed by: INTERNAL MEDICINE

## 2023-02-17 PROCEDURE — 85025 COMPLETE CBC W/AUTO DIFF WBC: CPT

## 2023-02-17 PROCEDURE — 80053 COMPREHEN METABOLIC PANEL: CPT

## 2023-02-17 PROCEDURE — 82570 ASSAY OF URINE CREATININE: CPT

## 2023-02-17 PROCEDURE — C1751 CATH, INF, PER/CENT/MIDLINE: HCPCS | Performed by: INTERNAL MEDICINE

## 2023-02-17 PROCEDURE — 83735 ASSAY OF MAGNESIUM: CPT

## 2023-02-17 PROCEDURE — 6370000000 HC RX 637 (ALT 250 FOR IP): Performed by: INTERNAL MEDICINE

## 2023-02-17 PROCEDURE — C1760 CLOSURE DEV, VASC: HCPCS | Performed by: INTERNAL MEDICINE

## 2023-02-17 PROCEDURE — 81001 URINALYSIS AUTO W/SCOPE: CPT

## 2023-02-17 RX ORDER — METOPROLOL SUCCINATE 25 MG/1
12.5 TABLET, EXTENDED RELEASE ORAL 2 TIMES DAILY
Qty: 30 TABLET | Refills: 0 | Status: SHIPPED | OUTPATIENT
Start: 2023-02-17

## 2023-02-17 RX ORDER — METOPROLOL SUCCINATE 25 MG/1
12.5 TABLET, EXTENDED RELEASE ORAL 2 TIMES DAILY
Status: DISCONTINUED | OUTPATIENT
Start: 2023-02-17 | End: 2023-02-17 | Stop reason: HOSPADM

## 2023-02-17 RX ORDER — LIDOCAINE HYDROCHLORIDE 10 MG/ML
INJECTION, SOLUTION INFILTRATION; PERINEURAL PRN
Status: DISCONTINUED | OUTPATIENT
Start: 2023-02-17 | End: 2023-02-17 | Stop reason: HOSPADM

## 2023-02-17 RX ORDER — BUMETANIDE 1 MG/1
1 TABLET ORAL 3 TIMES DAILY
Status: DISCONTINUED | OUTPATIENT
Start: 2023-02-17 | End: 2023-02-17 | Stop reason: HOSPADM

## 2023-02-17 RX ORDER — BUMETANIDE 1 MG/1
1 TABLET ORAL 3 TIMES DAILY
Qty: 90 TABLET | Refills: 0 | Status: SHIPPED | OUTPATIENT
Start: 2023-02-17

## 2023-02-17 RX ADMIN — METOPROLOL SUCCINATE 12.5 MG: 25 TABLET, FILM COATED, EXTENDED RELEASE ORAL at 15:56

## 2023-02-17 RX ADMIN — BUMETANIDE 1 MG: 1 TABLET ORAL at 15:56

## 2023-02-17 RX ADMIN — LEVOTHYROXINE SODIUM 88 MCG: 0.09 TABLET ORAL at 05:33

## 2023-02-17 RX ADMIN — ASPIRIN 81 MG: 81 TABLET ORAL at 08:48

## 2023-02-17 RX ADMIN — SODIUM BICARBONATE 650 MG: 650 TABLET ORAL at 08:48

## 2023-02-17 RX ADMIN — CLOPIDOGREL BISULFATE 75 MG: 75 TABLET ORAL at 08:49

## 2023-02-17 RX ADMIN — ALLOPURINOL 150 MG: 100 TABLET ORAL at 08:48

## 2023-02-17 RX ADMIN — CALCITRIOL 0.25 MCG: 0.25 CAPSULE ORAL at 08:48

## 2023-02-17 RX ADMIN — BUDESONIDE 500 MCG: 0.5 INHALANT RESPIRATORY (INHALATION) at 07:29

## 2023-02-17 RX ADMIN — LEVALBUTEROL HYDROCHLORIDE 0.63 MG: 0.63 SOLUTION RESPIRATORY (INHALATION) at 07:29

## 2023-02-17 RX ADMIN — HEPARIN SODIUM 5000 UNITS: 5000 INJECTION INTRAVENOUS; SUBCUTANEOUS at 05:33

## 2023-02-17 RX ADMIN — BUMETANIDE 1 MG: 0.25 INJECTION INTRAMUSCULAR; INTRAVENOUS at 08:48

## 2023-02-17 ASSESSMENT — PAIN SCALES - GENERAL: PAINLEVEL_OUTOF10: 0

## 2023-02-17 NOTE — PROGRESS NOTES
[facility] CARDIOLOGY PROGRESS NOTE           2/17/2023 6:44 AM    Admit Date: 2/14/2023         Subjective: Started on diltiazem overnight for pulmonary htn, BP became low and was stopped by primary team.  Remains very SOB, CR is worse.   Patient has significant anemia 8.7 which typically we would not consider all that significant but he has multiple severe comorbidities including profound obesity BMI of 43 kidney failure stage IV bordering on stage V and probably again in combination with severe obesity restrictive lung the disease hypoventilation sleep apnea anemia age we should not be surprised that he is short of breath we will proceed though with right heart cath for delineation of pulmonary pressures    ROS:  Cardiovascular:  As noted above    Objective:      Vitals:    02/16/23 2003 02/16/23 2150 02/17/23 0409 02/17/23 0415   BP:  132/73 121/69    Pulse: 78 76 71    Resp: 16 20 22    Temp:  97.5 °F (36.4 °C) 98.6 °F (37 °C)    TempSrc:  Axillary Oral    SpO2: 98% 97% 95%    Weight:    (!) 324 lb 6.4 oz (147.1 kg)   Height:             Physical Exam:  General: Well Developed, Well Nourished, No Acute Distress, Alert & Oriented x 3, Appropriate mood  Neck: supple, no JVD  Heart: S1S2 with RRR without murmurs or gallops  Lungs: Clear throughout auscultation bilaterally without adventitious sounds  Abd: soft, nontender, nondistended, with good bowel sounds  Ext: no edema bilaterally  Skin: warm and dry    Wt Readings from Last 10 Encounters:   02/17/23 (!) 324 lb 6.4 oz (147.1 kg)   02/11/23 (!) 319 lb 8 oz (144.9 kg)   02/08/23 (!) 323 lb (146.5 kg)   01/29/23 (!) 324 lb (147 kg)   01/28/23 (!) 322 lb 11.2 oz (146.4 kg)   01/24/23 (!) 315 lb (142.9 kg)   12/02/22 (!) 311 lb (141.1 kg)   09/19/22 (!) 320 lb (145.2 kg)   08/19/22 (!) 322 lb (146.1 kg)   08/15/22 (!) 327 lb 1.6 oz (148.4 kg)         Data Review:   Recent Labs     02/14/23  0929 02/15/23  0515 02/16/23  0457 02/17/23  0516   NA 141 140 138  --    K 4.8 4.1 4.7  --    MG 2.6*  --  3.0*  --    BUN 91* 91* 97*  --    WBC 6.3 6.3 6.2 5.7   HGB 9.1* 8.7* 8.9* 8.7*   HCT 29.3* 27.0* 27.4* 26.7*    226 278 228         No results for input(s): TNIPOC in the last 72 hours. Invalid input(s): TROIQ        Assessment/Plan:     Principal Problem:    Acute pulmonary edema (HCC)  Active Problems:    Restrictive lung disease    Volume overload    Chronic heart failure with preserved ejection fraction (HFpEF) (HCC)    Chronic gout due to renal impairment without tophus    Hypothyroidism due to Hashimoto's thyroiditis    Generalized weakness    Decreased exercise tolerance    Left foot pain    Hyperuricemia    Chronic kidney disease (CKD), stage IV (severe) (HCC)    JORJE (obstructive sleep apnea)    Obesity, morbid (HCC)    SEARS (dyspnea on exertion)    Type 2 diabetes with nephropathy ([facility] [address]    Hypertension    CAD in native artery  Resolved Problems:    * No resolved hospital problems. *    A/P  1) HFpEF -very limited data recorded in terms of I's and O's it is difficult to say whether he has had any meaningful diuresis throughout his entire hospital stay. His weight is increased compared to discharge wt dated 2/11/23. He has been getting regular IV Bumex his creatinine is worse. He has not had any meaningful improvement in his symptoms. At this point I feel the next appropriate step would be to perform a right heart catheterization to better assess his hemodynamics. We will make him n.p.o. after midnight with plans for right heart cath today we will continue diuretics with IV Bumex and daily BMPs. We will stop all of his antihypertensive medications with the exception of long-acting beta-blockers. The next agent that should be mineralcorticoid agonist.  2) Pulmonary HTN - check RHC tomorrow will stop diltiazem and consider alternative agents like sildenafil if appropriate would defer to pulmonary for recommendations.    3) CKD - nephrology saw today, repeat BMP in the morning. Discussed risk of cardiac catheterization with patient in detail. The risk of a major complication (death, MI or major embolization) during or after cardiac catheterization is below 1%. Risk of mortality is under 0.1%. Local complications at the site of catheter insertion were discussed. This includes but not limited to:  acute thrombosis, distal embolization, dissection, poorly controlled bleeding, hematoma, retroperitoneal hemorrhage, pseudoaneurysm or AV fistula formation. Other potential serious complication were discussed including risk of cardiac arrhythmias, allergic reactions, atheroemboli and acute kidney injury.     Britney Justice MD  2/17/2023 6:44 AM

## 2023-02-17 NOTE — PROGRESS NOTES
Report received from  Cath Lab RN. Procedural finding communicated. Intra procedural medication administration reviewed. Progression of care discussed. Patient received into CPRU room 6, Post RHC    Access site without bleeding or swelling. Right groin    Patient instructed to limit movement of right lower extremity. Routine post procedural vital signs & site assessment initiated.

## 2023-02-17 NOTE — FLOWSHEET NOTE
Patient on room air and has been NPO since midnight. Safety measures noted. Will continue to monitor per policy.      02/17/23 0691   Handoff   Communication Given Shift Handoff   Handoff Received From NORMA Oates   Handoff Communication Face to Face   Time Handoff Given [id number]

## 2023-02-17 NOTE — PROGRESS NOTES
Discharge instructions explained to patient and daughter. Understanding verbalized of all discharge instructions, both written and verbal. Adequate time given for questions. All questions asked/answered. Discharge medications reviewed as appropriate and Rx's given to patient. PIV removed and bandage applied.

## 2023-02-17 NOTE — PROGRESS NOTES
Physical Therapy Note:    Attempted to see patient this AM for physical therapy treatment  session. Patient currently getting heart cath. Will follow and re-attempt as schedule permits/patient available.  Thank you,    Yeni Dai, PT     [facility]hab [facility] [employer]

## 2023-02-17 NOTE — PROGRESS NOTES
02/17/23 1542   Resting (Room Air)   SpO2 99   HR 84   During Walk (Room Air)   SpO2 97   HR 94   After Walk   SpO2 96   HR 96   Symptoms Shortness of breath;Fatigue;Dizziness   Comments patient became SOB and unable to finish walk. Patient oxygen never dropped but did contiue to have SOB and could't walk back to his rm.   Does the Patient Qualify for Home O2 No   Does the Patient Need Portable Oxygen Tanks No

## 2023-02-17 NOTE — DISCHARGE SUMMARY
Hospitalist Discharge Summary   Admit Date:  2023  9:27 AM   DC Note date: 2023  Name:  Bart Nyhan   Age:  79 y.o. Sex:  male  :  1940   MRN:  [id number]   Room:  [facility]  PCP:  Dyana Tyler MD    Presenting Complaint: Shortness of Breath     Initial Admission Diagnosis: Shortness of breath [R06.02]  Acute pulmonary edema ([address] [J81.0]  Chronic kidney disease, unspecified CKD stage [N18.9]  Volume overload state of heart [E87.79]     Problem List for this Hospitalization (present on admission):    Principal Problem:    Acute pulmonary edema ([address]  Active Problems:    Restrictive lung disease    Volume overload    Chronic heart failure with preserved ejection fraction (HFpEF) (HCC)    Chronic gout due to renal impairment without tophus    Hypothyroidism due to Hashimoto's thyroiditis    Generalized weakness    Decreased exercise tolerance    Left foot pain    Hyperuricemia    Chronic kidney disease (CKD), stage IV (severe) (HCC)    JORJE (obstructive sleep apnea)    Obesity, morbid (HCC)    SEARS (dyspnea on exertion)    Type 2 diabetes with nephropathy ([address]    Hypertension    CAD in native artery  Resolved Problems:    * No resolved hospital problems. *      Hospital Course:    79-year-old male with history of chronic diastolic heart failure-preserved ejection fraction but significant pulmonary hypertension. Coronary artery disease with drug-eluting stent proximal circumflex 2023, morbid obesity BMI 42 with resulting restrictive lung disease, hypertension obstructive sleep apnea wears CPAP, diabetes mellitus requiring insulin, and late stage chronic kidney disease stage IV with estimated GFR 13. Hypothyroidism treated. He presented due to progressive weakness fatigue with exertion his previous estimated PA systolic pressure was in the 50s. Despite attempts at increased diuresis and medication manipulation for patient comfort-no significant clinical improvement.   Cardiology investigated with right heart cath and by available report it appears that PA systolic pressure 88 with PA diastolic pressure 45 with a mean pressure of 61 but formal report is pending. 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 He appears aware of situation and request evaluation for oxygen for pulmonary pressures and comfort. We will follow closely with nephrology and to consider additional trials of medication interventions as an outpatient such as possible erythropoietin if qualifies and/or pulmonary referral for possible Revatio or other agent. He will continue with Bumex increased to 3 times daily under recommendations of nephrology. Blood pressure medications have been significantly attenuated with elimination of hydralazine. He will continue on low-dose twice daily long-acting Toprol-XL typically once daily drug but was taking twice daily previously. Significant decrease dosing continue long-acting nitrate. Follow-up with nephrology, primary care, cardiology and pulmonary. Plan is to discharge home with palliative care with possible bridge to hospice given late stage renal disease, severe pulmonary hypertension and multiple other comorbidities as listed. Goal is attempt to obtain as much comfort for him as possible and to remain functional if able. Patient and daughter request evaluation for home oxygen or referral, and medical supplies for the home such as wheelchair if able. He appears stable for discharge to home with home health and palliative care-possible bridge to end-of-life care pending clinical course. Disposition: Appears stable for discharge to home health with palliative care  Diet: ADULT DIET; Regular; 3 carb choices (45 gm/meal); No Added Salt (3-4 gm); 1500 ml  Code Status: Full Code    Follow Ups:   Contact information for follow-up providers     Rosita Christiansen MD Follow up.     Specialty: Family Medicine  Contact information:  [facility] [address] [facility]             [address] Follow up on 2/22/2023. Why: 1:15  Contact information:  [address]                 Contact information for after-discharge care     Discharge [facility] (Link) . Service: [facility] information:  [address]  [place] [address] [phone]                 Discharge [place] . Service: Durable Medical Equipment  Contact information:  [phone]  [address]  [phone]                           Time spent in patient discharge and coordination 41 minutes. Follow up labs/diagnostics (ultimately defer to outpatient provider):  Needs follow-up metabolic panel. Other labs as per outpatient providers. Plan was discussed with patient, daughter, and staff. All questions answered. Patient was stable at time of discharge. Instructions given to call a physician or return if any concerns.     Current Discharge Medication List        START taking these medications    Details   bumetanide (BUMEX) 1 MG tablet Take 1 tablet by mouth in the morning, at noon, and at bedtime  Qty: 90 tablet, Refills: 0           CONTINUE these medications which have CHANGED    Details   metoprolol succinate (TOPROL XL) 25 MG extended release tablet Take 0.5 tablets by mouth in the morning and at bedtime  Qty: 30 tablet, Refills: 0           CONTINUE these medications which have NOT CHANGED    Details   albuterol (PROVENTIL) (2.5 MG/3ML) 0.083% nebulizer solution Take 3 mLs by nebulization in the morning, at noon, in the evening, and at bedtime Bill to Medicare D Code: J45.991  Qty: 120 each, Refills: 1      clopidogrel (PLAVIX) 75 MG tablet Take 1 tablet by mouth daily  Qty: 90 tablet, Refills: 3      nitroGLYCERIN (NITROSTAT) 0.4 MG SL tablet Place 1 tablet under the tongue every 5 minutes as needed for Chest pain up to max of 3 total doses. If no relief after 1 dose, call 911. Qty: 25 tablet, Refills: 3      isosorbide mononitrate (IMDUR) 60 MG extended release tablet Take 1 tablet by mouth daily  Qty: 30 tablet, Refills: 5      CPAP Machine MISC by Does not apply route At HS      albuterol sulfate HFA (PROVENTIL;VENTOLIN;PROAIR) 108 (90 Base) MCG/ACT inhaler 2 puffs 4 times daily if needed for shortness of breath or wheezing. Patient will call when refills are needed  Qty: 1 each, Refills: 11      Fluticasone Furoate-Vilanterol (BREO ELLIPTA) 100-25 MCG/ACT AEPB 1 inhalation every morning, rinse mouth after use. Qty: 1 each, Refills: 11      allopurinol (ZYLOPRIM) 300 MG tablet Take 150 mg by mouth daily      aspirin 81 MG EC tablet Take 81 mg by mouth      calcitRIOL (ROCALTROL) 0.25 MCG capsule Take 0.25 mcg by mouth daily      Dulaglutide (TRULICITY) 1.5 VO/3.3IA SOPN Inject 1.5 mg into the skin every 7 days On sundays      insulin glargine (LANTUS) 100 UNIT/ML injection vial Inject 25 Units into the skin 2 times daily      levothyroxine (SYNTHROID) 88 MCG tablet Take 88 mcg by mouth      sodium bicarbonate 650 MG tablet Take 650 mg by mouth 2 times daily           STOP taking these medications       Torsemide 40 MG TABS Comments:   Reason for Stopping:         hydrALAZINE (APRESOLINE) 50 MG tablet Comments:   Reason for Stopping:         losartan-hydroCHLOROthiazide (HYZAAR) 100-25 MG per tablet Comments:   Reason for Stopping:         nebivolol (BYSTOLIC) 10 MG tablet Comments:   Reason for Stopping:               Some medications may have been reported old/obsolete and marked \"stop taking\" by the system; in reality pt was already off these meds; defer to outpatient or prescribing providers.     Procedures done this admission:  Procedure(s):  RIGHT HEART CATH    Consults this admission:  IP CONSULT TO CASE MANAGEMENT  IP CONSULT TO CARDIOLOGY  IP CONSULT TO NEPHROLOGY  IP CONSULT HOME HEALTH  IP CONSULT TO RESPIRATORY CARE    Echocardiogram results:  01/25/23    TRANSTHORACIC ECHOCARDIOGRAM (TTE) COMPLETE (CONTRAST/BUBBLE/3D PRN) 01/26/2023 10:37 AM, 01/26/2023 12:00 AM (Final)    Interpretation Summary    Left Ventricle: Normal left ventricular systolic function with a visually estimated EF of 60 - 65%. Left ventricle size is normal. Mildly increased wall thickness. Normal wall motion. Abnormal diastolic function. Aortic Valve: Trileaflet valve. Moderate sclerosis of the aortic valve cusp. Moderate stenosis of the aortic valve. AV mean gradient is 25 mmHg. AV area by continuity VTI is 0.9 cm2. Mitral Valve: Mild regurgitation. Tricuspid Valve: The estimated RVSP is 57 mmHg. Left Atrium: Left atrium is severely dilated. Right Atrium: Right atrium is moderately dilated. Aorta: Dilated aortic root. Ao root diameter is 3.7 cm. Technical qualifiers: Technically difficult study due to patient's body habitus, color flow Doppler was performed and pulse wave and/or continuous wave Doppler was performed. Contrast used: Definity. Signed by: China Anglin MD on 1/26/2023 10:37 AM, Signed by: Unknown Provider Result on 1/26/2023 12:00 AM      Diagnostic Imaging/Tests:   XR FOOT LEFT (MIN 3 VIEWS)    Result Date: 2/16/2023  Generalized soft tissue swelling. No bony changes present to indicate gout arthropathy. NM LUNG SCAN PERFUSION ONLY    Result Date: 2/9/2023  Several small nonsegmental perfusion defects which by perfusion alone cannot be categorized. There are no moderate or large size perfusion defects to suggest pulmonary emboli.     XR CHEST PORTABLE    Result Date: 2/14/2023  -Bilateral interstitial opacities, similar to slightly increased from prior, likely reflecting pulmonary edema. -Persistent patchy left basilar opacity, likely reflecting atelectasis, though infection is also a consideration in appropriate clinical context. -Suspected small bilateral pleural effusions. XR CHEST PORTABLE    Result Date: 1/29/2023  1. Pulmonary edema, small pleural effusions and cardiomegaly. Findings suggest CHF. XR CHEST PORTABLE    Result Date: 1/28/2023  1. Bilateral perihilar and basilar densities, suspicious for pulmonary edema. XR CHEST 1 VIEW    Result Date: 2/9/2023  Stable bibasilar infiltrate     US RETROPERITONEAL COMPLETE    Result Date: 2/17/2023  1. No hydronephrosis or other acute abnormality. Renal echogenicity is normal. 2. Minimally complex, incompletely characterized cyst in the left kidney measuring 2.5 x 1.6 x 1.7 cm. Kendrick Bourgeois M.D. 2/17/2023 4:10:00 AM    Vascular duplex lower extremity venous bilateral    Result Date: 2/10/2023  No sonographic evidence for DVT in either lower extremity.         Labs: Results:       BMP, Mg, Phos Recent Labs     02/15/23  0515 02/16/23  0457 02/16/23  1057 02/17/23  0516    138  --  137   K 4.1 4.7  --  4.7    106  --  105   CO2 23 25  --  22   ANIONGAP 11 7  --  10   BUN 91* 97*  --  101*   CREATININE 3.80* 4.20*  --  4.20*   LABGLOM 15* 13*  --  13*   CALCIUM 9.2 9.7  --  9.5   GLUCOSE 174* 102*  --  147*   MG  --  3.0*  --  3.0*   PHOS  --   --  6.3*  --       CBC Recent Labs     02/15/23  0515 02/16/23  0457 02/17/23  0516   WBC 6.3 6.2 5.7   RBC 2.81* 2.86* 2.80*   HGB 8.7* 8.9* 8.7*   HCT 27.0* 27.4* 26.7*   MCV 96.1 95.8 95.4   MCH 31.0 31.1 31.1   MCHC 32.2 32.5 32.6   RDW 14.6 14.6 14.6    278 228   MPV 11.2 11.7 11.6   NRBC 0.00 0.00 0.00   SEGS 81* 76 77   LYMPHOPCT 6* 9* 8*   EOSRELPCT 3 3 3   MONOPCT 8 10 11   BASOPCT 1 1 1   IMMGRAN 1 1 0   SEGSABS 5.2 4.7 4.5   LYMPHSABS 0.4* 0.6 0.4*   EOSABS 0.2 0.2 0.2   MONOSABS 0.5 0.6 0.6   BASOSABS 0.1 0.1 0.1   ABSIMMGRAN 0.0 0.0 0.0      LFT Recent Labs     02/15/23  0515 02/16/23  0457 02/17/23  0516   BILITOT 0.5 0.6 0.5   ALKPHOS 65 70 65   AST 20 29 22   ALT 20 15 15   PROT 6.3 6.8 6.5   LABALBU 3.0* 3.2 3.0*   GLOB 3.3 3.6 3.5      Cardiac  Lab Results   Component Value Date/Time    NTPROBNP 15,525 02/14/2023 09:29 AM    NTPROBNP 16,122 02/09/2023 02:17 PM    NTPROBNP 16,324 01/29/2023 12:00 AM    TROPHS 67.7 02/14/2023 11:04 AM    TROPHS 94.7 02/14/2023 09:29 AM    TROPHS 87.3 02/08/2023 10:12 AM      Coags Lab Results   Component Value Date/Time    PROTIME 16.0 01/29/2023 12:00 AM    PROTIME 14.7 05/24/2019 12:36 PM    INR 1.2 01/29/2023 12:00 AM    INR 1.2 05/24/2019 12:36 PM      A1c Lab Results   Component Value Date/Time    LABA1C 7.4 01/26/2023 05:02 AM    LABA1C 6.6 08/13/2022 06:32 PM    LABA1C 7.3 07/02/2021 11:26 AM     01/26/2023 05:02 AM     08/13/2022 06:32 PM     07/02/2021 11:26 AM      Lipids No results found for: CHOL, LDLCALC, LABVLDL, HDL, CHOLHDLRATIO, TRIG   Thyroid  Lab Results   Component Value Date/Time    TSHELE 1.58 02/09/2023 02:17 PM    DND0WFZ 2.040 02/08/2023 10:12 AM        Most Recent UA Lab Results   Component Value Date/Time    COLORU YELLOW/STRAW 02/17/2023 01:24 AM    APPEARANCE CLEAR 02/17/2023 01:24 AM    SPECGRAV 1.012 02/17/2023 01:24 AM    LABPH 5.5 02/17/2023 01:24 AM    PROTEINU 30 02/17/2023 01:24 AM    GLUCOSEU 100 02/17/2023 01:24 AM    KETUA Negative 02/17/2023 01:24 AM    BILIRUBINUR Negative 02/17/2023 01:24 AM    BLOODU Negative 02/17/2023 01:24 AM    UROBILINOGEN 0.2 02/17/2023 01:24 AM    NITRU Negative 02/17/2023 01:24 AM    LEUKOCYTESUR Negative 02/17/2023 01:24 AM    WBCUA 0-4 02/17/2023 01:24 AM    RBCUA 0-5 02/17/2023 01:24 AM    EPITHUA 0-5 02/17/2023 01:24 AM    BACTERIA Negative 02/17/2023 01:24 AM    LABCAST 0-2 02/17/2023 01:24 AM        Recent Labs     01/29/23  0000   CULTURE NO GROWTH 5 DAYS  NO GROWTH 5 DAYS       All Labs from Last 24 Hrs:  Recent Results (from the past 24 hour(s))   POCT Glucose    Collection Time: 02/16/23  4:42 PM   Result Value Ref Range    POC Glucose 301 (H) 65 - 100 mg/dL Performed by: Saeid Rachel    POCT Glucose    Collection Time: 02/16/23  8:52 PM   Result Value Ref Range    POC Glucose 214 (H) 65 - 100 mg/dL    Performed by: SamplesEmilyT    Urinalysis w rflx microscopic    Collection Time: 02/17/23  1:24 AM   Result Value Ref Range    Color, UA YELLOW/STRAW      Appearance CLEAR      Specific Gravity, UA 1.012 1.001 - 1.023      pH, Urine 5.5 5.0 - 9.0      Protein, UA 30 (A) NEG mg/dL    Glucose,  mg/dL    Ketones, Urine Negative NEG mg/dL    Bilirubin Urine Negative NEG      Blood, Urine Negative NEG      Urobilinogen, Urine 0.2 0.2 - 1.0 EU/dL    Nitrite, Urine Negative NEG      Leukocyte Esterase, Urine Negative NEG      WBC, UA 0-4 U4 /hpf    RBC, UA 0-5 U5 /hpf    Epithelial Cells UA 0-5 U5 /hpf    BACTERIA, URINE Negative NEG /hpf    Casts 0-2 U2 /lpf   Protein / creatinine ratio, urine    Collection Time: 02/17/23  1:24 AM   Result Value Ref Range    Protein, Urine, Random 34 (H) <11.9 mg/dL    Creatinine, Ur 63.00 mg/dL    PROTEIN/CREAT RATIO URINE RAN 0.5     POCT Glucose    Collection Time: 02/17/23  4:19 AM   Result Value Ref Range    POC Glucose 155 (H) 65 - 100 mg/dL    Performed by:  SamplesEmilyPCT    Comprehensive Metabolic Panel w/ Reflex to MG    Collection Time: 02/17/23  5:16 AM   Result Value Ref Range    Sodium 137 133 - 143 mmol/L    Potassium 4.7 3.5 - 5.1 mmol/L    Chloride 105 101 - 110 mmol/L    CO2 22 21 - 32 mmol/L    Anion Gap 10 2 - 11 mmol/L    Glucose 147 (H) 65 - 100 mg/dL     (H) 8 - 23 MG/DL    Creatinine 4.20 (H) 0.8 - 1.5 MG/DL    Est, Glom Filt Rate 13 (L) >60 ml/min/1.73m2    Calcium 9.5 8.3 - 10.4 MG/DL    Total Bilirubin 0.5 0.2 - 1.1 MG/DL    ALT 15 12 - 65 U/L    AST 22 15 - 37 U/L    Alk Phosphatase 65 50 - 136 U/L    Total Protein 6.5 6.3 - 8.2 g/dL    Albumin 3.0 (L) 3.2 - 4.6 g/dL    Globulin 3.5 2.8 - 4.5 g/dL    Albumin/Globulin Ratio 0.9 0.4 - 1.6     CBC with Auto Differential    Collection Time: 02/17/23 5:16 AM   Result Value Ref Range    WBC 5.7 4.3 - 11.1 K/uL    RBC 2.80 (L) 4.23 - 5.6 M/uL    Hemoglobin 8.7 (L) 13.6 - 17.2 g/dL    Hematocrit 26.7 (L) 41.1 - 50.3 %    MCV 95.4 82 - 102 FL    MCH 31.1 26.1 - 32.9 PG    MCHC 32.6 31.4 - 35.0 g/dL    RDW 14.6 11.9 - 14.6 %    Platelets 979 862 - 762 K/uL    MPV 11.6 9.4 - 12.3 FL    nRBC 0.00 0.0 - 0.2 K/uL    Differential Type AUTOMATED      Seg Neutrophils 77 43 - 78 %    Lymphocytes 8 (L) 13 - 44 %    Monocytes 11 4.0 - 12.0 %    Eosinophils % 3 0.5 - 7.8 %    Basophils 1 0.0 - 2.0 %    Immature Granulocytes 0 0.0 - 5.0 %    Segs Absolute 4.5 1.7 - 8.2 K/UL    Absolute Lymph # 0.4 (L) 0.5 - 4.6 K/UL    Absolute Mono # 0.6 0.1 - 1.3 K/UL    Absolute Eos # 0.2 0.0 - 0.8 K/UL    Basophils Absolute 0.1 0.0 - 0.2 K/UL    Absolute Immature Granulocyte 0.0 0.0 - 0.5 K/UL   Magnesium    Collection Time: 02/17/23  5:16 AM   Result Value Ref Range    Magnesium 3.0 (H) 1.8 - 2.4 mg/dL   POCT Glucose    Collection Time: 02/17/23  7:39 AM   Result Value Ref Range    POC Glucose 169 (H) 65 - 100 mg/dL    Performed by: [facility]    Cardiac procedure    Collection Time: 02/17/23 10:46 AM   Result Value Ref Range    Body Surface Area 2.73 m2   POCT Glucose    Collection Time: 02/17/23  1:03 PM   Result Value Ref Range    POC Glucose 147 (H) 65 - 100 mg/dL    [facility]formed by: [facility]        Allergies   Allergen Reactions    Amlodipine Shortness Of Breath    Iodine Shortness Of Breath    Metformin Shortness Of Breath    Ranolazine Other (See Comments)     Lips and tongue numbness    Spironolactone Other (See Comments)     Potassium level went really high  Increased potassium    Hydrocodone-Acetaminophen Other (See Comments)     Pt states that if he takes this  The medication makes him feel like he is going crazy    Omeprazole Other (See Comments)     Caused drastic drop in blood sugar     Immunization History   Administered Date(s) Administered    COVID-19, PFIZER PURPLE top, DILUTE for use, (age 15 y+), 30mcg/0.3mL 01/20/2021, 02/15/2021, 09/28/2021, 05/11/2022    Influenza Trivalent 09/30/2010, 09/27/2017    Influenza Virus Vaccine 09/01/2012, 11/09/2013, 09/22/2014, 09/01/2017    Influenza, High Dose (Fluzone 65 yrs and older) 11/30/2015, 09/01/2016, 09/01/2019, 09/04/2020, 09/01/2021    Influenza, Triv, inactivated, subunit, adjuvanted, IM (Fluad 65 yrs and older) 09/10/2018    Pneumococcal Conjugate 13-valent ([id number]) 11/18/2015    Pneumococcal Vaccine 09/01/2012    Td vaccine (adult) 09/01/2012       Recent Vital Data:  Patient Vitals for the past 24 hrs:   Temp Pulse Resp BP SpO2   02/17/23 1301 97.3 °F (36.3 °C) 70 16 126/79 97 %   02/17/23 1150 -- 71 -- 134/70 97 %   02/17/23 1130 -- 70 -- (!) 141/76 97 %   02/17/23 1115 -- 70 -- (!) 116/92 96 %   02/17/23 1100 -- 70 -- 126/84 95 %   02/17/23 1056 -- 70 -- 133/69 94 %   02/17/23 0737 97.3 °F (36.3 °C) 69 18 132/87 100 %   02/17/23 0409 98.6 °F (37 °C) 71 22 121/69 95 %   02/16/23 2150 97.5 °F (36.4 °C) 76 20 132/73 97 %   02/16/23 2003 -- 78 16 -- 98 %   02/16/23 1955 97.6 °F (36.4 °C) 76 18 123/69 97 %   02/16/23 1727 97.5 °F (36.4 °C) 73 21 117/66 98 %       Oxygen Therapy  SpO2: 97 %  Pulse via Oximetry: 73 beats per minute  Pulse Oximeter Device Mode: Intermittent  Pulse Oximeter Device Location: Right, Finger  O2 Device: None (Room air)  O2 Flow Rate (L/min): 2 L/min  Oxygen Therapy: Supplemental oxygen  O2 Delivery Method: Nasal cannula    Estimated body mass index is 42.8 kg/m² as calculated from the following:    Height as of this encounter: 6' 1\" (1.854 m). Weight as of this encounter: 324 lb 6.4 oz (147.1 kg). Intake/Output Summary (Last 24 hours) at 2/17/2023 1429  Last data filed at 2/17/2023 1045  Gross per 24 hour   Intake 360 ml   Output 1160 ml   Net -800 ml         Physical Exam:    General:    Well nourished.   No overt distress  Head:  Normocephalic, atraumatic  Eyes:  Sclerae appear normal. Pupils equally round. HENT:  Nares appear normal, no drainage. Moist mucous membranes  Neck:  No restricted ROM. Trachea midline  CV:   No gross S3 gallop. No obvious new murmurs. Lungs: No wheeze at time of exam.  Normal symmetric excursion. Abdomen:   Soft, nontender, nondistended. Extremities: Warm and dry. No cyanosis or clubbing. No unilateral lower extremity edema. Neuro:  CN II-XII grossly intact. Psych:  Normal mood and affect. Signed:  Pelon Alvarez DO    Part of this note may have been written by using a voice dictation software. The note has been proof read but may still contain some grammatical/other typographical errors.

## 2023-02-17 NOTE — CARE COORDINATION
Patient with discharge orders for today. Patient requesting rolling walker and wheelchair at discharge. Referral sent to [address] per patient request. Patient and family made aware that insurance will only pay for walker OR wheelchair. Patient discharging with [facility] per patient request. RNCM contacted [facility] per patient/family request to discuss palliative care. Patient's primary care to set up palliative care after discharge. Daughter to transport patient home. No other needs made known to CM. Patient and family in agreement with plan. Patient has met all treatment goals and milestones for discharge. CM following until patient is discharged. 02/17/23 1459   Service Assessment   Patient Orientation Alert and [address] [facility] Discharge   Transition of Care Consult (CM Consult) Discharge [facility] Discharge Home Health;DME   [address] Provided? No   Mode of Transport at Discharge Self   Confirm Follow Up Transport Family   Condition of Participation: Discharge Planning   The Plan for Transition of Care is related to the following treatment goals: Return to baseline with family support   The Patient and/or Patient Representative was provided with a Choice of Provider? Patient   The Patient and/Or Patient Representative agree with the Discharge Plan? Yes   Freedom of Choice list was provided with basic dialogue that supports the patient's individualized plan of care/goals, treatment preferences, and shares the quality data associated with the providers?   Yes

## 2023-02-17 NOTE — PROGRESS NOTES
Lilo Pollock  Admission Date: 2/14/2023         [facility] Nephrology Progress Note: 2/17/2023    Follow-up for: GREGG/ CKD IV    The patient's chart is reviewed and the patient is discussed with the staff. Subjective:   Pt seen and examined in room daughter at the bedside, pt reports SOB, LE edema, UOP down today.      ROS:  Gen - no fever, no chills, appetite unchanged  CV - no chest pain, no palpitation  Lung - + exertional shortness of breath, no cough  Abd - no tenderness, no nausea/vomiting, no diarrhea  Ext - + edema    Current Facility-Administered Medications   Medication Dose Route Frequency    lidocaine 1 % injection    PRN    diphenhydrAMINE (BENADRYL) capsule 25 mg  25 mg Oral Q6H PRN    bumetanide (BUMEX) injection 1 mg  1 mg IntraVENous BID    aspirin EC tablet 81 mg  81 mg Oral Daily    calcitRIOL (ROCALTROL) capsule 0.25 mcg  0.25 mcg Oral Daily    clopidogrel (PLAVIX) tablet 75 mg  75 mg Oral Daily    levothyroxine (SYNTHROID) tablet 88 mcg  88 mcg Oral QAM AC    [Held by provider] metoprolol succinate (TOPROL XL) extended release tablet 100 mg  100 mg Oral BID    nitroGLYCERIN (NITROSTAT) SL tablet 0.4 mg  0.4 mg SubLINGual Q5 Min PRN    sodium bicarbonate tablet 650 mg  650 mg Oral BID    allopurinol (ZYLOPRIM) tablet 150 mg  150 mg Oral Daily    sodium chloride flush 0.9 % injection 5-40 mL  5-40 mL IntraVENous 2 times per day    sodium chloride flush 0.9 % injection 5-40 mL  5-40 mL IntraVENous PRN    0.9 % sodium chloride infusion   IntraVENous PRN    ondansetron (ZOFRAN-ODT) disintegrating tablet 4 mg  4 mg Oral Q8H PRN    Or    ondansetron (ZOFRAN) injection 4 mg  4 mg IntraVENous Q6H PRN    polyethylene glycol (GLYCOLAX) packet 17 g  17 g Oral Daily PRN    acetaminophen (TYLENOL) tablet 650 mg  650 mg Oral Q6H PRN    Or    acetaminophen (TYLENOL) suppository 650 mg  650 mg Rectal Q6H PRN    magnesium sulfate 2000 mg in 50 mL IVPB premix  2,000 mg IntraVENous PRN potassium chloride (KLOR-CON M) extended release tablet 40 mEq  40 mEq Oral PRN    Or    potassium bicarb-citric acid (EFFER-K) effervescent tablet 40 mEq  40 mEq Oral PRN    Or    potassium chloride 10 mEq/100 mL IVPB (Peripheral Line)  10 mEq IntraVENous PRN    aluminum & magnesium hydroxide-simethicone (MAALOX) 200-200-20 MG/5ML suspension 30 mL  30 mL Oral Q6H PRN    heparin (porcine) injection 5,000 Units  5,000 Units SubCUTAneous 3 times per day    glucose chewable tablet 16 g  4 tablet Oral PRN    dextrose bolus 10% 125 mL  125 mL IntraVENous PRN    Or    dextrose bolus 10% 250 mL  250 mL IntraVENous PRN    glucagon (rDNA) injection 1 mg  1 mg SubCUTAneous PRN    dextrose 10 % infusion   IntraVENous Continuous PRN    insulin glargine (LANTUS) injection vial 25 Units  25 Units SubCUTAneous BID    insulin lispro (HUMALOG) injection vial 0-4 Units  0-4 Units SubCUTAneous TID WC    insulin lispro (HUMALOG) injection vial 0-4 Units  0-4 Units SubCUTAneous Nightly    levalbuterol (XOPENEX) nebulization 0.63 mg  0.63 mg Nebulization Q6H WA    budesonide (PULMICORT) nebulizer suspension 500 mcg  0.5 mg Nebulization BID         Objective:     Vitals:    02/16/23 2150 02/17/23 0409 02/17/23 0415 02/17/23 0737   BP: 132/73 121/69  132/87   Pulse: 76 71  69   Resp: 20 22  18   Temp: 97.5 °F (36.4 °C) 98.6 °F (37 °C)  97.3 °F (36.3 °C)   TempSrc: Axillary Oral  Oral   SpO2: 97% 95%  100%   Weight:   (!) 324 lb 6.4 oz (147.1 kg)    Height:         Intake and Output:   02/15 1901 - 02/17 0700  In: 360 [P.O.:360]  Out: 2500 [Urine:2500]  No intake/output data recorded. Physical Exam:   Constitutional:  the patient is well developed and in no acute distress, alert and oriented   HEENT:  Sclera clear, pupils equal, oral mucosa moist  Lungs: clear low lung volumes bilaterally   Cardiovascular:  Regular rate, S1, S2, no Rub   Abd/GI: soft and non-tender; with positive bowel sounds. Ext: warm without cyanosis.  There is 3+ lower leg edema. Skin:  no jaundice or rashes  Neuro: no gross neuro deficits   Psychiatric: Calm. LAB  Recent Labs     02/15/23  0515 02/16/23  0457 02/17/23  0516   WBC 6.3 6.2 5.7   HGB 8.7* 8.9* 8.7*   HCT 27.0* 27.4* 26.7*    278 228       Recent Labs     02/15/23  0515 02/16/23  0457 02/16/23  1057 02/17/23  0516    138  --  137   K 4.1 4.7  --  4.7    106  --  105   CO2 23 25  --  22   BUN 91* 97*  --  101*   CREATININE 3.80* 4.20*  --  4.20*   MG  --  3.0*  --  3.0*   PHOS  --   --  6.3*  --        No results for input(s): PH, PCO2, PO2, HCO3 in the last 72 hours. Assessment/Plan:  (Medical Decision Making)   GREGG/CKD IV Cr up from baseline in the setting of diuresis, noted hypotension episodes   -increase bumex to TID with significant LE edema, hold antihypertensives   -baseline Cr in the mid to upper 3s. Cr holding in the 4s   - renal US no hydronephrosis, noted left renal cyst measuring 2.5x1. 6x1.7 cm, UA + protein, P/C ratio 0.5, daily labs      HFpEF/CAD  -cardiology following  Plan for right heart cath today      DM  -SSI     History of PHU    Edema- diuretic as above, daily weights    Anemia  Fe studies ok         MELLY Ramirez - [address] [facility]

## 2023-02-17 NOTE — PROGRESS NOTES
TRANSFER - OUT REPORT:    [facility] with Dr. Anuradha Flower  Access: Right femoral venous   RFV closed with 7 Fr vascade   No bleeding or hematoma site soft      Verbal report given to  enrique Cooper  being transferred to [facility] for routine progression of patient care       Report consisted of patient's Situation, Background, Assessment and Recommendations(SBAR). Information from the following report(s) Nurse Handoff Report and MAR was reviewed with the receiving nurse. Opportunity for questions and clarification was provided.       Patient transported with:  Registered Nurse

## 2023-02-17 NOTE — PROGRESS NOTES
Attempted to see patient this AM for occupational therapy treatment  session. Patient off floor for cardiac cath. Will follow and re-attempt as schedule permits/patient available.  Thank you,    Shannan Mcrae, OT    Rehab Caseload Tracker

## 2023-02-20 ENCOUNTER — TELEPHONE (OUTPATIENT)
Dept: PULMONOLOGY | Age: 83
End: 2023-02-20

## 2023-02-20 NOTE — TELEPHONE ENCOUNTER
Last seen: 12/2/22  Hx: cough variant asthma, RLD, JORJE, obese, CKD, CHF    Inpatient 2/14 - 2/17 pulmonary edema, d/c w/ HH, plan to set up palliative care. Contacted patient confirmed d/c from hospital, was told his diagnosis is pulmonary HTN, told by cardiology there isn't anything else to help w/ issues, going to be progressive; was told he has a 1 year (or less) life expectancy. Wants to get NP opinion regarding current medication regimen to optimize where he can. Worked in for virtual visit 2/22.

## 2023-02-21 ENCOUNTER — OFFICE VISIT (OUTPATIENT)
Dept: CARDIOLOGY CLINIC | Age: 83
End: 2023-02-21
Payer: MEDICARE

## 2023-02-21 VITALS
SYSTOLIC BLOOD PRESSURE: 104 MMHG | DIASTOLIC BLOOD PRESSURE: 62 MMHG | HEART RATE: 82 BPM | HEIGHT: 73 IN | BODY MASS INDEX: 42.8 KG/M2

## 2023-02-21 DIAGNOSIS — I50.32 DIASTOLIC CHF, CHRONIC (HCC): ICD-10-CM

## 2023-02-21 DIAGNOSIS — I25.10 CAD IN NATIVE ARTERY: Primary | Chronic | ICD-10-CM

## 2023-02-21 DIAGNOSIS — R06.09 DOE (DYSPNEA ON EXERTION): ICD-10-CM

## 2023-02-21 DIAGNOSIS — I50.32 CHRONIC HEART FAILURE WITH PRESERVED EJECTION FRACTION (HFPEF) (HCC): Chronic | ICD-10-CM

## 2023-02-21 DIAGNOSIS — N18.4 CHRONIC KIDNEY DISEASE (CKD), STAGE IV (SEVERE) (HCC): Chronic | ICD-10-CM

## 2023-02-21 DIAGNOSIS — G47.33 OSA (OBSTRUCTIVE SLEEP APNEA): Chronic | ICD-10-CM

## 2023-02-21 PROCEDURE — G8417 CALC BMI ABV UP PARAM F/U: HCPCS | Performed by: INTERNAL MEDICINE

## 2023-02-21 PROCEDURE — G8484 FLU IMMUNIZE NO ADMIN: HCPCS | Performed by: INTERNAL MEDICINE

## 2023-02-21 PROCEDURE — 3078F DIAST BP <80 MM HG: CPT | Performed by: INTERNAL MEDICINE

## 2023-02-21 PROCEDURE — 1111F DSCHRG MED/CURRENT MED MERGE: CPT | Performed by: INTERNAL MEDICINE

## 2023-02-21 PROCEDURE — G8427 DOCREV CUR MEDS BY ELIG CLIN: HCPCS | Performed by: INTERNAL MEDICINE

## 2023-02-21 PROCEDURE — 1123F ACP DISCUSS/DSCN MKR DOCD: CPT | Performed by: INTERNAL MEDICINE

## 2023-02-21 PROCEDURE — 3074F SYST BP LT 130 MM HG: CPT | Performed by: INTERNAL MEDICINE

## 2023-02-21 PROCEDURE — 1036F TOBACCO NON-USER: CPT | Performed by: INTERNAL MEDICINE

## 2023-02-21 PROCEDURE — 99214 OFFICE O/P EST MOD 30 MIN: CPT | Performed by: INTERNAL MEDICINE

## 2023-02-21 RX ORDER — METOLAZONE 5 MG/1
5 TABLET ORAL DAILY
Qty: 90 TABLET | Refills: 3 | Status: SHIPPED | OUTPATIENT
Start: 2023-02-21

## 2023-02-21 NOTE — TELEPHONE ENCOUNTER
Last Visit Date: 12/1/2022   Next Visit Date: Visit date not found TRIAGE CALL      Complaint: SOB  Cough: no  Productive:  no  Bloody Sputum:  no  Increased SOB/Wheezing:  yes  Duration: 3 weeks  Fever/Chills: no  OTC Meds tried: none  Patient was discharged from [facility] on Friday with Pulmonary hypertension

## 2023-02-21 NOTE — PROGRESS NOTES
He is a very pleasant 79-year-old male w/hx of cough variant asthma, sleep apnea, obesity, exertional dyspnea, JORJE who presents today with questions in regards to pulmonary hypertension, which was noted as severe at right heart cath during recent hospitalization. At prior visit, family member had mentioned family history of cirrhosis and wondered if he needed to have alpha-1 level. This was obtained and was normal.     Hospitalization 8/2022 secondary to volume overload, CHF with preserved EF. Had experienced volume overload and 25 pound weight gain. Chest x-rays obtained during the hospitalization are reviewed, suggestive of cardiomegaly, unchanged. Initially there was mild pulmonary venous congestion and perihilar edema, this improved on follow-up imaging. Hospitalization 2/2023 with acute pulmonary edema, chronic diastolic heart failure with preserved EF, CAD, stage IV chronic kidney disease. right heart cath and by available report it appears that PA systolic pressure 88 with PA diastolic pressure 45 with a mean pressure of 61 but formal report is pending. He was discharged with palliative care with possible bridge to hospice. Discharge note : \"We will follow closely with nephrology and to consider additional trials of medication interventions as an outpatient such as possible erythropoietin if qualifies and/or pulmonary referral for possible Revatio or other agent. He will continue with Bumex increased to 3 times daily under recommendations of nephrology. Blood pressure medications have been significantly attenuated with elimination of hydralazine. He will continue on low-dose twice daily long-acting Toprol-XL typically once daily drug but was taking twice daily previously. Significant decrease dosing continue long-acting nitrate. Follow-up with nephrology, primary care, cardiology and pulmonary.   Plan is to discharge home with palliative care with possible bridge to hospice given late stage renal disease, severe pulmonary hypertension and multiple other comorbidities as listed. Goal is attempt to obtain as much comfort for him as possible and to remain functional if able. Patient and daughter request evaluation for home oxygen or referral, and medical supplies for the home such as wheelchair if able. He appears stable for discharge to home with home health and palliative care-possible bridge to end-of-life care pending clinical course. \"\"                DIAGNOSTICS:       CPFt's 3/2016 - Spirometry is consistent with mild obstructive defect with decreased forced vital capacity, lung volume and atelectasis is consistent with mild gas trapping. The diffusion capacity is normal. Overall this is consistent with GOLD stage II COPD. CXR 3/2016-unremarkable. Echocardiogram 12/2015- EF 65-70%. Diastolic dysfunction. Myocardial stress test - CONCLUSION:   1. Stress EKG: Normal.  2. SPECT Perfusion Imaging: Fixed defect likely related to diaphragmatic attenuation - probably normal.  3. LV Systolic Function is  normal.   ECHO - 9/22/2017-EF 70-75%, hyperdynamic, mild LVH. Diastolic function indeterminant. RVSP estimated at 22 mmHg. CXR 10/2016-no acute findings. Ambulatory oximetry 10/5/2016 - baseline saturation 97%, dropped to 95% with ambulation. Baseline heart rate 82, increased to 124 with ambulation. Elevated d-dimer 10/2016, subsequent negative ventilation/perfusion study. Spirometry 6/20/2017-moderate combined obstructive and restrictive defect, interval improvement since prior study. Spirometry 5/25/2018 - patient declined, (back pain). Spirometry 3/29/2019-moderate restrictive defect, interval decline in FVC, no significant change in FEV1. Previous complete pulmonary function test have demonstrated obstructive defect with air trapping. Echo 8/3/2020-EF 40-18%, + diastolic dysfunction, mild aortic valve sclerosis without significant stenosis, minimal aortic regurgitation. Mild MR.   Trace TR.  RVSP could not be estimated due to no tricuspid regurgitation. Spirometry 12/3/2020-mild restrictive defect, slight improvement in FVC and FEV1 compared to last study, significant improvement compared to baseline study. Normal alpha-1 level 6/2021. CXR 6/1/2022- lung parenchyma is clear, no acute cardiopulmonary abnormality. Ambulatory oximetry 6/3/2022-baseline saturation 97% room air at rest, 97% room air with ambulation. Baseline heart rate 58, maximum 73 with ambulation. CXR 8/2/2022-mild pulmonary venous congestion and perihilar edema with probable mild CHF/fluid imbalance. CXR 8/12/2022-borderline cardiomegaly, unchanged. No acute cardiopulmonary abnormality. Spirometry 12/2/2022-normal.  Interval improvement in FVC. No significant change in FEV1. ECHO 1/26/2023-EF 60-65%, abnormal diastolic function, moderate stenosis of AV, mild MR, RVSP estimated 57 mmHg, severe LA dilatation, moderately dilated RA. RHC 2/17/2023-Severely elevated right-sided pressures with severely elevated RA pressures right ventricular systolic pressures measuring in the mid 90s and PA pressures measuring in the 55-90 range for systolic PA mean pressure is greater than 60. Right atrial and pulmonary artery sats are low 50s 52 to 55%. Wedge pressure with rezeroing and refluxing is profoundly elevated at at least 36. Due to the wedge being so severely elevated patient is not appropriate candidate for vasoactive challenge but he certainly has severe probably end-stage elevation of his pulmonary artery pressures again with systolic PA pressures in the 90s    PA pressure = 88/45 mmHg. PA mean = 61 mmHg. PA Sat = 52.9 %. Mid RA pressure = 29/27 mmHg. Mid RA mean = 25 mmHg. Mid RA Sat = 51.9 %. RV pressure = 90/19 mmHg. RV mean = 28 mmHg. Wedge pressure = 36 mmHg. AO Sat = 95 %. Alhaji CO = 5.96 L/min. TDCO  Testing:   Provocative Test #1: 5.8 L/min. Provocative Test #2: 5.7 L/min.    Provocative Test #3: 5.6 L/min. Reviewed with Dr Freada Osgood. PVR is 4.38 RUBIO. Combined pre and postcapillary PH. Precapillary component is mild with PVR <5.  Would not recommend vasodilators unless postcapillary component truly optimized. Only  then could a short term 3 month trial of PDE5i like tadalafil or sildenafil be considered with pre/post 6MWT to assess for benefit.   The risk/benefit is equivocal.                    ______________________________________________

## 2023-02-21 NOTE — PROGRESS NOTES
[address], [address]  PHONE: [phone]     23    NAME:  Orlando López  : 1940  MRN: [id number]       SUBJECTIVE:   Orlando López is a 79 y.o. male seen for a follow up visit regarding the following:     Chief Complaint   Patient presents with    Coronary Artery Disease     Post [facility]    Congestive Heart Failure    Shortness of Breath       HPI: Here for CAD (LAD PCI 12/15), JORJE on CPAP. He has CKD Stage III - IV. [facility] 2019: stable mod CAD, BNP was 13  [facility] 2023: severe probably end-stage elevation of his pulmonary artery pressures again with systolic PA pressures in the 90s  Echo 2023: Normal EF, mod AS        On higher dose diuretics now. Ongoing edema. No CP. 20 pds up now, struggling breathing. Worsening SEARS, SOB. No pressure. Worsening now. Ongoing edema. He has NYHA Class III sx now. Compliant with CPAP. Past Medical History, Past Surgical History, Family history, Social History, and Medications were all reviewed with the patient today and updated as necessary. Current Outpatient Medications   Medication Sig Dispense Refill    metoprolol succinate (TOPROL XL) 25 MG extended release tablet Take 0.5 tablets by mouth in the morning and at bedtime 30 tablet 0    bumetanide (BUMEX) 1 MG tablet Take 1 tablet by mouth in the morning, at noon, and at bedtime 90 tablet 0    albuterol (PROVENTIL) (2.5 MG/3ML) 0.083% nebulizer solution Take 3 mLs by nebulization in the morning, at noon, in the evening, and at bedtime Bill to Medicare D Code: J45.991 120 each 1    clopidogrel (PLAVIX) 75 MG tablet Take 1 tablet by mouth daily 90 tablet 3    nitroGLYCERIN (NITROSTAT) 0.4 MG SL tablet Place 1 tablet under the tongue every 5 minutes as needed for Chest pain up to max of 3 total doses.  If no relief after 1 dose, call 911. 25 tablet 3    isosorbide mononitrate (IMDUR) 60 MG extended release tablet Take 1 tablet by mouth daily 30 tablet 5    CPAP Machine MISC by Does not apply route At HS      albuterol sulfate HFA (PROVENTIL;VENTOLIN;PROAIR) 108 (90 Base) MCG/ACT inhaler 2 puffs 4 times daily if needed for shortness of breath or wheezing. Patient will call when refills are needed 1 each 11    Fluticasone Furoate-Vilanterol (BREO ELLIPTA) 100-25 MCG/ACT AEPB 1 inhalation every morning, rinse mouth after use. 1 each 11    allopurinol (ZYLOPRIM) 300 MG tablet Take 150 mg by mouth daily      aspirin 81 MG EC tablet Take 81 mg by mouth      calcitRIOL (ROCALTROL) 0.25 MCG capsule Take 0.25 mcg by mouth daily      Dulaglutide (TRULICITY) 1.5 LK/8.8BZ SOPN Inject 1.5 mg into the skin every 7 days On sundays      insulin glargine (LANTUS) 100 UNIT/ML injection vial Inject 25 Units into the skin 2 times daily      levothyroxine (SYNTHROID) 88 MCG tablet Take 88 mcg by mouth      sodium bicarbonate 650 MG tablet Take 650 mg by mouth 2 times daily       No current facility-administered medications for this visit.         Allergies   Allergen Reactions    Amlodipine Shortness Of Breath    Iodine Shortness Of Breath    Metformin Shortness Of Breath    Ranolazine Other (See Comments)     Lips and tongue numbness    Spironolactone Other (See Comments)     Potassium level went really high  Increased potassium    Hydrocodone-Acetaminophen Other (See Comments)     Pt states that if he takes this  The medication makes him feel like he is going crazy    Omeprazole Other (See Comments)     Caused drastic drop in blood sugar     Patient Active Problem List    Diagnosis Date Noted    Accelerating ang[facility] ([facility] 01/25/2023     Priority: High    Hyperuricemia 02/16/2023     Priority: Medium    Left foot pain 02/15/2023     Priority: Medium    Acute pulmonary edema ([facility] 02/14/2023     Priority: Medium    Generalized weakness 02/14/2023     Priority: Medium    Decreased exercise tolerance 02/14/2023     Priority: Medium    Volume overload state of heart 02/10/2023 Priority: Medium    Bilateral lower extremity edema 02/09/2023     Priority: Medium    Moderate aortic stenosis by prior echocardiogram 08/13/2022     Priority: Medium    Chronic heart failure with preserved ejection fraction (HFpEF) ([facility] 08/03/2022     Priority: Medium    Volume overload 08/02/2022     Priority: Medium    Restrictive lung disease 06/03/2022     Priority: Medium    Chronic gout due to renal impairment without tophus 04/25/2022     Priority: Medium    Hypothyroidism due to Hashimoto's thyroiditis 10/20/2016     Priority: Medium     Last Assessment & Plan:   Formatting of this note might be different from the original.  Continue thyroid at 88 mcg daily, Recheck TFTs 6M.       PHU (iron deficiency anemia) 08/13/2022     TSAT 8% SFe 8 8/13/22 start IV FEx3 per hf affirm       Chest pain 05/28/2019    Obesity, morbid ([facility] 04/18/2018    Type 2 diabetes with nephropathy ([facility] 28/74/7219    Diastolic CHF, chronic ([facility] 09/08/2017    Anemia in chronic kidney disease 04/08/2016    Acquired hypothyroidism 04/08/2016    Cough variant asthma 03/11/2016    Dyspnea 02/10/2016    CAD in native artery 02/10/2016     12/15: LAD PCI, small vessel dz        SEARS (dyspnea on exertion) 12/22/2015    Abnormal nuclear stress test 12/22/2015    Elevated liver function tests 08/20/2015    Hyponatremia 01/21/2015    Anemia 01/21/2015    PPD positive 10/28/2014    Seborrheic dermatitis 01/16/2013    Edema 10/11/2012     Edema on norvasc        Chronic kidney disease (CKD), stage IV (severe) ([address] 10/11/2012    JORJE (obstructive sleep apnea) 10/11/2012    Hypertension 10/11/2012    Diabetes mellitus type 2 in obese ([address] 10/11/2012      Past Surgical History:   Procedure Laterality Date    CARDIAC CATHETERIZATION  2016    3 stent    CARDIAC PROCEDURE N/A 1/27/2023    LEFT HEART CATH / CORONARY ANGIOGRAPHY performed by Sparkle Garcia MD at [address] [place] [address] CATH LAB    CARDIAC PROCEDURE N/A 1/27/2023    Percutaneous coronary intervention performed by Enrique Curry MD at [address] [place] [address] [facility] CATH [facility]    CARDIAC PROCEDURE N/A 2023    RIGHT HEART CATH performed by Enrique Curry MD at [facility] CARDIAC CATH LAB    CATARACT REMOVAL Bilateral     [facility]     Family History   Problem Relation Age of Onset    Cancer Father         lung    No Known Problems Sister     No Known Problems Brother     Hypertension Father     No Known Problems Sister     Hypertension Mother      Social History     Tobacco Use    Smoking status: Former     Packs/day: 0.50     Years: 2.00     Pack years: 1.00     Types: Cigarettes     Quit date: 1962     Years since quittin.1    Smokeless tobacco: Never   Substance Use Topics    Alcohol use: No         ROS:    No obvious pertinent positives on review of systems except for what was outlined in the HPI today.       PHYSICAL EXAM:     /62   Pulse 82   Ht 6' 1\" (1.854 m)   BMI 42.80 kg/m²    General/Constitutional:   Alert and oriented x 3, no acute distress  HEENT:   normocephalic, atraumatic, moist mucous membranes  Neck:   No JVD or carotid bruits bilaterally  Cardiovascular:   regular rate and rhythm, no murmur/rub/gallop appreciated  Pulmonary:   clear to auscultation bilaterally, no respiratory distress  Abdomen:   soft, non-tender, non-distended  Ext:   No sig LE edema bilaterally  Skin:  warm and dry, no obvious rashes seen  Neuro:   no obvious sensory or motor deficits  Psychiatric:   normal mood and affect      Lab Results   Component Value Date/Time     2023 05:16 AM    K 4.7 2023 05:16 AM     2023 05:16 AM    CO2 22 2023 05:16 AM     2023 05:16 AM    CREATININE 4.20 2023 05:16 AM    GLUCOSE 147 2023 05:16 AM    CALCIUM 9.5 2023 05:16 AM        Lab Results   Component Value Date    WBC 5.7 2023    HGB 8.7 (L) 2023    HCT 26.7 (L) 2023    MCV 95.4 2023     2023       Lab Results   Component Value Date    TSH 0.924 07/02/2021       Lab Results   Component Value Date    LABA1C 7.4 (H) 01/26/2023     Lab Results   Component Value Date     01/26/2023       No results found for: CHOL  No results found for: TRIG  No results found for: HDL  No results found for: LDLCHOLESTEROL, LDLCALC  No results found for: LABVLDL, VLDL  No results found for: CHOLHDLRATIO        I have Independently reviewed prior care notes, any ER records available, cardiac testing, labs and results with the patient and before seeing the patient today.  Also independently reviewed outside records when available.       ASSESSMENT:    Rudy was seen today for coronary artery disease, congestive heart failure and shortness of breath.    Diagnoses and all orders for this visit:    CAD in native artery    Chronic heart failure with preserved ejection fraction (HFpEF) ([facility])    Diastolic CHF, chronic (HCC)    JORJE (obstructive sleep apnea)    Chronic kidney disease (CKD), stage IV (severe) ([facility])    SEARS (dyspnea on exertion)        PLAN:        1. CAD/DHF/SEARS:     End stage, refer for home hospice.  Remain on bumex.  Add metolazone.      PRN morphine and ativan.      End of life discussion today.  Daughter here and agrees with plan.       2. HTN:  STOP the BB today,       3 HPL:  Stopped statin on his own, does not trust them.          4 CRI: stage IV, seeing renal.  More diuretics as needed.     5. JORJE: remain on CPAP.           Patient has been instructed and agrees to call our office with any issues or other concerns related to their cardiac condition(s) and/or complaint(s).        Return in about 1 month (around 3/21/2023).       Boris Blue, DO  2/21/2023

## 2023-02-22 ENCOUNTER — TELEPHONE (OUTPATIENT)
Dept: CARDIOLOGY CLINIC | Age: 83
End: 2023-02-22

## 2023-02-22 ENCOUNTER — TELEMEDICINE (OUTPATIENT)
Dept: PULMONOLOGY | Age: 83
End: 2023-02-22
Payer: MEDICARE

## 2023-02-22 DIAGNOSIS — I50.32 CHRONIC HEART FAILURE WITH PRESERVED EJECTION FRACTION (HFPEF) (HCC): ICD-10-CM

## 2023-02-22 DIAGNOSIS — G47.33 OSA (OBSTRUCTIVE SLEEP APNEA): ICD-10-CM

## 2023-02-22 DIAGNOSIS — J81.0 ACUTE PULMONARY EDEMA (HCC): ICD-10-CM

## 2023-02-22 DIAGNOSIS — J98.4 RESTRICTIVE LUNG DISEASE: ICD-10-CM

## 2023-02-22 DIAGNOSIS — J44.9 ASTHMA-COPD OVERLAP SYNDROME (HCC): Primary | Chronic | ICD-10-CM

## 2023-02-22 DIAGNOSIS — I35.0 MODERATE AORTIC STENOSIS BY PRIOR ECHOCARDIOGRAM: ICD-10-CM

## 2023-02-22 PROCEDURE — 99214 OFFICE O/P EST MOD 30 MIN: CPT | Performed by: NURSE PRACTITIONER

## 2023-02-22 PROCEDURE — 1111F DSCHRG MED/CURRENT MED MERGE: CPT | Performed by: NURSE PRACTITIONER

## 2023-02-22 PROCEDURE — 1123F ACP DISCUSS/DSCN MKR DOCD: CPT | Performed by: NURSE PRACTITIONER

## 2023-02-22 PROCEDURE — G8427 DOCREV CUR MEDS BY ELIG CLIN: HCPCS | Performed by: NURSE PRACTITIONER

## 2023-02-22 RX ORDER — IPRATROPIUM BROMIDE AND ALBUTEROL SULFATE 2.5; .5 MG/3ML; MG/3ML
1 SOLUTION RESPIRATORY (INHALATION) 4 TIMES DAILY
Qty: 360 ML | Refills: 11 | Status: SHIPPED | OUTPATIENT
Start: 2023-02-22

## 2023-02-22 ASSESSMENT — ENCOUNTER SYMPTOMS: SHORTNESS OF BREATH: 1

## 2023-02-22 NOTE — PROGRESS NOTES
Chandrakant Newman, was evaluated through a synchronous (real-time) audio-video encounter. The patient (or guardian if applicable) is aware that this is a billable service, which includes applicable co-pays. This Virtual Visit was conducted with patient's (and/or legal guardian's) consent. The visit was conducted pursuant to the emergency declaration under the [facility], [address] [place] [address] authority and the [email or web address] and [employer] Act. Patient identification was verified, and a caregiver was present when appropriate. The patient was located in a state where the provider was licensed to provide care. [place]. Consent:  The patient/healthcare decision maker is aware that this patient-initiated Telehealth encounter is a billable service, with coverage as determined by his insurance carrier. The patient is aware that he/she may receive a bill and has provided verbal consent to proceed:  yes    CHIEF COMPLAINT:      Chief Complaint   Patient presents with    Follow-Up from Hospital     Asthma/mild COPD overlap, hypoxia, Pulmonary  hypertension, dyspnea, JORJE, CHF         HISTORY OF PRESENT ILLNESS:    He is a very pleasant 50-year-old male w/hx of asthma/mild obstructive defect, hypoxia, sleep apnea, obesity, exertional dyspnea, JORJE who presents today with questions in regards to pulmonary hypertension, which was noted as severe at right heart cath during recent hospitalization. At prior visit, family member had mentioned family history of cirrhosis and wondered if he needed to have alpha-1 level. This was obtained and was normal.     To recap, Hospitalization 8/2022 secondary to volume overload, CHF with preserved EF. Had experienced volume overload and 25 pound weight gain. Chest x-rays obtained during the hospitalization are reviewed, suggestive of cardiomegaly, unchanged.   Initially there was mild pulmonary venous congestion and perihilar edema, this improved on follow-up imaging. Hospitalization 2/2023 with acute pulmonary edema, chronic diastolic heart failure with preserved EF, CAD, stage IV chronic kidney disease. right heart cath and by available report it appears that PA systolic pressure 88 with PA diastolic pressure 45 with a mean pressure of 61. See diagnostics below for full report. He was discharged with palliative care with possible bridge to hospice. Discharge note : \"We will follow closely with nephrology and to consider additional trials of medication interventions as an outpatient such as possible erythropoietin if qualifies and/or pulmonary referral for possible Revatio or other agent. He will continue with Bumex increased to 3 times daily under recommendations of nephrology. Blood pressure medications have been significantly attenuated with elimination of hydralazine. He will continue on low-dose twice daily long-acting Toprol-XL typically once daily drug but was taking twice daily previously. Significant decrease dosing continue long-acting nitrate. Follow-up with nephrology, primary care, cardiology and pulmonary. Plan is to discharge home with palliative care with possible bridge to hospice given late stage renal disease, severe pulmonary hypertension and multiple other comorbidities as listed. Goal is attempt to obtain as much comfort for him as possible and to remain functional if able. Patient and daughter request evaluation for home oxygen or referral, and medical supplies for the home such as wheelchair if able. He appears stable for discharge to home with home health and palliative care-possible bridge to end-of-life care pending clinical course. \"        He requested video visit today, has several questions. Wife and daughter are present during visit. Recants events of past couple of weeks. Questions:    1 - Medications about nebulizer. Using 2 - 3 times a day. Helping dyspnea.   2 - does he have COPD or asthma?  3 - ok to add oxygen to CPAP, he added O2 2 lpm via port, sleeping much better. 4 - rx for PH. 5 - is it ok for him to contact us if he indeed is placed in hospice. Anticipates hospice evaluation in next couple of days. He is using duoneb from hospital 2 - 3 times daily and reports that dyspnea is improved with it compared to albuterol inhaler. Using oxygen with exertion prn and finds it beneficial.  He connected O2 to CPAP on his own and states that he is sleeping better and longer without interrupted sleep. Compliant with breo. DIAGNOSTICS:       CPFt's 3/2016 - Spirometry is consistent with mild obstructive defect with decreased forced vital capacity, lung volume and atelectasis is consistent with mild gas trapping. The diffusion capacity is normal. Overall this is consistent with GOLD stage II COPD. CXR 3/2016-unremarkable. Echocardiogram 12/2015- EF 65-70%. Diastolic dysfunction. Myocardial stress test - CONCLUSION:   1. Stress EKG: Normal.  2. SPECT Perfusion Imaging: Fixed defect likely related to diaphragmatic attenuation - probably normal.  3. LV Systolic Function is  normal.   ECHO - 9/22/2017-EF 70-75%, hyperdynamic, mild LVH. Diastolic function indeterminant. RVSP estimated at 22 mmHg. CXR 10/2016-no acute findings. Ambulatory oximetry 10/5/2016 - baseline saturation 97%, dropped to 95% with ambulation. Baseline heart rate 82, increased to 124 with ambulation. Elevated d-dimer 10/2016, subsequent negative ventilation/perfusion study. Spirometry 6/20/2017-moderate combined obstructive and restrictive defect, interval improvement since prior study. Spirometry 5/25/2018 - patient declined, (back pain). Spirometry 3/29/2019-moderate restrictive defect, interval decline in FVC, no significant change in FEV1. Previous complete pulmonary function test have demonstrated obstructive defect with air trapping.   Echo 8/3/2020-EF 39-12%, + diastolic dysfunction, mild aortic valve sclerosis without significant stenosis, minimal aortic regurgitation. Mild MR. Trace TR.  RVSP could not be estimated due to no tricuspid regurgitation. Spirometry 12/3/2020-mild restrictive defect, slight improvement in FVC and FEV1 compared to last study, significant improvement compared to baseline study. Normal alpha-1 level 6/2021. CXR 6/1/2022- lung parenchyma is clear, no acute cardiopulmonary abnormality. Ambulatory oximetry 6/3/2022-baseline saturation 97% room air at rest, 97% room air with ambulation. Baseline heart rate 58, maximum 73 with ambulation. CXR 8/2/2022-mild pulmonary venous congestion and perihilar edema with probable mild CHF/fluid imbalance. CXR 8/12/2022-borderline cardiomegaly, unchanged. No acute cardiopulmonary abnormality. Spirometry 12/2/2022-normal.  Interval improvement in FVC. No significant change in FEV1. ECHO 1/26/2023-EF 60-65%, abnormal diastolic function, moderate stenosis of AV, mild MR, RVSP estimated 57 mmHg, severe LA dilatation, moderately dilated RA. RHC 2/17/2023-Severely elevated right-sided pressures with severely elevated RA pressures right ventricular systolic pressures measuring in the mid 90s and PA pressures measuring in the 28-72 range for systolic PA mean pressure is greater than 60. Right atrial and pulmonary artery sats are low 50s 52 to 55%. Wedge pressure with rezeroing and refluxing is profoundly elevated at at least 36. Due to the wedge being so severely elevated patient is not appropriate candidate for vasoactive challenge but he certainly has severe probably end-stage elevation of his pulmonary artery pressures again with systolic PA pressures in the 90s     PA pressure = 88/45 mmHg. PA mean = 61 mmHg. PA Sat = 52.9 %. Mid RA pressure = 29/27 mmHg. Mid RA mean = 25 mmHg. Mid RA Sat = 51.9 %. RV pressure = 90/19 mmHg. RV mean = 28 mmHg. Wedge pressure = 36 mmHg. AO Sat = 95 %. Alhaji CO = 5.96 L/min.    TDCO  Testing: Provocative Test #1: 5.8 L/min. Provocative Test #2: 5.7 L/min. Provocative Test #3: 5.6 L/min. Reviewed with Dr Justo Apodaca. \"PVR is 4.38 RUBIO. Combined pre and postcapillary PH. Precapillary component is mild with PVR <5.  Would not recommend vasodilators unless postcapillary component truly optimized. Only  then could a short term 3 month trial of PDE5i like tadalafil or sildenafil be considered with pre/post 6MWT to assess for benefit.   The risk/benefit is equivocal.\"                 ______________________________________________    Past Medical History:   Diagnosis Date    Acquired hypothyroidism 4/8/2016    Anemia 1/21/2015    Anemia in chronic kidney disease 4/8/2016    Asthma     Chronic kidney disease     stage 4 kidney disease- Dr. Jailene Post    CKD (chronic kidney disease) 10/11/2012    Coronary atherosclerosis of native coronary vessel 2/10/2016    Diabetes mellitus type 2 in obese ([address] 10/11/2012    avg 150, symptoms of hypoglycemia 90    Diastolic heart failure ([address]     Dyspnea     Dyspnea 2/10/2016    Edema 10/11/2012    Elevated liver function tests 8/20/2015    Hypertension 10/11/2012    Hyponatremia 1/21/2015    Morbid obesity ([address]     BMI 43.54    JORJE (obstructive sleep apnea) 10/11/2012    cpap    Seborrheic dermatitis 1/16/2013    Seborrheic dermatitis          Patient Active Problem List   Diagnosis    Edema    Chronic kidney disease (CKD), stage IV (severe) (HCC)    JORJE (obstructive sleep apnea)    Dyspnea    Cough variant asthma    PPD positive    Hyponatremia    Chest pain    Anemia in chronic kidney disease    Obesity, morbid ([address]    SEARS (dyspnea on exertion)    Abnormal nuclear stress test    Type 2 diabetes with nephropathy (HCC)    Hypertension    Seborrheic dermatitis    Elevated liver function tests    Anemia    Diastolic CHF, chronic (HCC)    Diabetes mellitus type 2 in obese (HCC)    CAD in native artery    Acquired hypothyroidism    Restrictive lung disease    Volume overload    Chronic heart failure with preserved ejection fraction (HFpEF) (HCC)    Moderate aortic stenosis by prior echocardiogram    Chronic gout due to renal impairment without tophus    Hypothyroidism due to Hashimoto's thyroiditis    PHU (iron deficiency anemia)    Accelerating angina (HCC)    Bilateral lower extremity edema    Volume overload state of heart    Acute pulmonary edema (HCC)    Generalized weakness    Decreased exercise tolerance    Left foot pain    Hyperuricemia         Past Surgical History:   Procedure Laterality Date    CARDIAC CATHETERIZATION  2016    3 stent    CARDIAC PROCEDURE N/A 1/27/2023    LEFT HEART CATH / CORONARY ANGIOGRAPHY performed by Luz Maria Petit MD at [facility] CATH LAB    CARDIAC PROCEDURE N/A 1/27/2023    Percutaneous coronary intervention performed by Luz Maria Petit MD at [id number] [place] [address] N/A 2/17/2023    RIGHT HEART CATH performed by Luz Maria Petit MD at [facility] CARDIAC CATH LAB    CATARACT REMOVAL Bilateral 2017    HERNIA REPAIR  1957       Allergies   Allergen Reactions    Amlodipine Shortness Of Breath    Iodine Shortness Of Breath    Metformin Shortness Of Breath    Ranolazine Other (See Comments)     Lips and tongue numbness    Spironolactone Other (See Comments)     Potassium level went really high  Increased potassium    Hydrocodone-Acetaminophen Other (See Comments)     Pt states that if he takes this  The medication makes him feel like he is going crazy    Omeprazole Other (See Comments)     Caused drastic drop in blood sugar       Current Outpatient Medications   Medication Sig    metOLazone (ZAROXOLYN) 5 MG tablet Take 1 tablet by mouth daily    metoprolol succinate (TOPROL XL) 25 MG extended release tablet Take 0.5 tablets by mouth in the morning and at bedtime    bumetanide (BUMEX) 1 MG tablet Take 1 tablet by mouth in the morning, at noon, and at bedtime    albuterol (PROVENTIL) (2.5 MG/3ML) 0.083% nebulizer solution Take 3 mLs by nebulization in the morning, at noon, in the evening, and at bedtime Bill to Medicare D Code: J45.991    clopidogrel (PLAVIX) 75 MG tablet Take 1 tablet by mouth daily    nitroGLYCERIN (NITROSTAT) 0.4 MG SL tablet Place 1 tablet under the tongue every 5 minutes as needed for Chest pain up to max of 3 total doses. If no relief after 1 dose, call 911. isosorbide mononitrate (IMDUR) 60 MG extended release tablet Take 1 tablet by mouth daily    CPAP Machine MISC by Does not apply route At HS    albuterol sulfate HFA (PROVENTIL;VENTOLIN;PROAIR) 108 (90 Base) MCG/ACT inhaler 2 puffs 4 times daily if needed for shortness of breath or wheezing. Patient will call when refills are needed    Fluticasone Furoate-Vilanterol (BREO ELLIPTA) 100-25 MCG/ACT AEPB 1 inhalation every morning, rinse mouth after use. allopurinol (ZYLOPRIM) 300 MG tablet Take 150 mg by mouth daily    aspirin 81 MG EC tablet Take 81 mg by mouth    calcitRIOL (ROCALTROL) 0.25 MCG capsule Take 0.25 mcg by mouth daily    Dulaglutide (TRULICITY) 1.5 WW/9.4AN SOPN Inject 1.5 mg into the skin every 7 days On sundays    insulin glargine (LANTUS) 100 UNIT/ML injection vial Inject 25 Units into the skin 2 times daily    levothyroxine (SYNTHROID) 88 MCG tablet Take 88 mcg by mouth    sodium bicarbonate 650 MG tablet Take 650 mg by mouth 2 times daily     No current facility-administered medications for this visit. PHYSICAL EXAM:    [unfilled]  There is no height or weight on file to calculate BMI. GENERAL APPEARANCE:  The patient is  in no respiratory distress. HEENT:  PERRL. Conjunctivae unremarkable. LUNGS:   Normal respiratory effort      NEURO:  The patient is alert and oriented to person, place, and time. Memory appears intact and mood is normal.      Review of Systems   Constitutional:  Positive for fatigue. Negative for chills and fever. Respiratory:  Positive for shortness of breath.         DIAGNOSTICS:     CXR:      XR CHEST (2 VW) 08/12/2022      FINDINGS:  Support Devices:  *  None    Cardiac Silhouette: Borderline cardiomegaly, unchanged. Mediastinum: Normal mediastinal contours. Aortic arch calcifications. Lungs: No airspace consolidation. No pneumothorax or sizable pleural effusion. Upper Abdomen: Normal    Miscellaneous: No fracture or suspicious osseous lesion. Impression  No acute cardiopulmonary abnormality. ASSESSMENT:   (Medical Decision Making)                                                                                                                                          Encounter Diagnoses   Name Primary? Asthma-COPD overlap syndrome (HCC) Yes    JORJE (obstructive sleep apnea)     Restrictive lung disease     Acute pulmonary edema (HCC)     Chronic heart failure with preserved ejection fraction (HFpEF) (HCC)     Moderate aortic stenosis by prior echocardiogram      Addressed patient's questions today, discussed that initial CPFT's in 2016 demonstrated mild obstructive defect, air trapping, normal diffusion. Discussed that on last spirometry in December, FVC and FEV1 were improved and within normal limits. He is on appropriate treatment for airways disease with Breo. He has perceived interval improvement in dyspnea with the use of DuoNeb and nebulizer. He is compliant with CPAP. At some point, oxygen was obtained and he has been using it during the day if he feels he needs it and also connecting it to CPAP and feels that he is sleeping better. We discussed that technically, overnight oximetry would direct need for oxygen bled into CPAP as well as ambulatory oximetry showing need for oxygen with exertion, however in setting with hospice support, it is acceptable to use oxygen as he is doing since he perceives improvement symptomatically. Reviewed right heart cath with Dr. Rachid Tovar who also spoke with the patient during video visit today.   It is felt that revatio or equivalent Rx is not definitely indicated at this time. We would need 6-minute ambulatory oximetry, states he is unable to perform that degree of ambulation. We discussed risk/benefit of this type of Rx. Attempting to optimize volume status with assistance of nephrology before any additional agents would be considered; this is challenging in view of chronic kidney disease                  PLAN:     The above was discussed with him. Time for questions, including family, was allowed. He reports that questions were answered to his satisfaction. Hospice evaluation as planned. Discussed benefit of additional Rx could be provided by hospice beneficial for shortness of breath. Continue oxygen as he is doing. Continue CPAP. Since he has perceived response to [facility], may continue to use it 3-4 times daily, continue Breo 1 inhalation every morning. Reassured him that we are available to him despite entering hospice. Offered appointment sooner than scheduled, which is December. However, he declined and will contact us when needed. Orders Placed This Encounter   Medications    ipratropium-albuterol (DUONEB) 0.5-2.5 (3) MG/3ML SOLN nebulizer solution     Sig: Inhale 3 mLs into the lungs 4 times daily Bill to medicare part B, dx J44.9     Dispense:  360 mL     Refill:  11       She        Total  time spent with patient - 37 min      Collaborating MD: Dr. Da Silva Speaks      Documentation:    We discussed the expected course, resolution and complications of the diagnosis(es) in detail. Medication risks, benefits, costs, interactions, and alternatives were discussed as indicated. I advised the patient to contact the office if their condition worsens, changes or fails to improve as anticipated. The patient expressed understanding with the diagnosis(es) and plan.      Pursuant to the emergency declaration under the [address] [facility] [place], [address] and Response Supplemental Appropriations Act, this telemedicine visit was conducted, with patient's consent, to reduce the patient's risk of exposure to COVID-19 and provide continuity of care for an established patient. Services were provided through a video synchronous or telephone discussion to substitute for in-person clinic visit. I affirm this is a Patient Initiated Episode with an Established Patient who has not had a related appointment within my department in the past 7 days or scheduled within the next 24 hours.     Note: not billable if this call serves to triage the patient into an appointment for the relevant concern      Trsiton Tate, APRN - CNP

## 2023-02-22 NOTE — TELEPHONE ENCOUNTER
Patients daughter called stating she was calling back to get the name of a Hospice group that Dr Guera Banks could refer to. Please call and advise.

## 2023-02-22 NOTE — TELEPHONE ENCOUNTER
Please call daughter of patient and tell them the name of the hospice group that Dr Darron Luna referred him to.

## 2023-02-23 ENCOUNTER — TELEPHONE (OUTPATIENT)
Dept: CARDIOLOGY CLINIC | Age: 83
End: 2023-02-23

## 2023-02-23 NOTE — TELEPHONE ENCOUNTER
Called pt's daughter and advised of the group was given a verbal understanding. Pt's daughter wanted to let Dr. Chaparro Viramontes know since starting the bumex pt has lost 3 lbs.

## 2023-02-23 NOTE — TELEPHONE ENCOUNTER
----- Message from Rowan Villatoro DO sent at 2/21/2023  9:10 AM EST -----  [facility] Needs to be seen ASAP.    Thanks

## 2023-03-29 ENCOUNTER — TELEPHONE (OUTPATIENT)
Dept: CARDIOLOGY CLINIC | Age: 83
End: 2023-03-29

## 2023-03-29 NOTE — TELEPHONE ENCOUNTER
No, we can do phone visit with Cynthia Jordan if needed. Marybel Tan, please call them, see how he is doing. I do not do virtuals. If they cancel, please move a noon patient to his slot.     Thanks

## 2023-03-29 NOTE — TELEPHONE ENCOUNTER
Pt's son stopped by office to confirm his father's appt on 3/30/23 will be a phone visit. Pt is in Hospice care and unable to travel to office. States Dr Kenyon Last agreed to phone visit.  Please call if questions [phone]

## 2023-03-29 NOTE — TELEPHONE ENCOUNTER
This is good BP and HR.    Can take metolazone daily or PRN, can increase it to 3-4 days per week if it helps.    302 is a good weight.   Is he on hospice now, this may help with mgmt at home for him and family?  Thanks

## 2023-03-29 NOTE — TELEPHONE ENCOUNTER
I spoke w/pt. he has a few questions:    1)BP is running around 110/71 HR=86 is this an ok BP? 2)On Metolazone 5mg qday PRN been using about twice a week and seems to be working good but still SOB. Is it ok to continue as doing? 3)Wt. is down to 302 lbs.but still SOB w/exertion. Any comments or suggestions?

## (undated) DEVICE — CONNECTOR TBNG OD5-7MM O2 END DISP

## (undated) DEVICE — DEVICE COMPR LNG 27 CM VASC BND

## (undated) DEVICE — GUIDEWIRE VASC L260CM DIA0.025IN TIP L7CM DIA3MM STD PTFE J

## (undated) DEVICE — CATHETER DIAG 5FR L100CM LUMN ID0.047IN JL4 CRV 0 SIDE H

## (undated) DEVICE — CATHETER COR DIAG PIGTAILS PIG 145 CRV 5FR 110CM 6 SIDE H

## (undated) DEVICE — CATH BLLN ANGIO 3X15MM NC EUPHORIA RX

## (undated) DEVICE — GUIDE CATHETER: Brand: RUNWAY™

## (undated) DEVICE — Device

## (undated) DEVICE — CATHETER COR DIAG 4.0 5FR 110CM 2 SIDE H

## (undated) DEVICE — CATHETER GUID 6FR L150CM RAP EXCHG L25CM TIP 5.1FR PUSHROD

## (undated) DEVICE — GUIDEWIRE VASC L180CM DIA0.035IN L7CM DIA3MM J TIP PTFE S

## (undated) DEVICE — BLOCK BITE AD 60FR W/ VELC STRP ADDRESSES MOST PT AND

## (undated) DEVICE — CATHETER THRMDIL 7FR L110CM STD PULM ART 4 INFUS LUMN SWN

## (undated) DEVICE — HEMOSTASIS VALVE PHD SM BORE WITH SPRING

## (undated) DEVICE — FORCEPS BX L240CM JAW DIA2.8MM L CAP W/ NDL MIC MESH TOOTH

## (undated) DEVICE — INTRODUCER SHTH 7FR CANN L11CM 0.038IN STD ORNG W/ MINI

## (undated) DEVICE — GUIDEWIRE VASC L260CM DIA0.035IN RAD 3MM J TIP L7CM PTFE

## (undated) DEVICE — PERCUTANEOUS ENTRY THINWALL NEEDLE  ONE-PART: Brand: COOK

## (undated) DEVICE — Device: Brand: PROWATER

## (undated) DEVICE — CATH BLLN ANGIO 2.50X15MM SC EUPHORA RX

## (undated) DEVICE — CANNULA NSL ORAL AD FOR CAPNOFLEX CO2 O2 AIRLFE

## (undated) DEVICE — KENDALL RADIOLUCENT FOAM MONITORING ELECTRODE RECTANGULAR SHAPE: Brand: KENDALL

## (undated) DEVICE — CONTAINER PREFIL FRMLN 40ML --

## (undated) DEVICE — Z DUP USE 2120483 DEVICE COMPR REG 24 CM VASC BND

## (undated) DEVICE — GLIDESHEATH SLENDER STAINLESS STEEL KIT: Brand: GLIDESHEATH SLENDER